# Patient Record
Sex: FEMALE | Race: WHITE | NOT HISPANIC OR LATINO | Employment: OTHER | URBAN - NONMETROPOLITAN AREA
[De-identification: names, ages, dates, MRNs, and addresses within clinical notes are randomized per-mention and may not be internally consistent; named-entity substitution may affect disease eponyms.]

---

## 2017-01-23 ENCOUNTER — HOSPITAL ENCOUNTER (OUTPATIENT)
Dept: GENERAL RADIOLOGY | Facility: HOSPITAL | Age: 72
Discharge: HOME | End: 2017-01-23
Attending: INTERNAL MEDICINE | Admitting: INTERNAL MEDICINE

## 2017-01-23 ENCOUNTER — TRANSCRIBE ORDERS (OUTPATIENT)
Dept: ADMINISTRATIVE | Facility: HOSPITAL | Age: 72
End: 2017-01-23

## 2017-01-23 DIAGNOSIS — M25.562 LEFT KNEE PAIN, UNSPECIFIED CHRONICITY: ICD-10-CM

## 2017-01-23 DIAGNOSIS — M25.562 LEFT KNEE PAIN, UNSPECIFIED CHRONICITY: Primary | ICD-10-CM

## 2017-01-23 PROCEDURE — 73564 X-RAY EXAM KNEE 4 OR MORE: CPT

## 2017-01-23 PROCEDURE — 73564 X-RAY EXAM KNEE 4 OR MORE: CPT | Performed by: RADIOLOGY

## 2017-02-07 DIAGNOSIS — M25.562 LEFT KNEE PAIN, UNSPECIFIED CHRONICITY: Primary | ICD-10-CM

## 2017-02-13 ENCOUNTER — APPOINTMENT (OUTPATIENT)
Dept: GENERAL RADIOLOGY | Facility: HOSPITAL | Age: 72
End: 2017-02-13
Attending: ORTHOPAEDIC SURGERY

## 2018-11-07 ENCOUNTER — OFFICE VISIT (OUTPATIENT)
Dept: ORTHOPEDIC SURGERY | Facility: CLINIC | Age: 73
End: 2018-11-07

## 2018-11-07 VITALS
HEART RATE: 66 BPM | DIASTOLIC BLOOD PRESSURE: 54 MMHG | HEIGHT: 60 IN | WEIGHT: 100 LBS | SYSTOLIC BLOOD PRESSURE: 121 MMHG | BODY MASS INDEX: 19.63 KG/M2

## 2018-11-07 DIAGNOSIS — M79.642 HAND PAIN, LEFT: ICD-10-CM

## 2018-11-07 DIAGNOSIS — M65.332 TRIGGER MIDDLE FINGER OF LEFT HAND: Primary | ICD-10-CM

## 2018-11-07 PROCEDURE — 99406 BEHAV CHNG SMOKING 3-10 MIN: CPT | Performed by: ORTHOPAEDIC SURGERY

## 2018-11-07 PROCEDURE — 99204 OFFICE O/P NEW MOD 45 MIN: CPT | Performed by: ORTHOPAEDIC SURGERY

## 2018-11-07 RX ORDER — TRAMADOL HYDROCHLORIDE 50 MG/1
50 TABLET ORAL EVERY 6 HOURS PRN
Status: ON HOLD | COMMUNITY
End: 2018-12-18 | Stop reason: SDUPTHER

## 2018-11-07 RX ORDER — LISINOPRIL 10 MG/1
10 TABLET ORAL DAILY
Status: ON HOLD | COMMUNITY
Start: 2016-09-12 | End: 2019-04-20

## 2018-11-07 RX ORDER — PYRIDOXINE HCL (VITAMIN B6) 50 MG
100 TABLET ORAL DAILY
COMMUNITY
End: 2018-11-21

## 2018-11-07 RX ORDER — ERGOCALCIFEROL 1.25 MG/1
50000 CAPSULE ORAL WEEKLY
Status: ON HOLD | COMMUNITY
End: 2019-04-20

## 2018-11-07 RX ORDER — DILTIAZEM HYDROCHLORIDE 240 MG/1
240 CAPSULE, COATED, EXTENDED RELEASE ORAL DAILY
COMMUNITY
Start: 2016-12-19 | End: 2018-12-18 | Stop reason: HOSPADM

## 2018-11-07 RX ORDER — ATENOLOL 25 MG/1
25 TABLET ORAL DAILY
COMMUNITY
Start: 2013-11-19 | End: 2019-03-28

## 2018-11-07 NOTE — PROGRESS NOTES
History & Physical      Patient: Anita Roche  YOB: 1945  Date of Encounter: 11/07/2018        Chief Complaint:   Chief Complaint   Patient presents with   • Left Hand - Pain, Follow-up       HPI:   Anita Roche, 73 y.o. female, referred by Diann Ferreira APRN presents today with pain in her palm along her fourth finger and complaints of locking sensation which is very painful.  She is right-hand dominant.  She denies weakness or numbness in her hand.  She has no other major complaints in her hand.  She has had trigger finger release right hand in the past.    Active Problem List:  Patient Active Problem List   Diagnosis   • Chronic fatigue syndrome   • Knee pain   • Closed wedge compression fracture of thoracic vertebra (CMS/HCC)   • Atherosclerosis of coronary artery   • Persistent cough   • Degeneration of intervertebral disc of lumbar region   • Stricture of esophagus   • Hand pain, left       Past Medical History:  Past Medical History:   Diagnosis Date   • Hypertension        Past Surgical History:  Past Surgical History:   Procedure Laterality Date   • BACK SURGERY     • CATARACT EXTRACTION, BILATERAL     • CHOLECYSTECTOMY     • TONSILLECTOMY AND ADENOIDECTOMY     • TRIGGER FINGER RELEASE Right     Third and Fourth Fingers Dr. Ashley       Family History:  Family History   Problem Relation Age of Onset   • Hypertension Mother    • Hypertension Father    • Cancer Sister    • Hypertension Sister        Social History:  Social History     Social History   • Marital status:      Spouse name: N/A   • Number of children: N/A   • Years of education: N/A     Occupational History   •       Works for [employer]     Social History Main Topics   • Smoking status: Current Every Day Smoker     Packs/day: 0.25     Years: 30.00   • Smokeless tobacco: Never Used   • Alcohol use No   • Drug use: No   • Sexual activity: Defer     Other Topics Concern   • Not on file     Social History Narrative    • No narrative on file     Patient's Body mass index is 19.53 kg/m². BMI is below normal parameters. Recommendations include: no follow-up required.    I advised Anita of the risks of continuing to use tobacco, and I provided her with tobacco cessation educational materials in the After Visit Summary.     During this visit, I spent 3 minutes counseling the patient regarding tobacco cessation.    Medications:  Current Outpatient Prescriptions   Medication Sig Dispense Refill   • aspirin 81 MG tablet Take 2 tablets by mouth.     • atenolol (TENORMIN) 25 MG tablet Take  by mouth.     • cyanocobalamin (CYANOCOBALAMIN) 100 MCG tablet tablet Take 100 mcg by mouth Daily.     • diltiaZEM CD (CARDIZEM CD) 240 MG 24 hr capsule Take  by mouth.     • lisinopril (PRINIVIL,ZESTRIL) 10 MG tablet Take  by mouth.     • traMADol (ULTRAM) 50 MG tablet Take 50 mg by mouth Every 6 (Six) Hours As Needed for Moderate Pain .     • vitamin D (ERGOCALCIFEROL) 69877 units capsule capsule Take 50,000 Units by mouth 1 (One) Time Per Week.       No current facility-administered medications for this visit.        Allergies:  Allergies   Allergen Reactions   • Atorvastatin Unknown (See Comments)   • Meperidine Unknown (See Comments)   • Sulfa Antibiotics Nausea Only   • Versed [Midazolam] Other (See Comments)     Patient states she can not wake up       Review of Systems:   Review of Systems   Constitutional: Negative.    HENT: Negative.    Eyes: Negative.    Respiratory: Positive for cough and shortness of breath.    Cardiovascular: Negative.    Gastrointestinal: Negative.    Endocrine: Negative.    Genitourinary: Negative.    Musculoskeletal: Positive for arthralgias.   Skin: Negative.    Allergic/Immunologic: Negative.    Neurological: Negative.    Hematological: Negative.    Psychiatric/Behavioral: Negative.        Physical Exam:   Physical Exam   Constitutional: She is oriented to person, place, and time. She appears well-developed. No  "distress.   HENT:   Head: Normocephalic and atraumatic.   Right Ear: External ear normal.   Left Ear: External ear normal.   Eyes: Conjunctivae and EOM are normal. Right eye exhibits no discharge. Left eye exhibits no discharge.   Neck: Normal range of motion. Neck supple.   Cardiovascular: Normal rate, regular rhythm and normal heart sounds.    No murmur heard.  Pulmonary/Chest: Effort normal and breath sounds normal. No respiratory distress.   Abdominal: Soft. She exhibits no distension.   Musculoskeletal:   Left hand evaluation reveals palpable nodule within the palm in line with the third finger.  She does demonstrate triggering with significant swelling and pain with attempted motion.  Neurovascular is grossly intact.   Neurological: She is alert and oriented to person, place, and time.   Skin: Skin is warm and dry. She is not diaphoretic.   Psychiatric: She has a normal mood and affect. Her behavior is normal. Judgment and thought content normal.     GENERAL: 73 y.o. female, alert and oriented X 3 in no acute distress.   Visit Vitals  /54   Pulse 66   Ht 152.4 cm (60\")   Wt 45.4 kg (100 lb)   BMI 19.53 kg/m²         Assessment & Plan:   73 y.o. female with obvious triggering left third finger.  We discussed her options, risks and benefits and I doubt she would have long-term success with steroid injection and she is more interested in obtaining permanent relief and wishes to proceed with surgical option. We have therefore scheduled her for trigger finger release left third distal to be done under local anesthetic at [facility].       ICD-10-CM ICD-9-CM   1. Trigger middle finger of left hand M65.332 727.03   2. Hand pain, left M79.642 729.5               Cc:   Diann Ferreira APRN          Scribed for Garret De Anda MD by Hue De Anda RN.10:39 AM 11/07/2018    "

## 2018-11-08 PROBLEM — M25.569 KNEE PAIN: Status: ACTIVE | Noted: 2018-11-08

## 2018-11-08 PROBLEM — G93.32 CHRONIC FATIGUE SYNDROME: Status: ACTIVE | Noted: 2018-11-08

## 2018-11-08 PROBLEM — I25.10 ATHEROSCLEROSIS OF CORONARY ARTERY: Status: ACTIVE | Noted: 2018-11-08

## 2018-11-08 PROBLEM — K22.2 STRICTURE OF ESOPHAGUS: Status: ACTIVE | Noted: 2018-11-08

## 2018-11-08 PROBLEM — R05.3 PERSISTENT COUGH: Status: ACTIVE | Noted: 2018-11-08

## 2018-11-08 PROBLEM — M79.642 HAND PAIN, LEFT: Status: ACTIVE | Noted: 2018-11-08

## 2018-11-08 PROBLEM — S22.000A CLOSED WEDGE COMPRESSION FRACTURE OF THORACIC VERTEBRA (HCC): Status: ACTIVE | Noted: 2018-11-08

## 2018-11-19 ENCOUNTER — TELEPHONE (OUTPATIENT)
Dept: ORTHOPEDIC SURGERY | Facility: CLINIC | Age: 73
End: 2018-11-19

## 2018-11-19 PROBLEM — M65.332 TRIGGER MIDDLE FINGER OF LEFT HAND: Status: ACTIVE | Noted: 2018-11-19

## 2018-11-19 NOTE — TELEPHONE ENCOUNTER
Patient has been notified concerning PAT date/time 11-21-18@11:30. Patient verbalized understanding.

## 2018-11-21 ENCOUNTER — APPOINTMENT (OUTPATIENT)
Dept: PREADMISSION TESTING | Facility: HOSPITAL | Age: 73
End: 2018-11-21

## 2018-11-21 VITALS — WEIGHT: 98.8 LBS | BODY MASS INDEX: 19.3 KG/M2

## 2018-11-21 LAB
ANION GAP SERPL CALCULATED.3IONS-SCNC: 4.4 MMOL/L (ref 3.6–11.2)
BASOPHILS # BLD AUTO: 0.03 10*3/MM3 (ref 0–0.3)
BASOPHILS NFR BLD AUTO: 0.3 % (ref 0–2)
BUN BLD-MCNC: 13 MG/DL (ref 7–21)
BUN/CREAT SERPL: 16.5 (ref 7–25)
CALCIUM SPEC-SCNC: 9.7 MG/DL (ref 7.7–10)
CHLORIDE SERPL-SCNC: 110 MMOL/L (ref 99–112)
CO2 SERPL-SCNC: 26.6 MMOL/L (ref 24.3–31.9)
CREAT BLD-MCNC: 0.79 MG/DL (ref 0.43–1.29)
DEPRECATED RDW RBC AUTO: 50 FL (ref 37–54)
EOSINOPHIL # BLD AUTO: 0.19 10*3/MM3 (ref 0–0.7)
EOSINOPHIL NFR BLD AUTO: 1.9 % (ref 0–7)
ERYTHROCYTE [DISTWIDTH] IN BLOOD BY AUTOMATED COUNT: 13.5 % (ref 11.5–14.5)
GFR SERPL CREATININE-BSD FRML MDRD: 72 ML/MIN/1.73
GLUCOSE BLD-MCNC: 70 MG/DL (ref 70–110)
HCT VFR BLD AUTO: 46.3 % (ref 37–47)
HGB BLD-MCNC: 14.9 G/DL (ref 12–16)
IMM GRANULOCYTES # BLD: 0.02 10*3/MM3 (ref 0–0.03)
IMM GRANULOCYTES NFR BLD: 0.2 % (ref 0–0.5)
LYMPHOCYTES # BLD AUTO: 1.94 10*3/MM3 (ref 1–3)
LYMPHOCYTES NFR BLD AUTO: 19.2 % (ref 16–46)
MCH RBC QN AUTO: 32.7 PG (ref 27–33)
MCHC RBC AUTO-ENTMCNC: 32.2 G/DL (ref 33–37)
MCV RBC AUTO: 101.5 FL (ref 80–94)
MONOCYTES # BLD AUTO: 0.83 10*3/MM3 (ref 0.1–0.9)
MONOCYTES NFR BLD AUTO: 8.2 % (ref 0–12)
NEUTROPHILS # BLD AUTO: 7.08 10*3/MM3 (ref 1.4–6.5)
NEUTROPHILS NFR BLD AUTO: 70.2 % (ref 40–75)
OSMOLALITY SERPL CALC.SUM OF ELEC: 279.8 MOSM/KG (ref 273–305)
PLATELET # BLD AUTO: 199 10*3/MM3 (ref 130–400)
PMV BLD AUTO: 13 FL (ref 6–10)
POTASSIUM BLD-SCNC: 4 MMOL/L (ref 3.5–5.3)
RBC # BLD AUTO: 4.56 10*6/MM3 (ref 4.2–5.4)
SODIUM BLD-SCNC: 141 MMOL/L (ref 135–153)
WBC NRBC COR # BLD: 10.09 10*3/MM3 (ref 4.5–12.5)

## 2018-11-21 PROCEDURE — 93005 ELECTROCARDIOGRAM TRACING: CPT

## 2018-11-21 PROCEDURE — 85025 COMPLETE CBC W/AUTO DIFF WBC: CPT | Performed by: ANESTHESIOLOGY

## 2018-11-21 PROCEDURE — 36415 COLL VENOUS BLD VENIPUNCTURE: CPT

## 2018-11-21 PROCEDURE — 80048 BASIC METABOLIC PNL TOTAL CA: CPT | Performed by: ANESTHESIOLOGY

## 2018-11-21 RX ORDER — UBIDECARENONE 75 MG
50 CAPSULE ORAL DAILY
Status: ON HOLD | COMMUNITY
End: 2018-11-27

## 2018-11-21 RX ORDER — CYANOCOBALAMIN 1000 UG/ML
1000 INJECTION, SOLUTION INTRAMUSCULAR; SUBCUTANEOUS
COMMUNITY
End: 2019-03-28

## 2018-11-21 NOTE — DISCHARGE INSTRUCTIONS
TAKE the following medications the morning of surgery:  All heart or blood pressure medications    HOLD all diabetic medications the morning of surgery as ordered by physician.    Please discontinue all blood thinners and anticoagulants (except aspirin) prior to surgery as per your surgeon and cardiologist instructions.  Aspirin may be continued up to the day prior to surgery.     CHLORHEXIDINE CLOTHS GIVEN WITH INSTRUCTIONS AND FORM TO RETURN TO HOSPITAL    General Instructions:  · Do not eat or drink after midnight: includes water, mints, or gum. You may brush your teeth.  Dental appliances that are removable must be taken out day of surgery.  · Do not smoke, chew tobacco, or drink alcohol.  · Bring medications in original bottles, any inhalers and if applicable your C-PAP/BI-PAP machine.  · Bring any papers given to you in the doctor's office.  · Wear clean comfortable clothes and socks.  · Do not wear contact lenses or make-up. Bring a case for your glasses if applicable.  · Bring crutches or walker if applicable.  · Leave all other valuables and jewelry at home.    If you were given a blood bank ID arm band remember to bring it with you the day of surgery.    Preventing a Surgical Site Infection:  Shower the night before surgery (unless instructed other wise) using a fresh bar of anti-bacterial soap (such as Dial) and clean washcloth. Dry with a clean towel and dress in clean clothing.  For 2 to 3 days before surgery, avoid shaving with a razor near where you will have surgery because the razor can irritate skin and make it easier to develop an infection. Ask your surgeon if you will be receiving antibiotics prior to surgery.  Make sure you, your family, and all healthcare providers clear their hands with soap and water or an alcohol-based hand  before caring for you or your wound.  If at all possible, quit smoking as many days before surgery as you can.    Day of surgery:  Upon arrival, a Pre-op nurse  and Anesthesiologist will review your health history, obtain vital signs, and answer questions you may have. The only belongings needed at this time will be your home medications and if applicable your C-PAP/BI-PAP machine. If you are staying overnight your family can leave the rest of your belongings in the car and bring them to your room later. A Pre-op nurse will start an IV and you may receive medication in preparation for surgery, including something to help you relax. Your family will be able to see you in the Pre-op area. While you are in surgery your family should notify the waiting room  if they leave the waiting room area and provide a contact phone number.    Please be aware that surgery does come with discomfort. We want to make every effort to control your discomfort so please discuss any uncontrolled symptoms with your nurse. Your doctor will most likely have prescribed pain medications.    If you are going home after surgery you will receive individualized written care instructions before being discharged. A responsible adult must drive you to and from the hospital on the day of surgery and stay with you for 24 hours.    If you are staying overnight following surgery, you will be transported to your hospital room following the recovery period.  [facility] has all private rooms.    If you have any questions please call Pre-Admission Testing at [phone].  Deductibles and co-payments are collected on the day of service. Please be prepared to pay the required co-pay, deductible or deposit on the day of service as defined by your plan.

## 2018-11-26 ENCOUNTER — TELEPHONE (OUTPATIENT)
Dept: ORTHOPEDIC SURGERY | Facility: CLINIC | Age: 73
End: 2018-11-26

## 2018-11-27 ENCOUNTER — PREP FOR SURGERY (OUTPATIENT)
Dept: OTHER | Facility: HOSPITAL | Age: 73
End: 2018-11-27

## 2018-11-27 ENCOUNTER — HOSPITAL ENCOUNTER (OUTPATIENT)
Facility: HOSPITAL | Age: 73
Setting detail: HOSPITAL OUTPATIENT SURGERY
Discharge: HOME | End: 2018-11-27
Attending: ORTHOPAEDIC SURGERY | Admitting: ORTHOPAEDIC SURGERY

## 2018-11-27 VITALS
BODY MASS INDEX: 19.44 KG/M2 | TEMPERATURE: 97.6 F | SYSTOLIC BLOOD PRESSURE: 164 MMHG | OXYGEN SATURATION: 97 % | DIASTOLIC BLOOD PRESSURE: 70 MMHG | WEIGHT: 99 LBS | HEIGHT: 60 IN | HEART RATE: 72 BPM | RESPIRATION RATE: 18 BRPM

## 2018-11-27 DIAGNOSIS — M79.642 HAND PAIN, LEFT: ICD-10-CM

## 2018-11-27 DIAGNOSIS — M65.332 TRIGGER MIDDLE FINGER OF LEFT HAND: ICD-10-CM

## 2018-11-27 PROCEDURE — 26055 INCISE FINGER TENDON SHEATH: CPT | Performed by: ORTHOPAEDIC SURGERY

## 2018-11-27 RX ORDER — LIDOCAINE HYDROCHLORIDE 10 MG/ML
INJECTION, SOLUTION EPIDURAL; INFILTRATION; INTRACAUDAL; PERINEURAL AS NEEDED
Status: DISCONTINUED | OUTPATIENT
Start: 2018-11-27 | End: 2018-11-27 | Stop reason: HOSPADM

## 2018-11-27 RX ORDER — HYDROCODONE BITARTRATE AND ACETAMINOPHEN 7.5; 325 MG/1; MG/1
1 TABLET ORAL EVERY 6 HOURS PRN
Qty: 8 TABLET | Refills: 0 | Status: SHIPPED | OUTPATIENT
Start: 2018-11-27 | End: 2018-12-18 | Stop reason: HOSPADM

## 2018-11-27 RX ORDER — MAGNESIUM HYDROXIDE 1200 MG/15ML
LIQUID ORAL AS NEEDED
Status: DISCONTINUED | OUTPATIENT
Start: 2018-11-27 | End: 2018-11-27 | Stop reason: HOSPADM

## 2018-11-27 RX ORDER — BUPIVACAINE HYDROCHLORIDE 5 MG/ML
INJECTION, SOLUTION PERINEURAL AS NEEDED
Status: DISCONTINUED | OUTPATIENT
Start: 2018-11-27 | End: 2018-11-27 | Stop reason: HOSPADM

## 2018-11-27 NOTE — H&P (VIEW-ONLY)
History & Physical        Patient: Anita Roche  YOB: 1945  Date of Encounter: 11/07/2018           Chief Complaint:       Chief Complaint   Patient presents with   • Left Hand - Pain, Follow-up         HPI:   Anita Roche, 73 y.o. female, referred by Diann Ferreira APRN presents today with pain in her palm along her fourth finger and complaints of locking sensation which is very painful.  She is right-hand dominant.  She denies weakness or numbness in her hand.  She has no other major complaints in her hand.  She has had trigger finger release right hand in the past.     Active Problem List:      Patient Active Problem List   Diagnosis   • Chronic fatigue syndrome   • Knee pain   • Closed wedge compression fracture of thoracic vertebra (CMS/HCC)   • Atherosclerosis of coronary artery   • Persistent cough   • Degeneration of intervertebral disc of lumbar region   • Stricture of esophagus   • Hand pain, left         Past Medical History:  Medical History        Past Medical History:   Diagnosis Date   • Hypertension              Past Surgical History:  Surgical History         Past Surgical History:   Procedure Laterality Date   • BACK SURGERY       • CATARACT EXTRACTION, BILATERAL       • CHOLECYSTECTOMY       • TONSILLECTOMY AND ADENOIDECTOMY       • TRIGGER FINGER RELEASE Right       Third and Fourth Fingers Dr. Ashley            Family History:        Family History   Problem Relation Age of Onset   • Hypertension Mother     • Hypertension Father     • Cancer Sister     • Hypertension Sister           Social History:  Social History               Social History   • Marital status:        Spouse name: N/A   • Number of children: N/A   • Years of education: N/A            Occupational History   •          Works for [employer]            Social History Main Topics   • Smoking status: Current Every Day Smoker       Packs/day: 0.25       Years: 30.00   • Smokeless tobacco: Never Used    • Alcohol use No   • Drug use: No   • Sexual activity: Defer           Other Topics Concern   • Not on file          Social History Narrative   • No narrative on file         Patient's Body mass index is 19.53 kg/m². BMI is below normal parameters. Recommendations include: no follow-up required.     I advised Anita of the risks of continuing to use tobacco, and I provided her with tobacco cessation educational materials in the After Visit Summary.      During this visit, I spent 3 minutes counseling the patient regarding tobacco cessation.     Medications:  Current Medications          Current Outpatient Prescriptions   Medication Sig Dispense Refill   • aspirin 81 MG tablet Take 2 tablets by mouth.       • atenolol (TENORMIN) 25 MG tablet Take  by mouth.       • cyanocobalamin (CYANOCOBALAMIN) 100 MCG tablet tablet Take 100 mcg by mouth Daily.       • diltiaZEM CD (CARDIZEM CD) 240 MG 24 hr capsule Take  by mouth.       • lisinopril (PRINIVIL,ZESTRIL) 10 MG tablet Take  by mouth.       • traMADol (ULTRAM) 50 MG tablet Take 50 mg by mouth Every 6 (Six) Hours As Needed for Moderate Pain .       • vitamin D (ERGOCALCIFEROL) 98909 units capsule capsule Take 50,000 Units by mouth 1 (One) Time Per Week.          No current facility-administered medications for this visit.             Allergies:        Allergies   Allergen Reactions   • Atorvastatin Unknown (See Comments)   • Meperidine Unknown (See Comments)   • Sulfa Antibiotics Nausea Only   • Versed [Midazolam] Other (See Comments)       Patient states she can not wake up         Review of Systems:   Review of Systems   Constitutional: Negative.    HENT: Negative.    Eyes: Negative.    Respiratory: Positive for cough and shortness of breath.    Cardiovascular: Negative.    Gastrointestinal: Negative.    Endocrine: Negative.    Genitourinary: Negative.    Musculoskeletal: Positive for arthralgias.   Skin: Negative.    Allergic/Immunologic: Negative.    Neurological:  "Negative.    Hematological: Negative.    Psychiatric/Behavioral: Negative.          Physical Exam:   Physical Exam   Constitutional: She is oriented to person, place, and time. She appears well-developed. No distress.   HENT:   Head: Normocephalic and atraumatic.   Right Ear: External ear normal.   Left Ear: External ear normal.   Eyes: Conjunctivae and EOM are normal. Right eye exhibits no discharge. Left eye exhibits no discharge.   Neck: Normal range of motion. Neck supple.   Cardiovascular: Normal rate, regular rhythm and normal heart sounds.    No murmur heard.  Pulmonary/Chest: Effort normal and breath sounds normal. No respiratory distress.   Abdominal: Soft. She exhibits no distension.   Musculoskeletal:   Left hand evaluation reveals palpable nodule within the palm in line with the third finger.  She does demonstrate triggering with significant swelling and pain with attempted motion.  Neurovascular is grossly intact.   Neurological: She is alert and oriented to person, place, and time.   Skin: Skin is warm and dry. She is not diaphoretic.   Psychiatric: She has a normal mood and affect. Her behavior is normal. Judgment and thought content normal.      GENERAL: 73 y.o. female, alert and oriented X 3 in no acute distress.   Visit Vitals  /54   Pulse 66   Ht 152.4 cm (60\")   Wt 45.4 kg (100 lb)   BMI 19.53 kg/m²            Assessment & Plan:   73 y.o. female with obvious triggering left third finger.  We discussed her options, risks and benefits and I doubt she would have long-term success with steroid injection and she is more interested in obtaining permanent relief and wishes to proceed with surgical option. We have therefore scheduled her for trigger finger release left third distal to be done under local anesthetic at [facility].          ICD-10-CM ICD-9-CM   1. Trigger middle finger of left hand M65.332 727.03   2. Hand pain, left M79.642 729.5                     Cc:   Diann Ferreira " SAMREEN Guerra              Scribed for Garret De Anda MD by Hue De Anda RN.10:39 AM 11/07/2018

## 2018-11-27 NOTE — H&P
History & Physical        Patient: Anita Roche  YOB: 1945  Date of Encounter: 11/07/2018           Chief Complaint:       Chief Complaint   Patient presents with   • Left Hand - Pain, Follow-up         HPI:   Anita Roche, 73 y.o. female, referred by Diann Ferreira APRN presents today with pain in her palm along her fourth finger and complaints of locking sensation which is very painful.  She is right-hand dominant.  She denies weakness or numbness in her hand.  She has no other major complaints in her hand.  She has had trigger finger release right hand in the past.     Active Problem List:      Patient Active Problem List   Diagnosis   • Chronic fatigue syndrome   • Knee pain   • Closed wedge compression fracture of thoracic vertebra (CMS/HCC)   • Atherosclerosis of coronary artery   • Persistent cough   • Degeneration of intervertebral disc of lumbar region   • Stricture of esophagus   • Hand pain, left         Past Medical History:  Medical History        Past Medical History:   Diagnosis Date   • Hypertension              Past Surgical History:  Surgical History         Past Surgical History:   Procedure Laterality Date   • BACK SURGERY       • CATARACT EXTRACTION, BILATERAL       • CHOLECYSTECTOMY       • TONSILLECTOMY AND ADENOIDECTOMY       • TRIGGER FINGER RELEASE Right       Third and Fourth Fingers Dr. Ashley            Family History:        Family History   Problem Relation Age of Onset   • Hypertension Mother     • Hypertension Father     • Cancer Sister     • Hypertension Sister           Social History:  Social History               Social History   • Marital status:        Spouse name: N/A   • Number of children: N/A   • Years of education: N/A            Occupational History   •          Works for [employer]            Social History Main Topics   • Smoking status: Current Every Day Smoker       Packs/day: 0.25       Years: 30.00   • Smokeless tobacco: Never Used    • Alcohol use No   • Drug use: No   • Sexual activity: Defer           Other Topics Concern   • Not on file          Social History Narrative   • No narrative on file         Patient's Body mass index is 19.53 kg/m². BMI is below normal parameters. Recommendations include: no follow-up required.     I advised Anita of the risks of continuing to use tobacco, and I provided her with tobacco cessation educational materials in the After Visit Summary.      During this visit, I spent 3 minutes counseling the patient regarding tobacco cessation.     Medications:  Current Medications          Current Outpatient Prescriptions   Medication Sig Dispense Refill   • aspirin 81 MG tablet Take 2 tablets by mouth.       • atenolol (TENORMIN) 25 MG tablet Take  by mouth.       • cyanocobalamin (CYANOCOBALAMIN) 100 MCG tablet tablet Take 100 mcg by mouth Daily.       • diltiaZEM CD (CARDIZEM CD) 240 MG 24 hr capsule Take  by mouth.       • lisinopril (PRINIVIL,ZESTRIL) 10 MG tablet Take  by mouth.       • traMADol (ULTRAM) 50 MG tablet Take 50 mg by mouth Every 6 (Six) Hours As Needed for Moderate Pain .       • vitamin D (ERGOCALCIFEROL) 72584 units capsule capsule Take 50,000 Units by mouth 1 (One) Time Per Week.          No current facility-administered medications for this visit.             Allergies:        Allergies   Allergen Reactions   • Atorvastatin Unknown (See Comments)   • Meperidine Unknown (See Comments)   • Sulfa Antibiotics Nausea Only   • Versed [Midazolam] Other (See Comments)       Patient states she can not wake up         Review of Systems:   Review of Systems   Constitutional: Negative.    HENT: Negative.    Eyes: Negative.    Respiratory: Positive for cough and shortness of breath.    Cardiovascular: Negative.    Gastrointestinal: Negative.    Endocrine: Negative.    Genitourinary: Negative.    Musculoskeletal: Positive for arthralgias.   Skin: Negative.    Allergic/Immunologic: Negative.    Neurological:  "Negative.    Hematological: Negative.    Psychiatric/Behavioral: Negative.          Physical Exam:   Physical Exam   Constitutional: She is oriented to person, place, and time. She appears well-developed. No distress.   HENT:   Head: Normocephalic and atraumatic.   Right Ear: External ear normal.   Left Ear: External ear normal.   Eyes: Conjunctivae and EOM are normal. Right eye exhibits no discharge. Left eye exhibits no discharge.   Neck: Normal range of motion. Neck supple.   Cardiovascular: Normal rate, regular rhythm and normal heart sounds.    No murmur heard.  Pulmonary/Chest: Effort normal and breath sounds normal. No respiratory distress.   Abdominal: Soft. She exhibits no distension.   Musculoskeletal:   Left hand evaluation reveals palpable nodule within the palm in line with the third finger.  She does demonstrate triggering with significant swelling and pain with attempted motion.  Neurovascular is grossly intact.   Neurological: She is alert and oriented to person, place, and time.   Skin: Skin is warm and dry. She is not diaphoretic.   Psychiatric: She has a normal mood and affect. Her behavior is normal. Judgment and thought content normal.      GENERAL: 73 y.o. female, alert and oriented X 3 in no acute distress.   Visit Vitals  /54   Pulse 66   Ht 152.4 cm (60\")   Wt 45.4 kg (100 lb)   BMI 19.53 kg/m²            Assessment & Plan:   73 y.o. female with obvious triggering left third finger.  We discussed her options, risks and benefits and I doubt she would have long-term success with steroid injection and she is more interested in obtaining permanent relief and wishes to proceed with surgical option. We have therefore scheduled her for trigger finger release left third distal to be done under local anesthetic at [facility].          ICD-10-CM ICD-9-CM   1. Trigger middle finger of left hand M65.332 727.03   2. Hand pain, left M79.642 729.5                     Cc:   Diann Ferreira " SAMREEN Guerra              Scribed for Garret De Anda MD by Hue De Anda RN.10:39 AM 11/07/2018

## 2018-11-27 NOTE — OP NOTE
FINGER TRIGGER RELEASE  Procedure Note    Anita Roche  11/27/2018    Pre-op Diagnosis:   Trigger Finger left third    Post-op Diagnosis:     Post-Op Diagnosis Codes:         Trigger finger left third    Procedure(s):  TRIGGER FINGER  RELEASE LEFT THIRD    Surgeon(s):  Garret De Anda MD    Anesthesia: Local    Operative technique: With patient in the operating theatre under left hand and arm were sterilely prepped and draped in the usual manner with tourniquet applied.  Palmar aspect left hand in line with the third finger at the level of the distal thenar crease was infiltrated with 3 cc of 0.5 Marcaine.  With the extremity exsanguinated and the tourniquet inflated transverse incision was made 1.5 cm in length.  With skin divided sharply underlying flexor tendon was exposed and at the level of the A1 pulley the sheath was divided including the A1 pulley.  The third finger was taken through full excursion demonstrates no further triggering.  Tourniquet was deflated hemostasis was obtained with electrocautery.  The wound closed with 3-0 nylon vertical mattress suture sterile bulky dressing was applied.  She was taken to recovery room in stable condition.    Staff:   Circulator: Rosa Todd RN; Rachid De Luna RN  Scrub Person: Shashank Noriega  Monitor/Sedation Nurse: Tim Griffiths RN  Assistant: Ravinder Gentile    Estimated Blood Loss: None    Specimens:   none             * No orders in the log *    Implants/Grafts: none      Drains:  None      Complications: none    Tourniquet time: 8  min    Garret De Anda MD     Date: 11/27/2018  Time: 12:28 PM    Cc: Diann Ferreira APRN

## 2018-12-08 ENCOUNTER — APPOINTMENT (OUTPATIENT)
Dept: MRI IMAGING | Facility: HOSPITAL | Age: 73
End: 2018-12-08

## 2018-12-08 ENCOUNTER — APPOINTMENT (OUTPATIENT)
Dept: GENERAL RADIOLOGY | Facility: HOSPITAL | Age: 73
End: 2018-12-08

## 2018-12-08 ENCOUNTER — APPOINTMENT (OUTPATIENT)
Dept: CT IMAGING | Facility: HOSPITAL | Age: 73
End: 2018-12-08

## 2018-12-08 ENCOUNTER — HOSPITAL ENCOUNTER (EMERGENCY)
Facility: HOSPITAL | Age: 73
Discharge: ANOTHER HOSPITAL | End: 2018-12-08
Attending: EMERGENCY MEDICINE | Admitting: EMERGENCY MEDICINE

## 2018-12-08 ENCOUNTER — HOSPITAL ENCOUNTER (INPATIENT)
Facility: HOSPITAL | Age: 73
LOS: 9 days | Discharge: NURSING, REHAB OR LONG-TERM CARE | End: 2018-12-18
Attending: FAMILY MEDICINE | Admitting: INTERNAL MEDICINE

## 2018-12-08 VITALS
BODY MASS INDEX: 19.43 KG/M2 | SYSTOLIC BLOOD PRESSURE: 147 MMHG | HEART RATE: 80 BPM | OXYGEN SATURATION: 95 % | RESPIRATION RATE: 16 BRPM | DIASTOLIC BLOOD PRESSURE: 75 MMHG | TEMPERATURE: 98.8 F | WEIGHT: 98.99 LBS | HEIGHT: 60 IN

## 2018-12-08 DIAGNOSIS — I63.231 CEREBROVASCULAR ACCIDENT (CVA) DUE TO OCCLUSION OF RIGHT CAROTID ARTERY (HCC): ICD-10-CM

## 2018-12-08 DIAGNOSIS — R13.12 OROPHARYNGEAL DYSPHAGIA: Primary | ICD-10-CM

## 2018-12-08 DIAGNOSIS — R41.841 COGNITIVE COMMUNICATION DEFICIT: ICD-10-CM

## 2018-12-08 DIAGNOSIS — Z74.09 IMPAIRED MOBILITY AND ADLS: ICD-10-CM

## 2018-12-08 DIAGNOSIS — Z74.09 IMPAIRED FUNCTIONAL MOBILITY, BALANCE, GAIT, AND ENDURANCE: ICD-10-CM

## 2018-12-08 DIAGNOSIS — I63.9 CEREBROVASCULAR ACCIDENT (CVA), UNSPECIFIED MECHANISM (HCC): Primary | ICD-10-CM

## 2018-12-08 DIAGNOSIS — Z78.9 IMPAIRED MOBILITY AND ADLS: ICD-10-CM

## 2018-12-08 PROBLEM — E87.6 HYPOKALEMIA: Status: ACTIVE | Noted: 2018-12-08

## 2018-12-08 PROBLEM — Z72.0 TOBACCO USE: Chronic | Status: ACTIVE | Noted: 2018-12-08

## 2018-12-08 PROBLEM — R53.1 LEFT-SIDED WEAKNESS: Status: ACTIVE | Noted: 2018-12-08

## 2018-12-08 LAB
ALBUMIN SERPL-MCNC: 4.4 G/DL (ref 3.4–4.8)
ALBUMIN/GLOB SERPL: 1.8 G/DL (ref 1.5–2.5)
ALP SERPL-CCNC: 79 U/L (ref 35–104)
ALT SERPL W P-5'-P-CCNC: 21 U/L (ref 10–36)
ANION GAP SERPL CALCULATED.3IONS-SCNC: 6.3 MMOL/L (ref 3.6–11.2)
AST SERPL-CCNC: 23 U/L (ref 10–30)
BASOPHILS # BLD AUTO: 0.02 10*3/MM3 (ref 0–0.3)
BASOPHILS NFR BLD AUTO: 0.2 % (ref 0–2)
BILIRUB SERPL-MCNC: 0.6 MG/DL (ref 0.2–1.8)
BUN BLD-MCNC: 17 MG/DL (ref 7–21)
BUN/CREAT SERPL: 21 (ref 7–25)
CALCIUM SPEC-SCNC: 9.6 MG/DL (ref 7.7–10)
CHLORIDE SERPL-SCNC: 110 MMOL/L (ref 99–112)
CO2 SERPL-SCNC: 24.7 MMOL/L (ref 24.3–31.9)
CREAT BLD-MCNC: 0.81 MG/DL (ref 0.43–1.29)
DEPRECATED RDW RBC AUTO: 49.2 FL (ref 37–54)
EOSINOPHIL # BLD AUTO: 0.03 10*3/MM3 (ref 0–0.7)
EOSINOPHIL NFR BLD AUTO: 0.3 % (ref 0–7)
ERYTHROCYTE [DISTWIDTH] IN BLOOD BY AUTOMATED COUNT: 13.3 % (ref 11.5–14.5)
GFR SERPL CREATININE-BSD FRML MDRD: 70 ML/MIN/1.73
GLOBULIN UR ELPH-MCNC: 2.5 GM/DL
GLUCOSE BLD-MCNC: 77 MG/DL (ref 70–110)
GLUCOSE BLDC GLUCOMTR-MCNC: 79 MG/DL (ref 70–130)
GLUCOSE BLDC GLUCOMTR-MCNC: 86 MG/DL (ref 70–130)
HCT VFR BLD AUTO: 46.1 % (ref 37–47)
HGB BLD-MCNC: 14.7 G/DL (ref 12–16)
HOLD SPECIMEN: NORMAL
HOLD SPECIMEN: NORMAL
IMM GRANULOCYTES # BLD: 0.04 10*3/MM3 (ref 0–0.03)
IMM GRANULOCYTES NFR BLD: 0.4 % (ref 0–0.5)
INR PPP: 1.07 (ref 0.9–1.1)
LYMPHOCYTES # BLD AUTO: 1.3 10*3/MM3 (ref 1–3)
LYMPHOCYTES NFR BLD AUTO: 11.6 % (ref 16–46)
MCH RBC QN AUTO: 32.2 PG (ref 27–33)
MCHC RBC AUTO-ENTMCNC: 31.9 G/DL (ref 33–37)
MCV RBC AUTO: 101.1 FL (ref 80–94)
MONOCYTES # BLD AUTO: 1.07 10*3/MM3 (ref 0.1–0.9)
MONOCYTES NFR BLD AUTO: 9.5 % (ref 0–12)
NEUTROPHILS # BLD AUTO: 8.79 10*3/MM3 (ref 1.4–6.5)
NEUTROPHILS NFR BLD AUTO: 78 % (ref 40–75)
OSMOLALITY SERPL CALC.SUM OF ELEC: 281.6 MOSM/KG (ref 273–305)
PLATELET # BLD AUTO: 178 10*3/MM3 (ref 130–400)
PMV BLD AUTO: 12.1 FL (ref 6–10)
POTASSIUM BLD-SCNC: 3.4 MMOL/L (ref 3.5–5.3)
PROT SERPL-MCNC: 6.9 G/DL (ref 6–8)
PROTHROMBIN TIME: 14.2 SECONDS (ref 11–15.4)
RBC # BLD AUTO: 4.56 10*6/MM3 (ref 4.2–5.4)
SODIUM BLD-SCNC: 141 MMOL/L (ref 135–153)
WBC NRBC COR # BLD: 11.25 10*3/MM3 (ref 4.5–12.5)
WHOLE BLOOD HOLD SPECIMEN: NORMAL
WHOLE BLOOD HOLD SPECIMEN: NORMAL

## 2018-12-08 PROCEDURE — 70544 MR ANGIOGRAPHY HEAD W/O DYE: CPT

## 2018-12-08 PROCEDURE — 96374 THER/PROPH/DIAG INJ IV PUSH: CPT

## 2018-12-08 PROCEDURE — 85025 COMPLETE CBC W/AUTO DIFF WBC: CPT | Performed by: EMERGENCY MEDICINE

## 2018-12-08 PROCEDURE — 25010000002 ONDANSETRON PER 1 MG: Performed by: FAMILY MEDICINE

## 2018-12-08 PROCEDURE — 93010 ELECTROCARDIOGRAM REPORT: CPT | Performed by: INTERNAL MEDICINE

## 2018-12-08 PROCEDURE — 82962 GLUCOSE BLOOD TEST: CPT

## 2018-12-08 PROCEDURE — G0378 HOSPITAL OBSERVATION PER HR: HCPCS

## 2018-12-08 PROCEDURE — 70547 MR ANGIOGRAPHY NECK W/O DYE: CPT

## 2018-12-08 PROCEDURE — 99285 EMERGENCY DEPT VISIT HI MDM: CPT

## 2018-12-08 PROCEDURE — 85610 PROTHROMBIN TIME: CPT | Performed by: EMERGENCY MEDICINE

## 2018-12-08 PROCEDURE — 80053 COMPREHEN METABOLIC PANEL: CPT | Performed by: EMERGENCY MEDICINE

## 2018-12-08 PROCEDURE — 71045 X-RAY EXAM CHEST 1 VIEW: CPT

## 2018-12-08 PROCEDURE — 25010000002 KETOROLAC TROMETHAMINE PER 15 MG: Performed by: EMERGENCY MEDICINE

## 2018-12-08 PROCEDURE — 70450 CT HEAD/BRAIN W/O DYE: CPT

## 2018-12-08 PROCEDURE — 96375 TX/PRO/DX INJ NEW DRUG ADDON: CPT

## 2018-12-08 PROCEDURE — 99219 PR INITIAL OBSERVATION CARE/DAY 50 MINUTES: CPT | Performed by: FAMILY MEDICINE

## 2018-12-08 PROCEDURE — 70450 CT HEAD/BRAIN W/O DYE: CPT | Performed by: RADIOLOGY

## 2018-12-08 PROCEDURE — 70551 MRI BRAIN STEM W/O DYE: CPT

## 2018-12-08 PROCEDURE — 25010000002 ONDANSETRON PER 1 MG: Performed by: EMERGENCY MEDICINE

## 2018-12-08 PROCEDURE — 71045 X-RAY EXAM CHEST 1 VIEW: CPT | Performed by: RADIOLOGY

## 2018-12-08 PROCEDURE — 93005 ELECTROCARDIOGRAM TRACING: CPT | Performed by: EMERGENCY MEDICINE

## 2018-12-08 RX ORDER — SODIUM CHLORIDE 0.9 % (FLUSH) 0.9 %
3 SYRINGE (ML) INJECTION EVERY 12 HOURS SCHEDULED
Status: DISCONTINUED | OUTPATIENT
Start: 2018-12-08 | End: 2018-12-18 | Stop reason: HOSPADM

## 2018-12-08 RX ORDER — ACETAMINOPHEN 325 MG/1
650 TABLET ORAL EVERY 6 HOURS PRN
Status: DISCONTINUED | OUTPATIENT
Start: 2018-12-08 | End: 2018-12-18 | Stop reason: HOSPADM

## 2018-12-08 RX ORDER — HYDROCODONE BITARTRATE AND ACETAMINOPHEN 7.5; 325 MG/1; MG/1
1 TABLET ORAL EVERY 6 HOURS PRN
Status: DISCONTINUED | OUTPATIENT
Start: 2018-12-08 | End: 2018-12-11

## 2018-12-08 RX ORDER — LISINOPRIL 10 MG/1
10 TABLET ORAL DAILY
Status: DISCONTINUED | OUTPATIENT
Start: 2018-12-09 | End: 2018-12-18 | Stop reason: HOSPADM

## 2018-12-08 RX ORDER — SODIUM CHLORIDE 0.9 % (FLUSH) 0.9 %
10 SYRINGE (ML) INJECTION AS NEEDED
Status: DISCONTINUED | OUTPATIENT
Start: 2018-12-08 | End: 2018-12-08 | Stop reason: HOSPADM

## 2018-12-08 RX ORDER — ACETAMINOPHEN 650 MG/1
650 SUPPOSITORY RECTAL EVERY 6 HOURS PRN
Status: DISCONTINUED | OUTPATIENT
Start: 2018-12-08 | End: 2018-12-18 | Stop reason: HOSPADM

## 2018-12-08 RX ORDER — POTASSIUM CHLORIDE 750 MG/1
40 CAPSULE, EXTENDED RELEASE ORAL AS NEEDED
Status: DISCONTINUED | OUTPATIENT
Start: 2018-12-08 | End: 2018-12-16

## 2018-12-08 RX ORDER — POTASSIUM CHLORIDE 1.5 G/1.77G
40 POWDER, FOR SOLUTION ORAL AS NEEDED
Status: DISCONTINUED | OUTPATIENT
Start: 2018-12-08 | End: 2018-12-16

## 2018-12-08 RX ORDER — POTASSIUM CHLORIDE 7.45 MG/ML
10 INJECTION INTRAVENOUS
Status: DISCONTINUED | OUTPATIENT
Start: 2018-12-08 | End: 2018-12-16

## 2018-12-08 RX ORDER — KETOROLAC TROMETHAMINE 30 MG/ML
15 INJECTION, SOLUTION INTRAMUSCULAR; INTRAVENOUS ONCE
Status: COMPLETED | OUTPATIENT
Start: 2018-12-08 | End: 2018-12-08

## 2018-12-08 RX ORDER — SODIUM CHLORIDE 9 MG/ML
75 INJECTION, SOLUTION INTRAVENOUS CONTINUOUS
Status: ACTIVE | OUTPATIENT
Start: 2018-12-08 | End: 2018-12-09

## 2018-12-08 RX ORDER — ASPIRIN 81 MG/1
162 TABLET, CHEWABLE ORAL DAILY
Status: DISCONTINUED | OUTPATIENT
Start: 2018-12-09 | End: 2018-12-09

## 2018-12-08 RX ORDER — ONDANSETRON 4 MG/1
4 TABLET, FILM COATED ORAL EVERY 6 HOURS PRN
Status: DISCONTINUED | OUTPATIENT
Start: 2018-12-08 | End: 2018-12-18 | Stop reason: HOSPADM

## 2018-12-08 RX ORDER — LORAZEPAM 2 MG/ML
0.5 INJECTION INTRAMUSCULAR EVERY 4 HOURS PRN
Status: CANCELLED | OUTPATIENT
Start: 2018-12-08 | End: 2018-12-18

## 2018-12-08 RX ORDER — SODIUM CHLORIDE 0.9 % (FLUSH) 0.9 %
3-10 SYRINGE (ML) INJECTION AS NEEDED
Status: DISCONTINUED | OUTPATIENT
Start: 2018-12-08 | End: 2018-12-18 | Stop reason: HOSPADM

## 2018-12-08 RX ORDER — DILTIAZEM HYDROCHLORIDE 240 MG/1
240 CAPSULE, COATED, EXTENDED RELEASE ORAL DAILY
Status: DISCONTINUED | OUTPATIENT
Start: 2018-12-09 | End: 2018-12-15

## 2018-12-08 RX ORDER — ONDANSETRON 2 MG/ML
4 INJECTION INTRAMUSCULAR; INTRAVENOUS EVERY 6 HOURS PRN
Status: DISCONTINUED | OUTPATIENT
Start: 2018-12-08 | End: 2018-12-18 | Stop reason: HOSPADM

## 2018-12-08 RX ORDER — ONDANSETRON 2 MG/ML
4 INJECTION INTRAMUSCULAR; INTRAVENOUS ONCE
Status: COMPLETED | OUTPATIENT
Start: 2018-12-08 | End: 2018-12-08

## 2018-12-08 RX ORDER — ATENOLOL 25 MG/1
25 TABLET ORAL DAILY
Status: DISCONTINUED | OUTPATIENT
Start: 2018-12-09 | End: 2018-12-18 | Stop reason: HOSPADM

## 2018-12-08 RX ORDER — LORAZEPAM 2 MG/ML
1 INJECTION INTRAMUSCULAR ONCE
Status: COMPLETED | OUTPATIENT
Start: 2018-12-08 | End: 2018-12-09

## 2018-12-08 RX ADMIN — SODIUM CHLORIDE, PRESERVATIVE FREE 3 ML: 5 INJECTION INTRAVENOUS at 20:33

## 2018-12-08 RX ADMIN — KETOROLAC TROMETHAMINE 15 MG: 30 INJECTION, SOLUTION INTRAMUSCULAR; INTRAVENOUS at 16:35

## 2018-12-08 RX ADMIN — ONDANSETRON 4 MG: 2 INJECTION, SOLUTION INTRAMUSCULAR; INTRAVENOUS at 20:33

## 2018-12-08 RX ADMIN — ONDANSETRON 4 MG: 2 INJECTION, SOLUTION INTRAMUSCULAR; INTRAVENOUS at 16:32

## 2018-12-08 RX ADMIN — SODIUM CHLORIDE 75 ML/HR: 9 INJECTION, SOLUTION INTRAVENOUS at 22:06

## 2018-12-08 RX ADMIN — SODIUM CHLORIDE, PRESERVATIVE FREE 3 ML: 5 INJECTION INTRAVENOUS at 20:40

## 2018-12-08 NOTE — ED NOTES
Called [facility] Stroke Team for Dr. Toledo. Dr. Salamanca returned phone call.     Roxy Benitez  12/08/18 [id number]

## 2018-12-08 NOTE — ED PROVIDER NOTES
Subjective   Patient states she started having headache 3 days ago family states that she started getting confused yesterday and started having weakness and facial droop yesterday        History provided by:  Patient and relative   used: No    Stroke   Presenting symptoms: confusion, headaches, visual change and weakness    Onset quality:  Gradual  Last known well:  3 days ago  Duration:  24 hours  Timing:  Constant  Progression:  Unchanged  Similar to previous episodes: no    Associated symptoms: no chest pain, no difficulty swallowing and no fever        Review of Systems   Constitutional: Negative for fever.   HENT: Negative for trouble swallowing.    Respiratory: Negative.    Cardiovascular: Negative.  Negative for chest pain.   Gastrointestinal: Negative.  Negative for abdominal pain.   Endocrine: Negative.    Genitourinary: Negative.  Negative for dysuria.   Skin: Negative.    Neurological: Positive for facial asymmetry, weakness and headaches.   Psychiatric/Behavioral: Positive for confusion.   All other systems reviewed and are negative.      Past Medical History:   Diagnosis Date   • Acid reflux    • Arthritis    • Atherosclerosis of coronary artery    • Chronic fatigue    • Coronary artery disease    • Degenerative lumbar disc    • Elevated cholesterol    • Esophageal stricture    • Hypertension    • PONV (postoperative nausea and vomiting)        Allergies   Allergen Reactions   • Meperidine Unknown (See Comments)     Does not take     • Sulfa Antibiotics Nausea Only   • Versed [Midazolam] Other (See Comments)     Patient states she can not wake up   • Atorvastatin Unknown (See Comments)     States not allergic wont take         Past Surgical History:   Procedure Laterality Date   • ABDOMINAL SURGERY     • BACK SURGERY     • CARDIAC CATHETERIZATION     • CATARACT EXTRACTION, BILATERAL     • CHOLECYSTECTOMY     • COLONOSCOPY     • ENDOSCOPY     • EYE SURGERY     • TONSILLECTOMY AND  ADENOIDECTOMY     • TRIGGER FINGER RELEASE Right     Third and Fourth Fingers Dr. Ashley   • TUBAL ABDOMINAL LIGATION         Family History   Problem Relation Age of Onset   • Hypertension Mother    • Hypertension Father    • Cancer Sister    • Hypertension Sister        Social History     Socioeconomic History   • Marital status:      Spouse name: Not on file   • Number of children: Not on file   • Years of education: Not on file   • Highest education level: Not on file   Occupational History   • Occupation:      Comment: Works for [employer]   Tobacco Use   • Smoking status: Current Every Day Smoker     Packs/day: 0.25     Years: 30.00     Pack years: 7.50   • Smokeless tobacco: Never Used   Substance and Sexual Activity   • Alcohol use: No   • Drug use: No   • Sexual activity: Defer           Objective   Physical Exam   Constitutional: She appears well-developed and well-nourished. No distress.   HENT:   Head: Normocephalic and atraumatic.   Right Ear: External ear normal.   Left Ear: External ear normal.   Nose: Nose normal.   Eyes: Conjunctivae and EOM are normal. Pupils are equal, round, and reactive to light.   Neck: Normal range of motion. Neck supple. No JVD present. No tracheal deviation present.   Cardiovascular: Normal rate, regular rhythm and normal heart sounds.   No murmur heard.  Pulmonary/Chest: Effort normal and breath sounds normal. No respiratory distress. She has no wheezes.   Abdominal: Soft. Bowel sounds are normal. There is no tenderness.   Musculoskeletal: Normal range of motion. She exhibits no edema or deformity.   Neurological: She is alert. She has normal strength. She is disoriented. A cranial nerve deficit is present.   Patient with left facial droop.  Forehead is spared.  Patient has left hemianopsia.  Patient ignores L arm, has some movement.   Skin: Skin is warm and dry. No rash noted. She is not diaphoretic. No erythema. No pallor.   Psychiatric: She has a normal  mood and affect. Her behavior is normal. Thought content normal.   Nursing note and vitals reviewed.      Procedures           ED Course  ED Course as of Dec 08 1615   Sat Dec 08, 2018   1611 Normal sinus rhythm rate of 76 smiled LV H otherwise no acute changes ECG 12 Lead [DD]      ED Course User Index  [DD] Christiano Toledo MD      's milagros with Dr. Salamanca neurology at [facility] who said that they could take patient and Dr. Bhatia the hospitalist accepted patient in transfer            MDM      Final diagnoses:   Cerebrovascular accident (CVA), unspecified mechanism (CMS/HCC)            Christiano Toledo MD  12/08/18 161

## 2018-12-08 NOTE — ED NOTES
Pt was accepted to [facility].     Called [facility] for transfer, [facility] approved and will be here as soon as they refuel.     Roxy Benitez  12/08/18 0138

## 2018-12-08 NOTE — NURSING NOTE
ACC REVIEW REPORT: [facility]        PATIENT NAME: Anita Roche    PATIENT ID: [id number]    BED: S340    BED TYPE: Tele    BED GIVEN TO: Deborah GODINEZ BED GIVEN: 1554    YOB: 1945    AGE: 74 yo    GENDER: Female    PREVIOUS ADMIT TO [facility]:     PREVIOUS ADMISSION DATE:     PATIENT CLASS:     TODAY'S DATE: 12/8/2018    TRANSFER DATE: 12/8/2018    ETA:    TRANSFERRING FACILITY: [facility]    TRANSFERRING FACILITY PHONE # : [phone]    TRANSFERRING MD: Dr. Toledo    DATE/TIME REQUEST RECEIVED: 12/8/2018 at 1547    [facility] RN: Ashlee Oliveira RN     REPORT FROM: Deborah Castañeda RN    TIME REPORT TAKEN: 1606    DIAGNOSIS: CVA    REASON FOR TRANSFER TO [facility]: Higher Level of Care    TRANSPORTATION: Ambulance    CLINICAL REASON FOR TRANSFER TO [facility]: Patient states that she was at work yesterday when she noticed having trouble using the keyboard. The family also noted a left facial droop. The patient did not seek treatment until today.       CLINICAL INFORMATION    HEIGHT:     WEIGHT: 44.9 kg    ALLERGIES: Sulfa, Demerol, Versed, Lipitor    RAINEY:     INFECTIOUS DISEASE: None    ISOLATION:     1ST VITAL SIGNS:   TIME:   TEMP:   PULSE:   B/P:   RESP:     LAST VITAL SIGNS:  TIME:   TEMP: 99.9  PULSE:76  B/P: 149/71  RESP: 18    LAB INFORMATION: Unremarkable    CULTURE INFORMATION:     MEDS/IV FLUIDS: See MAR sent with patient. Has a #22 LAC-SL      CARDIAC SYSTEM:    CHEST PAIN:     RATE:     SCALE:     RHYTHM: SR    Is patient taking or has patient been given any drugs that could increase bleeding? None  (Plavix, Brilinta, Effient, Eliquis, Xarelto, Warfarin, Integrilin, Angiomax)    DRUG:      DOSE/FREQUENCY:     CARDIAC ENZYMES:    DATE:   TIME:   CK:   CKMB:   RUBY:   TROP:     DATE:   TIME:   CK:   CKMB:   RUBY:   TROP:       HEART CATH:     HEART CATH DATE:     HEART CATH RESULTS:     LAD:   RCA:   CX:   LMAIN:   EF:     SWAN:     SITE:   SIZE:    DATE INSERTED:     ARTLINE:     SITE:   SIZE:   DATE INSERTED:      SHEATH:    SITE:   SIZE:   DATE INSERTED:         VASOSEAL:    SITE:   DATE INSERTED:     EXTERNAL PACEMAKER:     RATE:   EXT PACER ON:     MODE:    DATE INSERTED:   OUTPUT SETTING:   SENSITIVITY SETTING:   SENSITIVITY TYPE:     IABP:    SITE:   AUG PRESSURE:   DATE INSERTED:     CARDIAC NOTES:       RESPIRATORY SYSTEM:    LUNG SOUNDS:    CLEAR: Yes  CRACKLES:   WHEEZES:   RHONCHI:   DIMINISHED:     ABG DATE:         ABG TIME:     ABG RESULTS:    PH:   PO2:   PCO2:   HCO3:   O2 SAT:       ETT:     ETT SIZE:     ORAL:     NASAL:     SECURED AT MEASUREMENT (CM):     ON VENTILATOR:    TV:   FI02:   RATE:   PEEP:     OXYGEN: RA    O2 SAT: 96%    ADMINISTRATION ROUTE:     IMAGING FINDINGS:     PNEUMO LOCATION:     PNEUMO SIZE:     PNEUMO CHEST TUBE SEAL TYPE:     RADIOLOGY RESULTS:     RESPIRATORY STATUS: No acute distress      CNS/MUSCULOSKELETAL    ALERT AND ORIENTED:    PERSON: Yes  PLACE: Yes  TIME: Yes    INJURY:  WHERE:     SANDRA COMA SCALE:    E:   M:   V:     STROKE SCALE: NIH-6    SIZE OF HEMORRHAGE:     SYMPTOMS: (CHOOSE APPROPRIATE)    ASPHASIA: No  ATAXIA: Yes  DYSARTHRIA: No  DYSPHASIA: Ariel  HEADACHE: No  PARALYSIS: No  SEIZURE: No  SYNCOPE: No  VERTIGO: NO  VISION CHANGE:  NO      EXTREMITY WEAKNESS:    LEFT ARM: Yes  RIGHT ARM: WNL  LEFT LEG: Yes  RIGHT LEG: WNL    CAT SCAN RESULTS: CT of head showed small meningoma and old left sided infarct    MRI RESULTS:     CNS/MUSCULOSKELETAL NOTES: Patient is able to answer question appropriately. Has left facial droop and left sided weakness. Seems to somewhat ignore the left side although she is able to move her extremities.       GI//GY      ABDOMINAL PAIN:     VOMITING:     DIARRHEA:     NAUSEA:     BOWEL SOUNDS:     OCCULT STOOL:     VAGINAL BLEEDING:     TESTICULAR PAIN:     HEMATURIA:     NG TUBE:    SIZE:   DATE INSERTED:       ULTRASOUND:     ULTRASOUND RESULTS:       ACUTE ABDOMEN:     ACUTE ABDOMEN RESULTS:       CT SCAN:     CT SCAN RESULTS:        GI//GY NOTES:     PAST MEDICAL HISTORY: CAD, GERD, HTN, Hypercholesterolemia, choley, tonsillectomy, back surgery, trigger finger release    OTHER SYMPTOM NOTES:     ADDITIONAL NOTES: Patient has a bandaide on her left second digit covering sutures from recent trigger finger release procedure          Ashlee Oliveira RN  12/8/2018  4:16 PM

## 2018-12-09 ENCOUNTER — APPOINTMENT (OUTPATIENT)
Dept: CARDIOLOGY | Facility: HOSPITAL | Age: 73
End: 2018-12-09
Attending: FAMILY MEDICINE

## 2018-12-09 ENCOUNTER — APPOINTMENT (OUTPATIENT)
Dept: GENERAL RADIOLOGY | Facility: HOSPITAL | Age: 73
End: 2018-12-09

## 2018-12-09 PROBLEM — J69.0 ASPIRATION PNEUMONIA (HCC): Status: ACTIVE | Noted: 2018-12-09

## 2018-12-09 PROBLEM — I25.10 ATHEROSCLEROSIS OF CORONARY ARTERY: Chronic | Status: ACTIVE | Noted: 2018-11-08

## 2018-12-09 PROBLEM — J18.9 SEPSIS DUE TO PNEUMONIA (HCC): Status: ACTIVE | Noted: 2018-12-09

## 2018-12-09 PROBLEM — I63.9 CVA (CEREBRAL VASCULAR ACCIDENT) (HCC): Status: ACTIVE | Noted: 2018-12-08

## 2018-12-09 PROBLEM — A41.9 SEPSIS DUE TO PNEUMONIA (HCC): Status: ACTIVE | Noted: 2018-12-09

## 2018-12-09 PROBLEM — R63.6 UNDERWEIGHT: Status: ACTIVE | Noted: 2018-12-09

## 2018-12-09 LAB
ANION GAP SERPL CALCULATED.3IONS-SCNC: 15 MMOL/L (ref 3–11)
ARTICHOKE IGE QN: 116 MG/DL (ref 0–130)
BACTERIA UR QL AUTO: ABNORMAL /HPF
BASOPHILS # BLD AUTO: 0.01 10*3/MM3 (ref 0–0.2)
BASOPHILS NFR BLD AUTO: 0.1 % (ref 0–1)
BH CV ECHO MEAS - AO MAX PG: 12 MMHG
BH CV ECHO MEAS - AO MEAN PG: 5 MMHG
BH CV ECHO MEAS - AO V2 MAX: 172 CM/SEC
BH CV ECHO MEAS - AO V2 VTI: 29.6 CM
BH CV ECHO MEAS - AVA PLANIMETRY TRACED: 1.5 CM2
BH CV ECHO MEAS - IVSD: 1.3 CM
BH CV ECHO MEAS - LAT PEAK E' VEL: 10 CM/SEC
BH CV ECHO MEAS - LV MAX PG: 4 MMHG
BH CV ECHO MEAS - LV MEAN PG: 2 MMHG
BH CV ECHO MEAS - LV V1 MAX: 102 CM/SEC
BH CV ECHO MEAS - LV V1 VTI: 17 CM
BH CV ECHO MEAS - LVIDD: 2.8 CM
BH CV ECHO MEAS - LVOT AREA: 2.5 CM2
BH CV ECHO MEAS - LVOT DIAM: 1.8 CM
BH CV ECHO MEAS - LVPWD: 1.3 CM
BH CV ECHO MEAS - MED PEAK E' VEL: 4.83 CM/SEC
BH CV ECHO MEAS - MV A MAX VEL: 103 CM/SEC
BH CV ECHO MEAS - MV DEC TIME: 254 MSEC
BH CV ECHO MEAS - MV E MAX VEL: 58.2 CM/SEC
BH CV ECHO MEAS - MV E/A: 0.6
BH CV ECHO MEAS - MV MAX PG: 6 MMHG
BH CV ECHO MEAS - MV MEAN PG: 3 MMHG
BH CV ECHO MEAS - MV V2 VTI: 22 CM
BH CV ECHO MEAS - TAPSE (>1.6): 1.8 CM2
BH CV ECHO MEASUREMENTS AVERAGE E/E' RATIO: 7.85
BH CV XLRA - RV BASE: 1.5 CM
BH CV XLRA - RV LENGTH: 3.7 CM
BH CV XLRA - RV MID: 0.8 CM
BH CV XLRA - TDI S': 21.4 CM/SEC
BILIRUB UR QL STRIP: NEGATIVE
BUN BLD-MCNC: 32 MG/DL (ref 9–23)
BUN/CREAT SERPL: 36.8 (ref 7–25)
CALCIUM SPEC-SCNC: 9.1 MG/DL (ref 8.7–10.4)
CHLORIDE SERPL-SCNC: 108 MMOL/L (ref 99–109)
CHOLEST SERPL-MCNC: 160 MG/DL (ref 0–200)
CLARITY UR: CLEAR
CO2 SERPL-SCNC: 19 MMOL/L (ref 20–31)
COLOR UR: YELLOW
CREAT BLD-MCNC: 0.87 MG/DL (ref 0.6–1.3)
DEPRECATED RDW RBC AUTO: 48.3 FL (ref 37–54)
EOSINOPHIL # BLD AUTO: 0.01 10*3/MM3 (ref 0–0.3)
EOSINOPHIL NFR BLD AUTO: 0.1 % (ref 0–3)
ERYTHROCYTE [DISTWIDTH] IN BLOOD BY AUTOMATED COUNT: 13 % (ref 11.3–14.5)
GFR SERPL CREATININE-BSD FRML MDRD: 64 ML/MIN/1.73
GLUCOSE BLD-MCNC: 53 MG/DL (ref 70–100)
GLUCOSE BLDC GLUCOMTR-MCNC: 163 MG/DL (ref 70–130)
GLUCOSE BLDC GLUCOMTR-MCNC: 165 MG/DL (ref 70–130)
GLUCOSE BLDC GLUCOMTR-MCNC: 166 MG/DL (ref 70–130)
GLUCOSE BLDC GLUCOMTR-MCNC: 63 MG/DL (ref 70–130)
GLUCOSE UR STRIP-MCNC: NEGATIVE MG/DL
HBA1C MFR BLD: 5.1 % (ref 4.8–5.6)
HCT VFR BLD AUTO: 44.1 % (ref 34.5–44)
HDLC SERPL-MCNC: 32 MG/DL (ref 40–60)
HGB BLD-MCNC: 14.3 G/DL (ref 11.5–15.5)
HGB UR QL STRIP.AUTO: ABNORMAL
HYALINE CASTS UR QL AUTO: ABNORMAL /LPF
IMM GRANULOCYTES # BLD: 0.03 10*3/MM3 (ref 0–0.03)
IMM GRANULOCYTES NFR BLD: 0.2 % (ref 0–0.6)
KETONES UR QL STRIP: ABNORMAL
LEFT ATRIUM VOLUME INDEX: 13.3 ML/M2
LEUKOCYTE ESTERASE UR QL STRIP.AUTO: ABNORMAL
LV EF 2D ECHO EST: 70 %
LYMPHOCYTES # BLD AUTO: 0.75 10*3/MM3 (ref 0.6–4.8)
LYMPHOCYTES NFR BLD AUTO: 4.9 % (ref 24–44)
MAXIMAL PREDICTED HEART RATE: 148 BPM
MCH RBC QN AUTO: 32.7 PG (ref 27–31)
MCHC RBC AUTO-ENTMCNC: 32.4 G/DL (ref 32–36)
MCV RBC AUTO: 100.9 FL (ref 80–99)
MONOCYTES # BLD AUTO: 0.44 10*3/MM3 (ref 0–1)
MONOCYTES NFR BLD AUTO: 2.9 % (ref 0–12)
NEUTROPHILS # BLD AUTO: 14.16 10*3/MM3 (ref 1.5–8.3)
NEUTROPHILS NFR BLD AUTO: 91.8 % (ref 41–71)
NITRITE UR QL STRIP: NEGATIVE
PH UR STRIP.AUTO: <=5 [PH] (ref 5–8)
PLATELET # BLD AUTO: 176 10*3/MM3 (ref 150–450)
PMV BLD AUTO: 11.9 FL (ref 6–12)
POTASSIUM BLD-SCNC: 3.7 MMOL/L (ref 3.5–5.5)
PROCALCITONIN SERPL-MCNC: 0.05 NG/ML
PROT UR QL STRIP: NEGATIVE
RBC # BLD AUTO: 4.37 10*6/MM3 (ref 3.89–5.14)
RBC # UR: ABNORMAL /HPF
REF LAB TEST METHOD: ABNORMAL
SODIUM BLD-SCNC: 142 MMOL/L (ref 132–146)
SP GR UR STRIP: 1.02 (ref 1–1.03)
SQUAMOUS #/AREA URNS HPF: ABNORMAL /HPF
STRESS TARGET HR: 126 BPM
TRIGL SERPL-MCNC: 178 MG/DL (ref 0–150)
UROBILINOGEN UR QL STRIP: ABNORMAL
WBC NRBC COR # BLD: 15.4 10*3/MM3 (ref 3.5–10.8)
WBC UR QL AUTO: ABNORMAL /HPF

## 2018-12-09 PROCEDURE — 25010000002 ONDANSETRON PER 1 MG: Performed by: FAMILY MEDICINE

## 2018-12-09 PROCEDURE — 97167 OT EVAL HIGH COMPLEX 60 MIN: CPT | Performed by: OCCUPATIONAL THERAPIST

## 2018-12-09 PROCEDURE — 92610 EVALUATE SWALLOWING FUNCTION: CPT

## 2018-12-09 PROCEDURE — 84145 PROCALCITONIN (PCT): CPT | Performed by: FAMILY MEDICINE

## 2018-12-09 PROCEDURE — 94799 UNLISTED PULMONARY SVC/PX: CPT

## 2018-12-09 PROCEDURE — 93306 TTE W/DOPPLER COMPLETE: CPT

## 2018-12-09 PROCEDURE — 80061 LIPID PANEL: CPT | Performed by: FAMILY MEDICINE

## 2018-12-09 PROCEDURE — 83036 HEMOGLOBIN GLYCOSYLATED A1C: CPT | Performed by: FAMILY MEDICINE

## 2018-12-09 PROCEDURE — 82962 GLUCOSE BLOOD TEST: CPT

## 2018-12-09 PROCEDURE — 93306 TTE W/DOPPLER COMPLETE: CPT | Performed by: INTERNAL MEDICINE

## 2018-12-09 PROCEDURE — 25010000002 LORAZEPAM PER 2 MG: Performed by: FAMILY MEDICINE

## 2018-12-09 PROCEDURE — 80048 BASIC METABOLIC PNL TOTAL CA: CPT | Performed by: FAMILY MEDICINE

## 2018-12-09 PROCEDURE — 25010000002 PIPERACILLIN SOD-TAZOBACTAM PER 1 G: Performed by: FAMILY MEDICINE

## 2018-12-09 PROCEDURE — 87040 BLOOD CULTURE FOR BACTERIA: CPT | Performed by: FAMILY MEDICINE

## 2018-12-09 PROCEDURE — 85025 COMPLETE CBC W/AUTO DIFF WBC: CPT | Performed by: FAMILY MEDICINE

## 2018-12-09 PROCEDURE — 99223 1ST HOSP IP/OBS HIGH 75: CPT | Performed by: PSYCHIATRY & NEUROLOGY

## 2018-12-09 PROCEDURE — 25010000002 METOCLOPRAMIDE PER 10 MG: Performed by: FAMILY MEDICINE

## 2018-12-09 PROCEDURE — 71045 X-RAY EXAM CHEST 1 VIEW: CPT

## 2018-12-09 PROCEDURE — 94640 AIRWAY INHALATION TREATMENT: CPT

## 2018-12-09 PROCEDURE — 81001 URINALYSIS AUTO W/SCOPE: CPT | Performed by: FAMILY MEDICINE

## 2018-12-09 RX ORDER — METOCLOPRAMIDE HYDROCHLORIDE 5 MG/ML
5 INJECTION INTRAMUSCULAR; INTRAVENOUS EVERY 6 HOURS
Status: DISCONTINUED | OUTPATIENT
Start: 2018-12-09 | End: 2018-12-16

## 2018-12-09 RX ORDER — DEXTROSE MONOHYDRATE 50 MG/ML
75 INJECTION, SOLUTION INTRAVENOUS CONTINUOUS
Status: DISCONTINUED | OUTPATIENT
Start: 2018-12-09 | End: 2018-12-09

## 2018-12-09 RX ORDER — METOPROLOL TARTRATE 5 MG/5ML
5 INJECTION INTRAVENOUS EVERY 6 HOURS PRN
Status: DISCONTINUED | OUTPATIENT
Start: 2018-12-09 | End: 2018-12-18 | Stop reason: HOSPADM

## 2018-12-09 RX ORDER — DEXTROSE MONOHYDRATE 25 G/50ML
25 INJECTION, SOLUTION INTRAVENOUS ONCE
Status: COMPLETED | OUTPATIENT
Start: 2018-12-09 | End: 2018-12-09

## 2018-12-09 RX ORDER — IPRATROPIUM BROMIDE AND ALBUTEROL SULFATE 2.5; .5 MG/3ML; MG/3ML
3 SOLUTION RESPIRATORY (INHALATION)
Status: DISCONTINUED | OUTPATIENT
Start: 2018-12-09 | End: 2018-12-13

## 2018-12-09 RX ORDER — ASPIRIN 300 MG/1
300 SUPPOSITORY RECTAL DAILY
Status: DISCONTINUED | OUTPATIENT
Start: 2018-12-09 | End: 2018-12-18 | Stop reason: HOSPADM

## 2018-12-09 RX ORDER — DEXTROSE, SODIUM CHLORIDE, AND POTASSIUM CHLORIDE 5; .45; .15 G/100ML; G/100ML; G/100ML
100 INJECTION INTRAVENOUS CONTINUOUS
Status: DISCONTINUED | OUTPATIENT
Start: 2018-12-09 | End: 2018-12-11

## 2018-12-09 RX ORDER — CLOPIDOGREL BISULFATE 75 MG/1
75 TABLET ORAL DAILY
Status: DISCONTINUED | OUTPATIENT
Start: 2018-12-09 | End: 2018-12-18 | Stop reason: HOSPADM

## 2018-12-09 RX ADMIN — ONDANSETRON 4 MG: 2 INJECTION, SOLUTION INTRAMUSCULAR; INTRAVENOUS at 13:12

## 2018-12-09 RX ADMIN — METOCLOPRAMIDE 5 MG: 5 INJECTION, SOLUTION INTRAMUSCULAR; INTRAVENOUS at 18:14

## 2018-12-09 RX ADMIN — ACETAMINOPHEN 650 MG: 650 SUPPOSITORY RECTAL at 02:38

## 2018-12-09 RX ADMIN — LORAZEPAM 1 MG: 2 INJECTION INTRAMUSCULAR; INTRAVENOUS at 00:19

## 2018-12-09 RX ADMIN — SODIUM CHLORIDE, PRESERVATIVE FREE 3 ML: 5 INJECTION INTRAVENOUS at 21:59

## 2018-12-09 RX ADMIN — DEXTROSE MONOHYDRATE 75 ML/HR: 50 INJECTION, SOLUTION INTRAVENOUS at 08:09

## 2018-12-09 RX ADMIN — TAZOBACTAM SODIUM AND PIPERACILLIN SODIUM 3.38 G: 375; 3 INJECTION, SOLUTION INTRAVENOUS at 21:58

## 2018-12-09 RX ADMIN — IPRATROPIUM BROMIDE AND ALBUTEROL SULFATE 3 ML: 2.5; .5 SOLUTION RESPIRATORY (INHALATION) at 19:49

## 2018-12-09 RX ADMIN — SODIUM CHLORIDE, PRESERVATIVE FREE 3 ML: 5 INJECTION INTRAVENOUS at 08:13

## 2018-12-09 RX ADMIN — DEXTROSE MONOHYDRATE 25 ML: 25 INJECTION, SOLUTION INTRAVENOUS at 08:09

## 2018-12-09 RX ADMIN — POTASSIUM CHLORIDE, DEXTROSE MONOHYDRATE AND SODIUM CHLORIDE 100 ML/HR: 150; 5; 450 INJECTION, SOLUTION INTRAVENOUS at 12:21

## 2018-12-09 RX ADMIN — TAZOBACTAM SODIUM AND PIPERACILLIN SODIUM 3.38 G: 375; 3 INJECTION, SOLUTION INTRAVENOUS at 16:06

## 2018-12-09 RX ADMIN — ASPIRIN 300 MG: 300 SUPPOSITORY RECTAL at 16:42

## 2018-12-09 RX ADMIN — IPRATROPIUM BROMIDE AND ALBUTEROL SULFATE 3 ML: 2.5; .5 SOLUTION RESPIRATORY (INHALATION) at 17:00

## 2018-12-09 RX ADMIN — ACETAMINOPHEN 650 MG: 650 SUPPOSITORY RECTAL at 13:05

## 2018-12-09 NOTE — CONSULTS
Inpatient Neurology Consult Stroke  Consult performed by: Willian Salamanca MD  Consult ordered by: Yesica Bhatia MD  Reason for consult: stroke sx       Patient Care Team:  Diann Ferreira APRN as PCP - General (Family Medicine)  Devan Interiano MD as PCP - Claims Attributed    Chief complaint:       History of Present Illness    73 y.o. female referred by Dr Yesica Bhatia for left sided weakness.  Pt developed HA on Wednesday 12/5/18 assoc with N/v.  12/7/18 family noticed left sided facial droop and confusion.  Pt agreed to evaluation at [facility] on 12/8/18.  Pt found to have unsteady gait, left hemianopsia, left sided weakness and neglect in Ed.     HCT, my review of films, normal   MRI Brain, my review of films, multiple shower emboli right hemisphere  MRA Head and neck - occlusion R ICA     Transferred to [facility] for higher level of care.       Review of Systems   Constitutional: Positive for fatigue. Negative for activity change and unexpected weight change.   HENT: Negative for tinnitus and trouble swallowing.    Eyes: Negative for photophobia and visual disturbance.   Respiratory: Negative for apnea, cough and choking.    Cardiovascular: Negative for leg swelling.   Gastrointestinal: Positive for diarrhea and nausea. Negative for vomiting.   Endocrine: Negative for cold intolerance and heat intolerance.   Genitourinary: Negative for difficulty urinating, frequency, menstrual problem and urgency.   Musculoskeletal: Negative for back pain, gait problem, myalgias and neck pain.   Skin: Negative for color change and rash.   Allergic/Immunologic: Negative for immunocompromised state.   Neurological: Positive for weakness, numbness and headaches. Negative for dizziness, tremors, seizures, syncope, facial asymmetry, speech difficulty and light-headedness.   Hematological: Negative for adenopathy. Does not bruise/bleed easily.   Psychiatric/Behavioral: Negative for behavioral problems, confusion, decreased  concentration, hallucinations and sleep disturbance.         Past Medical History:   Diagnosis Date   • Acid reflux    • Arthritis    • Atherosclerosis of coronary artery    • Chronic fatigue    • Coronary artery disease    • Degenerative lumbar disc    • Elevated cholesterol    • Esophageal stricture    • Hypertension    • PONV (postoperative nausea and vomiting)    ,   Past Surgical History:   Procedure Laterality Date   • ABDOMINAL SURGERY     • BACK SURGERY     • CARDIAC CATHETERIZATION     • CATARACT EXTRACTION, BILATERAL     • CHOLECYSTECTOMY     • COLONOSCOPY     • ENDOSCOPY     • EYE SURGERY     • TONSILLECTOMY AND ADENOIDECTOMY     • TRIGGER FINGER RELEASE Right     Third and Fourth Fingers Dr. Ashley   • TUBAL ABDOMINAL LIGATION     ,   Family History   Problem Relation Age of Onset   • Hypertension Mother    • Hypertension Father    • Cancer Sister    • Hypertension Sister    ,   Social History     Tobacco Use   • Smoking status: Current Every Day Smoker     Packs/day: 0.25     Years: 30.00     Pack years: 7.50   • Smokeless tobacco: Never Used   Substance Use Topics   • Alcohol use: No   • Drug use: No   ,   Medications Prior to Admission   Medication Sig Dispense Refill Last Dose   • aspirin 81 MG tablet Take 2 tablets by mouth Daily.   11/7/2018 at Unknown time   • atenolol (TENORMIN) 25 MG tablet Take 25 mg by mouth Daily.   11/26/2018 at 2245   • cyanocobalamin 1000 MCG/ML injection Inject 1,000 mcg into the appropriate muscle as directed by prescriber Every 28 (Twenty-Eight) Days.   Past Month at Unknown time   • diltiaZEM CD (CARDIZEM CD) 240 MG 24 hr capsule Take 240 mg by mouth Daily.   11/26/2018 at Unknown time   • HYDROcodone-acetaminophen (NORCO) 7.5-325 MG per tablet Take 1 tablet by mouth Every 6 (Six) Hours As Needed for Moderate Pain  (Pain). 8 tablet 0 More than a month at Unknown time   • lisinopril (PRINIVIL,ZESTRIL) 10 MG tablet Take 10 mg by mouth Daily.   11/26/2018 at Unknown  time   • traMADol (ULTRAM) 50 MG tablet Take 50 mg by mouth Every 6 (Six) Hours As Needed for Moderate Pain .   11/27/2018 at Unknown time   • vitamin D (ERGOCALCIFEROL) 27221 units capsule capsule Take 50,000 Units by mouth 1 (One) Time Per Week.   Past Week at Unknown time   , Scheduled Meds:    aspirin 162 mg Oral Daily   atenolol 25 mg Oral Daily   clopidogrel 75 mg Oral Daily   diltiaZEM  mg Oral Daily   lisinopril 10 mg Oral Daily   sodium chloride 3 mL Intravenous Q12H   sodium chloride 3 mL Intravenous Q12H   , Continuous Infusions:    dextrose 75 mL/hr Last Rate: 75 mL/hr (12/09/18 1037)   , PRN Meds:  •  acetaminophen  •  acetaminophen  •  HYDROcodone-acetaminophen  •  ondansetron **OR** ondansetron  •  potassium chloride **OR** potassium chloride **OR** potassium chloride  •  sodium chloride  •  sodium chloride and Allergies:  Meperidine; Sulfa antibiotics; Versed [midazolam]; and Atorvastatin     Physical Exam   Constitutional: Vital signs are normal. She appears well-developed and well-nourished.   HENT:   Head: Normocephalic and atraumatic.   Eyes: Lids are normal. Pupils are equal, round, and reactive to light.   Fundoscopic exam:       The right eye shows no exudate, no hemorrhage and no papilledema. The right eye shows venous pulsations.        The left eye shows no exudate, no hemorrhage and no papilledema. The left eye shows venous pulsations.   Neck: Normal range of motion and phonation normal. Normal carotid pulses present. Carotid bruit is not present. No thyroid mass and no thyromegaly present.   Cardiovascular: Normal rate, regular rhythm and normal heart sounds.   Pulmonary/Chest: Effort normal.   Skin: Skin is warm and dry.   Psychiatric: She has a normal mood and affect. Her speech is slurred.   Nursing note and vitals reviewed.      Neurologic Exam     Mental Status   Oriented to person.   Oriented to place.   Follows 3 step commands.   Attention: decreased. Concentration:  decreased.   Speech: slurred   Level of consciousness: drowsy  Knowledge: poor.   Able to name object. Able to read. Able to repeat. Able to write. Normal comprehension.     Cranial Nerves     CN II   Visual fields full to confrontation.   Visual acuity: normal  Right visual field deficit: none  Left visual field deficit: none     CN III, IV, VI   Pupils are equal, round, and reactive to light.  Right pupil: Shape: regular. Reactivity: brisk. Consensual response: intact.   Left pupil: Shape: regular. Reactivity: brisk. Consensual response: intact.   Nystagmus: none     CN V   Right facial sensation deficit: none  Left facial sensation deficit: complete  Right corneal reflex: normal  Left corneal reflex: normal    CN VII   Right facial weakness: none  Left facial weakness: central    CN VIII   CN VIII normal.   Hearing: intact    CN IX, X   Right gag reflex: abnormal  Left gag reflex: abnormal    CN XI   Right sternocleidomastoid strength: normal  Left sternocleidomastoid strength: normal    CN XII   Tongue: not atrophic  Fasciculations: absent  Tongue deviation: none  Right gaze preference  Doll past midline     Motor Exam   Muscle bulk: normal  Overall muscle tone: normal  Right arm tone: normal  Left arm tone: normal  Right leg tone: normal  Left leg tone: normal    Strength   Strength 5/5 except as noted.   Left neck flexion: 2/5  Left neck extension: 2/5  Left deltoid: 2/5  Left biceps: 2/5  Left triceps: 2/5  Left wrist flexion: 2/5  Left wrist extension: 2/5  Left iliopsoas: 2/5  Left quadriceps: 2/5  Left hamstrin/5  Left glutei: 2/5  Left anterior tibial: 2/5  Left posterior tibial: 2/5  Left peroneal: 2/5  Left gastroc: 2/5    Sensory Exam   Right arm light touch: normal  Right leg light touch: normal  Right arm vibration: normal  Right arm proprioception: normal  Right leg proprioception: normal  Right arm pinprick: normal  Right leg pinprick: decreased from knee  Stereognosis: abnormal left  Left  hemineglect      Gait, Coordination, and Reflexes     Tremor   Resting tremor: absent  Intention tremor: absent    Reflexes   Reflexes 2+ except as noted. Moves right side purposely  Impaired on the left              Results Review:    I reviewed the patient's new clinical results.  I reviewed the patient's new imaging results and agree with the interpretation.  I reviewed the patient's other test results and agree with the interpretation    A1c 5.1, chol 160, BMP, CMP - NCS    Echo pending    coags - NCS    Assessment/Plan       Left-sided weakness    Atherosclerosis of coronary artery    Degeneration of intervertebral disc of lumbar region    Hypokalemia    Tobacco use    1.  R hemisphere infarct secondary to R ICA occlusion - ASA/Plavix.  Large infarct prognosis gaurded        I discussed the patients findings and my recommendations with patient and family    Willian Salamanca MD  12/09/18  10:57 AM

## 2018-12-09 NOTE — PLAN OF CARE
Problem: Patient Care Overview  Goal: Plan of Care Review  Outcome: Ongoing (interventions implemented as appropriate)   12/08/18 1938 12/09/18 0412   Coping/Psychosocial   Plan of Care Reviewed With patient;family --    Plan of Care Review   Progress no change --    OTHER   Outcome Summary --  pt vss, pt NIH is a 14 pt has left sided weakness and a facial droop, pt confused at times and complains of a headache and stomach ache pt got ativan prn due to pt feeling uneasy and agitated and couldt atay still long enough for the mri, pt finally was able to complete mri, results were called to the doctor, no other complaints at this time will continue to monitor

## 2018-12-09 NOTE — PLAN OF CARE
Problem: Patient Care Overview  Goal: Plan of Care Review   12/09/18 7134   Coping/Psychosocial   Plan of Care Reviewed With patient   OTHER   Outcome Summary Patient flaccid o n left side of body. NPO. Complained of nausea throughout shift. Daughter at bedside for majority of day.

## 2018-12-09 NOTE — THERAPY EVALUATION
Acute Care - Occupational Therapy Initial Evaluation  [facility]     Patient Name: Anita Roche  : 1945  MRN: [id number]  Today's Date: 2018  Onset of Illness/Injury or Date of Surgery: 18  Date of Referral to OT: 18  Referring Physician: MD Sriram    Admit Date: 2018       ICD-10-CM ICD-9-CM   1. Dysphagia, unspecified type R13.10 787.20   2. Impaired mobility and ADLs Z74.09 799.89     Patient Active Problem List   Diagnosis   • Chronic fatigue syndrome   • Knee pain   • Closed wedge compression fracture of thoracic vertebra (CMS/HCC)   • Atherosclerosis of coronary artery   • Persistent cough   • Degeneration of intervertebral disc of lumbar region   • Stricture of esophagus   • Hand pain, left   • Trigger middle finger of left hand   • Hypokalemia   • CVA (cerebral vascular accident) (CMS/HCC)   • Tobacco use   • Underweight   • Sepsis due to pneumonia (CMS/HCC)   • Aspiration pneumonia (CMS/HCC)     Past Medical History:   Diagnosis Date   • Acid reflux    • Arthritis    • Atherosclerosis of coronary artery    • Chronic fatigue    • Coronary artery disease    • Degenerative lumbar disc    • Elevated cholesterol    • Esophageal stricture    • Hypertension    • PONV (postoperative nausea and vomiting)      Past Surgical History:   Procedure Laterality Date   • ABDOMINAL SURGERY     • BACK SURGERY     • CARDIAC CATHETERIZATION     • CATARACT EXTRACTION, BILATERAL     • CHOLECYSTECTOMY     • COLONOSCOPY     • ENDOSCOPY     • EYE SURGERY     • TONSILLECTOMY AND ADENOIDECTOMY     • TRIGGER FINGER RELEASE Right     Third and Fourth Fingers Dr. Ashley   • TUBAL ABDOMINAL LIGATION            OT ASSESSMENT FLOWSHEET (last 72 hours)      Occupational Therapy Evaluation     Row Name 18 1532                   OT Evaluation Time/Intention    Subjective Information  complains of;weakness;fatigue;pain  -ST        Document Type  evaluation  -ST        Patient Effort  good  -ST            General Information    Patient Profile Reviewed?  yes  -ST        Onset of Illness/Injury or Date of Surgery  12/08/18  -ST        Referring Physician  MD Sriram  -ST        Patient Observations  cooperative;agree to therapy;lethargic  -ST        Patient/Family Observations  no family present  -ST        General Observations of Patient  Pt sitting UIB upon arrival; 02 NC; tele  -ST        Prior Level of Function  independent:;all household mobility;community mobility;transfer;bed mobility;feeding;grooming;dressing;bathing;home management;cooking;cleaning;driving  -ST        Equipment Currently Used at Home  raised toilet  -ST        Pertinent History of Current Functional Problem  Pt presents as a transfer from [facility] with H/A starting 12/5 associated with nausea and diarrhea. Pt then w/onset of L sided facial droop, confusion and unsteady gait. CT: old L sided CVA; small meningioma, however no acute findings   -ST        Existing Precautions/Restrictions  fall;other (see comments) severe neglect, L side flaccid, NPO  -ST        Limitations/Impairments  sensory;visual;other (see comments) neglect   -ST        Risks Reviewed  patient:;LOB;nausea/vomiting;dizziness;increased discomfort;change in vital signs  -ST        Benefits Reviewed  patient:;improve function;increase independence;increase strength;increase balance;decrease pain;increase knowledge  -ST        Barriers to Rehab  medically complex  -ST           Relationship/Environment    Concerns About Impact on Relationships  lives with son; son works; pt works in an office  -ST           Resource/Environmental Concerns    Current Living Arrangements  home/apartment/condo  -ST           Cognitive Assessment/Interventions    Additional Documentation  Cognitive Assessment/Intervention (Group)  -ST           Cognitive Assessment/Intervention- PT/OT    Affect/Mental Status (Cognitive)  WNL  -ST        Orientation Status (Cognition)  oriented x 4  -ST        Follows  Commands (Cognition)  WNL  -ST        Cognitive Function (Cognitive)  WNL  -ST        Personal Safety Interventions  fall prevention program maintained;gait belt;nonskid shoes/slippers when out of bed  -ST        Cognitive Assessment/Intervention Comment  moderate dysarthria, significant facial droop on L side impacted words/sounds  -ST           Safety Issues, Functional Mobility    Impairments Affecting Function (Mobility)  balance;coordination;grasp;motor control;motor planning;muscle tone abnormal;postural/trunk control;range of motion (ROM);sensation/sensory awareness;strength;visual/perceptual  -ST           Bed Mobility Assessment/Treatment    Bed Mobility Assessment/Treatment  rolling right;rolling left;supine-sit-supine  -ST        Rolling Left [place] (Bed Mobility)  minimum assist (75% patient effort);2 person assist  -ST        Rolling Right [place] (Bed Mobility)  maximum assist (25% patient effort);2 person assist  -ST        Supine-Sit-Supine [place] (Bed Mobility)  maximum assist (25% patient effort);2 person assist  -ST        Bed Mobility, Safety Issues  decreased use of arms for pushing/pulling;decreased use of legs for bridging/pushing  -ST        Assistive Device (Bed Mobility)  bed rails;draw sheet;head of bed elevated  -ST        Comment (Bed Mobility)  RN asked that therapy completed in-bed eval (EOB OK), no transfers d/t HR  -ST           Functional Mobility    Functional Mobility- Comment  medical status  -ST           Transfer Assessment/Treatment    Comment (Transfers)  med status   -ST           ADL Assessment/Intervention    BADL Assessment/Intervention  lower body dressing;toileting;feeding  -ST           Lower Body Dressing Assessment/Training    Lower Body Dressing [place] Level  doff;don;dependent (less than 25% patient effort)  -ST        Lower Body Dressing Position  supine  -ST           Self-Feeding Assessment/Training    Comment (Feeding)  currently NPO  -ST            Toileting Assessment/Training    [place] Level (Toileting)  change pad/brief;perform perineal hygiene;dependent (less than 25% patient effort)  -ST        Comment (Toileting)  rolled onto bed pan however pt not successful; noted soiled linens under pt-completed hygiene and changed linens   -ST           BADL Safety/Performance    Impairments, BADL Safety/Performance  balance;endurance/activity tolerance;grasp/prehension;coordination;motor control;motor planning;muscle tone abnormality;range of motion;sensory awareness;strength;trunk/postural control;visual/perceptual  -ST           General ROM    GENERAL ROM COMMENTS  RUE WFL; LUE PROM WFL; AROM no active movements  -ST           MMT (Manual Muscle Testing)    General MMT Comments  RUE 4+/5; LUE 0/5-no trace movement   -ST           Motor Assessment/Interventions    Additional Documentation  Balance (Group);Fine Motor Testing & Training (Group);Gross Motor Coordination (Group);Muscle Tone Assessment (Row);Therapeutic Exercise (Group)  -ST           Gross Motor Coordination    Gross Motor Impairments  coordination;finger to nose;balance;rapid alternating;postural control;motor control;sensation;strength;upper  -ST        Gross Motor Skill, Impairments Detail  finger to nose only intact R side   -ST           Balance    Balance  static sitting balance  -ST           Static Sitting Balance    Level of [place] (Unsupported Sitting, Static Balance)  maximal assist, 25 to 49% patient effort  -ST        Sitting Position (Unsupported Sitting, Static Balance)  sitting on edge of bed  -ST        Time Able to Maintain Position (Unsupported Sitting, Static Balance)  2 to 3 minutes  -ST           Fine Motor Testing & Training    Comment, Fine Motor Coordination  opposition, slow R side but intact; L side flaccid   -ST           Sensory Assessment/Intervention    Sensory General Assessment  pain sensation deficits identified;sharp/dull sensation deficits  identified;light touch sensation deficits identified;proprioception deficits identified  -ST           Light Touch Sensation Assessment    Left Upper Extremity: Light Touch Sensation Assessment  absent sensation  -ST           Pain Sensation Assessment    Left Upper Extremity: Pain Sensation Assessment  absent sensation  -ST           Sharp/Dull Sensation Assessment    Left Upper Extremity: Sharp/Dull Sensation Assessment  absent sensation  -ST           Proprioception Assessment    Left Upper Extremity: Proprioception Assessment  absent sensation  -ST           Positioning and Restraints    Pre-Treatment Position  in bed  -ST        Post Treatment Position  bed  -ST        In Bed  notified nsg;supine;call light within reach;encouraged to call for assist;exit alarm on  -ST           Pain Assessment    Additional Documentation  Pain Scale: Numbers Pre/Post-Treatment (Group)  -ST           Pain Scale: Numbers Pre/Post-Treatment    Pain Scale: Numbers, Pretreatment  0/10 - no pain  -ST        Pain Scale: Numbers, Post-Treatment  0/10 - no pain  -ST           Clinical Impression (OT)    Date of Referral to OT  12/09/18  -ST        OT Diagnosis  impaired ADLs  -ST        Prognosis (OT Eval)  fair  -ST        Patient/Family Goals Statement (OT Eval)  return home  -ST        Criteria for Skilled Therapeutic Interventions Met (OT Eval)  yes  -ST        Rehab Potential (OT Eval)  fair, will monitor progress closely  -ST        Therapy Frequency (OT Eval)  daily  -ST        Care Plan Review (OT)  evaluation/treatment results reviewed;care plan/treatment goals reviewed;risks/benefits reviewed;current/potential barriers reviewed;patient/other agree to care plan  -ST        Anticipated Equipment Needs at Discharge (OT)  -- TBD  -ST        Anticipated Discharge Disposition (OT)  inpatient rehabilitation facility;skilled nursing facility  -ST           Vital Signs    Pre Systolic BP Rehab  150  -ST        Pre Treatment Diastolic  BP  82  -ST        Intratreatment Heart Rate (beats/min)  108  -ST           Planned OT Interventions    Planned Therapy Interventions (OT Eval)  activity tolerance training;adaptive equipment training;functional balance retraining;neuromuscular control/coordination retraining;occupation/activity based interventions;passive ROM/stretching;patient/caregiver education/training;ROM/therapeutic exercise;strengthening exercise;transfer/mobility retraining  -ST           OT Goals    Bed Mobility Goal Selection (OT)  bed mobility, OT goal 1  -ST        Transfer Goal Selection (OT)  transfer, OT goal 1  -ST        Dressing Goal Selection (OT)  dressing, OT goal 1  -ST        Balance Goal Selection (OT)  balance, OT goal 1  -ST        Isolated Movement Goal Selection (OT)  isolated movement, OT goal 1  -ST        Additional Documentation  Balance Goal Selection (OT) (Row);Isolated Movement Goal Selection (OT) (Row)  -ST           Bed Mobility Goal 1 (OT)    Activity/Assistive Device (Bed Mobility Goal 1, OT)  sit to supine/supine to sit  -ST        [place] Level/Cues Needed (Bed Mobility Goal 1, OT)  moderate assist (50-74% patient effort)  -ST        Time Frame (Bed Mobility Goal 1, OT)  long term goal (LTG);5 days  -ST        Progress/Outcomes (Bed Mobility Goal 1, OT)  goal ongoing  -ST           Transfer Goal 1 (OT)    Activity/Assistive Device (Transfer Goal 1, OT)  bed-to-chair/chair-to-bed;toilet  -ST        [place] Level/Cues Needed (Transfer Goal 1, OT)  minimum assist (75% or more patient effort)  -ST        Time Frame (Transfer Goal 1, OT)  long term goal (LTG);5 days  -ST        Barriers (Transfers Goal 1, OT)  any appropriate AD as needed   -ST        Progress/Outcome (Transfer Goal 1, OT)  goal ongoing  -ST           Dressing Goal 1 (OT)    Activity/Assistive Device (Dressing Goal 1, OT)  upper body dressing  -ST        [place]/Cues Needed (Dressing Goal 1, OT)  minimum assist (75% or more  patient effort)  -ST        Time Frame (Dressing Goal 1, OT)  long term goal (LTG);5 days  -ST        Barriers (Dressing Goal 1, OT)  fran technique  -ST        Progress/Outcome (Dressing Goal 1, OT)  goal ongoing  -ST           Balance Goal 1 (OT)    Activity/Assistive Device (Balance Goal 1, OT)  standing, static  -ST        [place] Level/Cues Needed (Balance Goal 1, OT)  minimum assist (75% or more patient effort)  -ST        Time Frame (Balance Goal 1, OT)  long term goal (LTG);5 days  -ST        Barriers (Balance Goal 1, OT)  X3+ mins with appropriate support  -ST        Progress/Outcomes (Balance Goal 1, OT)  goal ongoing  -ST           Isolated Movement Goal 1 (OT)    Body Area (Isolated Movement Goal 1, OT)  left fingers;left wrist;left forearm;left elbow;left shoulder;left scapula  -ST        Level (Isolated Movement Goal 1, OT)  facilitate movement  -ST        Time Frame (Isolated Movement Goal 1, OT)  long term goal (LTG);5 days  -ST        Progress/Outcomes (Isolated Movement Goal 1, OT)  goal ongoing  -ST           Living Environment    Home Accessibility  wheelchair accessible;other (see comments);tub/shower is not walk in has ramp  -ST          User Key  (r) = Recorded By, (t) = Taken By, (c) = Cosigned By    Initials Name Effective Dates    Rebeka Garcia, OTR 06/10/18 -          Occupational Therapy Education     Title: PT OT SLP Therapies (In Progress)     Topic: Occupational Therapy (Done)     Point: ADL training (Done)     Description: Instruct learner(s) on proper safety adaptation and remediation techniques during self care or transfers.   Instruct in proper use of assistive devices.    Learning Progress Summary           Patient Acceptance, E,TB,D, VU,DU by ST at 12/9/2018  3:32 PM    Comment:  role of OT, benefits of activity, bed mobility, POC, rolling, deficits, neglect                   Point: Home exercise program (Done)     Description: Instruct learner(s) on appropriate  technique for monitoring, assisting and/or progressing therapeutic exercises/activities.    Learning Progress Summary           Patient Acceptance, E,TB,D, VU,DU by  at 12/9/2018  3:32 PM    Comment:  role of OT, benefits of activity, bed mobility, POC, rolling, deficits, neglect                   Point: Precautions (Done)     Description: Instruct learner(s) on prescribed precautions during self-care and functional transfers.    Learning Progress Summary           Patient Acceptance, E,TB,D, VU,DU by  at 12/9/2018  3:32 PM    Comment:  role of OT, benefits of activity, bed mobility, POC, rolling, deficits, neglect                   Point: Body mechanics (Done)     Description: Instruct learner(s) on proper positioning and spine alignment during self-care, functional mobility activities and/or exercises.    Learning Progress Summary           Patient Acceptance, E,TB,D, VU,DU by  at 12/9/2018  3:32 PM    Comment:  role of OT, benefits of activity, bed mobility, POC, rolling, deficits, neglect                               User Key     Initials Effective Dates Name Provider Type Discipline     06/10/18 -  Rebeka Ospina OTR Occupational Therapist OT                  OT Recommendation and Plan  Outcome Summary/Treatment Plan (OT)  Anticipated Equipment Needs at Discharge (OT): (TBD)  Anticipated Discharge Disposition (OT): inpatient rehabilitation facility, skilled nursing facility  Planned Therapy Interventions (OT Eval): activity tolerance training, adaptive equipment training, functional balance retraining, neuromuscular control/coordination retraining, occupation/activity based interventions, passive ROM/stretching, patient/caregiver education/training, ROM/therapeutic exercise, strengthening exercise, transfer/mobility retraining  Therapy Frequency (OT Eval): daily  Plan of Care Review  Plan of Care Reviewed With: patient  Plan of Care Reviewed With: patient  Outcome Summary: Pt presents with  significant facial droop, flaccid L side, and neglect. Pt unable to maintain midline with pupils or turn head past midline. Pt max-dependent X2 for EOB sitting balance. Pt with need extensive rehab at d/c.     Outcome Measures     Row Name 12/09/18 1532             How much help from another is currently needed...    Putting on and taking off regular lower body clothing?  1  -ST      Bathing (including washing, rinsing, and drying)  1  -ST      Toileting (which includes using toilet bed pan or urinal)  1  -ST      Putting on and taking off regular upper body clothing  1  -ST      Taking care of personal grooming (such as brushing teeth)  1  -ST      Eating meals  1  -ST      Score  6  -ST         Modified Martin Scale    Modified Eagle Scale  5 - Severe disability.  Bedridden, incontinent, and requiring constant nursing care and attention.  -ST         Functional Assessment    Outcome Measure Options  AM-PAC 6 Clicks Daily Activity (OT);Modified Eagle  -ST        User Key  (r) = Recorded By, (t) = Taken By, (c) = Cosigned By    Initials Name Provider Type    ST Rebeka Ospina OTR Occupational Therapist          Time Calculation:   Time Calculation- OT     Row Name 12/09/18 1702             Time Calculation- OT    OT Start Time  1532  -ST      OT Received On  12/09/18  -ST      OT Goal Re-Cert Due Date  12/19/18  -ST        User Key  (r) = Recorded By, (t) = Taken By, (c) = Cosigned By    Initials Name Provider Type    ST Rebeka Ospina OTR Occupational Therapist        Therapy Suggested Charges     Code   Minutes Charges    None           Therapy Charges for Today     Code Description Service Date Service Provider Modifiers Qty    [id number]  OT EVAL HIGH COMPLEXITY 4 12/9/2018 Rebeka Ospina OTR GO 1               VERNON Weber  12/9/2018

## 2018-12-09 NOTE — H&P
"    [facility] Medicine Services  HISTORY AND PHYSICAL    Patient Name: Anita Roche  : 1945  MRN: [id number]  Primary Care Physician: Diann Ferreira APRN  Date of admission: 2018      Subjective   Subjective     Chief Complaint:  Possible stroke, left sided weakness    HPI:  Anita Roche is a 73 y.o. female with PMH significant for HTN, tobacco abuse, CAD, esophageal stricture, trigger finger s/p release 2018 comes to us in transfer from the ER at [place].  The patient reports headaches starting Wednesday,  associated with nausea, no vomiting.  Diarrhea started Thursday and has since resolved.  Yesterday,  - the patient's family noted she had left sided facial droop as well as some confusion.  They attempted to bring her to the local ER that day, but the patient refused until today.  The patient additionally reports an unsteady gait, leaning both to the left and right at times.  She denies any falls, but \"caught herself\" today in the kitchen before falling.  At the local ER, CT head was performed which showed an old left sided stroke (by report) but no acute insults.  However, left facial droop and left sided drift was noted and the patient was sent to [facility] for higher level of neuro care.    Review of Systems   Constitutional: Positive for activity change and appetite change. Negative for fever.   HENT: Positive for ear pain.         Right ear pain, congestion   Eyes: Negative.    Respiratory: Negative.    Cardiovascular: Negative.    Gastrointestinal: Positive for diarrhea and nausea.   Endocrine: Negative.    Genitourinary: Negative.    Musculoskeletal: Positive for back pain.   Skin: Negative.    Allergic/Immunologic: Negative.    Neurological: Positive for facial asymmetry, weakness and headaches.   Hematological: Negative.    Psychiatric/Behavioral: Negative.           Otherwise 10-system ROS reviewed and is negative except as mentioned in the " HPI.    Personal History     Past Medical History:   Diagnosis Date   • Acid reflux    • Arthritis    • Atherosclerosis of coronary artery    • Chronic fatigue    • Coronary artery disease    • Degenerative lumbar disc    • Elevated cholesterol    • Esophageal stricture    • Hypertension    • PONV (postoperative nausea and vomiting)        Past Surgical History:   Procedure Laterality Date   • ABDOMINAL SURGERY     • BACK SURGERY     • CARDIAC CATHETERIZATION     • CATARACT EXTRACTION, BILATERAL     • CHOLECYSTECTOMY     • COLONOSCOPY     • ENDOSCOPY     • EYE SURGERY     • TONSILLECTOMY AND ADENOIDECTOMY     • TRIGGER FINGER RELEASE Right     Third and Fourth Fingers Dr. Ashley   • TUBAL ABDOMINAL LIGATION         Family History: family history includes Cancer in her sister; Hypertension in her father, mother, and sister. Otherwise pertinent FHx was reviewed and unremarkable.     Social History:  reports that she has been smoking.  She has a 7.50 pack-year smoking history. she has never used smokeless tobacco. She reports that she does not drink alcohol or use drugs.  Social History     Social History Narrative   • Not on file       Medications:  Available home medication information reviewed.    Allergies   Allergen Reactions   • Meperidine Unknown (See Comments)     Does not take     • Sulfa Antibiotics Nausea Only   • Versed [Midazolam] Other (See Comments)     Patient states she can not wake up   • Atorvastatin Unknown (See Comments)     States not allergic wont take         Objective   Objective     Vital Signs:   Temp:  [98.8 °F (37.1 °C)-99.9 °F (37.7 °C)] 98.8 °F (37.1 °C)  Heart Rate:  [69-82] 69  Resp:  [16-20] 20  BP: (139-158)/(69-80) 139/69   Total (NIH Stroke Scale): 14    Physical Exam   Constitutional: No acute distress, awake, alert  Eyes: PERRLA, sclerae anicteric, no conjunctival injection  HENT: NCAT, mucous membranes moist, left sided facial droop  Neck: Supple, no thyromegaly, no  lymphadenopathy, trachea midline, no audible bruits  Respiratory: Clear to auscultation bilaterally, nonlabored respirations   Cardiovascular: RRR, no murmurs, rubs, or gallops, palpable pedal pulses bilaterally  Gastrointestinal: Positive bowel sounds, soft, nontender, nondistended  Musculoskeletal: No bilateral ankle edema, no clubbing or cyanosis to extremities  Psychiatric: Very flat affect, cooperative  Neurologic: Oriented x 3, strength decreased LUE and LLL with some uncoordination, Left facial droop, left visual field cut, speech mildly slurred  Skin: No rashes      Results Reviewed:  I have personally reviewed current lab, radiology, and data and agree.    Results from last 7 days   Lab Units  12/08/18   1514   WBC 10*3/mm3  11.25   HEMOGLOBIN g/dL  14.7   HEMATOCRIT %  46.1   PLATELETS 10*3/mm3  178   INR   1.07     Results from last 7 days   Lab Units  12/08/18   1514   SODIUM mmol/L  141   POTASSIUM mmol/L  3.4*   CHLORIDE mmol/L  110   CO2 mmol/L  24.7   BUN mg/dL  17   CREATININE mg/dL  0.81   GLUCOSE mg/dL  77   CALCIUM mg/dL  9.6   ALT (SGPT) U/L  21   AST (SGOT) U/L  23     Estimated Creatinine Clearance: 44.5 mL/min (by C-G formula based on SCr of 0.81 mg/dL).  Brief Urine Lab Results     None        No results found for: BNP  Imaging Results (last 24 hours)     ** No results found for the last 24 hours. **             Assessment/Plan   Assessment / Plan     Active Hospital Problems    Diagnosis   • **Left-sided weakness   • Hypokalemia   • Tobacco use   • Atherosclerosis of coronary artery     Description: Echo (4/2012): LVEF >65%     • Degeneration of intervertebral disc of lumbar region         Assessment & Plan:  Ms. Roche is a 73 year old woman who presents to us with left sided facial droop and weakness since yesterday.  Initial CT head at [facility] showed old left sided infarct and small meningioma but nothing acute.    MRI brain, MRA head and neck without contrast  ECHO tomorrow  Continue ASA  "(takes 162 mg daily at home already)  Neuro consult  Refuses statin due to \"allergy\" in the past  Did not pass bedside dysphagia, so NPO and SLP consult in AM  PT/OT/CM.  Will likely need rehab.    Encourage smoking cessation  Refused nicotine patch    Headache and nausea: Acetaminophen and zofran PRN.    Hypokalemia:  Mild.    Recheck in AM and replete per protocol.    DVT prophylaxis: Mechanical    CODE STATUS:    Code Status and Medical Interventions:   Ordered at: 12/08/18 2008     Level Of Support Discussed With:    Patient     Code Status:    CPR     Medical Interventions (Level of Support Prior to Arrest):    Full     OBSERVATION status, however if further evaluation or treatment plans warrant, status may change.  Based upon current information, I predict patient's care encounter to be less than or equal to 2 midnights.      Electronically signed by Yesica Bhatia MD, 12/08/18, 8:08 PM.        "

## 2018-12-09 NOTE — PLAN OF CARE
Problem: Patient Care Overview  Goal: Plan of Care Review   12/09/18 1616   Coping/Psychosocial   Plan of Care Reviewed With patient   Plan of Care Review   Progress (Eval)     SLP evaluation completed. Will continue to address dysphagia. NPO safest at this time, SLC eval pending when pt is more consistently alert. Please see note for further details and recommendations.

## 2018-12-09 NOTE — THERAPY EVALUATION
Acute Care - Speech Language Pathology   Swallow Initial Evaluation [facility]   Clinical Swallow Evaluation       Patient Name: Anita Roche  : 1945  MRN: [id number]  Today's Date: 2018  Onset of Illness/Injury or Date of Surgery: 18     Referring Physician: MD Sriram      Admit Date: 2018    Visit Dx:     ICD-10-CM ICD-9-CM   1. Dysphagia, unspecified type R13.10 787.20   2. Impaired mobility and ADLs Z74.09 799.89     Patient Active Problem List   Diagnosis   • Chronic fatigue syndrome   • Knee pain   • Closed wedge compression fracture of thoracic vertebra (CMS/HCC)   • Atherosclerosis of coronary artery   • Persistent cough   • Degeneration of intervertebral disc of lumbar region   • Stricture of esophagus   • Hand pain, left   • Trigger middle finger of left hand   • Hypokalemia   • CVA (cerebral vascular accident) (CMS/HCC)   • Tobacco use   • Underweight   • Sepsis due to pneumonia (CMS/HCC)   • Aspiration pneumonia (CMS/HCC)     Past Medical History:   Diagnosis Date   • Acid reflux    • Arthritis    • Atherosclerosis of coronary artery    • Chronic fatigue    • Coronary artery disease    • Degenerative lumbar disc    • Elevated cholesterol    • Esophageal stricture    • Hypertension    • PONV (postoperative nausea and vomiting)      Past Surgical History:   Procedure Laterality Date   • ABDOMINAL SURGERY     • BACK SURGERY     • CARDIAC CATHETERIZATION     • CATARACT EXTRACTION, BILATERAL     • CHOLECYSTECTOMY     • COLONOSCOPY     • ENDOSCOPY     • EYE SURGERY     • TONSILLECTOMY AND ADENOIDECTOMY     • TRIGGER FINGER RELEASE Right     Third and Fourth Fingers Dr. Ashley   • TUBAL ABDOMINAL LIGATION          SWALLOW EVALUATION (last 72 hours)      SLP Adult Swallow Evaluation     Row Name 18 1600                   Rehab Evaluation    Document Type  evaluation  -SG        Subjective Information  no complaints;complains of  -SG        Patient Effort  good  -SG         Symptoms Noted During/After Treatment  none  -SG           General Information    Patient Profile Reviewed  yes  -SG        Pertinent History Of Current Problem  adm w/ CVA  -SG        Current Method of Nutrition  NPO  -SG        Precautions/Limitations, Vision  WFL  -SG        Precautions/Limitations, Hearing  WFL  -SG        Prior Level of Function-Communication  other (see comments) unknown baseline  -SG        Prior Level of Function-Swallowing  other (see comments) unknown baseline  -SG        Plans/Goals Discussed with  patient;agreed upon  -SG        Barriers to Rehab  none identified  -SG        Patient's Goals for Discharge  return home  -SG           Oral Motor and Function    Dentition Assessment  other (see comments) CNA, pt unable to open mouth on command  -SG        Secretion Management  dried secretions in oral cavity;other (see comments) drooling noted bilaterally  -SG        Volitional Swallow  delayed;weak  -SG        Volitional Cough  WFL  -SG           Oral Musculature and Cranial Nerve Assessment    Oral Motor General Assessment  oral labial or buccal impairment  -SG        Oral Labial or Buccal Impairment, Detail, Cranial Nerve VII (Facial):  CN7: Motor Impairment;left labial droop;reduced strength bilaterally;reduced ROM  -SG        Oral Motor, Comment  Unable to fully assess 2' pt's dec'd cog status, unable to follow OM commands  -SG           General Eating/Swallowing Observations    Respiratory Support Currently in Use  nasal cannula  -SG        Eating/Swallowing Skills  fed by SLP;unsafe self-feeding skills observed;unaware of safety concerns  -SG        Positioning During Eating  upright 90 degree;upright in bed;needs frequent re-positioning;other (see comments) frequent repositioning by SLP  -SG        Utensils Used  spoon;cup;straw  -SG        Consistencies Trialed  thin liquids;pureed  -SG           Respiratory    Respiratory Status  wheezing, stridor;during swallowing/eating  -SG         Respiratory Pattern  decrease control/incoordination of breathing swallow  -SG           Clinical Swallow Eval    Oral Prep Phase  impaired  -SG        Oral Transit  impaired  -SG        Oral Residue  impaired  -SG        Pharyngeal Phase  suspected pharyngeal impairment  -SG        Clinical Swallow Evaluation Summary  CSE completed this date. Pt not alert in am, then getting in-room testing, pt seen for evaluation later in pm. Pt confused, lethargic, incoherent mumbling during evaluation. Inconsistent verbal responses, inconsistent alertness. Frequent repositioning required by SLP during assessment. Drooling noted bilaterally, but facial droop evident on left. Moderate dysarthria. Pt tested w/ thins and puree via tsp. Immediate gagging cough w/ small tsp of thins. Ant loss w/ all, pt unable to clear lips of puree, minimal amount taken from tsp. Wheezing cough noted after trials. No family present. Pt unable to express understanding w/ provided education. Rec: NPO is safest at this time, needs BS re-eval tomorrow, aggressive oral care, and SLC eval when pt more consistently responsive and alert.   -SG           Oral Prep Concerns    Oral Prep Concerns  other (see comments) solids not tested  -SG           Oral Transit Concerns    Oral Transit Concerns  delayed initiation of bolus transit  -SG        Delayed Intiation of Bolus Transit  thin  -SG           Oral Residue Concerns    Oral Residue Concerns  diffuse residue throughout oral cavity  -SG        Diffuse Residue Throughout Oral Cavity  other (see comments) puree  -SG           Pharyngeal Phase Concerns    Pharyngeal Phase Concerns  cough;wet vocal quality  -SG        Wet Vocal Quality  all consistencies  -SG        Cough  thin  -SG           Clinical Impression    SLP Swallowing Diagnosis  oral dysfunction;pharyngeal dysfunction  -SG        Functional Impact  risk of aspiration/pneumonia;risk of malnutrition;risk of dehydration  -SG        Rehab  Potential/Prognosis, Swallowing  good, to achieve stated therapy goals  -        Swallow Criteria for Skilled Therapeutic Interventions Met  demonstrates skilled criteria  -SG           Recommendations    Therapy Frequency (Swallow)  5 days per week  -SG        Predicted Duration Therapy Intervention (Days)  until discharge  -        SLP Diet Recommendation  NPO  -SG        Recommended Diagnostics  reassess via clinical swallow evaluation  -        SLP Rec. for Method of Medication Administration  meds via alternate route  -          User Key  (r) = Recorded By, (t) = Taken By, (c) = Cosigned By    Initials Name Effective Dates    Tatyana Sahu MS CCC-SLP 06/22/15 -           EDUCATION  The patient has been educated in the following areas:   Dysphagia (Swallowing Impairment) Oral Care/Hydration Modified Diet Instruction.    SLP Recommendation and Plan  SLP Swallowing Diagnosis: oral dysfunction, pharyngeal dysfunction  SLP Diet Recommendation: NPO           Recommended Diagnostics: reassess via clinical swallow evaluation  Swallow Criteria for Skilled Therapeutic Interventions Met: demonstrates skilled criteria     Rehab Potential/Prognosis, Swallowing: good, to achieve stated therapy goals  Therapy Frequency (Swallow): 5 days per week  Predicted Duration Therapy Intervention (Days): until discharge       Plan of Care Reviewed With: patient  Plan of Care Review  Plan of Care Reviewed With: patient  Progress: (Eval)           Time Calculation:   Time Calculation- SLP     Row Name 12/09/18 1621             Time Calculation- SLP    SLP Start Time  1600  -      SLP Received On  12/09/18  -        User Key  (r) = Recorded By, (t) = Taken By, (c) = Cosigned By    Initials Name Provider Type    AISHA CurtistalijohanaJoyce Tatyana MS Michela CCC-SLP Speech and Language Pathologist          Therapy Charges for Today     Code Description Service Date Service Provider Modifiers Qty    [id number] [facility]  ORAL PHARYNG SWALLOW 3 12/9/2018 Tatyana Rodarte, MS CCC-SLP GN 1               Tatyana Rodarte MS CCC-SLP  12/9/2018

## 2018-12-09 NOTE — PROGRESS NOTES
[facility] Medicine Services  PROGRESS NOTE    Patient Name: Anita Roche  : 1945  MRN: [id number]    Date of Admission: 2018  Length of Stay: 0  Primary Care Physician: Diann Ferreira APRN    Subjective   Subjective     CC:  CVA    HPI:  Fever 102 noted ~1130 this am.  Tachypneic and ill appearing earlier today now improving. Somnolent but localized, very sleepy.     ROS:  Otherwise ROS is negative except as mentioned in the HPI.    Objective   Objective     Vital Signs:   Temp:  [96.7 °F (35.9 °C)-102 °F (38.9 °C)] 102 °F (38.9 °C)  Heart Rate:  [] 110  Resp:  [16-20] 20  BP: (128-158)/(56-83) 150/82  Total (NIH Stroke Scale): 16     Physical Exam:  Constitutional: Somnelent, thin body habitus  Respiratory: Coarse bilaterally, poor effort but faint rales R>L base, nonlabored respirations while asleep currently on 3L  Cardiovascular: RRR, no murmur  Musculoskeletal: No peripheral edema, normal muscle tone for age  Neurologic: Somnolent with awake briefly and localize to me as I stand on right side of bed.  Per prior exams has left hemiparesis and neglect.   Skin: No rashes, no jaundice, no petechiae, no mottling      Results Reviewed:  I have personally reviewed current lab, radiology, and data and agree.    Results from last 7 days   Lab Units  18   1259  18   1514   WBC 10*3/mm3  15.40*  11.25   HEMOGLOBIN g/dL  14.3  14.7   HEMATOCRIT %  44.1*  46.1   PLATELETS 10*3/mm3  176  178   INR    --   1.07     Results from last 7 days   Lab Units  18   0637  18   1514   SODIUM mmol/L  142  141   POTASSIUM mmol/L  3.7  3.4*   CHLORIDE mmol/L  108  110   CO2 mmol/L  19.0*  24.7   BUN mg/dL  32*  17   CREATININE mg/dL  0.87  0.81   GLUCOSE mg/dL  53*  77   CALCIUM mg/dL  9.1  9.6   ALT (SGPT) U/L   --   21   AST (SGOT) U/L   --   23     Estimated Creatinine Clearance: 37.3 mL/min (by C-G formula based on SCr of 0.87 mg/dL).  No results found for:  BNP    Microbiology Results Abnormal     None          Imaging Results (last 24 hours)     Procedure Component Value Units Date/Time    XR Chest 1 View [[id number]] Collected:  12/09/18 1232     Updated:  12/09/18 1257    Narrative:       EXAMINATION: XR CHEST 1 VW - 12/9/2018     INDICATION: Productive cough, fever.     TECHNIQUE: Single-view frontal chest.     COMPARISONS: 6/27/2013     FINDINGS:  There is opacity in the right lung base. No pleural effusion  or pneumothorax. There is atherosclerosis of the aortic knob. Normal  cardiomediastinal silhouette.       Impression:       Right basal opacity could be compatible with aspiration as  questioned clinically.     DICTATED:   12/9/2018  EDITED/ls :   12/9/2018      This report was finalized on 12/9/2018 12:55 PM by Chandra Ceballos.       MRI Angiogram Head Without Contrast [[id number]] Collected:  12/08/18 2122     Updated:  12/09/18 0109    Addenda:        THIS REPORT CONTAINS FINDINGS THAT MAY BE CRITICAL TO PATIENT CARE. The   findings were verbally communicated via telephone conference with Koki Munroe NP at 1:07 AM EST on 12/9/2018. The findings were acknowledged and   understood.    THIS DOCUMENT HAS BEEN ELECTRONICALLY SIGNED BY BURTON GIVENS MD  Signed:  12/09/18 0109 by Burton Givens MD    Narrative:       EXAM:    MR Angiogram Head Without Contrast, Arteries     EXAM DATE/TIME:    12/8/2018 9:22 PM     CLINICAL HISTORY:    73 years old, female; Signs and symptoms; Cognitive deficit and headache and   speech disturbance and weakness; Altered mental status; Slurred speech; Patient   HX: Unsteady gait, weakness, almost fell today; H/a and nausea since wednesday,   left sided facial droop and confusion yesterday; HX CVA best images possible -   patient AMS, meds were given to patient, sponges/pads were used to stabilize   head; Additional info: Stroke     TECHNIQUE:    MR angiogram head without contrast. Exam focused on the arteries.     COMPARISON:     No relevant prior studies available.     FINDINGS:    Right internal carotid artery:  There is occlusion of the right internal   carotid artery, with partial reconstitution of the cavernous and supraclinoid   right internal carotid artery. There is approximately 50% stenosis of the   clinoid segment of the left internal carotid artery.    Right anterior cerebral artery:  There is mild decreased signal intensity   within the A1 segment of the right anterior cerebral artery, suggestive of   decreased blood flow.    Right middle cerebral artery:  There is significant asymmetric decreased   signal intensity of M1 and M2 segments of the the right middle cerebral artery,   consistent with decreased blood flow.    Right posterior cerebral artery: No occlusion or significant stenosis. No   aneurysm.    Right vertebral artery:  There is suboptimal evaluation of the right vertebral   artery due to motion artifact.      Left internal carotid artery:  There is suboptimal evaluation of the petrous   left internal carotid artery due to motion artifact.    Left anterior cerebral artery: No occlusion or significant stenosis. No   aneurysm.    Left middle cerebral artery: No occlusion or significant stenosis. No   aneurysm.    Left posterior cerebral artery: No occlusion or significant stenosis. No   aneurysm.    Left vertebral artery:  There is suboptimal evaluation of the left vertebral   artery due to motion artifact.    Basilar artery: No occlusion or significant stenosis. No aneurysm.    Other findings:  Motion artifact significantly limits this study.       Impression:       1. There is occlusion of the right internal carotid artery, with partial   reconstitution of the cavernous and supraclinoid right internal carotid artery.   2. There is significant asymmetric decreased signal intensity of M1 and M2   segments of the the right middle cerebral artery, consistent with decreased   blood flow.   3. There is mild decreased  signal intensity within the A1 segment of the right   anterior cerebral artery, suggestive of decreased blood flow.   4. Motion artifact limits this study. CTA suggested.    THIS DOCUMENT HAS BEEN ELECTRONICALLY SIGNED BY BURTON GIVENS MD    MRI Brain Without Contrast [[id number]] Collected:  12/08/18 2313     Updated:  12/09/18 0109    Addenda:        THIS REPORT CONTAINS FINDINGS THAT MAY BE CRITICAL TO PATIENT CARE. The   findings were verbally communicated via telephone conference with Koki Munroe NP at 1:07 AM EST on 12/9/2018. The findings were acknowledged and   understood.    THIS DOCUMENT HAS BEEN ELECTRONICALLY SIGNED BY BURTON GIVENS MD  Signed:  12/09/18 0109 by Burton Givens MD    Narrative:       EXAM:    MR Head Without Contrast     EXAM DATE/TIME:    12/8/2018 11:13 PM     CLINICAL HISTORY:    73 years old, female; Pain and signs and symptoms; Altered mental   status/memory loss and speech disturbance and weakness, extremity and weakness,   facial; Left; Confusion or disorientation; Slurred speech; Headache; Headache   not specified; Patient HX: Unsteady gait, weakness, almost fell today; H/a and   nausea since wednesday, left sided facial droop and confusion yesterday; HX CVA   best images possible - patient AMS, meds were given to patient, sponges/pads   were used to stabilize head; Additional info: Stroke     TECHNIQUE:    MR of the head without contrast.     COMPARISON:    MRI ANGIOGRAM HEAD WO CONTRAST 12/8/2018 9:15 PM     FINDINGS:    Brain:  Multiple foci of restricted diffusion are identified within the right   cerebral hemisphere, consistent with acute infarcts. This involves the frontal,   parietal, and occipital lobes. These infarcts are predominantly within the   white matter consistent with lacunar infarcts, with several small cortical   based infarcts. Multiple tiny T2 hyperintense chronic lacunar infarcts are   identified within the bilateral thalami. Tiny T2 hyperintense  dilated   perivascular spaces and/or chronic lacunar infarcts are visualized within the   bilateral basal ganglia. In the left parafalcine region superiorly, there is an   extra-axial mass measuring 1.2 x 0.7 x 1.3 cm. The likely etiology is a   meningioma.    Midline shift:  No midline shift.    Ventricles:  There is mild prominence of the ventricles and sulci, compatible   with atrophy.    Bones/joints: Unremarkable.    Soft tissues: Normal.    Sinuses:  Minimal mucosal thickening of a few ethmoid air cells. There is no   mucosal thickening of the right frontal sinus.    Mastoid air cells: No mastoid effusion.    Orbits:  Bilateral lens implants.    Internal carotid arteries:  There is loss of the normal flow void within the   right internal carotid artery, consistent with decreased flow or occlusion.       Impression:       1. Multiple foci of restricted diffusion are identified within the right   cerebral hemisphere, consistent with acute infarcts. These infarcts are   predominantly within the white matter consistent with lacunar infarcts, with   several small cortical based infarcts.   2. There is loss of the normal flow void within the right internal carotid   artery, consistent with decreased flow or occlusion. Refer to the MRA head   study from the same day for further discussion.   3. Mild atrophy.   4. In the left parafalcine region superiorly, there is an extra-axial mass   measuring 1.2 x 0.7 x 1.3 cm. The likely etiology is a meningioma. Follow-up   postcontrast sequences are recommended.  5. Additional findings described above.    THIS DOCUMENT HAS BEEN ELECTRONICALLY SIGNED BY HELDER CARDOZA MD    MRI Angiogram Neck Without Contrast [[id number]] Collected:  12/08/18 2313     Updated:  12/09/18 0109    Addenda:        THIS REPORT CONTAINS FINDINGS THAT MAY BE CRITICAL TO PATIENT CARE. The   findings were verbally communicated via telephone conference with Koki Munroe NP at 1:07 AM EST on 12/9/2018.  The findings were acknowledged and   understood.    THIS DOCUMENT HAS BEEN ELECTRONICALLY SIGNED BY BURTON GIVENS MD  Signed:  12/09/18 0109 by Burton Givens MD    Narrative:       EXAM:    MR Angiography Neck Without Intravenous Contrast    EXAM DATE/TIME:    12/8/2018 11:13 PM    CLINICAL HISTORY:    The patient age is 73 years old and is female; Signs and symptoms; Cognitive   deficit and headache and speech disturbance and weakness; Altered mental   status; Slurred speech; Patient HX: Unsteady gait, weakness, almost fell today;   H/a and nausea since wednesday, left sided facial droop and confusion   yesterday; HX CVA best images possible - patient AMS, meds were given to   patient, sponges/pads were used to stabilize head; Additional info: Stroke   Unsteady gait, weakness, almost fell today; H/a and nausea since wednesday,   left sided facial droop and confusion yesterday; HX CVA best images possible -   patient AMS, meds were given to patient, sponges/pads were used to stabilize   head    TECHNIQUE:    Magnetic resonance angiography images of the neck without intravenous   contrast.    COMPARISON:    MRI ANGIOGRAM HEAD WO CONTRAST 12/8/2018 9:15 PM    FINDINGS:    Right common carotid artery:  There is tapered narrowing of the proximal   right common carotid artery, suggestive of dissection. There is occlusion of   the remaining right common carotid artery.    Right internal carotid artery:  Occlusion of the right internal and external   carotid arteries.    Right external carotid artery:  See above.    Right vertebral artery:  A flow-void is identified within the proximal right   vertebral artery. This can be contributed by artifact, although flow limiting   pathology is considered. Artifact significantly limits evaluation of the distal   V3 and proximal V4 segments of the right vertebral artery.      Left common carotid artery:  A bovine aortic arch is visualized, with common   origin of the  brachiocephalic artery and left common carotid artery. See below.    Left internal carotid artery:  There is approximate 50% stenosis of the   proximal left internal carotid artery. Mild stenosis is also visualized of the   distal left common carotid artery.    Left external carotid artery:  Mild stenosis of the proximal left external   carotid artery.  No occlusion.    Left vertebral artery:  Moderate to severe stenosis is visualized of the   proximal left vertebral artery. This may be contributed by artifact.      Other vasculature:  There is significant decreased signal intensity involving   the proximal left subclavian artery, concerning for severe stenosis. There is   moderate stenosis of the proximal right subclavian artery.  Flow is visualized   within a vessel within the soft tissues of the right posterior neck. This is   suggestive of the ascending cervical artery.     CAROTID STENOSIS REFERENCE USING NASCET CRITERIA:    % ICA stenosis = (1 - narrowest ICA diameter/diameter of distal cervical ICA)   x 100.    Mild - <50% stenosis.    Moderate - 50-69% stenosis.    Severe - 70-94% stenosis.    Near occlusion - 95-99% stenosis.    Occluded - 100% stenosis.      Impression:       1.  There is tapered narrowing of the proximal right common carotid artery,   suggestive of dissection. There is occlusion of the remaining right common   carotid artery.  2.  Occlusion of the right internal and external carotid arteries.  3.  There is approximate 50% stenosis of the proximal left internal carotid   artery. Mild stenosis is also visualized of the distal left common carotid   artery.  4.  Mild stenosis of the proximal left external carotid artery.  5.  There is significant decreased signal intensity involving the proximal left   subclavian artery, concerning for severe stenosis. There is moderate stenosis   of the proximal right subclavian artery.  6.  A flow-void is identified within the proximal right vertebral  artery. This   can be contributed by artifact, although flow limiting pathology is considered.   Artifact significantly limits evaluation of the distal V3 and proximal V4   segments of the right vertebral artery.  7.  Moderate to severe stenosis is visualized of the proximal left vertebral   artery. This may be contributed by artifact.  8.  These findings can be further evaluated with CTA.  9.  Additional findings described above.    THIS DOCUMENT HAS BEEN ELECTRONICALLY SIGNED BY HELDER CARDOZA MD             I have reviewed the medications.    Assessment/Plan   Assessment / Plan     Active Hospital Problems    Diagnosis Date Noted   • **CVA (cerebral vascular accident) (CMS/[facility]) [I63.9] 12/08/2018   • Underweight [R63.6] 12/09/2018   • Sepsis due to pneumonia (CMS/[facility]) [J18.9, A41.9] 12/09/2018   • Aspiration pneumonia (CMS/[facility]) [J69.0] 12/09/2018   • Hypokalemia [E87.6] 12/08/2018   • Tobacco use [Z72.0] 12/08/2018   • Atherosclerosis of coronary artery [I25.10] 11/08/2018     Description: Echo (4/2012): LVEF >65%     • Degeneration of intervertebral disc of lumbar region [M51.36] 03/31/2008          Brief Hospital Course to date:  Anita Roche is a 73 y.o. female with PMH significant for HTN, tobacco abuse, CAD, esophageal stricture, trigger finger s/p release 11/27/2018 transferred from the ER at [place] for 3 days of HA, confusion, balance disturbances.  At the local ER, CT head was performed which showed an old left sided stroke but no acute insults.  However, left facial droop and left sided drift was noted and the patient was sent to [facility] for higher level of neuro care.  MRI upon arrival revealed large right hemispheric CVA.       R hemisphere infarct secondary to R ICA occlusion   - ASA/Plavix (currently NPO)  - Neuro has evaluated  - large infarct with guarded prognosis (somnelence, left HP, neglect, dysphagia)  - strict NPO, placed on maintenance D5 1/2ND with K+ for now until seen by ST[facility] [place] IV BP meds  for now and rectal ASA.  - ST/PT/OT/CM    Sepsis (not POA)  Aspiration pna (not POA)  - BCx x2  - Zosyn  - NPO awaiting ST official eval but suspect will need alternate nutrition        DVT Prophylaxis:  Favor mechanical for now due to large size of evolving stroke    CODE STATUS:   Code Status and Medical Interventions:   Ordered at: 12/08/18 2008     Level Of Support Discussed With:    Patient     Code Status:    CPR     Medical Interventions (Level of Support Prior to Arrest):    Full       Disposition: I expect the patient to be discharged TBD.      Electronically signed by Kandi Whiting MD, 12/09/18, 2:51 PM.    Critical Care time spent in direct patient care:    40 minutes (excluding procedure time, if applicable) including high complexity decision making to assess and treat vital organ system failure in this individual who has impairment of one or more vital organ systems such that there is a high probability of imminent or life threatening deterioration in the patient’s condition and failure to initiate the above interventions on an urgent basis would likely result in sudden, clinically significant or life threatening deterioration in the patient's condition.  3:21 PM

## 2018-12-09 NOTE — PLAN OF CARE
Problem: Patient Care Overview  Goal: Plan of Care Review  Outcome: Ongoing (interventions implemented as appropriate)    Goal: Individualization and Mutuality  Outcome: Ongoing (interventions implemented as appropriate)    Goal: Discharge Needs Assessment  Outcome: Ongoing (interventions implemented as appropriate)   12/08/18 1934 12/08/18 1937   Disability   Equipment Currently Used at Home --  none   Discharge Needs Assessment   Patient/Family Anticipates Transition to home with family --    Patient/Family Anticipated Services at Transition  --    Transportation Anticipated family or friend will provide;car, drives self --      Goal: Interprofessional Rounds/Family Conf   12/08/18 1938   Interdisciplinary Rounds/Family Conf   Participants ;family;pharmacy;patient;social work/services       Problem: Skin Injury Risk (Adult)  Goal: Identify Related Risk Factors and Signs and Symptoms  Outcome: Ongoing (interventions implemented as appropriate)   12/08/18 1938   Skin Injury Risk (Adult)   Related Risk Factors (Skin Injury Risk) advanced age;medication     Goal: Skin Health and Integrity  Outcome: Ongoing (interventions implemented as appropriate)   12/08/18 1938   Skin Injury Risk (Adult)   Skin Health and Integrity making progress toward outcome

## 2018-12-10 ENCOUNTER — APPOINTMENT (OUTPATIENT)
Dept: GENERAL RADIOLOGY | Facility: HOSPITAL | Age: 73
End: 2018-12-10

## 2018-12-10 PROBLEM — J96.01 ACUTE RESPIRATORY FAILURE WITH HYPOXIA (HCC): Status: ACTIVE | Noted: 2018-12-10

## 2018-12-10 LAB
GLUCOSE BLDC GLUCOMTR-MCNC: 131 MG/DL (ref 70–130)
GLUCOSE BLDC GLUCOMTR-MCNC: 149 MG/DL (ref 70–130)
GLUCOSE BLDC GLUCOMTR-MCNC: 151 MG/DL (ref 70–130)

## 2018-12-10 PROCEDURE — 25010000002 METOCLOPRAMIDE PER 10 MG: Performed by: FAMILY MEDICINE

## 2018-12-10 PROCEDURE — 25010000002 PIPERACILLIN SOD-TAZOBACTAM PER 1 G: Performed by: FAMILY MEDICINE

## 2018-12-10 PROCEDURE — 97162 PT EVAL MOD COMPLEX 30 MIN: CPT

## 2018-12-10 PROCEDURE — 94799 UNLISTED PULMONARY SVC/PX: CPT

## 2018-12-10 PROCEDURE — 99232 SBSQ HOSP IP/OBS MODERATE 35: CPT | Performed by: PSYCHIATRY & NEUROLOGY

## 2018-12-10 PROCEDURE — 25010000002 MORPHINE SULFATE (PF) 2 MG/ML SOLUTION: Performed by: FAMILY MEDICINE

## 2018-12-10 PROCEDURE — 92611 MOTION FLUOROSCOPY/SWALLOW: CPT

## 2018-12-10 PROCEDURE — 97112 NEUROMUSCULAR REEDUCATION: CPT

## 2018-12-10 PROCEDURE — 82962 GLUCOSE BLOOD TEST: CPT

## 2018-12-10 PROCEDURE — 25010000002 HEPARIN (PORCINE) PER 1000 UNITS: Performed by: FAMILY MEDICINE

## 2018-12-10 PROCEDURE — 74230 X-RAY XM SWLNG FUNCJ C+: CPT

## 2018-12-10 PROCEDURE — 94760 N-INVAS EAR/PLS OXIMETRY 1: CPT

## 2018-12-10 PROCEDURE — 94640 AIRWAY INHALATION TREATMENT: CPT

## 2018-12-10 PROCEDURE — 92610 EVALUATE SWALLOWING FUNCTION: CPT

## 2018-12-10 RX ORDER — HEPARIN SODIUM 5000 [USP'U]/ML
5000 INJECTION, SOLUTION INTRAVENOUS; SUBCUTANEOUS EVERY 8 HOURS SCHEDULED
Status: DISCONTINUED | OUTPATIENT
Start: 2018-12-10 | End: 2018-12-18 | Stop reason: HOSPADM

## 2018-12-10 RX ORDER — MORPHINE SULFATE 2 MG/ML
1 INJECTION, SOLUTION INTRAMUSCULAR; INTRAVENOUS EVERY 4 HOURS PRN
Status: DISCONTINUED | OUTPATIENT
Start: 2018-12-10 | End: 2018-12-11

## 2018-12-10 RX ADMIN — IPRATROPIUM BROMIDE AND ALBUTEROL SULFATE 3 ML: 2.5; .5 SOLUTION RESPIRATORY (INHALATION) at 20:08

## 2018-12-10 RX ADMIN — HYDROCODONE BITARTRATE AND ACETAMINOPHEN 1 TABLET: 7.5; 325 TABLET ORAL at 22:18

## 2018-12-10 RX ADMIN — TAZOBACTAM SODIUM AND PIPERACILLIN SODIUM 3.38 G: 375; 3 INJECTION, SOLUTION INTRAVENOUS at 06:05

## 2018-12-10 RX ADMIN — MORPHINE SULFATE 1 MG: 2 INJECTION, SOLUTION INTRAMUSCULAR; INTRAVENOUS at 10:05

## 2018-12-10 RX ADMIN — ATENOLOL 25 MG: 25 TABLET ORAL at 10:07

## 2018-12-10 RX ADMIN — TAZOBACTAM SODIUM AND PIPERACILLIN SODIUM 3.38 G: 375; 3 INJECTION, SOLUTION INTRAVENOUS at 22:21

## 2018-12-10 RX ADMIN — METOCLOPRAMIDE 5 MG: 5 INJECTION, SOLUTION INTRAMUSCULAR; INTRAVENOUS at 14:55

## 2018-12-10 RX ADMIN — IPRATROPIUM BROMIDE AND ALBUTEROL SULFATE 3 ML: 2.5; .5 SOLUTION RESPIRATORY (INHALATION) at 16:53

## 2018-12-10 RX ADMIN — HEPARIN SODIUM 5000 UNITS: 5000 INJECTION INTRAVENOUS; SUBCUTANEOUS at 22:19

## 2018-12-10 RX ADMIN — HEPARIN SODIUM 5000 UNITS: 5000 INJECTION INTRAVENOUS; SUBCUTANEOUS at 14:54

## 2018-12-10 RX ADMIN — METOCLOPRAMIDE 5 MG: 5 INJECTION, SOLUTION INTRAMUSCULAR; INTRAVENOUS at 10:04

## 2018-12-10 RX ADMIN — SODIUM CHLORIDE, PRESERVATIVE FREE 3 ML: 5 INJECTION INTRAVENOUS at 22:19

## 2018-12-10 RX ADMIN — MORPHINE SULFATE 1 MG: 2 INJECTION, SOLUTION INTRAMUSCULAR; INTRAVENOUS at 17:56

## 2018-12-10 RX ADMIN — POTASSIUM CHLORIDE, DEXTROSE MONOHYDRATE AND SODIUM CHLORIDE 100 ML/HR: 150; 5; 450 INJECTION, SOLUTION INTRAVENOUS at 22:18

## 2018-12-10 RX ADMIN — POTASSIUM CHLORIDE, DEXTROSE MONOHYDRATE AND SODIUM CHLORIDE 100 ML/HR: 150; 5; 450 INJECTION, SOLUTION INTRAVENOUS at 01:52

## 2018-12-10 RX ADMIN — IPRATROPIUM BROMIDE AND ALBUTEROL SULFATE 3 ML: 2.5; .5 SOLUTION RESPIRATORY (INHALATION) at 08:38

## 2018-12-10 RX ADMIN — LISINOPRIL 10 MG: 10 TABLET ORAL at 10:04

## 2018-12-10 RX ADMIN — TAZOBACTAM SODIUM AND PIPERACILLIN SODIUM 3.38 G: 375; 3 INJECTION, SOLUTION INTRAVENOUS at 14:55

## 2018-12-10 RX ADMIN — ASPIRIN 300 MG: 300 SUPPOSITORY RECTAL at 10:05

## 2018-12-10 RX ADMIN — METOCLOPRAMIDE 5 MG: 5 INJECTION, SOLUTION INTRAMUSCULAR; INTRAVENOUS at 22:19

## 2018-12-10 RX ADMIN — METOCLOPRAMIDE 5 MG: 5 INJECTION, SOLUTION INTRAMUSCULAR; INTRAVENOUS at 01:36

## 2018-12-10 RX ADMIN — CLOPIDOGREL BISULFATE 75 MG: 75 TABLET ORAL at 10:04

## 2018-12-10 RX ADMIN — BARIUM SULFATE 100 ML: 0.81 POWDER, FOR SUSPENSION ORAL at 11:30

## 2018-12-10 RX ADMIN — BARIUM SULFATE 20 ML: 400 PASTE ORAL at 11:30

## 2018-12-10 RX ADMIN — IPRATROPIUM BROMIDE AND ALBUTEROL SULFATE 3 ML: 2.5; .5 SOLUTION RESPIRATORY (INHALATION) at 13:20

## 2018-12-10 RX ADMIN — SODIUM CHLORIDE, PRESERVATIVE FREE 3 ML: 5 INJECTION INTRAVENOUS at 22:22

## 2018-12-10 NOTE — CONSULTS
Patient does not meet diabetes education order criteria (5.1) therefore patient was not seen for diabetes education at this time.  Please re consult as needed.

## 2018-12-10 NOTE — MBS/VFSS/FEES
Acute Care - Speech Language Pathology   Swallow Initial Evaluation  [place]   Modified Barium Swallow Study (MBS)     Patient Name: Anita Roche  : 1945  MRN: [id number]  Today's Date: 12/10/2018  Onset of Illness/Injury or Date of Surgery: 18     Referring Physician: MD SULEIMAN      Admit Date: 2018    Visit Dx:     ICD-10-CM ICD-9-CM   1. Dysphagia, unspecified type R13.10 787.20   2. Impaired mobility and ADLs Z74.09 799.89   3. Impaired functional mobility, balance, gait, and endurance Z74.09 V49.89     Patient Active Problem List   Diagnosis   • Chronic fatigue syndrome   • Knee pain   • Closed wedge compression fracture of thoracic vertebra (CMS/HCC)   • Atherosclerosis of coronary artery   • Persistent cough   • Degeneration of intervertebral disc of lumbar region   • Stricture of esophagus   • Hand pain, left   • Trigger middle finger of left hand   • Hypokalemia   • CVA (cerebral vascular accident) (CMS/HCC)   • Tobacco use   • Underweight   • Sepsis due to pneumonia (CMS/HCC)   • Aspiration pneumonia (CMS/HCC)   • Acute respiratory failure with hypoxia (CMS/HCC)     Past Medical History:   Diagnosis Date   • Acid reflux    • Arthritis    • Atherosclerosis of coronary artery    • Chronic fatigue    • Coronary artery disease    • Degenerative lumbar disc    • Elevated cholesterol    • Esophageal stricture    • Hypertension    • PONV (postoperative nausea and vomiting)      Past Surgical History:   Procedure Laterality Date   • ABDOMINAL SURGERY     • BACK SURGERY     • CARDIAC CATHETERIZATION     • CATARACT EXTRACTION, BILATERAL     • CHOLECYSTECTOMY     • COLONOSCOPY     • ENDOSCOPY     • EYE SURGERY     • TONSILLECTOMY AND ADENOIDECTOMY     • TRIGGER FINGER RELEASE Right     Third and Fourth Fingers Dr. Ashley   • TUBAL ABDOMINAL LIGATION          SWALLOW EVALUATION (last 72 hours)      SLP Adult Swallow Evaluation     Row Name 12/10/18 1120 12/10/18 0930 18 1600              Rehab Evaluation    Document Type  evaluation  -MP  re-evaluation  -MP  evaluation  -SG      Subjective Information  no complaints  -MP  complains of;pain at IV site; RN aware  -MP  no complaints;complains of  -SG      Patient Observations  lethargic  -MP  alert;cooperative  -MP  --      Patient/Family Observations  --  Daughter present  -MP  --      Patient Effort  adequate  -MP  good  -MP  good  -SG      Symptoms Noted During/After Treatment  --  --  none  -SG         General Information    Patient Profile Reviewed  yes  -MP  yes  -MP  yes  -SG      Pertinent History Of Current Problem  See previous eval.  -MP  Pt admitted 12/8 w/ CVA. 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 PMH: HTN, tobacco abuse, CAD, esophageal stricture. CXR 12/8: no evidence of active or acute cardiopulmonary disease.  -MP  adm w/ CVA  -SG      Current Method of Nutrition  NPO  -MP  NPO  -MP  NPO  -SG      Precautions/Limitations, Vision  WFL  -MP  WFL  -MP  WFL  -SG      Precautions/Limitations, Hearing  WFL  -MP  WFL  -MP  WFL  -SG      Prior Level of Function-Communication  other (see comments) baseline unknown  -MP  other (see comments) baseline unknown  -MP  other (see comments) unknown baseline  -SG      Prior Level of Function-Swallowing  other (see comments) baseline unknown  -MP  other (see comments) baseline unknown  -MP  other (see comments) unknown baseline  -SG      Plans/Goals Discussed with  patient;agreed upon  -MP  patient;family;agreed upon  -MP  patient;agreed upon  -SG      Barriers to Rehab  none identified  -MP  none identified  -MP  none identified  -SG      Patient's Goals for Discharge  patient did not state  -MP  patient did not state  -MP  return home  -SG      Family Goals for Discharge  --  family did not state  -MP  --         Pain Assessment    Additional Documentation  Pain Scale: FACES Pre/Post-Treatment (Group)  -MP  Pain Scale: FACES Pre/Post-Treatment (Group)  -MP  --         Pain Scale: FACES  Pre/Post-Treatment    Pain: FACES Scale, Pretreatment  2-->hurts little bit  -MP  2-->hurts little bit  -MP  --      Pain: FACES Scale, Post-Treatment  2-->hurts little bit  -MP  2-->hurts little bit  -MP  --         Oral Motor and Function    Dentition Assessment  --  natural, present and adequate  -MP  other (see comments) CNA, pt unable to open mouth on command  -SG      Secretion Management  --  dried secretions in oral cavity;other (see comments) continues to drool bilaterally  -MP  dried secretions in oral cavity;other (see comments) drooling noted bilaterally  -SG      Volitional Swallow  --  --  delayed;weak  -SG      Volitional Cough  --  --  WFL  -SG         Oral Musculature and Cranial Nerve Assessment    Oral Motor General Assessment  --  oral labial or buccal impairment  -MP  oral labial or buccal impairment  -SG      Oral Labial or Buccal Impairment, Detail, Cranial Nerve VII (Facial):  --  CN7: Motor Impairment;left labial droop;reduced strength bilaterally;reduced ROM  -MP  CN7: Motor Impairment;left labial droop;reduced strength bilaterally;reduced ROM  -SG      Oral Motor, Comment  --  --  Unable to fully assess 2' pt's dec'd cog status, unable to follow OM commands  -SG         General Eating/Swallowing Observations    Respiratory Support Currently in Use  --  nasal cannula  -MP  nasal cannula  -SG      Eating/Swallowing Skills  --  fed by SLP  -MP  fed by SLP;unsafe self-feeding skills observed;unaware of safety concerns  -SG      Positioning During Eating  --  upright 90 degree;upright in bed  -MP  upright 90 degree;upright in bed;needs frequent re-positioning;other (see comments) frequent repositioning by SLP  -SG      Utensils Used  --  spoon  -MP  spoon;cup;straw  -SG      Consistencies Trialed  --  thin liquids;nectar/syrup-thick liquids;pureed  -MP  thin liquids;pureed  -SG         Respiratory    Respiratory Status  --  --  wheezing, stridor;during swallowing/eating  -SG      Respiratory  Pattern  --  --  decrease control/incoordination of breathing swallow  -SG         Clinical Swallow Eval    Oral Prep Phase  --  impaired  -MP  impaired  -SG      Oral Transit  --  impaired  -MP  impaired  -SG      Oral Residue  --  WFL  -MP  impaired  -SG      Pharyngeal Phase  --  suspected pharyngeal impairment  -MP  suspected pharyngeal impairment  -SG      Clinical Swallow Evaluation Summary  --  Clinical swallow re-evaluation completed. Pt given trials of thin, nectar-thick, and puree via tsp. Anterior loss of approximately 1/2 of thin liquid bolus, immediate cough and wet vocal quality noted. No overt s/sxs w/ NTL or puree via tsp. Rec NPO, meds crushed in puree, check to ensure cleared oral cavity. SLP will proceed w/ MBS today to determine safest PO diet.  -MP  CSE completed this date. Pt not alert in am, then getting in-room testing, pt seen for evaluation later in pm. Pt confused, lethargic, incoherent mumbling during evaluation. Inconsistent verbal responses, inconsistent alertness. Frequent repositioning required by SLP during assessment. Drooling noted bilaterally, but facial droop evident on left. Moderate dysarthria. Pt tested w/ thins and puree via tsp. Immediate gagging cough w/ small tsp of thins. Ant loss w/ all, pt unable to clear lips of puree, minimal amount taken from tsp. Wheezing cough noted after trials. No family present. Pt unable to express understanding w/ provided education. Rec: NPO is safest at this time, needs BS re-eval tomorrow, aggressive oral care, and SLC eval when pt more consistently responsive and alert.   -SG         Oral Prep Concerns    Oral Prep Concerns  --  other (see comments) DNT solid  -MP  other (see comments) solids not tested  -SG         Oral Transit Concerns    Oral Transit Concerns  --  increased oral transit time  -MP  delayed initiation of bolus transit  -SG      Delayed Intiation of Bolus Transit  --  --  thin  -SG      Increased Oral Transit Time  --   pudding  -MP  --         Oral Residue Concerns    Oral Residue Concerns  --  --  diffuse residue throughout oral cavity  -SG      Diffuse Residue Throughout Oral Cavity  --  --  other (see comments) puree  -SG         Pharyngeal Phase Concerns    Pharyngeal Phase Concerns  --  cough;wet vocal quality  -MP  cough;wet vocal quality  -SG      Wet Vocal Quality  --  thin  -MP  all consistencies  -SG      Cough  --  --  thin  -SG         MBS/VFSS    Utensils Used  spoon;cup  -MP  --  --      Consistencies Trialed  thin liquids;pudding thick  -MP  --  --         MBS/VFSS Interpretation    Oral Prep Phase  impaired oral phase of swallowing  -MP  --  --      Oral Transit Phase  impaired  -MP  --  --      Oral Residue  impaired  -MP  --  --      VFSS Summary  SLP MBS evaluation completed. Pt w/ significantly decreased alertness level compared to bedside evaluation completed in AM. Discussed w/ RN. Per RN, pt given morphine prior to MBS. Given decreased alertness level, limited PO trials were completed during MBS. Pt w/ mild anterior loss w/ thin via tsp. Pt unable to accept thin via cup or straw. Increased A-P transit time w/ pudding. Mild-mod oral reside w/ pudding that partially cleared w/ consecutive swallows. DNT solid. Delay of initiation of the pharyngeal swallow to the level of the pyriform sinuses, though did not impact function (w/ small bolus size). No penetration/aspiration w/ thin liquid via tsp size bolus or pudding. Mild vallecular residue w/ thin and pudding 2' reduced base of tongue retraction. Recommend: dysphagia level II diet w/ thin liquid via tsp only. No straws. Meds crushed in puree. Will need assist w/ all feeding. ONLY feed when alert/awake. If lethargic, hold tray. Follow standard aspiration precautions. SLP will f/u for dysphagia tx and advancement as able.  -MP  --  --         Oral Preparatory Phase    Oral Preparatory Phase  anterior loss  -MP  --  --      Anterior Loss  thin liquids  -MP  --  --          Oral Transit Phase    Impaired Oral Transit Phase  increased A-P transit time  -MP  --  --      Increased A-P Transit Time  pudding/puree  -MP  --  --         Oral Residue    Impaired Oral Residue  diffuse residue throughout oral cavity  -MP  --  --      Diffuse Residue throughout Oral Cavity  pudding/puree  -MP  --  --      Response to Oral Residue  other (see comments) partially cleared w/ consecutive swallows  -MP  --  --         Initiation of Pharyngeal Swallow    Initiation of Pharyngeal Swallow  bolus in pyriform sinuses  -MP  --  --      Pharyngeal Phase  functional pharyngeal phase of swallowing;other (see comments) for limited PO given (tsp size bolus of thin and pudding)  -MP  --  --      Pharyngeal Residue  pudding/puree;valleculae;secondary to reduced base of tongue retraction  -MP  --  --      Response to Residue  other (see comments) reduced but not cleared w/ consecutive swallows  -MP  --  --         Clinical Impression    SLP Swallowing Diagnosis  oral dysfunction;mild;pharyngeal dysfunction  -MP  oral dysfunction;suspected pharyngeal dysfunction  -MP  oral dysfunction;pharyngeal dysfunction  -SG      Functional Impact  risk of malnutrition;risk of dehydration  -MP  risk of aspiration/pneumonia;risk of malnutrition;risk of dehydration  -MP  risk of aspiration/pneumonia;risk of malnutrition;risk of dehydration  -SG      Rehab Potential/Prognosis, Swallowing  good, to achieve stated therapy goals  -MP  good, to achieve stated therapy goals  -MP  good, to achieve stated therapy goals  -SG      Swallow Criteria for Skilled Therapeutic Interventions Met  demonstrates skilled criteria  -MP  demonstrates skilled criteria  -MP  demonstrates skilled criteria  -SG         Recommendations    Therapy Frequency (Swallow)  5 days per week  -MP  5 days per week  -MP  5 days per week  -SG      Predicted Duration Therapy Intervention (Days)  until discharge  -MP  until discharge  -MP  until discharge  -SG       SLP Diet Recommendation  puree;thin liquids;other (see comments) via tsp only  -MP  NPO  -MP  NPO  -SG      Recommended Diagnostics  --  VFSS (MBS)  -MP  reassess via clinical swallow evaluation  -SG      Recommended Precautions and Strategies  no straw;small bites of food and sips of liquid;liquid via spoon only;other (see comments) ONLY feed when alert/awake  -MP  --  --      SLP Rec. for Method of Medication Administration  meds crushed;with pudding or applesauce;as tolerated  -MP  meds crushed;with pudding or applesauce;as tolerated via alternate route if any s/sxs  -MP  meds via alternate route  -SG      Monitor for Signs of Aspiration  yes;notify SLP if any concerns  -MP  yes;notify SLP if any concerns  -MP  --      Anticipated Dischage Disposition  unknown;anticipate therapy at next level of care  -MP  unknown;anticipate therapy at next level of care  -MP  --        User Key  (r) = Recorded By, (t) = Taken By, (c) = Cosigned By    Initials Name Effective Dates     Tatyana Rodarte MS CCC-SLP 06/22/15 -     MP Steven Christina MS, CFY-SLP 08/15/18 -           EDUCATION  The patient has been educated in the following areas:   Dysphagia (Swallowing Impairment) Modified Diet Instruction.    SLP Recommendation and Plan  SLP Swallowing Diagnosis: oral dysfunction, mild, pharyngeal dysfunction  SLP Diet Recommendation: puree, thin liquids, other (see comments)(via tsp only)  Recommended Precautions and Strategies: no straw, small bites of food and sips of liquid, liquid via spoon only, other (see comments)(ONLY feed when alert/awake)     Monitor for Signs of Aspiration: yes, notify SLP if any concerns  Recommended Diagnostics: VFSS (MBS)  Swallow Criteria for Skilled Therapeutic Interventions Met: demonstrates skilled criteria  Anticipated Dischage Disposition: unknown, anticipate therapy at next level of care  Rehab Potential/Prognosis, Swallowing: good, to achieve stated therapy goals  Therapy  Frequency (Swallow): 5 days per week  Predicted Duration Therapy Intervention (Days): until discharge       Plan of Care Reviewed With: patient  Plan of Care Review  Plan of Care Reviewed With: patient    SLP GOALS     Row Name 12/10/18 1120             Oral Nutrition/Hydration Goal 1 (SLP)    Oral Nutrition/Hydration Goal 1, SLP  LTG: Pt will return to regular diet, thin liquids w/ no overt s/sxs aspiration w/ 100% acc and no cues  -MP      Time Frame (Oral Nutrition/Hydration Goal 1, SLP)  by discharge  -MP         Oral Nutrition/Hydration Goal 2 (SLP)    Oral Nutrition/Hydration Goal 2, SLP  Pt will tolerate therapeutic trials of thin liquid via tsp and puree w/ no overt s/sxs aspiration w/ 100% acc and no cues  -MP      Time Frame (Oral Nutrition/Hydration Goal 2, SLP)  short term goal (STG);by discharge  -MP         Oral Nutrition/Hydration Goal (SLP)    Oral Nutrition/Hydration Goal, SLP  Pt will tolerate trials of regular solid w/ no overt s/sxs aspiration or discomfort w/ 100% acc and no cues  -MP      Time Frame (Oral Nutrition/Hydration Goal, SLP)  short term goal (STG);by discharge  -MP         Lingual Strengthening Goal 1 (SLP)    Activity (Lingual Strengthening Goal 1, SLP)  increase lingual tone/sensation/control/coordination/movement  -MP      Increase Lingual Tone/Sensation/Control/Coordination/Movement  swallow trials;lingual resistance exercises  -MP      [place]/Accuracy (Lingual Strengthening Goal 1, SLP)  with minimal cues (75-90% accuracy)  -MP      Time Frame (Lingual Strengthening Goal 1, SLP)  short term goal (STG);by discharge  -MP         Pharyngeal Strengthening Exercise Goal 1 (SLP)    Activity (Pharyngeal Strengthening Goal 1, SLP)  increase timing;increase squeeze/positive pressure generation;increase tongue base retraction  -MP      Increase Timing  prepping - 3 second prep or suck swallow or 3-step swallow  -MP      Increase Squeeze/Positive Pressure Generation  hard effortful  swallow  -MP      Increase Tongue Base Retraction  juan david  -MP      [place]/Accuracy (Pharyngeal Strengthening Goal 1, SLP)  with minimal cues (75-90% accuracy)  -MP      Time Frame (Pharyngeal Strengthening Goal 1, SLP)  short term goal (STG);by discharge  -MP         Swallow Compensatory Strategies Goal 1 (SLP)    Activity (Swallow Compensatory Strategies/Techniques Goal 1, SLP)  compensatory strategies;liquids by teaspoon only  -MP      [place]/Accuracy (Swallow Compensatory Strategies/Techniques Goal 1, SLP)  with minimal cues (75-90% accuracy)  -MP      Time Frame (Swallow Compensatory Strategies/Techniques Goal 1, SLP)  short term goal (STG);by discharge  -MP        User Key  (r) = Recorded By, (t) = Taken By, (c) = Cosigned By    Initials Name Provider Type    Steven Nieto MS, CFY-SLP Speech and Language Pathologist             Time Calculation:   Time Calculation- SLP     Row Name 12/10/18 1229 12/10/18 1002          Time Calculation- SLP    SLP Start Time  1120  -MP  0930  -MP     SLP Received On  12/10/18  -MP  12/10/18  -MP       User Key  (r) = Recorded By, (t) = Taken By, (c) = Cosigned By    Initials Name Provider Type    Steven Nieto MS, CFY-SLP Speech and Language Pathologist          Therapy Charges for Today     Code Description Service Date Service Provider Modifiers Qty    63776799767 HC ST EVAL ORAL PHARYNG SWALLOW 3 12/10/2018 Steven Christina MS CFY-SLP GN 1    65146767640 HC ST MOTION FLUORO EVAL SWALLOW 5 12/10/2018 Steven Christina MS, CFY-SLP GN 1               Steven Christina MS, CFY-INOCENCIA  12/10/2018

## 2018-12-10 NOTE — PROGRESS NOTES
Continued Stay Note  [facility]     Patient Name: Anita Roche  MRN: [id number]  Today's Date: 12/10/2018    Admit Date: 12/8/2018    Discharge Plan     Row Name 12/10/18 5390       Plan    Plan  TBD.   Possible Rehab    Patient/Family in Agreement with Plan  yes    Plan Comments  CM met w/ pt at bedside.    Pt's grandson, Bipin Rojas, whom lives with the pt. is at bedside.   Presently, his Mother and pt's dtr. is not here.    I discussed with pt and grandson possibility of rehab and there are places in [facility].   He said they live closer to [place] and wondered if there was Rehab there as well as it would be easier on family; however, he wants the very best rehab for his grandmother and [place] is Ok too.    When his Mother returns this evening, they will discuss it and CM will f/u in a.m.      Bipin stated he lives with his pt.(his grandmother) and he is a student and works and his other brother whom has Cerebral palsy lives there as well.   His grandmother was independent prior to this and he said she would most likely need rehab.            Discharge Codes    No documentation.             Anita Moreno RN

## 2018-12-10 NOTE — PLAN OF CARE
Problem: Fall Risk (Adult)  Goal: Absence of Fall  Outcome: Ongoing (interventions implemented as appropriate)   12/10/18 0072   Fall Risk (Adult)   Absence of Fall making progress toward outcome

## 2018-12-10 NOTE — THERAPY RE-EVALUATION
Acute Care - Speech Language Pathology   Swallow Re-Evaluation [facility]   Clinical Swallow Re-Evaluation     Patient Name: Anita Roche  : 1945  MRN: [id number]  Today's Date: 12/10/2018  Onset of Illness/Injury or Date of Surgery: 18     Referring Physician: MD Sriram      Admit Date: 2018    Visit Dx:     ICD-10-CM ICD-9-CM   1. Dysphagia, unspecified type R13.10 787.20   2. Impaired mobility and ADLs Z74.09 799.89     Patient Active Problem List   Diagnosis   • Chronic fatigue syndrome   • Knee pain   • Closed wedge compression fracture of thoracic vertebra (CMS/HCC)   • Atherosclerosis of coronary artery   • Persistent cough   • Degeneration of intervertebral disc of lumbar region   • Stricture of esophagus   • Hand pain, left   • Trigger middle finger of left hand   • Hypokalemia   • CVA (cerebral vascular accident) (CMS/HCC)   • Tobacco use   • Underweight   • Sepsis due to pneumonia (CMS/HCC)   • Aspiration pneumonia (CMS/HCC)     Past Medical History:   Diagnosis Date   • Acid reflux    • Arthritis    • Atherosclerosis of coronary artery    • Chronic fatigue    • Coronary artery disease    • Degenerative lumbar disc    • Elevated cholesterol    • Esophageal stricture    • Hypertension    • PONV (postoperative nausea and vomiting)      Past Surgical History:   Procedure Laterality Date   • ABDOMINAL SURGERY     • BACK SURGERY     • CARDIAC CATHETERIZATION     • CATARACT EXTRACTION, BILATERAL     • CHOLECYSTECTOMY     • COLONOSCOPY     • ENDOSCOPY     • EYE SURGERY     • TONSILLECTOMY AND ADENOIDECTOMY     • TRIGGER FINGER RELEASE Right     Third and Fourth Fingers Dr. Ashley   • TUBAL ABDOMINAL LIGATION          SWALLOW EVALUATION (last 72 hours)      SLP Adult Swallow Evaluation     Row Name 12/10/18 0930 18 1600                Rehab Evaluation    Document Type  re-evaluation  -MP  evaluation  -SG       Subjective Information  complains of;pain at IV site; RN aware  -MP  no  complaints;complains of  -SG       Patient Observations  alert;cooperative  -MP  --       Patient/Family Observations  Daughter present  -MP  --       Patient Effort  good  -MP  good  -SG       Symptoms Noted During/After Treatment  --  none  -SG          General Information    Patient Profile Reviewed  yes  -MP  yes  -SG       Pertinent History Of Current Problem  Pt admitted 12/8 w/ CVA. 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 PMH: HTN, tobacco abuse, CAD, esophageal stricture. CXR 12/8: no evidence of active or acute cardiopulmonary disease.  -MP  adm w/ CVA  -SG       Current Method of Nutrition  NPO  -MP  NPO  -SG       Precautions/Limitations, Vision  WFL  -MP  WFL  -SG       Precautions/Limitations, Hearing  WFL  -MP  WFL  -SG       Prior Level of Function-Communication  other (see comments) baseline unknown  -MP  other (see comments) unknown baseline  -SG       Prior Level of Function-Swallowing  other (see comments) baseline unknown  -MP  other (see comments) unknown baseline  -SG       Plans/Goals Discussed with  patient;family;agreed upon  -MP  patient;agreed upon  -SG       Barriers to Rehab  none identified  -MP  none identified  -SG       Patient's Goals for Discharge  patient did not state  -MP  return home  -SG       Family Goals for Discharge  family did not state  -MP  --          Pain Assessment    Additional Documentation  Pain Scale: FACES Pre/Post-Treatment (Group)  -MP  --          Pain Scale: FACES Pre/Post-Treatment    Pain: FACES Scale, Pretreatment  2-->hurts little bit  -MP  --       Pain: FACES Scale, Post-Treatment  2-->hurts little bit  -MP  --          Oral Motor and Function    Dentition Assessment  natural, present and adequate  -MP  other (see comments) CNA, pt unable to open mouth on command  -SG       Secretion Management  dried secretions in oral cavity;other (see comments) continues to drool bilaterally  -MP  dried secretions in oral cavity;other (see comments) drooling noted  bilaterally  -SG       Volitional Swallow  --  delayed;weak  -SG       Volitional Cough  --  WFL  -SG          Oral Musculature and Cranial Nerve Assessment    Oral Motor General Assessment  oral labial or buccal impairment  -MP  oral labial or buccal impairment  -SG       Oral Labial or Buccal Impairment, Detail, Cranial Nerve VII (Facial):  CN7: Motor Impairment;left labial droop;reduced strength bilaterally;reduced ROM  -MP  CN7: Motor Impairment;left labial droop;reduced strength bilaterally;reduced ROM  -SG       Oral Motor, Comment  --  Unable to fully assess 2' pt's dec'd cog status, unable to follow OM commands  -SG          General Eating/Swallowing Observations    Respiratory Support Currently in Use  nasal cannula  -MP  nasal cannula  -SG       Eating/Swallowing Skills  fed by SLP  -MP  fed by SLP;unsafe self-feeding skills observed;unaware of safety concerns  -SG       Positioning During Eating  upright 90 degree;upright in bed  -MP  upright 90 degree;upright in bed;needs frequent re-positioning;other (see comments) frequent repositioning by SLP  -SG       Utensils Used  spoon  -MP  spoon;cup;straw  -SG       Consistencies Trialed  thin liquids;nectar/syrup-thick liquids;pureed  -MP  thin liquids;pureed  -SG          Respiratory    Respiratory Status  --  wheezing, stridor;during swallowing/eating  -SG       Respiratory Pattern  --  decrease control/incoordination of breathing swallow  -SG          Clinical Swallow Eval    Oral Prep Phase  impaired  -MP  impaired  -SG       Oral Transit  impaired  -MP  impaired  -SG       Oral Residue  WFL  -MP  impaired  -SG       Pharyngeal Phase  suspected pharyngeal impairment  -MP  suspected pharyngeal impairment  -SG       Clinical Swallow Evaluation Summary  Clinical swallow re-evaluation completed. Pt given trials of thin, nectar-thick, and puree via tsp. Anterior loss of approximately 1/2 of thin liquid bolus, immediate cough and wet vocal quality noted. No  overt s/sxs w/ NTL or puree via tsp. Rec NPO, meds crushed in puree, check to ensure cleared oral cavity. SLP will proceed w/ MBS today to determine safest PO diet.  -MP  CSE completed this date. Pt not alert in am, then getting in-room testing, pt seen for evaluation later in pm. Pt confused, lethargic, incoherent mumbling during evaluation. Inconsistent verbal responses, inconsistent alertness. Frequent repositioning required by SLP during assessment. Drooling noted bilaterally, but facial droop evident on left. Moderate dysarthria. Pt tested w/ thins and puree via tsp. Immediate gagging cough w/ small tsp of thins. Ant loss w/ all, pt unable to clear lips of puree, minimal amount taken from tsp. Wheezing cough noted after trials. No family present. Pt unable to express understanding w/ provided education. Rec: NPO is safest at this time, needs BS re-eval tomorrow, aggressive oral care, and SLC eval when pt more consistently responsive and alert.   -SG          Oral Prep Concerns    Oral Prep Concerns  other (see comments) DNT solid  -MP  other (see comments) solids not tested  -SG          Oral Transit Concerns    Oral Transit Concerns  increased oral transit time  -MP  delayed initiation of bolus transit  -SG       Delayed Intiation of Bolus Transit  --  thin  -SG       Increased Oral Transit Time  pudding  -MP  --          Oral Residue Concerns    Oral Residue Concerns  --  diffuse residue throughout oral cavity  -SG       Diffuse Residue Throughout Oral Cavity  --  other (see comments) puree  -SG          Pharyngeal Phase Concerns    Pharyngeal Phase Concerns  cough;wet vocal quality  -MP  cough;wet vocal quality  -SG       Wet Vocal Quality  thin  -MP  all consistencies  -SG       Cough  --  thin  -SG          Clinical Impression    SLP Swallowing Diagnosis  oral dysfunction;suspected pharyngeal dysfunction  -MP  oral dysfunction;pharyngeal dysfunction  -SG       Functional Impact  risk of  aspiration/pneumonia;risk of malnutrition;risk of dehydration  -MP  risk of aspiration/pneumonia;risk of malnutrition;risk of dehydration  -SG       Rehab Potential/Prognosis, Swallowing  good, to achieve stated therapy goals  -MP  good, to achieve stated therapy goals  -SG       Swallow Criteria for Skilled Therapeutic Interventions Met  demonstrates skilled criteria  -MP  demonstrates skilled criteria  -SG          Recommendations    Therapy Frequency (Swallow)  5 days per week  -MP  5 days per week  -SG       Predicted Duration Therapy Intervention (Days)  until discharge  -MP  until discharge  -SG       SLP Diet Recommendation  NPO  -MP  NPO  -SG       Recommended Diagnostics  VFSS (MBS)  -MP  reassess via clinical swallow evaluation  -SG       SLP Rec. for Method of Medication Administration  meds crushed;with pudding or applesauce;as tolerated via alternate route if any s/sxs  -MP  meds via alternate route  -SG       Monitor for Signs of Aspiration  yes;notify SLP if any concerns  -MP  --       Anticipated Dischage Disposition  unknown;anticipate therapy at next level of care  -  --         User Key  (r) = Recorded By, (t) = Taken By, (c) = Cosigned By    Initials Name Effective Dates     Suri-Tatyana Rodas MS CCC-SLP 06/22/15 -     Steven Nieto MS, CFY-SLP 08/15/18 -           EDUCATION  The patient has been educated in the following areas:   Dysphagia (Swallowing Impairment) NPO rationale.    SLP Recommendation and Plan  SLP Swallowing Diagnosis: oral dysfunction, suspected pharyngeal dysfunction  SLP Diet Recommendation: NPO        Monitor for Signs of Aspiration: yes, notify SLP if any concerns  Recommended Diagnostics: VFSS (MBS)  Swallow Criteria for Skilled Therapeutic Interventions Met: demonstrates skilled criteria  Anticipated Dischage Disposition: unknown, anticipate therapy at next level of care  Rehab Potential/Prognosis, Swallowing: good, to achieve stated therapy  goals  Therapy Frequency (Swallow): 5 days per week  Predicted Duration Therapy Intervention (Days): until discharge       Plan of Care Reviewed With: patient, family  Plan of Care Review  Plan of Care Reviewed With: patient, family           Time Calculation:   Time Calculation- SLP     Row Name 12/10/18 1002             Time Calculation- SLP    SLP Start Time  0930  -MP      SLP Received On  12/10/18  -MP        User Key  (r) = Recorded By, (t) = Taken By, (c) = Cosigned By    Initials Name Provider Type    MP Steven Christina MS, CFY-SLP Speech and Language Pathologist          Therapy Charges for Today     Code Description Service Date Service Provider Modifiers Qty    [id number] HC ST EVAL ORAL PHARYNG SWALLOW 3 12/10/2018 Steven Christina MS, CFY-SLP GN 1               Steven Christina MS, JESSI-SLP  12/10/2018

## 2018-12-10 NOTE — PLAN OF CARE
Problem: Patient Care Overview  Goal: Plan of Care Review  Outcome: Ongoing (interventions implemented as appropriate)   12/10/18 1001   Coping/Psychosocial   Plan of Care Reviewed With patient;family   SLP re-evaluation completed. Will continue to address suspected pharyngeal dysphagia via MBSS today. Please see note for further details and recommendations.

## 2018-12-10 NOTE — PROGRESS NOTES
"Clinical Nutrition   Reason For Visit: BMI    Patient Name: Anita Roche  YOB: 1945  MRN: [id number]  Date of Encounter: 12/10/18 1:42 PM  Admission date: 12/8/2018        Nutrition Assessment     Admission Problem List:  • **Left-sided weakness   • Hypokalemia   • Tobacco use   • Atherosclerosis of coronary artery     Applicable Nutrition-Related Information:  Uses supplements at home as primary nutrition      Applicable medical tests/procedures since admission:  12/10: MBS      PMH: She  has a past medical history of Acid reflux, Arthritis, Atherosclerosis of coronary artery, Chronic fatigue, Coronary artery disease, Degenerative lumbar disc, Elevated cholesterol, Esophageal stricture, Hypertension, and PONV (postoperative nausea and vomiting).   PSxH: She  has a past surgical history that includes Cataract extraction, bilateral; Cholecystectomy; Tonsillectomy and adenoidectomy; Back surgery; Trigger finger release (Right); Abdominal surgery; Cardiac catheterization; Colonoscopy; Esophagogastroduodenoscopy; Eye surgery; Tubal ligation; and TRIGGER FINGER  RELEASE LEFT THIRD (Left, 11/27/2018).        Reported/Observed/Food/Nutrition Related History     Pt asleep, per pt's family she does not eat very much solid food at home.  She drinks 4-5 Ensure type ([employer] brand) supplemental drinks every day (chocolate only). She will also eat soups and more liquid type foods. Pt had MBS today and per SLP pt ok for puree, thin with no straws. Obtained food pref's from family.      Anthropometrics   Height: 62\"  Weight: 89 lbs  BMI: 16.27  BMI classification: Underweight:<18.5kg/m2   UBW: 95 lbs per family  IBW: 110 lbs      Weight change: Family not sure about pt's weight loss, state she fluctuates between 90-95lbs usually.         Labs reviewed   Labs reviewed: Yes  Results from last 7 days   Lab Units  12/09/18   0637  12/08/18   1514   SODIUM mmol/L  142  141   POTASSIUM mmol/L  3.7  3.4*   CHLORIDE mmol/L  " 108  110   CO2 mmol/L  19.0*  24.7   BUN mg/dL  32*  17   CREATININE mg/dL  0.87  0.81   GLUCOSE mg/dL  53*  77   CALCIUM mg/dL  9.1  9.6   CHOLESTEROL mg/dL  160   --    TRIGLYCERIDES mg/dL  178*   --      Results from last 7 days   Lab Units  12/09/18   1259  12/09/18   0637  12/08/18   1514   WBC 10*3/mm3  15.40*   --   11.25   ALBUMIN g/dL   --    --   4.40   CHOLESTEROL mg/dL   --   160   --    TRIGLYCERIDES mg/dL   --   178*   --      Results from last 7 days   Lab Units  12/10/18   1219  12/10/18   0553  12/10/18   0038  12/09/18   1756  12/09/18   1137  12/09/18   0832   GLUCOSE mg/dL  131*  151*  149*  165*  166*  163*     Lab Results   Lab Value Date/Time    HGBA1C 5.10 12/09/2018 0637     Medications reviewed   Medications reviewed: Yes       Current Nutrition Prescription   PO: Diet Dysphagia; II - Pureed; Thin; No Straws; Cardiac      Evaluation of Received Nutrient/Fluid Intake:  Insufficient data       Nutrition Diagnosis     Problem Underweight   Etiology Clinical condition   Signs/Symptoms BMI 16.27       Problem Swallowing difficulty   Etiology Left sided weakness 2/2 CVA   Signs/Symptoms MBS results       Intervention   Intervention: Follow treatment progress, Care plan reviewed, Advise alternate selection, Interview for preferences, Encourage intake, Supplement provided   1) Boost plus 4/day      Goal:   General: Nutrition support treatment  PO: Establish PO, Tolerate PO, Modify texture/consistency        Monitoring/Evaluation:       Monitoring/Evaluation: Per protocol, I&O, PO intake, Supplement intake, Symptoms, Swallow function  Will Continue to follow per protocol  Yudelka Jarvis RD  Time Spent: 40 min

## 2018-12-10 NOTE — PLAN OF CARE
Problem: Patient Care Overview  Goal: Plan of Care Review  Outcome: Ongoing (interventions implemented as appropriate)   12/10/18 1229   Coping/Psychosocial   Plan of Care Reviewed With patient   SLP MBS evaluation completed. Will continue to address dysphagia. Please see note for further details and recommendations.

## 2018-12-10 NOTE — THERAPY EVALUATION
Acute Care - Physical Therapy Initial Evaluation  [facility]     Patient Name: Anita Roche  : 1945  MRN: [id number]  Today's Date: 12/10/2018   Onset of Illness/Injury or Date of Surgery: 18  Date of Referral to PT: 18  Referring Physician: MD SULEIMAN      Admit Date: 2018    Visit Dx:     ICD-10-CM ICD-9-CM   1. Dysphagia, unspecified type R13.10 787.20   2. Impaired mobility and ADLs Z74.09 799.89   3. Impaired functional mobility, balance, gait, and endurance Z74.09 V49.89     Patient Active Problem List   Diagnosis   • Chronic fatigue syndrome   • Knee pain   • Closed wedge compression fracture of thoracic vertebra (CMS/HCC)   • Atherosclerosis of coronary artery   • Persistent cough   • Degeneration of intervertebral disc of lumbar region   • Stricture of esophagus   • Hand pain, left   • Trigger middle finger of left hand   • Hypokalemia   • CVA (cerebral vascular accident) (CMS/HCC)   • Tobacco use   • Underweight   • Sepsis due to pneumonia (CMS/HCC)   • Aspiration pneumonia (CMS/HCC)     Past Medical History:   Diagnosis Date   • Acid reflux    • Arthritis    • Atherosclerosis of coronary artery    • Chronic fatigue    • Coronary artery disease    • Degenerative lumbar disc    • Elevated cholesterol    • Esophageal stricture    • Hypertension    • PONV (postoperative nausea and vomiting)      Past Surgical History:   Procedure Laterality Date   • ABDOMINAL SURGERY     • BACK SURGERY     • CARDIAC CATHETERIZATION     • CATARACT EXTRACTION, BILATERAL     • CHOLECYSTECTOMY     • COLONOSCOPY     • ENDOSCOPY     • EYE SURGERY     • TONSILLECTOMY AND ADENOIDECTOMY     • TRIGGER FINGER RELEASE Right     Third and Fourth Fingers Dr. Ashley   • TUBAL ABDOMINAL LIGATION          PT ASSESSMENT (last 12 hours)      Physical Therapy Evaluation     Row Name 12/10/18 0940          PT Evaluation Time/Intention    Subjective Information  complains of;pain PAIN AT IV SITE, R WRIST.   -CD      Document Type  evaluation COMPLETED CHART REVIEW [id number].   -CD     Mode of Treatment  physical therapy  -CD     Patient Effort  good  -CD     Row Name 12/10/18 0958          General Information    Patient Profile Reviewed?  yes  -CD     Onset of Illness/Injury or Date of Surgery  12/08/18  -CD     Referring Physician  MD SULEIMAN  -CD     Patient Observations  alert;cooperative  -CD     Patient/Family Observations  DAUGHTER PRESENT.   -CD     General Observations of Patient  PT IN FOWLERS UPON ARRIVA ON O2 AND WITH IV AT R WRIST. NSG IN TO GIVE MEDS DURING P.T. EVAL.   -CD     Prior Level of Function  independent:;all household mobility;community mobility;ADL's;driving;work WORKS FT IN AN OFFICE.   -CD     Equipment Currently Used at Home  raised toilet  -CD     Pertinent History of Current Functional Problem  Pt presents as a transfer from [facility] with H/A starting 12/5 associated with nausea and diarrhea. Pt then w/onset of L sided facial droop, confusion and unsteady gait. CT: old L sided CVA; small meningioma, however no acute findings. 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   -CD     Existing Precautions/Restrictions  fall SEVERE L NEGLECT, L SIDE FLACCID.   -CD     Limitations/Impairments  sensory;visual NEGLECT.   -CD     Risks Reviewed  patient:;LOB;nausea/vomiting;dizziness;increased discomfort;change in vital signs;lines disloged  -CD     Benefits Reviewed  improve function;increase independence;increase strength;increase balance;increase knowledge;patient and family:  -CD     Barriers to Rehab  medically complex  -CD     Row Name 12/10/18 0972          Relationship/Environment    Lives With  child(jannet), adult SON AND GRANDSON. DTR PLANS TO STAY WITH PT AT D/C.   -CD     Primary Roles/Responsibilities  wage earner, full time;caregiver for other(s)  -CD     Concerns About Impact on Relationships  SON WORKS   -CD     Row Name 12/10/18 0924          Resource/Environmental Concerns     Current Living Arrangements  home/apartment/condo  -CD     Row Name 12/10/18 0940          Cognitive Assessment/Intervention- PT/OT    Affect/Mental Status (Cognitive)  WNL  -CD     Orientation Status (Cognition)  oriented x 4  -CD     Follows Commands (Cognition)  follows one step commands;repetition of directions required;verbal cues/prompting required;75-90% accuracy  -CD     Personal Safety Interventions  fall prevention program maintained;gait belt;muscle strengthening facilitated;nonskid shoes/slippers when out of bed;supervised activity  -CD     Cognitive Assessment/Intervention Comment  SLURRED SPEECH.   -CD     Row Name 12/10/18 0940          Safety Issues, Functional Mobility    Safety Issues Affecting Function (Mobility)  insight into deficits/self awareness;safety precaution awareness;safety precautions follow-through/compliance  -CD     Impairments Affecting Function (Mobility)  balance;coordination;muscle tone abnormal;pain;postural/trunk control;strength;visual/perceptual  -CD     Row Name 12/10/18 0940          Bed Mobility Assessment/Treatment    Supine-Sit-Supine Dunn (Bed Mobility)  maximum assist (25% patient effort);2 person assist  -CD     Bed Mobility, Safety Issues  decreased use of arms for pushing/pulling;decreased use of legs for bridging/pushing  -CD     Assistive Device (Bed Mobility)  bed rails;draw sheet;head of bed elevated  -CD     Row Name 12/10/18 0940          Transfer Assessment/Treatment    Transfer Assessment/Treatment  sit-stand transfer;bed-chair transfer  -CD     Comment (Transfers)  CUES FOR HAND PLACEMENT. PT RELUCTANT TO USE R UE DUE C/O PAIN FROM IV SITE.   -CD     Bed-Chair Dunn (Transfers)  maximum assist (25% patient effort);2 person assist;verbal cues  -CD     Assistive Device (Bed-Chair Transfers)  -- VIA B UE SUPPORT AND GAIT BELT.  -CD     Sit-Stand Dunn (Transfers)  maximum assist (25% patient effort);2 person assist;verbal cues  -CD      Row Name 12/10/18 0940          Sit-Stand Transfer    Assistive Device (Sit-Stand Transfers)  -- VIA B UE SUPPORT AND GAIT BELT.   -CD     Row Name 12/10/18 0940          Gait/Stairs Assessment/Training    Comment (Gait/Stairs)  DEFERRED TO POOR SITTING BALANCE, WEAKNESS.   -CD     Row Name 12/10/18 0940          General ROM    GENERAL ROM COMMENTS  B LE WFL'S , L PROM, R AROM.   -CD     Row Name 12/10/18 0940          MMT (Manual Muscle Testing)    General MMT Comments  R LE GROSSLY 4/5, L LE 0/5.   -CD     Row Name 12/10/18 0940          Motor Assessment/Intervention    Additional Documentation  Balance (Group);Balance Interventions (Group);Gross Motor Coordination (Group);Muscle Tone Assessment (Row);Therapeutic Exercise (Group);Therapeutic Exercise Interventions (Group)  -     Row Name 12/10/18 0940          Therapeutic Exercise    45012 -  PT Neuromuscular Reeducation Minutes  10  -CD     Row Name 12/10/18 0940          Balance    Balance  static sitting balance;static standing balance  -     Row Name 12/10/18 09          Static Sitting Balance    Level of [place] (Unsupported Sitting, Static Balance)  maximal assist, 25 to 49% patient effort X2  -CD     Sitting Position (Unsupported Sitting, Static Balance)  sitting on edge of bed  -CD     Time Able to Maintain Position (Unsupported Sitting, Static Balance)  more than 5 minutes  -CD     Comment (Unsupported Sitting, Static Balance)  PT PUSHES TO THE L WITH R UE. HOLD HEAD FLEXED AND ROTATED R. WORKED ON MIDLINE ORIENTATION WITH FORWARD GAZE SITTING EOB.   -     Row Name 12/10/18 0940          Static Standing Balance    Level of [place] (Supported Standing, Static Balance)  maximal assist, 25 to 49% patient effort X2  -CD     Time Able to Maintain Position (Supported Standing, Static Balance)  15 to 30 seconds  -CD     Assistive Device Utilized (Supported Standing, Static Balance)  -- VIA B UE SUPPORT AND GAIT BELT.   -CD     Comment  (Supported Standing, Static Balance)  STOOD BRIEFLY FOR NSG TO GIVE SUPPOSITORY.   -CD     Row Name 12/10/18 0940          Sensory Assessment/Intervention    Sensory General Assessment  light touch sensation deficits identified;proprioception deficits identified  -CD     Row Name 12/10/18 0940          Vision Assessment/Intervention    Visual Impairment/Limitations  peripheral vision impaired left  -CD     Visual Motor Impairment  visual tracking, left  -CD     Visual Processing Deficit  fran-inattention/neglect, left  -CD     Vision Assessment Comment  TO BE ASSESSED FURTHER.   -CD     Row Name 12/10/18 0940          Pain Assessment    Additional Documentation  Pain Scale: FACES Pre/Post-Treatment (Group)  -CD     Row Name 12/10/18 0940          Pain Scale: Numbers Pre/Post-Treatment    Pain Location - Side  Right  -CD     Pain Location  wrist FROM IV AT DORSUM OF WRIST.   -CD     Pain Intervention(s)  Repositioned NSG NOTIFIED- TO CONSIDER CHANGING IV SITE TO HELP MOBILITY.  -CD     Row Name 12/10/18 0940          Pain Scale: FACES Pre/Post-Treatment    Pain: FACES Scale, Pretreatment  4-->hurts little more  -CD     Pain: FACES Scale, Post-Treatment  4-->hurts little more  -CD     Row Name 12/10/18 0940          Plan of Care Review    Plan of Care Reviewed With  patient;daughter  -CD     Row Name 12/10/18 0940          Physical Therapy Clinical Impression    Date of Referral to PT  12/08/18  -CD     PT Diagnosis (PT Clinical Impression)  IMPAIRED FUNCTIONAL MOBILITY.   -CD     Patient/Family Goals Statement (PT Clinical Impression)  DID NOT STATE.   -CD     Criteria for Skilled Interventions Met (PT Clinical Impression)  yes  -CD     Rehab Potential (PT Clinical Summary)  good, to achieve stated therapy goals  -CD     Care Plan Review (PT)  evaluation/treatment results reviewed;care plan/treatment goals reviewed;risks/benefits reviewed;current/potential barriers reviewed;patient/other agree to care plan  -CD      Care Plan Review, Other Participant (PT Clinical Impression)  daughter  -CD     Row Name 12/10/18 0940          Vital Signs    Pre Systolic BP Rehab  148  -CD     Pre Treatment Diastolic BP  78  -CD     Post Systolic BP Rehab  165  -CD     Post Treatment Diastolic BP  74  -CD     Pretreatment Heart Rate (beats/min)  109  -CD     Posttreatment Heart Rate (beats/min)  111  -CD     Pre SpO2 (%)  96  -CD     O2 Delivery Pre Treatment  supplemental O2  -CD     Post SpO2 (%)  96  -CD     O2 Delivery Post Treatment  supplemental O2  -CD     Pre Patient Position  Supine  -CD     Intra Patient Position  Sitting  -CD     Post Patient Position  Sitting AFTER TRANSFER BED TO CHAIR.   -CD     Row Name 12/10/18 0940          Physical Therapy Goals    Bed Mobility Goal Selection (PT)  bed mobility, PT goal 1  -CD     Transfer Goal Selection (PT)  transfer, PT goal 1  -CD     Balance Goal Selection (PT)  balance, PT goal 1;balance, PT goal 2  -CD     Additional Documentation  Balance Goal Selection (PT) (Row)  -CD     Row Name 12/10/18 0940          Bed Mobility Goal 1 (PT)    Activity/Assistive Device (Bed Mobility Goal 1, PT)  supine to sit  -CD     [place] Level/Cues Needed (Bed Mobility Goal 1, PT)  minimum assist (75% or more patient effort) X2  -CD     Time Frame (Bed Mobility Goal 1, PT)  long term goal (LTG);2 weeks  -CD     Row Name 12/10/18 0940          Transfer Goal 1 (PT)    Activity/Assistive Device (Transfer Goal 1, PT)  sit-to-stand/stand-to-sit;walker, fran  -CD     [place] Level/Cues Needed (Transfer Goal 1, PT)  moderate assist (50-74% patient effort) X2  -CD     Time Frame (Transfer Goal 1, PT)  long term goal (LTG);2 weeks  -CD     Row Name 12/10/18 0940          Balance Goal 1 (PT)    Activity/Assistive Device (Balance Goal 1, PT)  sitting, static  -CD     [place] Level/Cues Needed (Balance Goal 1, PT)  minimum assist (75% or more patient effort)  -CD     Time Frame (Balance Goal 1, PT)  long  term goal (LTG);2 weeks  -CD     Row Name 12/10/18 0940          Balance Goal 2 (PT)    Activity/Assistive Device (Balance Goal 2, PT)  standing, static;walker, fran  -CD     [place] Level (Balance Goal 2, PT)  moderate assist (50-74% patient effort)  -CD     Time Frame (Balance Goal 2, PT)  long term goal (LTG);2 weeks  -CD     Row Name 12/10/18 0940          Patient Education Goal (PT)    Activity (Patient Education Goal, PT)  HEP, POSITIONING FOR PROTECTION/PRESSURE RELIEF, ATTENDING TO L SIDE.   -CD     [place]/Cues/Accuracy (Memory Goal 2, PT)  verbalizes understanding;independent;demonstrates adequately FAMILY.  -CD     Time Frame (Patient Education Goal, PT)  long term goal (LTG);2 weeks  -CD     Row Name 12/10/18 0940          Positioning and Restraints    Pre-Treatment Position  in bed  -CD     Post Treatment Position  chair  -CD     In Chair  reclined;call light within reach;encouraged to call for assist;exit alarm on;with nsg;RUE elevated;LUE elevated;legs elevated;on mechanical lift sling NOTIFIED NSG OF CURRENT MOBILITY STATUS AND REC'S FOR TF'S .  -CD     Row Name 12/10/18 0940          Living Environment    Home Accessibility  wheelchair accessible HAS A RAMP.   -CD       User Key  (r) = Recorded By, (t) = Taken By, (c) = Cosigned By    Initials Name Provider Type    CD Jamia Bocanegra PT Physical Therapist        Physical Therapy Education     Title: PT OT SLP Therapies (In Progress)     Topic: Physical Therapy (Done)     Point: Mobility training (Done)     Learning Progress Summary           Patient Acceptance, E, VU,NR by CD at 12/10/2018 10:49 AM    Comment:  BENEFITS OF OOB ACTIVITY, POSTURAL AWARENESS, ATTENDING TO L SIDE, TRANSFER TRAINING                   Point: Home exercise program (Done)     Learning Progress Summary           Patient Acceptance, E, VU,NR by CD at 12/10/2018 10:49 AM    Comment:  BENEFITS OF OOB ACTIVITY, POSTURAL AWARENESS, ATTENDING TO L SIDE, TRANSFER TRAINING                    Point: Body mechanics (Done)     Learning Progress Summary           Patient Acceptance, E, VU,NR by CD at 12/10/2018 10:49 AM    Comment:  BENEFITS OF OOB ACTIVITY, POSTURAL AWARENESS, ATTENDING TO L SIDE, TRANSFER TRAINING                   Point: Precautions (Done)     Learning Progress Summary           Patient Acceptance, E, VU,NR by CD at 12/10/2018 10:49 AM    Comment:  BENEFITS OF OOB ACTIVITY, POSTURAL AWARENESS, ATTENDING TO L SIDE, TRANSFER TRAINING                               User Key     Initials Effective Dates Name Provider Type Discipline    CD 06/19/15 -  Jamia Bocanegra PT Physical Therapist PT              PT Recommendation and Plan  Anticipated Discharge Disposition (PT): inpatient rehabilitation facility  Planned Therapy Interventions (PT Eval): balance training, home exercise program, bed mobility training, patient/family education, postural re-education, transfer training, strengthening, neuromuscular re-education  Therapy Frequency (PT Clinical Impression): daily  Outcome Summary/Treatment Plan (PT)  Anticipated Discharge Disposition (PT): inpatient rehabilitation facility  Plan of Care Reviewed With: patient  Outcome Summary: PT PRESENTS WITH EVOLVING SYMPTOM S/P CVA TO INCLUDE, L SIDED WEAKNESS, IMPAIRED SENSATION ON L, L NEGLECT, R GAZE PREFERENCE, IMPAIRED BALANCE AND DECLINE IN FUNCTIONAL MOBILITY. PT REQUIRES MAX ASSIST X2 FOR BED MOBILITY AND STAND PIVOT TRANSFER BED TO CHAIR. RECOMMEND IRF AT D/C.   Outcome Measures     Row Name 12/10/18 0940 12/09/18 1532          How much help from another person do you currently need...    Turning from your back to your side while in flat bed without using bedrails?  2  -CD  --     Moving from lying on back to sitting on the side of a flat bed without bedrails?  2  -CD  --     Moving to and from a bed to a chair (including a wheelchair)?  2  -CD  --     Standing up from a chair using your arms (e.g., wheelchair, bedside chair)?   2  -CD  --     Climbing 3-5 steps with a railing?  1  -CD  --     To walk in hospital room?  1  -CD  --     AM-PAC 6 Clicks Score  10  -CD  --        How much help from another is currently needed...    Putting on and taking off regular lower body clothing?  --  1  -ST     Bathing (including washing, rinsing, and drying)  --  1  -ST     Toileting (which includes using toilet bed pan or urinal)  --  1  -ST     Putting on and taking off regular upper body clothing  --  1  -ST     Taking care of personal grooming (such as brushing teeth)  --  1  -ST     Eating meals  --  1  -ST     Score  --  6  -ST        Modified [place] Scale    Modified [place] Scale  4 - Moderately severe disability.  Unable to walk without assistance, and unable to attend to own bodily needs without assistance.  -CD  5 - Severe disability.  Bedridden, incontinent, and requiring constant nursing care and attention.  -ST        Functional Assessment    Outcome Measure Options  AM-PAC 6 Clicks Basic Mobility (PT);Modified Martin  -CD  AM-PAC 6 Clicks Daily Activity (OT);Modified [place]  -ST       User Key  (r) = Recorded By, (t) = Taken By, (c) = Cosigned By    Initials Name Provider Type    CD Jamia Bocanegra, PT Physical Therapist    Rebeka Garcia, OTR Occupational Therapist         Time Calculation:   PT Charges     Row Name 12/10/18 0940             Time Calculation    Start Time  0940  -CD         Time Calculation- PT    Total Timed Code Minutes- PT  10 minute(s)  -CD         Timed Charges    24961 -  PT Neuromuscular Reeducation Minutes  10  -CD        User Key  (r) = Recorded By, (t) = Taken By, (c) = Cosigned By    Initials Name Provider Type    Jamia Harrell, PT Physical Therapist        Therapy Suggested Charges     Code   Minutes Charges    18718 (CPT®) Hc Pt Neuromusc Re Education Ea 15 Min 10 1    36259 (CPT®) Hc Pt Ther Proc Ea 15 Min      24649 (CPT®) Hc Gait Training Ea 15 Min      75570 (CPT®) Hc Pt Therapeutic Act Ea 15 Min       68255 (CPT®) Hc Pt Manual Therapy Ea 15 Min      55010 (CPT®) Hc Pt Iontophoresis Ea 15 Min      55952 (CPT®) Hc Pt Elec Stim Ea-Per 15 Min      11057 (CPT®) Hc Pt Ultrasound Ea 15 Min      94832 (CPT®) Hc Pt Self Care/Mgmt/Train Ea 15 Min      94757 (CPT®) Hc Pt Prosthetic (S) Train Initial Encounter, Each 15 Min      89363 (CPT®) Hc Pt Orthotic(S)/Prosthetic(S) Encounter, Each 15 Min      52432 (CPT®) Hc Orthotic(S) Mgmt/Train Initial Encounter, Each 15min      Total  10 1        Therapy Charges for Today     Code Description Service Date Service Provider Modifiers Qty    49086620979 HC PT NEUROMUSC RE EDUCATION EA 15 MIN 12/10/2018 Jamia Bocanegra, PT GP 1    18010228370 HC PT EVAL MOD COMPLEXITY 4 12/10/2018 Jamia Bocanegra, PT GP 1    23693012576 HC PT THER SUPP EA 15 MIN 12/10/2018 Jamia Bocanegra, PT GP 2          PT G-Codes  Outcome Measure Options: AM-PAC 6 Clicks Basic Mobility (PT), Modified Martin  AM-PAC 6 Clicks Score: 10  Score: 6  Modified [place] Scale: 4 - Moderately severe disability.  Unable to walk without assistance, and unable to attend to own bodily needs without assistance.      Jamia Bocanegra, PT  12/10/2018

## 2018-12-10 NOTE — PROGRESS NOTES
[facility] Medicine Services  PROGRESS NOTE    Patient Name: Anita Roche  : 1945  MRN: [id number]    Date of Admission: 2018  Length of Stay: 1  Primary Care Physician: Diann Ferreira APRN    Subjective   Subjective     CC:  CVA    HPI:  Last fever 102 ~24 hrs ago.  Will wake more reliably today.  Per nursing, pt coughed some with pills in applesauce, passed MBS and cleared for modified diet but will need to watch closely.  Flaccid left side and left neglect persists.     ROS:  Otherwise ROS is negative except as mentioned in the HPI.    Objective   Objective     Vital Signs:   Temp:  [97.5 °F (36.4 °C)-99.7 °F (37.6 °C)] 98.6 °F (37 °C)  Heart Rate:  [86-96] 91  Resp:  [15-16] 16  BP: (118-164)/(67-88) 154/68  Total (NIH Stroke Scale): 16     Physical Exam:  Constitutional: alert and interactive, thin body habitus  Respiratory: Coarse bilaterally,today no rales apprciated, nonlabored respirations while on 3L  Cardiovascular: RRR, no murmur  Musculoskeletal: No peripheral edema, normal muscle tone for age  Neurologic: Flaccid left paresis and facial droop with associated mechanically slurred speech  Skin: No rashes, no jaundice, no petechiae, no mottling      Results Reviewed:  I have personally reviewed current lab, radiology, and data and agree.    Results from last 7 days   Lab Units  18   1259  18   1514   WBC 10*3/mm3  15.40*  11.25   HEMOGLOBIN g/dL  14.3  14.7   HEMATOCRIT %  44.1*  46.1   PLATELETS 10*3/mm3  176  178   INR    --   1.07     Results from last 7 days   Lab Units  18   0637  18   1514   SODIUM mmol/L  142  141   POTASSIUM mmol/L  3.7  3.4*   CHLORIDE mmol/L  108  110   CO2 mmol/L  19.0*  24.7   BUN mg/dL  32*  17   CREATININE mg/dL  0.87  0.81   GLUCOSE mg/dL  53*  77   CALCIUM mg/dL  9.1  9.6   ALT (SGPT) U/L   --   21   AST (SGOT) U/L   --   23     Estimated Creatinine Clearance: 37.3 mL/min (by C-G formula based on SCr of  0.87 mg/dL).  No results found for: BNP    Microbiology Results Abnormal     Procedure Component Value - Date/Time    Blood Culture - Blood, Arm, Right [[id number]] Collected:  12/09/18 1259    Lab Status:  Preliminary result Specimen:  Blood from Arm, Right Updated:  12/10/18 0131     Blood Culture No growth at less than 24 hours    Blood Culture - Blood, Hand, Right [[id number]] Collected:  12/09/18 1306    Lab Status:  Preliminary result Specimen:  Blood from Hand, Right Updated:  12/10/18 0131     Blood Culture No growth at less than 24 hours          Imaging Results (last 24 hours)     Procedure Component Value Units Date/Time    FL Video Swallow With Speech [[id number]] Updated:  12/10/18 1131    XR Chest 1 View [[id number]] Collected:  12/09/18 1232     Updated:  12/09/18 1257    Narrative:       EXAMINATION: XR CHEST 1 VW - 12/9/2018     INDICATION: Productive cough, fever.     TECHNIQUE: Single-view frontal chest.     COMPARISONS: 6/27/2013     FINDINGS:  There is opacity in the right lung base. No pleural effusion  or pneumothorax. There is atherosclerosis of the aortic knob. Normal  cardiomediastinal silhouette.       Impression:       Right basal opacity could be compatible with aspiration as  questioned clinically.     DICTATED:   12/9/2018  EDITED/ls :   12/9/2018      This report was finalized on 12/9/2018 12:55 PM by Chandra Ceballos.           Results for orders placed during the hospital encounter of 12/08/18   Adult Transthoracic Echo Complete W/ Cont if Necessary Per Protocol (With Agitated Saline)    Narrative · Left ventricular systolic function is hyperdynamic (EF > 70).  · Mid LV cavitary gradient of 40 mmHg..  · Trace mitral valve regurgitation is present.  · Trace tricuspid valve regurgitation is present          I have reviewed the medications.    Assessment/Plan   Assessment / Plan     Active Hospital Problems    Diagnosis Date Noted   • **CVA (cerebral vascular accident) (CMS/[facility]) [I63.9]  12/08/2018   • Acute respiratory failure with hypoxia (CMS/[facility]) [J96.01] 12/10/2018   • Underweight [R63.6] 12/09/2018   • Sepsis due to pneumonia (CMS/[facility]) [J18.9, A41.9] 12/09/2018   • Aspiration pneumonia (CMS/[facility]) [J69.0] 12/09/2018   • Hypokalemia [E87.6] 12/08/2018   • Tobacco use [Z72.0] 12/08/2018   • Atherosclerosis of coronary artery [I25.10] 11/08/2018     Description: Echo (4/2012): LVEF >65%     • Degeneration of intervertebral disc of lumbar region [M51.36] 03/31/2008          Brief Hospital Course to date:  Anita Roche is a 73 y.o. female with PMH significant for HTN, tobacco abuse, CAD, esophageal stricture, trigger finger s/p release 11/27/2018 transferred from the ER at [facility] for 3 days of HA, confusion, balance disturbances.  At the local ER, CT head was performed which showed an old left sided stroke but no acute insults.  However, left facial droop and left sided drift was noted and the patient was sent to [facility] for higher level of neuro care.  MRI upon arrival revealed large right hemispheric CVA.       R hemisphere infarct secondary to R ICA occlusion with residual left hemiparalysis, dysphagia  - ASA/Plavix (currently NPO)  - Neuro has evaluated  - large infarct with guarded prognosis (somnelence, left HP, neglect, dysphagia)  - on modified diet  - ST/PT/OT/CM -->  [facility] referral in progress    Sepsis (not POA)  Aspiration pna (not POA)  Acute hypoxic failure  - BCx x2  - Zosyn  - NPO [place] [facility] [place] suspect will need alternate nutrition  - am CBC        DVT Prophylaxis:  Given stable neuro exam as of 12/10 added SQ hep prophy since bedbound from severity of CVA    CODE STATUS:   Code Status and Medical Interventions:   Ordered at: 12/08/18 2008     Level Of Support Discussed With:    Patient     Code Status:    CPR     Medical Interventions (Level of Support Prior to Arrest):    Full       Disposition: I expect the patient to be discharged to [facility] likely mid-week or so once ensure she  safely takes enough PO to sustain her nutrition and has no further aspiration episodes.     Electronically signed by Kandi Whiting MD, 12/10/18, 11:37 AM.

## 2018-12-10 NOTE — PROGRESS NOTES
Clinical Nutrition   Reason For Visit: {reason:69336}    Patient Name: Anita Roche  YOB: 1945  MRN: [id number]  Date of Encounter: 12/10/18 1:42 PM  Admission date: 12/8/2018      Comments:       Nutrition Assessment     Admission Problem List:        Applicable Nutrition-Related Information:        Applicable medical tests/procedures since admission:        PMH: She  has a past medical history of Acid reflux, Arthritis, Atherosclerosis of coronary artery, Chronic fatigue, Coronary artery disease, Degenerative lumbar disc, Elevated cholesterol, Esophageal stricture, Hypertension, and PONV (postoperative nausea and vomiting).   PSxH: She  has a past surgical history that includes Cataract extraction, bilateral; Cholecystectomy; Tonsillectomy and adenoidectomy; Back surgery; Trigger finger release (Right); Abdominal surgery; Cardiac catheterization; Colonoscopy; Esophagogastroduodenoscopy; Eye surgery; Tubal ligation; and TRIGGER FINGER  RELEASE LEFT THIRD (Left, 11/27/2018).        Reported/Observed/Food/Nutrition Related History           Anthropometrics   Height:   Weight:   BMI:   BMI classification: {BMI classification:53692}   UBW:   IBW:       Weight change:         Labs reviewed   Labs reviewed: Yes  Results from last 7 days   Lab Units  12/09/18   0637  12/08/18   1514   SODIUM mmol/L  142  141   POTASSIUM mmol/L  3.7  3.4*   CHLORIDE mmol/L  108  110   CO2 mmol/L  19.0*  24.7   BUN mg/dL  32*  17   CREATININE mg/dL  0.87  0.81   GLUCOSE mg/dL  53*  77   CALCIUM mg/dL  9.1  9.6   CHOLESTEROL mg/dL  160   --    TRIGLYCERIDES mg/dL  178*   --      Results from last 7 days   Lab Units  12/09/18   1259  12/09/18   0637  12/08/18   1514   WBC 10*3/mm3  15.40*   --   11.25   ALBUMIN g/dL   --    --   4.40   CHOLESTEROL mg/dL   --   160   --    TRIGLYCERIDES mg/dL   --   178*   --      Results from last 7 days   Lab Units  12/10/18   1219  12/10/18   0553  12/10/18   0038  12/09/18   1756  12/09/18    1137  12/09/18   0832   GLUCOSE mg/dL  131*  151*  149*  165*  166*  163*     Lab Results   Lab Value Date/Time    HGBA1C 5.10 12/09/2018 0637     Medications reviewed   Medications reviewed: Yes       Current Nutrition Prescription   PO: Diet Dysphagia; II - Pureed; Thin; No Straws; Cardiac      Evaluation of Received Nutrient/Fluid Intake:  Insufficient data       Nutrition Diagnosis     Problem Underweight   Etiology Clinical condition   Signs/Symptoms BMI 16.27       Problem Swallowing difficulty   Etiology Left sided weakness 2/2 CVA   Signs/Symptoms MBS results       Intervention   Intervention: Follow treatment progress, Care plan reviewed, Advise alternate selection, Interview for preferences, Encourage intake, Supplement provided   1) Boost plus 4/day      Goal:   General: Nutrition support treatment  PO: Establish PO, Tolerate PO, Modify texture/consistency        Monitoring/Evaluation:       Monitoring/Evaluation: Per protocol, I&O, PO intake, Supplement intake, Symptoms, Swallow function  Will Continue to follow per protocol  Yudelka Jarvis RD  Time Spent: 40 min

## 2018-12-10 NOTE — PROGRESS NOTES
Daily Progress Note  Neurology     LOS: 1 day     Subjective     Chief Complaint: left hemiparesis    Interval History:  No acute events overnight    ROS: negative for fever    Objective     Vital signs in last 24 hours:  Temp:  [97.5 °F (36.4 °C)-99.7 °F (37.6 °C)] 98.6 °F (37 °C)  Heart Rate:  [86-96] 91  Resp:  [15-16] 16  BP: (118-164)/(67-88) 154/68      Physical Exam:   General: Lying in bed with eyes open. In NAD.     Respiratory: Respirations unlabored   CV: RRR       Neurologic Exam:   Mental status: Awake, alert, Follows commands.    CN: II-XII intact                    Results from last 7 days   Lab Units  12/09/18   1259   WBC 10*3/mm3  15.40*   HEMOGLOBIN g/dL  14.3   HEMATOCRIT %  44.1*   PLATELETS 10*3/mm3  176           Results Review:    Labs reviewed. A1c 5.1,   MRI brain report reviewed  MRA head and neck reports reviewed  EKG report reviewed  TTE report reviewed    Assessment/Plan       CVA (cerebral vascular accident) (CMS/HCC)    Atherosclerosis of coronary artery    Degeneration of intervertebral disc of lumbar region    Hypokalemia    Tobacco use    Underweight    Sepsis due to pneumonia (CMS/HCC)    Aspiration pneumonia (CMS/HCC)    Acute respiratory failure with hypoxia (CMS/HCC)        1.  Acute ischemic stroke = scattered infarcts seen in the right hemisphere in the periventricular region on MRI.  Mechanism likely secondary to right ICA occlusion.  She is currently on aspirin and Plavix.  She refuses to take statin medication. PT recommending inpatient rehab.    2.  Hypertension = continue meds    3.  Hyperlipidemia = . Patient declining statin    No further input. Will follow as needed      Violeta Rosario MD  12/10/18  12:34 PM

## 2018-12-10 NOTE — PROGRESS NOTES
Discharge Planning Assessment  [facility]     Patient Name: Anita Roche  MRN: [id number]  Today's Date: 12/10/2018    Admit Date: 12/8/2018    Discharge Needs Assessment     Row Name 12/10/18 0912       Living Environment    Lives With  child(jannet), adult    Name(s) of Who Lives With Patient  Both her adult son and daughter live with her    Current Living Arrangements  home/apartment/condo    Primary Care Provided by  self    Provides Primary Care For  no one    Family Caregiver if Needed  child(jannet), adult    Quality of Family Relationships  involved    Able to Return to Prior Arrangements  yes       Transition Planning    Patient/Family Anticipates Transition to  home with family    Patient/Family Anticipated Services at Transition  none    Transportation Anticipated  family or friend will provide       Discharge Needs Assessment    Readmission Within the Last 30 Days  no previous admission in last 30 days    Concerns to be Addressed  discharge planning    Equipment Currently Used at Home  none    Anticipated Changes Related to Illness  none    Equipment Needed After Discharge  none        Discharge Plan     Row Name 12/10/18 0913       Plan    Plan  home    Patient/Family in Agreement with Plan  yes    Plan Comments  Pt lives in [place] and her adult children live with her. Pt is still empployed full time and was independent with all ADLs prior to admit. She denies use of any DME/HH prior to admit. Pt is followed by her PCP and her medications are covered by insurance. At this time pt reports her plan for discharge is to return home. CM will await for PT/OT eval to be complete and will then discuss with pt possible need for rehab if appropriate.         Destination      No service coordination in this encounter.      Durable Medical Equipment      No service coordination in this encounter.      Dialysis/Infusion      No service coordination in this encounter.      Home Medical Care      No service coordination  in this encounter.      Community Resources      No service coordination in this encounter.          Demographic Summary     Row Name 12/10/18 0912       General Information    Admission Type  inpatient    Referral Source  physician    Reason for Consult  discharge planning    General Information Comments  PCP Diann Ferreira        Contact Information    Permission Granted to Share Info With  ;family/designee    Contact Information Comments  Teresa Roche  [phone]        Functional Status     Row Name 12/10/18 0912       Functional Status, IADL    Medications  independent    Meal Preparation  independent    Housekeeping  independent    Laundry  independent    Shopping  independent       Mental Status    General Appearance WDL  WDL       Mental Status Summary    Recent Changes in Mental Status/Cognitive Functioning  unable to assess       Employment/    Employment Status  employed full time        Psychosocial    No documentation.       Abuse/Neglect    No documentation.       Legal    No documentation.       Substance Abuse    No documentation.       Patient Forms    No documentation.           Alaina Stevens RN

## 2018-12-10 NOTE — PLAN OF CARE
Problem: Patient Care Overview  Goal: Plan of Care Review  Outcome: Ongoing (interventions implemented as appropriate)   12/10/18 9173   Coping/Psychosocial   Plan of Care Reviewed With patient   OTHER   Outcome Summary PT PRESENTS WITH EVOLVING SYMPTOM S/P CVA TO INCLUDE, L SIDED WEAKNESS, IMPAIRED SENSATION ON L, L NEGLECT, R GAZE PREFERENCE, SLURRED SPEECH, IMPAIRED BALANCE AND DECLINE IN FUNCTIONAL MOBILITY. PT REQUIRES MAX ASSIST X2 FOR BED MOBILITY AND STAND PIVOT TRANSFER BED TO CHAIR. RECOMMEND IRF AT D/C.

## 2018-12-10 NOTE — PROGRESS NOTES
Continued Stay Note  [facility]     Patient Name: Anita Roche  MRN: [id number]  Today's Date: 12/10/2018    Admit Date: 12/8/2018    Discharge Plan     Row Name 12/10/18 1128       Plan    Plan  TBD.    Plan Comments  In rounds this a.m., Dr. Whiting shared pt. would benefit from Rehab.    CM spoke to Celia Reveles, and she will review pt.    CM will discuss with pt. and family.   CM to f/u.       Final Discharge Disposition Code  62 - inpatient rehab facility    Row Name 12/10/18 0913       Plan    Plan  home    Patient/Family in Agreement with Plan  yes    Plan Comments  Pt lives in [place] and her adult children live with her. Pt is still empployed full time and was independent with all ADLs prior to admit. She denies use of any DME/HH prior to admit. Pt is followed by her PCP and her medications are covered by insurance. At this time pt reports her plan for discharge is to return home. CM will await for PT/OT eval to be complete and will then discuss with pt possible need for rehab if appropriate.         Discharge Codes    No documentation.             Anita Moreno RN

## 2018-12-11 ENCOUNTER — APPOINTMENT (OUTPATIENT)
Dept: GENERAL RADIOLOGY | Facility: HOSPITAL | Age: 73
End: 2018-12-11

## 2018-12-11 LAB
ANION GAP SERPL CALCULATED.3IONS-SCNC: 5 MMOL/L (ref 3–11)
BUN BLD-MCNC: 10 MG/DL (ref 9–23)
BUN/CREAT SERPL: 14.9 (ref 7–25)
CALCIUM SPEC-SCNC: 9.2 MG/DL (ref 8.7–10.4)
CHLORIDE SERPL-SCNC: 111 MMOL/L (ref 99–109)
CO2 SERPL-SCNC: 24 MMOL/L (ref 20–31)
CREAT BLD-MCNC: 0.67 MG/DL (ref 0.6–1.3)
DEPRECATED RDW RBC AUTO: 50.8 FL (ref 37–54)
ERYTHROCYTE [DISTWIDTH] IN BLOOD BY AUTOMATED COUNT: 13.5 % (ref 11.3–14.5)
GFR SERPL CREATININE-BSD FRML MDRD: 87 ML/MIN/1.73
GLUCOSE BLD-MCNC: 96 MG/DL (ref 70–100)
GLUCOSE BLDC GLUCOMTR-MCNC: 69 MG/DL (ref 70–130)
HCT VFR BLD AUTO: 40.4 % (ref 34.5–44)
HGB BLD-MCNC: 13 G/DL (ref 11.5–15.5)
MCH RBC QN AUTO: 32.9 PG (ref 27–31)
MCHC RBC AUTO-ENTMCNC: 32.2 G/DL (ref 32–36)
MCV RBC AUTO: 102.3 FL (ref 80–99)
PLATELET # BLD AUTO: 175 10*3/MM3 (ref 150–450)
PMV BLD AUTO: 12.4 FL (ref 6–12)
POTASSIUM BLD-SCNC: 3.5 MMOL/L (ref 3.5–5.5)
RBC # BLD AUTO: 3.95 10*6/MM3 (ref 3.89–5.14)
SODIUM BLD-SCNC: 140 MMOL/L (ref 132–146)
WBC NRBC COR # BLD: 11.38 10*3/MM3 (ref 3.5–10.8)

## 2018-12-11 PROCEDURE — 85027 COMPLETE CBC AUTOMATED: CPT | Performed by: FAMILY MEDICINE

## 2018-12-11 PROCEDURE — 94799 UNLISTED PULMONARY SVC/PX: CPT

## 2018-12-11 PROCEDURE — 97110 THERAPEUTIC EXERCISES: CPT

## 2018-12-11 PROCEDURE — 94760 N-INVAS EAR/PLS OXIMETRY 1: CPT

## 2018-12-11 PROCEDURE — 92523 SPEECH SOUND LANG COMPREHEN: CPT

## 2018-12-11 PROCEDURE — 80048 BASIC METABOLIC PNL TOTAL CA: CPT | Performed by: FAMILY MEDICINE

## 2018-12-11 PROCEDURE — 97530 THERAPEUTIC ACTIVITIES: CPT

## 2018-12-11 PROCEDURE — 25010000002 HEPARIN (PORCINE) PER 1000 UNITS: Performed by: FAMILY MEDICINE

## 2018-12-11 PROCEDURE — 25010000002 PIPERACILLIN SOD-TAZOBACTAM PER 1 G: Performed by: FAMILY MEDICINE

## 2018-12-11 PROCEDURE — 25010000002 METOCLOPRAMIDE PER 10 MG: Performed by: FAMILY MEDICINE

## 2018-12-11 PROCEDURE — 92610 EVALUATE SWALLOWING FUNCTION: CPT

## 2018-12-11 PROCEDURE — 71045 X-RAY EXAM CHEST 1 VIEW: CPT

## 2018-12-11 PROCEDURE — 94640 AIRWAY INHALATION TREATMENT: CPT

## 2018-12-11 RX ORDER — SENNA AND DOCUSATE SODIUM 50; 8.6 MG/1; MG/1
2 TABLET, FILM COATED ORAL 2 TIMES DAILY
Status: DISCONTINUED | OUTPATIENT
Start: 2018-12-11 | End: 2018-12-16

## 2018-12-11 RX ADMIN — LISINOPRIL 10 MG: 10 TABLET ORAL at 08:58

## 2018-12-11 RX ADMIN — DILTIAZEM HYDROCHLORIDE 240 MG: 240 CAPSULE, COATED, EXTENDED RELEASE ORAL at 08:58

## 2018-12-11 RX ADMIN — SODIUM CHLORIDE, PRESERVATIVE FREE 3 ML: 5 INJECTION INTRAVENOUS at 08:58

## 2018-12-11 RX ADMIN — METOCLOPRAMIDE 5 MG: 5 INJECTION, SOLUTION INTRAMUSCULAR; INTRAVENOUS at 15:56

## 2018-12-11 RX ADMIN — TAZOBACTAM SODIUM AND PIPERACILLIN SODIUM 3.38 G: 375; 3 INJECTION, SOLUTION INTRAVENOUS at 15:57

## 2018-12-11 RX ADMIN — HEPARIN SODIUM 5000 UNITS: 5000 INJECTION INTRAVENOUS; SUBCUTANEOUS at 21:37

## 2018-12-11 RX ADMIN — DOXYCYCLINE 100 MG: 100 INJECTION, POWDER, LYOPHILIZED, FOR SOLUTION INTRAVENOUS at 21:27

## 2018-12-11 RX ADMIN — IPRATROPIUM BROMIDE AND ALBUTEROL SULFATE 3 ML: 2.5; .5 SOLUTION RESPIRATORY (INHALATION) at 19:32

## 2018-12-11 RX ADMIN — HEPARIN SODIUM 5000 UNITS: 5000 INJECTION INTRAVENOUS; SUBCUTANEOUS at 15:56

## 2018-12-11 RX ADMIN — ATENOLOL 25 MG: 25 TABLET ORAL at 08:58

## 2018-12-11 RX ADMIN — DOXYCYCLINE 100 MG: 100 INJECTION, POWDER, LYOPHILIZED, FOR SOLUTION INTRAVENOUS at 11:39

## 2018-12-11 RX ADMIN — TAZOBACTAM SODIUM AND PIPERACILLIN SODIUM 3.38 G: 375; 3 INJECTION, SOLUTION INTRAVENOUS at 06:13

## 2018-12-11 RX ADMIN — CLOPIDOGREL BISULFATE 75 MG: 75 TABLET ORAL at 08:58

## 2018-12-11 RX ADMIN — TAZOBACTAM SODIUM AND PIPERACILLIN SODIUM 3.38 G: 375; 3 INJECTION, SOLUTION INTRAVENOUS at 21:27

## 2018-12-11 RX ADMIN — METOCLOPRAMIDE 5 MG: 5 INJECTION, SOLUTION INTRAMUSCULAR; INTRAVENOUS at 21:37

## 2018-12-11 RX ADMIN — METOCLOPRAMIDE 5 MG: 5 INJECTION, SOLUTION INTRAMUSCULAR; INTRAVENOUS at 06:12

## 2018-12-11 RX ADMIN — ACETAMINOPHEN 650 MG: 650 SUPPOSITORY RECTAL at 21:38

## 2018-12-11 RX ADMIN — ASPIRIN 300 MG: 300 SUPPOSITORY RECTAL at 11:39

## 2018-12-11 RX ADMIN — METOCLOPRAMIDE 5 MG: 5 INJECTION, SOLUTION INTRAMUSCULAR; INTRAVENOUS at 08:58

## 2018-12-11 RX ADMIN — SODIUM CHLORIDE, PRESERVATIVE FREE 3 ML: 5 INJECTION INTRAVENOUS at 21:27

## 2018-12-11 RX ADMIN — IPRATROPIUM BROMIDE AND ALBUTEROL SULFATE 3 ML: 2.5; .5 SOLUTION RESPIRATORY (INHALATION) at 07:58

## 2018-12-11 RX ADMIN — HEPARIN SODIUM 5000 UNITS: 5000 INJECTION INTRAVENOUS; SUBCUTANEOUS at 06:30

## 2018-12-11 RX ADMIN — IPRATROPIUM BROMIDE AND ALBUTEROL SULFATE 3 ML: 2.5; .5 SOLUTION RESPIRATORY (INHALATION) at 13:29

## 2018-12-11 NOTE — PLAN OF CARE
Problem: Patient Care Overview  Goal: Plan of Care Review  Outcome: Ongoing (interventions implemented as appropriate)   12/11/18 5484   Coping/Psychosocial   Plan of Care Reviewed With patient;daughter   Plan of Care Review   Progress no change   OTHER   Outcome Summary Pt A&O, swallowing worsening to the point of NPO recommendation from SLP after evaluation today. Max assist with non weight bearing on left side. Family considering keofeed at this point as pt not eating/drinking. Will continue to monitor.

## 2018-12-11 NOTE — PLAN OF CARE
Problem: Patient Care Overview  Goal: Plan of Care Review  Outcome: Ongoing (interventions implemented as appropriate)   12/11/18 1019   Coping/Psychosocial   Plan of Care Reviewed With patient   OTHER   Outcome Summary Pt alert and motivated to get OOB and attempt ADL and balance tasks. Pt continues to need mod assist for sittiing balance, max x 2 for bed to chair txfr. Needs IRF at discharge.

## 2018-12-11 NOTE — THERAPY RE-EVALUATION
Acute Care - Speech Language Pathology Initial Evaluation  [facility]   Cognitive-Communication Evaluation  & Clinical Swallow Re-evaluation     Patient Name: Anita Roche  : 1945  MRN: [id number]  Today's Date: 2018  Onset of Illness/Injury or Date of Surgery: 18     Referring Physician: MD SULEIMAN      Admit Date: 2018     Visit Dx:    ICD-10-CM ICD-9-CM   1. Dysphagia, unspecified type R13.10 787.20   2. Impaired mobility and ADLs Z74.09 799.89   3. Impaired functional mobility, balance, gait, and endurance Z74.09 V49.89   4. Cerebrovascular accident (CVA) due to occlusion of right carotid artery (CMS/HCC) I63.231 433.11   5. Cognitive communication deficit R41.841 799.52     Patient Active Problem List   Diagnosis   • Chronic fatigue syndrome   • Knee pain   • Closed wedge compression fracture of thoracic vertebra (CMS/HCC)   • Atherosclerosis of coronary artery   • Persistent cough   • Degeneration of intervertebral disc of lumbar region   • Stricture of esophagus   • Hand pain, left   • Trigger middle finger of left hand   • Hypokalemia   • CVA (cerebral vascular accident) (CMS/HCC)   • Tobacco use   • Underweight   • Sepsis due to pneumonia (CMS/HCC)   • Aspiration pneumonia (CMS/HCC)   • Acute respiratory failure with hypoxia (CMS/HCC)     Past Medical History:   Diagnosis Date   • Acid reflux    • Arthritis    • Atherosclerosis of coronary artery    • Chronic fatigue    • Coronary artery disease    • Degenerative lumbar disc    • Elevated cholesterol    • Esophageal stricture    • Hypertension    • PONV (postoperative nausea and vomiting)      Past Surgical History:   Procedure Laterality Date   • ABDOMINAL SURGERY     • BACK SURGERY     • CARDIAC CATHETERIZATION     • CATARACT EXTRACTION, BILATERAL     • CHOLECYSTECTOMY     • COLONOSCOPY     • ENDOSCOPY     • EYE SURGERY     • TONSILLECTOMY AND ADENOIDECTOMY     • TRIGGER FINGER RELEASE Right     Third and Fourth Fingers   "Gabi   • TUBAL ABDOMINAL LIGATION          SLP EVALUATION (last 72 hours)      SLP SLC Evaluation     Row Name 12/11/18 8555                   Communication Assessment/Intervention    Document Type  evaluation  -AC        Subjective Information  complains of;pain pain/discomfort in chair   -AC        Patient/Family Observations  Pt had difficulty remaining alert. Cooperative w/ cues. Pt's dtr @ bedside & reported pt \"not herself today.\"  -AC        Patient Effort  fair  -AC           General Information    Patient Profile Reviewed  yes  -AC        Pertinent History Of Current Problem  Adm w/ multiple CVAs, aspiration pneumonia. Hx spinal fx, esophageal stricture s/p multiple dilations (most recent ~1yr ago per dtr). Pt's dtr reported pt eats very little at home--primarily drinks [employer] chocolate Boost.  -AC        Prior Level of Function-Communication  other (see comments) u/a to determine @ this time  -AC        Plans/Goals Discussed with  patient and family;agreed upon  -AC        Barriers to Rehab  medically complex  -AC        Patient's Goals for Discharge  patient did not state  -AC        Family Goals for Discharge  family did not state  -AC           Pain Assessment    Additional Documentation  Pain Scale: FACES Pre/Post-Treatment (Group)  -AC           Pain Scale: FACES Pre/Post-Treatment    Pain: FACES Scale, Pretreatment  2-->hurts little bit  -AC        Pain: FACES Scale, Post-Treatment  2-->hurts little bit  -AC        Pre/Post Treatment Pain Comment  back/head/right hand - repositioned pt, per her request, and notified RN re: headache.  -AC           Comprehension Assessment/Intervention    Comprehension Assessment/Intervention  Auditory Comprehension  -AC           Auditory Comprehension Assessment/Intervention    Answers Questions (Communication)  mild impairment;simple;yes/no;personal  -AC        Able to Follow Commands (Communication)  mild impairment;2-step  -AC        Auditory " Comprehension Communication, Comment  Further assessment needed when pt more consistently alert.  -AC           Expression Assessment/Intervention    Expression Assessment/Intervention  verbal expression  -AC           Verbal Expression Assessment/Intervention    Repetition  WFL;sounds  -AC        Conversational Discourse/Fluency  WFL;other (see comments) for min verbalizations  -AC        Verbal Expression, Comment  Minimal verbalizations during eval. Further assessment needed when pt more consistently alert.  -AC           Oral Motor Structure and Function    Dentition Assessment  upper dentures/partial in place;lower dentures/partial in place;poor oral hygiene  -AC        Mucosal Quality  sticky  -AC           Oral Musculature and Cranial Nerve Assessment    Oral Motor General Assessment  oral labial or buccal impairment;lingual impairment  -AC        Oral Labial or Buccal Impairment, Detail, Cranial Nerve VII (Facial):  CN7: Motor Impairment;reduced ROM;reduced strength bilaterally;left labial droop;other (see comments) greatest on L  -AC        Lingual Impairment, Detail. Cranial Nerves IX, XII (Glossopharyngeal and Hypoglossal)  CN12: Motor Impairment;reduced lingual ROM;reduced strength;bilaterally  -AC           Motor Speech Assessment/Intervention    Motor Speech Function  moderate impairment;unfamiliar listener  -AC        Characteristics Consistent with Dysarthria  slow rate;decreased intensity;decreased breath support  -AC        Conversational Speech (Communication)  moderate impairment;simple  -AC        Motor Speech, Comment  Minimal verbalizations during eval, but dysarthria was evident. Pt's dtr also reported noting changes in speech. Further assessment needed when pt more consistently alert.  -AC           Cognitive Assessment Intervention- SLP    Attention (Cognitive)  severe impairment;sustained;other (see comments) poor eye contact, attention to task  -AC        Cognition, Comment  U/a to fully  assess cognitive-communication skills given difficulty sustaining alertness & attention.  -AC           SLP Clinical Impressions    SLP Diagnosis  Pt had difficulty remaining alert during eval. Suspected moderate dysarthria c/b slow rate of speech, imprecise articulation, & reduced breath support. Pt also noted to have significant difficulty making eye contact and maintaining attention to task. Would benefit from cont'd dx tx.  -AC        Rehab Potential/Prognosis  fair  -        SLC Criteria for Skilled Therapy Interventions Met  yes  -        Functional Impact  functional impact in social situations;functional impact in ADLs;difficulty communicating wants, needs;difficulty communicating in an emergency  -           Recommendations    Therapy Frequency (SLP SLC)  5 days per week  -        Predicted Duration Therapy Intervention (Days)  until discharge  -        Anticipated Dischage Disposition  anticipate therapy at next level of care  -          User Key  (r) = Recorded By, (t) = Taken By, (c) = Cosigned By    Initials Name Effective Dates     Estrella Gaytan, MS CCC-SLP 07/27/17 -            EDUCATION  The patient has been educated in the following areas:     Cognitive Impairment Communication Impairment.    SLP Recommendation and Plan    SLP Diagnosis: Pt had difficulty remaining alert during eval. Suspected moderate dysarthria c/b slow rate of speech, imprecise articulation, & reduced breath support. Pt also noted to have significant difficulty making eye contact and maintaining attention to task. Would benefit from cont'd dx tx.    Rehab Potential/Prognosis: fair    [facility] Criteria for Skilled Therapy Interventions Met: yes    Anticipated Dischage Disposition: anticipate therapy at next level of care       Predicted Duration Therapy Intervention (Days): until discharge      Plan of Care Review  Plan of Care Reviewed With: patient, daughter  Plan of Care Reviewed With: patient,  daughter          Estrella Gaytan, MS CCC-SLP  2018 and Acute Care - Speech Language Pathology   Swallow Re-Evaluation [facility]     Patient Name: Anita Roche  : 1945  MRN: [id number]  Today's Date: 2018  Onset of Illness/Injury or Date of Surgery: 18     Referring Physician: MD SULEIMAN      Admit Date: 2018    Visit Dx:     ICD-10-CM ICD-9-CM   1. Dysphagia, unspecified type R13.10 787.20   2. Impaired mobility and ADLs Z74.09 799.89   3. Impaired functional mobility, balance, gait, and endurance Z74.09 V49.89   4. Cerebrovascular accident (CVA) due to occlusion of right carotid artery (CMS/HCC) I63.231 433.11   5. Cognitive communication deficit R41.841 799.52        SWALLOW EVALUATION (last 72 hours)      SLP Adult Swallow Evaluation     Row Name 18 1200 12/10/18 1120 12/10/18 0930 18 1600          Rehab Evaluation    Document Type  re-evaluation  -AC  evaluation  -MP  re-evaluation  -MP  evaluation  -SG     Subjective Information  --  no complaints  -MP  complains of;pain at IV site; RN aware  -MP  no complaints;complains of  -SG     Patient Observations  --  lethargic  -MP  alert;cooperative  -MP  --     Patient/Family Observations  --  --  Daughter present  -MP  --     Patient Effort  fair  -AC  adequate  -MP  good  -MP  good  -SG     Symptoms Noted During/After Treatment  --  --  --  none  -SG        General Information    Patient Profile Reviewed  yes  -AC  yes  -MP  yes  -MP  yes  -SG     Pertinent History Of Current Problem  --  See previous eval.  -MP  Pt admitted  w/ CVA. MRI Head : occlusion of the R ICA. PMH: HTN, tobacco abuse, CAD, esophageal stricture. CXR : no evidence of active or acute cardiopulmonary disease.  -MP  adm w/ CVA  -SG     Current Method of Nutrition  pureed;thin liquids thin via tsp only  -AC  NPO  -MP  NPO  -MP  NPO  -SG     Precautions/Limitations, Vision  --  WFL  -MP  WFL  -MP  WFL  -SG     Precautions/Limitations, Hearing   --  WFL  -MP  WFL  -MP  WFL  -SG     Prior Level of Function-Communication  --  other (see comments) baseline unknown  -MP  other (see comments) baseline unknown  -MP  other (see comments) unknown baseline  -SG     Prior Level of Function-Swallowing  --  other (see comments) baseline unknown  -MP  other (see comments) baseline unknown  -MP  other (see comments) unknown baseline  -SG     Plans/Goals Discussed with  patient and family;agreed upon  -AC  patient;agreed upon  -MP  patient;family;agreed upon  -MP  patient;agreed upon  -SG     Barriers to Rehab  medically complex  -AC  none identified  -MP  none identified  -MP  none identified  -SG     Patient's Goals for Discharge  patient did not state  -AC  patient did not state  -MP  patient did not state  -MP  return home  -SG     Family Goals for Discharge  patient able to eat/drink without coughing/choking  -AC  --  family did not state  -MP  --        Pain Assessment    Additional Documentation  --  Pain Scale: FACES Pre/Post-Treatment (Group)  -MP  Pain Scale: FACES Pre/Post-Treatment (Group)  -MP  --        Pain Scale: FACES Pre/Post-Treatment    Pain: FACES Scale, Pretreatment  2-->hurts little bit  -AC  2-->hurts little bit  -MP  2-->hurts little bit  -MP  --     Pain: FACES Scale, Post-Treatment  2-->hurts little bit  -AC  2-->hurts little bit  -MP  2-->hurts little bit  -MP  --     Pre/Post Treatment Pain Comment  back/head/right hand - repositioned pt, per her request, and notified RN re: headache.  -AC  --  --  --        Oral Motor and Function    Dentition Assessment  --  --  natural, present and adequate  -MP  other (see comments) CNA, pt unable to open mouth on command  -SG     Secretion Management  --  --  dried secretions in oral cavity;other (see comments) continues to drool bilaterally  -MP  dried secretions in oral cavity;other (see comments) drooling noted bilaterally  -SG     Volitional Swallow  --  --  --  delayed;weak  -SG     Volitional Cough   --  --  --  WFL  -SG        Oral Musculature and Cranial Nerve Assessment    Oral Motor General Assessment  --  --  oral labial or buccal impairment  -MP  oral labial or buccal impairment  -SG     Oral Labial or Buccal Impairment, Detail, Cranial Nerve VII (Facial):  --  --  CN7: Motor Impairment;left labial droop;reduced strength bilaterally;reduced ROM  -MP  CN7: Motor Impairment;left labial droop;reduced strength bilaterally;reduced ROM  -SG     Oral Motor, Comment  --  --  --  Unable to fully assess 2' pt's dec'd cog status, unable to follow OM commands  -SG        General Eating/Swallowing Observations    Respiratory Support Currently in Use  --  --  nasal cannula  -MP  nasal cannula  -SG     Eating/Swallowing Skills  fed by SLP;self-fed  -AC  --  fed by SLP  -MP  fed by SLP;unsafe self-feeding skills observed;unaware of safety concerns  -SG     Positioning During Eating  upright 90 degree;upright in chair  -AC  --  upright 90 degree;upright in bed  -MP  upright 90 degree;upright in bed;needs frequent re-positioning;other (see comments) frequent repositioning by SLP  -SG     Utensils Used  spoon;cup  -AC  --  spoon  -MP  spoon;cup;straw  -SG     Consistencies Trialed  thin liquids;nectar/syrup-thick liquids;pureed  -AC  --  thin liquids;nectar/syrup-thick liquids;pureed  -MP  thin liquids;pureed  -SG        Respiratory    Respiratory Status  wheezing, stridor;during swallowing/eating  -AC  --  --  wheezing, stridor;during swallowing/eating  -SG     Respiratory Pattern  --  --  --  decrease control/incoordination of breathing swallow  -SG        Clinical Swallow Eval    Oral Prep Phase  impaired  -AC  --  impaired  -MP  impaired  -SG     Oral Transit  impaired  -AC  --  impaired  -MP  impaired  -SG     Oral Residue  impaired  -AC  --  WFL  -MP  impaired  -SG     Pharyngeal Phase  suspected pharyngeal impairment  -AC  --  suspected pharyngeal impairment  -MP  suspected pharyngeal impairment  -SG     Clinical  Swallow Evaluation Summary  --  --  Clinical swallow re-evaluation completed. Pt given trials of thin, nectar-thick, and puree via tsp. Anterior loss of approximately 1/2 of thin liquid bolus, immediate cough and wet vocal quality noted. No overt s/sxs w/ NTL or puree via tsp. Rec NPO, meds crushed in puree, check to ensure cleared oral cavity. SLP will proceed w/ MBS today to determine safest PO diet.  -MP  CSE completed this date. Pt not alert in am, then getting in-room testing, pt seen for evaluation later in pm. Pt confused, lethargic, incoherent mumbling during evaluation. Inconsistent verbal responses, inconsistent alertness. Frequent repositioning required by SLP during assessment. Drooling noted bilaterally, but facial droop evident on left. Moderate dysarthria. Pt tested w/ thins and puree via tsp. Immediate gagging cough w/ small tsp of thins. Ant loss w/ all, pt unable to clear lips of puree, minimal amount taken from tsp. Wheezing cough noted after trials. No family present. Pt unable to express understanding w/ provided education. Rec: NPO is safest at this time, needs BS re-eval tomorrow, aggressive oral care, and SLC eval when pt more consistently responsive and alert.   -SG        Oral Prep Concerns    Oral Prep Concerns  oral holding;anterior loss  -AC  --  other (see comments) DNT solid  -MP  other (see comments) solids not tested  -SG     Oral Holding  all consistencies;other (see comments) likely affected by cognitive status/lethargy  -AC  --  --  --     Anterior Loss  all consistencies;other (see comments) 2' reduced labial seal  -AC  --  --  --        Oral Transit Concerns    Oral Transit Concerns  delayed initiation of bolus transit;increased oral transit time  -AC  --  increased oral transit time  -MP  delayed initiation of bolus transit  -SG     Delayed Intiation of Bolus Transit  all consistencies  -AC  --  --  thin  -SG     Increased Oral Transit Time  all consistencies  -AC  --  pudding   -MP  --     Oral Transit Concerns, Comment  both 2' reduced lingual strength/coordination and likely affected by cognitive status/lethargy  -AC  --  --  --        Oral Residue Concerns    Oral Residue Concerns  diffuse residue throughout oral cavity  -AC  --  --  diffuse residue throughout oral cavity  -SG     Diffuse Residue Throughout Oral Cavity  pudding;other (see comments) removed w/ oral toothettes & oral suctioning  -AC  --  --  other (see comments) puree  -SG     Oral Residue Concerns, Comment  2' reduced lingual strength/ROM  -AC  --  --  --        Pharyngeal Phase Concerns    Pharyngeal Phase Concerns  wet vocal quality;cough  -AC  --  cough;wet vocal quality  -MP  cough;wet vocal quality  -SG     Wet Vocal Quality  all consistencies;other (see comments) intermittent wet-sounding wheezing   -AC  --  thin  -MP  all consistencies  -SG     Cough  thin;nectar;pudding;other (see comments) immediate cough-thin via tsp, delayed-nectar via tsp/puree  -AC  --  --  thin  -SG     Pharyngeal Phase Concerns, Comment  APRN/RN called to see if pt could be re-evaluated due to pt developing fever overnight & coughing noted w/ PO today. Pt had difficulty holding up her head and maintaining alertness/attention during re-eval. Pt demonstrated overt clinical s/sxs aspiration today -- appears to be a change c/t yesterday afternoon. Pt's dtr concerned that Morphine pt was given last night is still affecting her today. Safest rec is NPO w/ meds via alternate route.  -AC  --  --  --        MBS/VFSS    Utensils Used  --  spoon;cup  -MP  --  --     Consistencies Trialed  --  thin liquids;pudding thick  -MP  --  --        MBS/VFSS Interpretation    Oral Prep Phase  --  impaired oral phase of swallowing  -MP  --  --     Oral Transit Phase  --  impaired  -MP  --  --     Oral Residue  --  impaired  -MP  --  --     VFSS Summary  --  SLP MBS evaluation completed. Pt w/ significantly decreased alertness level compared to bedside evaluation  completed in AM. Discussed w/ RN. Per RN, pt given morphine prior to MBS. Given decreased alertness level, limited PO trials were completed during MBS. Pt w/ mild anterior loss w/ thin via tsp. Pt unable to accept thin via cup or straw. Increased A-P transit time w/ pudding. Mild-mod oral reside w/ pudding that partially cleared w/ consecutive swallows. DNT solid. Delay of initiation of the pharyngeal swallow to the level of the pyriform sinuses, though did not impact function (w/ small bolus size). No penetration/aspiration w/ thin liquid via tsp size bolus or pudding. Mild vallecular residue w/ thin and pudding 2' reduced base of tongue retraction. Recommend: dysphagia level II diet w/ thin liquid via tsp only. No straws. Meds crushed in puree. Will need assist w/ all feeding. ONLY feed when alert/awake. If lethargic, hold tray. Follow standard aspiration precautions. SLP will f/u for dysphagia tx and advancement as able.  -MP  --  --        Oral Preparatory Phase    Oral Preparatory Phase  --  anterior loss  -MP  --  --     Anterior Loss  --  thin liquids  -MP  --  --        Oral Transit Phase    Impaired Oral Transit Phase  --  increased A-P transit time  -MP  --  --     Increased A-P Transit Time  --  pudding/puree  -MP  --  --        Oral Residue    Impaired Oral Residue  --  diffuse residue throughout oral cavity  -MP  --  --     Diffuse Residue throughout Oral Cavity  --  pudding/puree  -MP  --  --     Response to Oral Residue  --  other (see comments) partially cleared w/ consecutive swallows  -MP  --  --        Initiation of Pharyngeal Swallow    Initiation of Pharyngeal Swallow  --  bolus in pyriform sinuses  -MP  --  --     Pharyngeal Phase  --  functional pharyngeal phase of swallowing;other (see comments) for limited PO given (tsp size bolus of thin and pudding)  -MP  --  --     Pharyngeal Residue  --  pudding/puree;valleculae;secondary to reduced base of tongue retraction  -MP  --  --     Response to  Residue  --  other (see comments) reduced but not cleared w/ consecutive swallows  -MP  --  --        Clinical Impression    SLP Swallowing Diagnosis  mod-severe;oral dysfunction;suspected pharyngeal dysfunction  -AC  oral dysfunction;mild;pharyngeal dysfunction  -MP  oral dysfunction;suspected pharyngeal dysfunction  -MP  oral dysfunction;pharyngeal dysfunction  -SG     Functional Impact  risk of aspiration/pneumonia;risk of malnutrition;risk of dehydration  -AC  risk of malnutrition;risk of dehydration  -MP  risk of aspiration/pneumonia;risk of malnutrition;risk of dehydration  -MP  risk of aspiration/pneumonia;risk of malnutrition;risk of dehydration  -SG     Rehab Potential/Prognosis, Swallowing  adequate, monitor progress closely  -AC  good, to achieve stated therapy goals  -MP  good, to achieve stated therapy goals  -MP  good, to achieve stated therapy goals  -SG     Swallow Criteria for Skilled Therapeutic Interventions Met  demonstrates skilled criteria  -AC  demonstrates skilled criteria  -MP  demonstrates skilled criteria  -MP  demonstrates skilled criteria  -SG        Recommendations    Therapy Frequency (Swallow)  --  5 days per week  -MP  5 days per week  -MP  5 days per week  -SG     Predicted Duration Therapy Intervention (Days)  until discharge  -AC  until discharge  -MP  until discharge  -MP  until discharge  -SG     SLP Diet Recommendation  NPO  -AC  puree;thin liquids;other (see comments) via tsp only  -MP  NPO  -MP  NPO  -SG     Recommended Diagnostics  reassess via clinical swallow evaluation;other (see comments) when pt more consistently alert  -  --  VFSS (MBS)  -MP  reassess via clinical swallow evaluation  -SG     Recommended Precautions and Strategies  --  no straw;small bites of food and sips of liquid;liquid via spoon only;other (see comments) ONLY feed when alert/awake  -MP  --  --     SLP Rec. for Method of Medication Administration  meds via alternate route  -AC  meds crushed;with  pudding or applesauce;as tolerated  -MP  meds crushed;with pudding or applesauce;as tolerated via alternate route if any s/sxs  -MP  meds via alternate route  -SG     Monitor for Signs of Aspiration  --  yes;notify SLP if any concerns  -MP  yes;notify SLP if any concerns  -MP  --     Anticipated Dischage Disposition  anticipate therapy at next level of care  -AC  unknown;anticipate therapy at next level of care  -MP  unknown;anticipate therapy at next level of care  -MP  --       User Key  (r) = Recorded By, (t) = Taken By, (c) = Cosigned By    Initials Name Effective Dates    SG Groppjohana-Adrianna Tatyana Abernathy, MS CCC-SLP 06/22/15 -     AC Estrella Gaytan, MS CCC-SLP 07/27/17 -     MP Steven Christina MS, CFY-SLP 08/15/18 -           EDUCATION  The patient has been educated in the following areas:   Dysphagia (Swallowing Impairment) Oral Care/Hydration NPO rationale.    SLP Recommendation and Plan  SLP Swallowing Diagnosis: mod-severe, oral dysfunction, suspected pharyngeal dysfunction  SLP Diet Recommendation: NPO      Recommended Diagnostics: reassess via clinical swallow evaluation, other (see comments)(when pt more consistently alert)  Swallow Criteria for Skilled Therapeutic Interventions Met: demonstrates skilled criteria  Anticipated Dischage Disposition: anticipate therapy at next level of care  Rehab Potential/Prognosis, Swallowing: adequate, monitor progress closely     Predicted Duration Therapy Intervention (Days): until discharge       Plan of Care Reviewed With: patient, daughter  Plan of Care Review  Plan of Care Reviewed With: patient, daughter    SLP GOALS     Row Name 12/11/18 1150 12/10/18 1120          Oral Nutrition/Hydration Goal 1 (SLP)    Oral Nutrition/Hydration Goal 1, SLP  --  LTG: Pt will return to regular diet, thin liquids w/ no overt s/sxs aspiration w/ 100% acc and no cues  -MP     Time Frame (Oral Nutrition/Hydration Goal 1, SLP)  --  by discharge  -MP        Oral Nutrition/Hydration  Goal 2 (SLP)    Oral Nutrition/Hydration Goal 2, SLP  --  Pt will tolerate therapeutic trials of thin liquid via tsp and puree w/ no overt s/sxs aspiration w/ 100% acc and no cues  -MP     Time Frame (Oral Nutrition/Hydration Goal 2, SLP)  --  short term goal (STG);by discharge  -MP        Oral Nutrition/Hydration Goal (SLP)    Oral Nutrition/Hydration Goal, SLP  --  Pt will tolerate trials of regular solid w/ no overt s/sxs aspiration or discomfort w/ 100% acc and no cues  -MP     Time Frame (Oral Nutrition/Hydration Goal, SLP)  --  short term goal (STG);by discharge  -MP        Lingual Strengthening Goal 1 (SLP)    Activity (Lingual Strengthening Goal 1, SLP)  --  increase lingual tone/sensation/control/coordination/movement  -MP     Increase Lingual Tone/Sensation/Control/Coordination/Movement  --  swallow trials;lingual resistance exercises  -MP     [place]/Accuracy (Lingual Strengthening Goal 1, SLP)  --  with minimal cues (75-90% accuracy)  -MP     Time Frame (Lingual Strengthening Goal 1, SLP)  --  short term goal (STG);by discharge  -MP        Pharyngeal Strengthening Exercise Goal 1 (SLP)    Activity (Pharyngeal Strengthening Goal 1, SLP)  --  increase timing;increase squeeze/positive pressure generation;increase tongue base retraction  -MP     Increase Timing  --  prepping - 3 second prep or suck swallow or 3-step swallow  -MP     Increase Squeeze/Positive Pressure Generation  --  hard effortful swallow  -MP     Increase Tongue Base Retraction  --  juan david  -MP     [place]/Accuracy (Pharyngeal Strengthening Goal 1, SLP)  --  with minimal cues (75-90% accuracy)  -MP     Time Frame (Pharyngeal Strengthening Goal 1, SLP)  --  short term goal (STG);by discharge  -MP        Swallow Compensatory Strategies Goal 1 (SLP)    Activity (Swallow Compensatory Strategies/Techniques Goal 1, SLP)  --  compensatory strategies;liquids by teaspoon only  -MP     [place]/Accuracy (Swallow Compensatory  Strategies/Techniques Goal 1, SLP)  --  with minimal cues (75-90% accuracy)  -MP     Time Frame (Swallow Compensatory Strategies/Techniques Goal 1, SLP)  --  short term goal (STG);by discharge  -MP        Respiratory Support Goal 1 (SLP)    Improve Respiratory Support Goal 1 (SLP)  sustaining a vowel sound on exhalation;80%;with minimal cues (75-90%)  -AC  --     Time Frame (Respiratory Support Goal 1, SLP)  short term goal (STG);by discharge  -AC  --        Articulation Goal 1 (SLP)    Improve Articulation Goal 1 (SLP)  by over-articulating at word level;80%;with minimal cues (75-90%)  -AC  --     Time Frame (Articulation Goal 1, SLP)  short term goal (STG);by discharge  -AC  --        Attention Goal 1 (SLP)    Improve Attention by Goal 1 (SLP)  looking at speaker;attending to task;80%;independently (over 90% accuracy)  -AC  --     Time Frame (Attention Goal 1, SLP)  short term goal (STG);by discharge  -AC  --        Additional Goal 1 (SLP)    Additional Goal 1, SLP  LTG: Pt will improve cognitive-communication skills in order to participate in care in hospital setting w/ 100% acc w/ min cues.  -AC  --     Time Frame (Additional Goal 1, SLP)  by discharge  -AC  --       User Key  (r) = Recorded By, (t) = Taken By, (c) = Cosigned By    Initials Name Provider Type    Estrella Rueda MS CCC-SLP Speech and Language Pathologist    Steven Nieto MS, CFY-SLP Speech and Language Pathologist        Time Calculation:   Time Calculation- SLP     Row Name 12/11/18 1418             Time Calculation- SLP    SLP Start Time  1150  -      SLP Received On  12/11/18  -        User Key  (r) = Recorded By, (t) = Taken By, (c) = Cosigned By    Initials Name Provider Type    Estrella Rueda MS CCC-SLP Speech and Language Pathologist          Therapy Charges for Today     Code Description Service Date Service Provider Modifiers Qty    [id number]  ST EVAL ORAL PHARYNG SWALLOW 3 12/11/2018 Estrella Gaytan MS CCC-SLP GN 1     [id number] [facility] SPEECH AND PROD W LANG  3 12/11/2018 Estrella Gaytan, MS CCC-SLP GN 1               Estrella Gaytan MS CCC-SLP  12/11/2018

## 2018-12-11 NOTE — PROGRESS NOTES
Continued Stay Note  [facility]     Patient Name: Anita Roche  MRN: [id number]  Today's Date: 12/11/2018    Admit Date: 12/8/2018    Discharge Plan     Row Name 12/11/18 1156       Plan    Plan  TBD.    Possible Rehab at [facility] or [facility]    Plan Comments  CM provided Acute rehab list to pt and pt's grandson, Bipin, to review with his Mom(Pt's dtr.), and pt.      He did say they live in [place] and this maybe easier on family to visit pt., referral made to [facility], adm. Coord., Marcelle, and per Marcelle at this time they do have open beds.    Celia Mason, also following pt.     CM to f/u with family for their final decision.    Per rds, Dr Whiting, says pt. current with fever, looking at end of week.         Final Discharge Disposition Code  62 - inpatient rehab facility        Discharge Codes    No documentation.             Anita Moreno RN

## 2018-12-11 NOTE — THERAPY TREATMENT NOTE
Acute Care - Occupational Therapy Treatment Note  [facility]     Patient Name: Anita Roche  : 1945  MRN: [id number]  Today's Date: 2018  Onset of Illness/Injury or Date of Surgery: 18  Date of Referral to OT: 18  Referring Physician: MD SULEIMAN    Admit Date: 2018       ICD-10-CM ICD-9-CM   1. Dysphagia, unspecified type R13.10 787.20   2. Impaired mobility and ADLs Z74.09 799.89   3. Impaired functional mobility, balance, gait, and endurance Z74.09 V49.89     Patient Active Problem List   Diagnosis   • Chronic fatigue syndrome   • Knee pain   • Closed wedge compression fracture of thoracic vertebra (CMS/HCC)   • Atherosclerosis of coronary artery   • Persistent cough   • Degeneration of intervertebral disc of lumbar region   • Stricture of esophagus   • Hand pain, left   • Trigger middle finger of left hand   • Hypokalemia   • CVA (cerebral vascular accident) (CMS/HCC)   • Tobacco use   • Underweight   • Sepsis due to pneumonia (CMS/HCC)   • Aspiration pneumonia (CMS/HCC)   • Acute respiratory failure with hypoxia (CMS/HCC)     Past Medical History:   Diagnosis Date   • Acid reflux    • Arthritis    • Atherosclerosis of coronary artery    • Chronic fatigue    • Coronary artery disease    • Degenerative lumbar disc    • Elevated cholesterol    • Esophageal stricture    • Hypertension    • PONV (postoperative nausea and vomiting)      Past Surgical History:   Procedure Laterality Date   • ABDOMINAL SURGERY     • BACK SURGERY     • CARDIAC CATHETERIZATION     • CATARACT EXTRACTION, BILATERAL     • CHOLECYSTECTOMY     • COLONOSCOPY     • ENDOSCOPY     • EYE SURGERY     • TONSILLECTOMY AND ADENOIDECTOMY     • TRIGGER FINGER RELEASE Right     Third and Fourth Fingers Dr. Ashley   • TUBAL ABDOMINAL LIGATION         Therapy Treatment    Rehabilitation Treatment Summary     Row Name 18 1000             Treatment Time/Intention    Discipline  occupational therapist  -AN      Document  Type  therapy note (daily note)  -AN      Subjective Information  complains of;pain  -AN      Mode of Treatment  occupational therapy  -AN      Patient/Family Observations  daughter came in during session; pt supine in bed with [place], bed alarm  -AN      Care Plan Review  care plan/treatment goals reviewed;risks/benefits reviewed;current/potential barriers reviewed  -AN      Patient Effort  good  -AN      Comment  pt unable to track past midline this date  -AN      Treatment Considerations/Comments  L side neglect, visual deficits R eye, swallow restrictions, L side HP  -AN      Recorded by [AN] Juana Carson OT 12/11/18 1018      Row Name 12/11/18 1000             Vital Signs    Pre Systolic BP Rehab  139  -AN      Pre Treatment Diastolic BP  64  -AN      Post Systolic BP Rehab  129  -AN      Post Treatment Diastolic BP  76  -AN      Pretreatment Heart Rate (beats/min)  91  -AN      Intratreatment Heart Rate (beats/min)  106  -AN      Posttreatment Heart Rate (beats/min)  100  -AN      Recorded by [ANNA] Juana Carson OT 12/11/18 1018      Row Name 12/11/18 1000             Cognitive Assessment/Intervention- PT/OT    Affect/Mental Status (Cognitive)  WFL  -AN      Orientation Status (Cognition)  oriented to;oriented x 4  -AN      Follows Commands (Cognition)  follows one step commands;75-90% accuracy;increased processing time needed;repetition of directions required;verbal cues/prompting required  -AN      Personal Safety Interventions  fall prevention program maintained  -AN      Recorded by [ANNA] Juana Carson OT 12/11/18 1018      Row Name 12/11/18 1000             Bed-Chair Transfer    Bed-Chair [place] (Transfers)  maximum assist (25% patient effort);2 person assist;verbal cues  -AN      Recorded by [ANNA] Juana Carson OT 12/11/18 1018      Row Name 12/11/18 1000             ADL Assessment/Intervention    BADL Assessment/Intervention  feeding  -AN      Recorded by [ANNA] Juana Carson OT 12/11/18  1018      Row Name 12/11/18 1000             Lower Body Dressing Assessment/Training    Lower Body Dressing [place] Level  don;socks;maximum assist (25% patient effort)  -AN      Recorded by [AN] Juana Carson OT 12/11/18 1018      Row Name 12/11/18 1000             Self-Feeding Assessment/Training    [place] Level (Feeding)  prepare tray/open items;moderate assist (50% patient effort);feeding skills;supervision;set up;verbal cues  -AN      Self-Feeding Assess/Train, Spillage Amount  moderate  -AN      Position (Self-Feeding)  supported sitting  -AN      Comment (Feeding)  needs vc's to take smaller bites and swallow between  -AN      Recorded by [AN] Juana Carson OT 12/11/18 1018      Row Name 12/11/18 1000             Motor Skills Assessment/Interventions    Additional Documentation  Therapeutic Exercise (Group)  -AN      Recorded by [AN] Juana Carson OT 12/11/18 1018      Row Name 12/11/18 1000             Therapeutic Exercise    66805 - OT Therapeutic Activity Minutes  30  -AN      Recorded by [AN] Juana Carson OT 12/11/18 1018      Row Name 12/11/18 1000             Therapeutic Exercise    Upper Extremity Range of Motion (Therapeutic Exercise)  shoulder abduction/adduction, left;shoulder horizontal abduction/adduction, left;shoulder internal/external rotation, left;elbow flexion/extension, left;forearm supination/pronation, left;wrist flexion/extension, left  -AN      Exercise Type (Therapeutic Exercise)  PROM (passive range of motion)  -AN      Recorded by [AN] Juana Carson OT 12/11/18 1018      Row Name 12/11/18 1000             Gross Motor Coordination    Gross Motor Skill, Impairments Detail  some difficulty this date with eye hand coordination on R finding food  on R  -AN      Recorded by [AN] Juana Carson OT 12/11/18 1018      Row Name 12/11/18 1000             Static Sitting Balance    Level of [place] (Unsupported Sitting, Static Balance)  maximal assist, 25 to 49%  patient effort  -AN      Sitting Position (Unsupported Sitting, Static Balance)  sitting on edge of bed  -AN      Time Able to Maintain Position (Unsupported Sitting, Static Balance)  3 to 4 minutes  -AN      Comment (Unsupported Sitting, Static Balance)  progress to mod intermittently with assist with neck stretch and rotation to look forward and flex trunk  -AN      Recorded by [AN] Juana Carson OT 12/11/18 1018      Row Name 12/11/18 1000             Positioning and Restraints    Pre-Treatment Position  in bed  -AN      Post Treatment Position  chair  -AN      In Chair  notified nsg;reclined;call light within reach;encouraged to call for assist;exit alarm on;with family/caregiver  -AN      Recorded by [AN] Juana Carson OT 12/11/18 1018      Row Name 12/11/18 1000             Pain Scale: Numbers Pre/Post-Treatment    Pain Location  head  -AN      Pain Intervention(s)  Repositioned notified Rn  -AN      Recorded by [AN] Juana Carson OT 12/11/18 1018      Row Name 12/11/18 1000             Pain Scale: FACES Pre/Post-Treatment    Pain: FACES Scale, Pretreatment  4-->hurts little more  -AN      Pain: FACES Scale, Post-Treatment  4-->hurts little more  -AN      Recorded by [AN] Juana Carson, OT 12/11/18 1018      Row Name 12/11/18 1000             Vision Assessment/Intervention    Vision Assessment Comment  Placed tissues at midline this date with instructions to pt and daughter to keep at midline today for 1-2 hours to address visual compensation strategies  -AN      Recorded by [AN] Juana Carson OT 12/11/18 1018      Row Name 12/11/18 1000             Outcome Summary/Treatment Plan (PT)    Daily Summary of Progress (PT)  progress toward functional goals as expected  -AN      Anticipated Discharge Disposition (PT)  inpatient rehabilitation facility  -AN      Recorded by [AN] Juana Carson OT 12/11/18 1018        User Key  (r) = Recorded By, (t) = Taken By, (c) = Cosigned By    Initials Name Effective  Dates Discipline    AN AlliJuana dasilva, OT 06/22/15 -  OT             Occupational Therapy Education     Title: PT OT SLP Therapies (In Progress)     Topic: Occupational Therapy (Done)     Point: ADL training (Done)     Description: Instruct learner(s) on proper safety adaptation and remediation techniques during self care or transfers.   Instruct in proper use of assistive devices.    Learning Progress Summary           Patient Acceptance, E,D, VU,NR by  at 12/11/2018 10:18 AM    Comment:  ADL retraining wtih feeding and education on body mechanics for balance.    Acceptance, E,TB,D, VU,DU by ST at 12/9/2018  3:32 PM    Comment:  role of OT, benefits of activity, bed mobility, POC, rolling, deficits, neglect                   Point: Home exercise program (Done)     Description: Instruct learner(s) on appropriate technique for monitoring, assisting and/or progressing therapeutic exercises/activities.    Learning Progress Summary           Patient Acceptance, E,TB,D, VU,DU by ST at 12/9/2018  3:32 PM    Comment:  role of OT, benefits of activity, bed mobility, POC, rolling, deficits, neglect                   Point: Precautions (Done)     Description: Instruct learner(s) on prescribed precautions during self-care and functional transfers.    Learning Progress Summary           Patient Acceptance, E,TB,D, VU,DU by ST at 12/9/2018  3:32 PM    Comment:  role of OT, benefits of activity, bed mobility, POC, rolling, deficits, neglect                   Point: Body mechanics (Done)     Description: Instruct learner(s) on proper positioning and spine alignment during self-care, functional mobility activities and/or exercises.    Learning Progress Summary           Patient Acceptance, E,D, VU,NR by AN at 12/11/2018 10:18 AM    Comment:  ADL retraining wtih feeding and education on body mechanics for balance.    Acceptance, E,TB,D, VU,DU by ST at 12/9/2018  3:32 PM    Comment:  role of OT, benefits of activity, bed mobility,  POC, rolling, deficits, neglect                               User Key     Initials Effective Dates Name Provider Type Discipline    ST 06/10/18 -  Rebeka Ospina, OTR Occupational Therapist OT    AN 06/22/15 -  Juana Carson, OT Occupational Therapist OT                OT Recommendation and Plan     Plan of Care Review  Plan of Care Reviewed With: patient  Plan of Care Reviewed With: patient  Outcome Summary: Pt alert and motivated to get OOB and attempt ADL and balance tasks. Pt continues to need mod assist for sittiing balance, max x 2 for bed to chair txfr. Needs IRF at discharge.   Outcome Measures     Row Name 12/11/18 0931 12/10/18 0940 12/09/18 1532       How much help from another person do you currently need...    Turning from your back to your side while in flat bed without using bedrails?  --  2  -CD  --    Moving from lying on back to sitting on the side of a flat bed without bedrails?  --  2  -CD  --    Moving to and from a bed to a chair (including a wheelchair)?  --  2  -CD  --    Standing up from a chair using your arms (e.g., wheelchair, bedside chair)?  --  2  -CD  --    Climbing 3-5 steps with a railing?  --  1  -CD  --    To walk in hospital room?  --  1  -CD  --    AM-PAC 6 Clicks Score  --  10  -CD  --       How much help from another is currently needed...    Putting on and taking off regular lower body clothing?  1  -AN  --  1  -ST    Bathing (including washing, rinsing, and drying)  1  -AN  --  1  -ST    Toileting (which includes using toilet bed pan or urinal)  1  -AN  --  1  -ST    Putting on and taking off regular upper body clothing  1  -AN  --  1  -ST    Taking care of personal grooming (such as brushing teeth)  1  -AN  --  1  -ST    Eating meals  2  -AN  --  1  -ST    Score  7  -AN  --  6  -ST       Modified [place] Scale    Modified [place] Scale  4 - Moderately severe disability.  Unable to walk without assistance, and unable to attend to own bodily needs without assistance.  -AN   4 - Moderately severe disability.  Unable to walk without assistance, and unable to attend to own bodily needs without assistance.  -CD  5 - Severe disability.  Bedridden, incontinent, and requiring constant nursing care and attention.  -ST       Functional Assessment    Outcome Measure Options  --  AM-PAC 6 Clicks Basic Mobility (PT);Modified [place]  -CD  AM-PAC 6 Clicks Daily Activity (OT);Modified [place]  -ST      User Key  (r) = Recorded By, (t) = Taken By, (c) = Cosigned By    Initials Name Provider Type    Jamia Harrell, PT Physical Therapist    Rebeka Garcia, OTR Occupational Therapist    uJana Quintanilla, OT Occupational Therapist           Time Calculation:   Time Calculation- OT     Row Name 12/11/18 1020 12/11/18 1000          Time Calculation- OT    OT Start Time  0931  -AN  --     Total Timed Code Minutes- OT  39 minute(s)  -AN  --     OT Received On  12/11/18  -AN  --     OT Goal Re-Cert Due Date  12/19/18  -AN  --        Timed Charges    48114 - OT Therapeutic Activity Minutes  --  30  -AN       User Key  (r) = Recorded By, (t) = Taken By, (c) = Cosigned By    Initials Name Provider Type    Juana Quintanilla, OT Occupational Therapist           Therapy Suggested Charges     Code   Minutes Charges    52151 (CPT®) Hc Ot Neuromusc Re Education Ea 15 Min      62167 (CPT®) Hc Ot Ther Proc Ea 15 Min      78907 (CPT®) Hc Ot Therapeutic Act Ea 15 Min 30 2    99093 (CPT®) Hc Ot Manual Therapy Ea 15 Min      18634 (CPT®) Hc Ot Iontophoresis Ea 15 Min      39376 (CPT®) Hc Ot Elec Stim Ea-Per 15 Min      28716 (CPT®) Hc Ot Ultrasound Ea 15 Min      26357 (CPT®) Hc Ot Self Care/Mgmt/Train Ea 15 Min      Total  30 2        Therapy Charges for Today     Code Description Service Date Service Provider Modifiers Qty    90740309269 HC OT THERAPEUTIC ACT EA 15 MIN 12/11/2018 Juana Carson OT GO 2    00576818632 HC OT THER PROC EA 15 MIN 12/11/2018 Juana Carson OT GO 1    38631975413 HC OT THER SUPP EA 15  MIN 12/11/2018 Juana Carson, OT GO 2               Juana CALL. Alli, NIK  12/11/2018

## 2018-12-11 NOTE — PLAN OF CARE
Problem: Patient Care Overview  Goal: Plan of Care Review  Outcome: Ongoing (interventions implemented as appropriate)   12/11/18 1864   Coping/Psychosocial   Plan of Care Reviewed With patient;daughter   SLP re-evaluation completed. 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 Please see note for further details and recommendations.

## 2018-12-12 ENCOUNTER — APPOINTMENT (OUTPATIENT)
Dept: GENERAL RADIOLOGY | Facility: HOSPITAL | Age: 73
End: 2018-12-12
Attending: HOSPITALIST

## 2018-12-12 PROCEDURE — 25010000002 METOCLOPRAMIDE PER 10 MG: Performed by: FAMILY MEDICINE

## 2018-12-12 PROCEDURE — 92611 MOTION FLUOROSCOPY/SWALLOW: CPT

## 2018-12-12 PROCEDURE — 94799 UNLISTED PULMONARY SVC/PX: CPT

## 2018-12-12 PROCEDURE — 97110 THERAPEUTIC EXERCISES: CPT

## 2018-12-12 PROCEDURE — 92610 EVALUATE SWALLOWING FUNCTION: CPT

## 2018-12-12 PROCEDURE — 25010000002 HEPARIN (PORCINE) PER 1000 UNITS: Performed by: FAMILY MEDICINE

## 2018-12-12 PROCEDURE — 74230 X-RAY XM SWLNG FUNCJ C+: CPT

## 2018-12-12 PROCEDURE — 94640 AIRWAY INHALATION TREATMENT: CPT

## 2018-12-12 PROCEDURE — 94760 N-INVAS EAR/PLS OXIMETRY 1: CPT

## 2018-12-12 PROCEDURE — 25010000002 PIPERACILLIN SOD-TAZOBACTAM PER 1 G: Performed by: FAMILY MEDICINE

## 2018-12-12 RX ADMIN — ACETAMINOPHEN 650 MG: 650 SUPPOSITORY RECTAL at 21:57

## 2018-12-12 RX ADMIN — TAZOBACTAM SODIUM AND PIPERACILLIN SODIUM 3.38 G: 375; 3 INJECTION, SOLUTION INTRAVENOUS at 05:26

## 2018-12-12 RX ADMIN — ATENOLOL 25 MG: 25 TABLET ORAL at 12:15

## 2018-12-12 RX ADMIN — IPRATROPIUM BROMIDE AND ALBUTEROL SULFATE 3 ML: 2.5; .5 SOLUTION RESPIRATORY (INHALATION) at 06:57

## 2018-12-12 RX ADMIN — TAZOBACTAM SODIUM AND PIPERACILLIN SODIUM 3.38 G: 375; 3 INJECTION, SOLUTION INTRAVENOUS at 14:04

## 2018-12-12 RX ADMIN — BARIUM SULFATE 10 ML: 400 SUSPENSION ORAL at 14:38

## 2018-12-12 RX ADMIN — HEPARIN SODIUM 5000 UNITS: 5000 INJECTION INTRAVENOUS; SUBCUTANEOUS at 21:55

## 2018-12-12 RX ADMIN — IPRATROPIUM BROMIDE AND ALBUTEROL SULFATE 3 ML: 2.5; .5 SOLUTION RESPIRATORY (INHALATION) at 16:06

## 2018-12-12 RX ADMIN — METOCLOPRAMIDE 5 MG: 5 INJECTION, SOLUTION INTRAMUSCULAR; INTRAVENOUS at 21:56

## 2018-12-12 RX ADMIN — HEPARIN SODIUM 5000 UNITS: 5000 INJECTION INTRAVENOUS; SUBCUTANEOUS at 05:27

## 2018-12-12 RX ADMIN — IPRATROPIUM BROMIDE AND ALBUTEROL SULFATE 3 ML: 2.5; .5 SOLUTION RESPIRATORY (INHALATION) at 19:54

## 2018-12-12 RX ADMIN — METOCLOPRAMIDE 5 MG: 5 INJECTION, SOLUTION INTRAMUSCULAR; INTRAVENOUS at 08:05

## 2018-12-12 RX ADMIN — CLOPIDOGREL BISULFATE 75 MG: 75 TABLET ORAL at 12:15

## 2018-12-12 RX ADMIN — SODIUM CHLORIDE, PRESERVATIVE FREE 3 ML: 5 INJECTION INTRAVENOUS at 09:19

## 2018-12-12 RX ADMIN — BARIUM SULFATE 55 ML: 0.81 POWDER, FOR SUSPENSION ORAL at 14:39

## 2018-12-12 RX ADMIN — DOXYCYCLINE 100 MG: 100 INJECTION, POWDER, LYOPHILIZED, FOR SOLUTION INTRAVENOUS at 21:55

## 2018-12-12 RX ADMIN — BARIUM SULFATE 50 ML: 400 SUSPENSION ORAL at 14:39

## 2018-12-12 RX ADMIN — METOCLOPRAMIDE 5 MG: 5 INJECTION, SOLUTION INTRAMUSCULAR; INTRAVENOUS at 01:40

## 2018-12-12 RX ADMIN — SODIUM CHLORIDE, PRESERVATIVE FREE 3 ML: 5 INJECTION INTRAVENOUS at 21:55

## 2018-12-12 RX ADMIN — HEPARIN SODIUM 5000 UNITS: 5000 INJECTION INTRAVENOUS; SUBCUTANEOUS at 14:04

## 2018-12-12 RX ADMIN — BARIUM SULFATE 20 ML: 400 PASTE ORAL at 14:39

## 2018-12-12 RX ADMIN — METOCLOPRAMIDE 5 MG: 5 INJECTION, SOLUTION INTRAMUSCULAR; INTRAVENOUS at 14:04

## 2018-12-12 RX ADMIN — DILTIAZEM HYDROCHLORIDE 240 MG: 240 CAPSULE, COATED, EXTENDED RELEASE ORAL at 12:14

## 2018-12-12 RX ADMIN — DOXYCYCLINE 100 MG: 100 INJECTION, POWDER, LYOPHILIZED, FOR SOLUTION INTRAVENOUS at 08:05

## 2018-12-12 RX ADMIN — ASPIRIN 300 MG: 300 SUPPOSITORY RECTAL at 08:05

## 2018-12-12 RX ADMIN — LISINOPRIL 10 MG: 10 TABLET ORAL at 12:15

## 2018-12-12 RX ADMIN — SODIUM CHLORIDE, PRESERVATIVE FREE 3 ML: 5 INJECTION INTRAVENOUS at 08:05

## 2018-12-12 RX ADMIN — IPRATROPIUM BROMIDE AND ALBUTEROL SULFATE 3 ML: 2.5; .5 SOLUTION RESPIRATORY (INHALATION) at 12:26

## 2018-12-12 RX ADMIN — ACETAMINOPHEN 650 MG: 325 TABLET ORAL at 12:14

## 2018-12-12 RX ADMIN — TAZOBACTAM SODIUM AND PIPERACILLIN SODIUM 3.38 G: 375; 3 INJECTION, SOLUTION INTRAVENOUS at 21:55

## 2018-12-12 NOTE — PROGRESS NOTES
[facility] Medicine Services  PROGRESS NOTE    Patient Name: Anita Roche  : 1945  MRN: [id number]    Date of Admission: 2018  Length of Stay: 3  Primary Care Physician: Diann Ferreira APRN    Subjective   Subjective     CC:  Hospital follow up for CVA    HPI:    Patient resting in chair this morning.  No family at bedside.  Per nursing staff, she has had multiple loose stools this morning.  Patient answers all questions appropriately this morning.    ROS:  ROS limited due to mentation     Objective   Objective     Vital Signs:   Temp:  [97.8 °F (36.6 °C)-100.7 °F (38.2 °C)] 97.8 °F (36.6 °C)  Heart Rate:  [80-96] 80  Resp:  [12-18] 18  BP: (132-186)/(72-88) 146/72  Total (NIH Stroke Scale): 17     Physical Exam:  Gen-no acute distress, sitting up in chair, no family at bedside.   CV-RRR, S1 S2 normal, no m/r/g  Resp-faint crackles in bases, normal WOB, on supplemental O2  Abd-soft, NT, ND, +BS  Ext-no edema, PPP  Neuro-left side is flaccid, left facial droop, slurred speech noted  Skin-no rashes noted to exposed skin   Psych-appropriate mood, occasional confusion, alert and pleasant.     Results Reviewed:  I have personally reviewed current lab, radiology, and data and agree.    Results from last 7 days   Lab Units  18   0714  18   1259  18   1514   WBC 10*3/mm3  11.38*  15.40*  11.25   HEMOGLOBIN g/dL  13.0  14.3  14.7   HEMATOCRIT %  40.4  44.1*  46.1   PLATELETS 10*3/mm3  175  176  178   INR    --    --   1.07     Results from last 7 days   Lab Units  18   0714  18   0637  18   1514   SODIUM mmol/L  140  142  141   POTASSIUM mmol/L  3.5  3.7  3.4*   CHLORIDE mmol/L  111*  108  110   CO2 mmol/L  24.0  19.0*  24.7   BUN mg/dL  10  32*  17   CREATININE mg/dL  0.67  0.87  0.81   GLUCOSE mg/dL  96  53*  77   CALCIUM mg/dL  9.2  9.1  9.6   ALT (SGPT) U/L   --    --   21   AST (SGOT) U/L   --    --   23     Estimated Creatinine Clearance:  40.5 mL/min (by C-G formula based on SCr of 0.67 mg/dL).  No results found for: BNP    Microbiology Results Abnormal     Procedure Component Value - Date/Time    Blood Culture - Blood, Arm, Right [[id number]] Collected:  12/09/18 1259    Lab Status:  Preliminary result Specimen:  Blood from Arm, Right Updated:  12/11/18 1330     Blood Culture No growth at 2 days    Blood Culture - Blood, Hand, Right [[id number]] Collected:  12/09/18 1306    Lab Status:  Preliminary result Specimen:  Blood from Hand, Right Updated:  12/11/18 1330     Blood Culture No growth at 2 days          Imaging Results (last 24 hours)     Procedure Component Value Units Date/Time    XR Chest 1 View [[id number]] Collected:  12/11/18 1410     Updated:  12/11/18 1415    Narrative:       EXAMINATION: XR CHEST 1 VW- 12/11/2018     INDICATION: fever, hypoxia, aspiration PNA; R13.10-Dysphagia,  unspecified; Z74.09-Other reduced mobility     TECHNIQUE:  Single view frontal chest.     COMPARISONS: 12/09/2018     FINDINGS:  Atherosclerosis noted of the aortic knob. There is improved  right lower lobe airspace disease. No pleural effusion or pneumothorax.  The cardiomediastinal silhouette is unchanged.       Impression:       Improved right lower lobe airspace disease.     D:  12/11/2018  E:  12/11/2018     This report was finalized on 12/11/2018 2:13 PM by Chandra Ceballos.           Results for orders placed during the hospital encounter of 12/08/18   Adult Transthoracic Echo Complete W/ Cont if Necessary Per Protocol (With Agitated Saline)    Narrative · Left ventricular systolic function is hyperdynamic (EF > 70).  · Mid LV cavitary gradient of 40 mmHg..  · Trace mitral valve regurgitation is present.  · Trace tricuspid valve regurgitation is present          I have reviewed the medications.    Assessment/Plan   Assessment / Plan     Active Hospital Problems    Diagnosis Date Noted   • **CVA (cerebral vascular accident) (CMS/[facility]) [I63.9] 12/08/2018   • Acute  respiratory failure with hypoxia (CMS/[facility]) [J96.01] 12/10/2018   • Underweight [R63.6] 12/09/2018   • Sepsis due to pneumonia (CMS/[facility]) [J18.9, A41.9] 12/09/2018   • Aspiration pneumonia (CMS/[facility]) [J69.0] 12/09/2018   • Hypokalemia [E87.6] 12/08/2018   • Tobacco use [Z72.0] 12/08/2018   • Atherosclerosis of coronary artery [I25.10] 11/08/2018     Description: Echo (4/2012): LVEF >65%     • Degeneration of intervertebral disc of lumbar region [M51.36] 03/31/2008       Brief Hospital Course to date:  Anita Roche is a 73 y.o. female with PMH significant for HTN, tobacco abuse, CAD, esophageal stricture, trigger finger s/p release 11/27/2018 transferred from the ER at [place] for 3 days of HA, confusion, balance disturbances.  At the local ER, CT head was performed which showed an old left sided stroke but no acute insults.  However, left facial droop and left sided drift was noted and the patient was sent to [facility] for higher level of neuro care.  MRI upon arrival revealed large right hemispheric CVA.     R hemisphere infarct secondary to R ICA occlusion with residual left hemiparalysis, dysphagia  - ASA/Plavix  - Neuro has evaluated  - large infarct with guarded prognosis (somnelence, left HP, neglect, dysphagia)  - currently NPO.  Was unable to work with speech yesterday due to somnolence.  Scheduled for a MBS this afternoon.    - ST/PT/OT/CM -->  [facility] referral in progress    Sepsis (not POA)  Aspiration pna (not POA)  Acute hypoxic failure  - BCx x2 negative thus far  - Zosyn, doxy  - leukocytosis and fever improved.      Diarrhea  - multiple (~5) episodes this morning.   - will send stool for c diff.      DVT Prophylaxis:  Given stable neuro exam as of 12/10 added SQ hep prophy since bedbound from severity of CVA    CODE STATUS:   Code Status and Medical Interventions:   Ordered at: 12/08/18 2008     Level Of Support Discussed With:    Patient     Code Status:    CPR     Medical Interventions (Level of Support Prior  to Arrest):    Full     Disposition: I expect the patient to be discharged TBD    Electronically signed by SAMREEN Bradley, 12/12/18, 1:18 PM.

## 2018-12-12 NOTE — PLAN OF CARE
Problem: Fall Risk (Adult)  Goal: Absence of Fall  Outcome: Ongoing (interventions implemented as appropriate)  Client is unaware of safe boundaries, and left side neglect, Tolerated antibiotic therapy well

## 2018-12-12 NOTE — PLAN OF CARE
Problem: Patient Care Overview  Goal: Plan of Care Review  Outcome: Ongoing (interventions implemented as appropriate)   12/12/18 4195   Coping/Psychosocial   Plan of Care Reviewed With patient   SLP re-evaluation completed. Will address moderate oral dysphagia and moderate-severe pharyngeal dysphagia in tx. Please see note for further details and recommendations.

## 2018-12-12 NOTE — PLAN OF CARE
Problem: Patient Care Overview  Goal: Plan of Care Review  Outcome: Ongoing (interventions implemented as appropriate)   12/12/18 8413   Coping/Psychosocial   Plan of Care Reviewed With patient   Plan of Care Review   Progress no change   OTHER   Outcome Summary pt more alert today, able to take meds crushed in applesauce but failed barium swallow test and swallow evals. waiting on family to discuss possible keofeed as pt is unwilling to make decision. no complaints of pain but does complain of headache. left sided facial droop still predominant. reviewed options and plan of care with pt who verbalized understanding but needs further teaching. will continue to monitor.

## 2018-12-12 NOTE — THERAPY TREATMENT NOTE
Acute Care - Occupational Therapy Treatment Note  [facility]     Patient Name: Anita Roche  : 1945  MRN: [id number]  Today's Date: 2018  Onset of Illness/Injury or Date of Surgery: 18  Date of Referral to OT: 18  Referring Physician: MD SULEIMAN    Admit Date: 2018       ICD-10-CM ICD-9-CM   1. Oropharyngeal dysphagia R13.12 787.22   2. Impaired mobility and ADLs Z74.09 799.89   3. Impaired functional mobility, balance, gait, and endurance Z74.09 V49.89   4. Cerebrovascular accident (CVA) due to occlusion of right carotid artery (CMS/HCC) I63.231 433.11   5. Cognitive communication deficit R41.841 799.52     Patient Active Problem List   Diagnosis   • Chronic fatigue syndrome   • Knee pain   • Closed wedge compression fracture of thoracic vertebra (CMS/HCC)   • Atherosclerosis of coronary artery   • Persistent cough   • Degeneration of intervertebral disc of lumbar region   • Stricture of esophagus   • Hand pain, left   • Trigger middle finger of left hand   • Hypokalemia   • CVA (cerebral vascular accident) (CMS/HCC)   • Tobacco use   • Underweight   • Sepsis due to pneumonia (CMS/HCC)   • Aspiration pneumonia (CMS/HCC)   • Acute respiratory failure with hypoxia (CMS/HCC)     Past Medical History:   Diagnosis Date   • Acid reflux    • Arthritis    • Atherosclerosis of coronary artery    • Chronic fatigue    • Coronary artery disease    • Degenerative lumbar disc    • Elevated cholesterol    • Esophageal stricture    • Hypertension    • PONV (postoperative nausea and vomiting)      Past Surgical History:   Procedure Laterality Date   • ABDOMINAL SURGERY     • BACK SURGERY     • CARDIAC CATHETERIZATION     • CATARACT EXTRACTION, BILATERAL     • CHOLECYSTECTOMY     • COLONOSCOPY     • ENDOSCOPY     • EYE SURGERY     • TONSILLECTOMY AND ADENOIDECTOMY     • TRIGGER FINGER RELEASE Right     Third and Fourth Fingers Dr. Ashley   • TUBAL ABDOMINAL LIGATION         Therapy  Treatment    Rehabilitation Treatment Summary     Row Name 12/12/18 1609 12/12/18 0923          Treatment Time/Intention    Discipline  occupational therapist  -AN  physical therapist  -CD     Document Type  therapy note (daily note)  -AN  therapy note (daily note)  -CD     Subjective Information  complains of;pain;fatigue  -AN  complains of;pain HA. NSG TO GIVE PAIN MED AFTER SWALLOW ASSESSMENT BY SLP  -CD     Mode of Treatment  occupational therapy  -AN  physical therapy  -CD     Patient/Family Observations  pt supine in bed, L UE elevated, [place] within reach, bed alarm  -AN  PT SUPINE UPON ARRIVAL ON RA. PT AGREEABLE TO THERAPY AND UIC. REQUESTED TO USE THE BATHROOM.   -CD     Care Plan Review  care plan/treatment goals reviewed;risks/benefits reviewed  -AN  care plan/treatment goals reviewed;patient/other agree to care plan  -CD     Therapy Frequency (PT Clinical Impression)  --  daily  -CD     Patient Effort  adequate  -AN  --     Comment  Pt reports fatigued, sleepy and HA  -AN  --     Existing Precautions/Restrictions  fall;NPO;other (see comments)  (Significant)  pt now Spore precautions  -AN  fall L HP. L NEGLECT, NPO, VISUAL DEFICITS.   -CD     Treatment Considerations/Comments  L side neglect, L HP  -AN  --     Recorded by [AN] Juana Carson, OT 12/12/18 1627 [CD] Jamia Bocanegra, PT 12/12/18 1027     Row Name 12/12/18 1609 12/12/18 0923          Vital Signs    Pre Systolic BP Rehab  172  -AN  -- VSS. NSG CLEARED FOR TREATMENT.   -CD     Pre Treatment Diastolic BP  74  -AN  --     Pre SpO2 (%)  95  -AN  --     O2 Delivery Pre Treatment  room air  -AN  --     Post SpO2 (%)  95  -AN  --     O2 Delivery Post Treatment  room air  -AN  --     Recorded by [AN] Juana Carson, OT 12/12/18 1630 [CD] Jamia Bocanegra, PT 12/12/18 1027     Row Name 12/12/18 1609 12/12/18 0923          Cognitive Assessment/Intervention- PT/OT    Affect/Mental Status (Cognitive)  WFL  -AN  --     Orientation Status (Cognition)   oriented to;person;place;time  -AN  oriented to;person;place;time  -CD     Follows Commands (Cognition)  follows one step commands;75-90% accuracy;repetition of directions required;verbal cues/prompting required  -AN  follows one step commands;75-90% accuracy;verbal cues/prompting required;repetition of directions required;increased processing time needed  -CD     Personal Safety Interventions  --  fall prevention program maintained;gait belt;muscle strengthening facilitated;nonskid shoes/slippers when out of bed;supervised activity  -CD     Recorded by [AN] Juana Carson, OT 12/12/18 1630 [CD] Jamia Bocanegra, PT 12/12/18 1027     Row Name 12/12/18 2955             Safety Issues, Functional Mobility    Safety Issues Affecting Function (Mobility)  insight into deficits/self awareness;safety precaution awareness;safety precautions follow-through/compliance  -CD      Impairments Affecting Function (Mobility)  balance;coordination;endurance/activity tolerance;postural/trunk control;strength;visual/perceptual  -CD      Recorded by [CD] Jamia Bocanegra, PT 12/12/18 1027      Row Name 12/12/18 1609 12/12/18 0908          Bed Mobility Assessment/Treatment    Bed Mobility Assessment/Treatment  --  rolling right;sidelying-sit  -CD     Rolling Right [place] (Bed Mobility)  --  moderate assist (50% patient effort)  -CD     Sidelying-Sit [place] (Bed Mobility)  --  maximum assist (25% patient effort);2 person assist;verbal cues  -CD     Bed Mobility, Safety Issues  --  decreased use of arms for pushing/pulling;decreased use of legs for bridging/pushing;impaired trunk control for bed mobility  -CD     Assistive Device (Bed Mobility)  --  bed rails;head of bed elevated;draw sheet  -CD     Comment (Bed Mobility)  deferred, pt reports just  back to bed, wants to rest  -AN  --     Recorded by [AN] Juana Carson, OT 12/12/18 1630 [CD] Jamia Bocanegra, PT 12/12/18 1027     Row Name 12/12/18 1609 12/12/18 1841           Transfer Assessment/Treatment    Transfer Assessment/Treatment  --  sit-stand transfer;stand-sit transfer;toilet transfer;bed-chair transfer  -CD     Comment (Transfers)  deferred  -AN  ALL TRANSFERS WITH B UE SUPPORT AND GAIT BELT.   -CD     Recorded by [AN] Juana Carson, OT 12/12/18 1630 [CD] Jamia Bocaengra, PT 12/12/18 1027     Row Name 12/12/18 0923             Bed-Chair Transfer    Bed-Chair [place] (Transfers)  maximum assist (25% patient effort);verbal cues BED TO BSC AND BSC TO RECLINER.   -CD      Recorded by [CD] Jamia Bocanegra, PT 12/12/18 1027      Row Name 12/12/18 0923             Sit-Stand Transfer    Sit-Stand [place] (Transfers)  maximum assist (25% patient effort)  -CD      Recorded by [CD] Jamia Bocanegra, PT 12/12/18 1027      Row Name 12/12/18 0923             Stand-Sit Transfer    Stand-Sit [place] (Transfers)  maximum assist (25% patient effort)  -CD      Recorded by [CD] Jamia Bocanegra, PT 12/12/18 1027      Row Name 12/12/18 0923             Toilet Transfer    Type (Toilet Transfer)  sit-stand;stand-sit  -CD      [place] Level (Toilet Transfer)  maximum assist (25% patient effort)  -CD      Assistive Device (Toilet Transfer)  commode, bedside without drop arms  -CD      Recorded by [CD] Jamia Bocanegra, PT 12/12/18 1027      Row Name 12/12/18 0923             Motor Skills Assessment/Interventions    Additional Documentation  Balance (Group);Balance Interventions (Group);Therapeutic Exercise (Group);Therapeutic Exercise Interventions (Group)  -CD      Recorded by [CD] Jamia Bocanegra, PT 12/12/18 1027      Row Name 12/12/18 1609 12/12/18 0923          Therapeutic Exercise    13012 - PT Therapeutic Exercise Minutes  --  10  -CD     06745 -  PT Neuromuscular Reeducation Minutes  --  20  -CD     84563 - OT Therapeutic Exercise Minutes  9  -AN  --     Recorded by [AN] Juana Carson, OT 12/12/18 1630 [CD] Jamia Bocanegra, PT 12/12/18 1027     Row Name 12/12/18 1609 12/12/18 0923           Therapeutic Exercise    Upper Extremity Range of Motion (Therapeutic Exercise)  shoulder flexion/extension, bilateral;shoulder horizontal abduction/adduction, bilateral;shoulder internal/external rotation, bilateral;elbow flexion/extension, bilateral;forearm supination/pronation, bilateral;wrist flexion/extension, bilateral  -AN  shoulder flexion/extension, left;elbow flexion/extension, left;wrist flexion/extension, left  -CD     Hand (Therapeutic Exercise)  finger flexion/extension, left various reps , no reciporcal AROM  -AN  --     Lower Extremity (Therapeutic Exercise)  --  LAQ (long arc quad), bilateral;marching while seated HIP ABD/ADD, DF,   -CD     Exercise Type (Therapeutic Exercise)  AROM (active range of motion);PROM (passive range of motion)  -AN  AROM (active range of motion);PROM (passive range of motion) PROM L LE, AROM R LE.   -CD     Position (Therapeutic Exercise)  supine  -AN  seated  -CD     Sets/Reps (Therapeutic Exercise)  2 set of 10  -AN  1 SET OF 10 REPS.   -CD     Comment (Therapeutic Exercise)  --  CUES TO ATTEND TO L SIDE DURING PROM EXERCISES. WORKED ON CERVICAL ROTATION TO THE L AND TRACKING L, PT HAS R GAZE PREFERENCE/ L NEGLECT.   -CD     Recorded by [AN] Juana Carson OT 12/12/18 1630 [CD] Jamia Bocanegra, PT 12/12/18 1027     Row Name 12/12/18 0923             Balance    Balance  static sitting balance;static standing balance  -CD      Recorded by [CD] Jamia Bocanegra, PT 12/12/18 1027      Row Name 12/12/18 0923             Static Sitting Balance    Level of [place] (Unsupported Sitting, Static Balance)  maximal assist, 25 to 49% patient effort PROGRESSED TO INTERMITTENT MIN ASSIST.   -CD      Sitting Position (Unsupported Sitting, Static Balance)  sitting on edge of bed  -CD      Time Able to Maintain Position (Unsupported Sitting, Static Balance)  more than 5 minutes  -CD      Comment (Unsupported Sitting, Static Balance)  WORKED ON MIDLINE ORIENTATION, ATTENDING TO  L SIDE OF ROOM, IDENTIFYING NUMBER OF FINGERS, TRACKING. .PERFORMED AT EOB AND ON BSC.   -CD      Recorded by [CD] Jamia Bocanegra, PT 12/12/18 1027      Row Name 12/12/18 0923             Static Standing Balance    Level of [place] (Supported Standing, Static Balance)  maximal assist, 25 to 49% patient effort  -CD      Time Able to Maintain Position (Supported Standing, Static Balance)  45 to 60 seconds  -CD      Assistive Device Utilized (Supported Standing, Static Balance)  -- VIA B UE SUPPORT AND GAIT BELT.   -CD      Comment (Supported Standing, Static Balance)  STOOD FOR HYGIENE AFTER BM. CARE TO BLOCK L KNEE. PT LEANS LEFT AND L KNEE ONEAL.   -CD      Recorded by [CD] Jamia Bocanegra, PT 12/12/18 1027      Row Name 12/12/18 1609 12/12/18 0923          Positioning and Restraints    Pre-Treatment Position  in bed  -AN  in bed  -CD     Post Treatment Position  bed  -AN  chair  -CD     In Bed  supine;call light within reach;encouraged to call for assist;exit alarm on;legs elevated;LUE elevated  -AN  --     In Chair  --  reclined;call light within reach;encouraged to call for assist;notified nsg;exit alarm on;with other staff;on mechanical lift sling;legs elevated;RUE elevated;LUE elevated SLP ARRIVED TO TEST SWALLOW.   -CD     Recorded by [AN] Juana Carson, OT 12/12/18 1630 [CD] Jamia Bocanegra, PT 12/12/18 1027     Row Name 12/12/18 1609 12/12/18 09          Pain Scale: Numbers Pre/Post-Treatment    Pain Scale: Numbers, Pretreatment  6/10  -AN  9/10  -CD     Pain Scale: Numbers, Post-Treatment  6/10  -AN  9/10  -CD     Pain Location  head  -AN  head  -CD     Pain Intervention(s)  Repositioned  -AN  Repositioned NSG AWARE AND TO GIVE MEDS ASAP WHEN SLP DONE.   -CD     Recorded by [AN] Juana Carson, OT 12/12/18 1630 [CD] Jamia Bocanegra, PT 12/12/18 1027     Row Name 12/12/18 1609 12/12/18 0923          Sensory Assessment/Intervention    Sensory General Assessment  --  light touch sensation deficits  identified L LE.   -CD     Additional Documentation  Vision Assessment/Intervention (Group)  -AN  --     Recorded by [AN] Juana Carson, OT 12/12/18 1630 [CD] Jamia Bocanegra, PT 12/12/18 1027     Row Name 12/12/18 1609             Vision Assessment/Intervention    Vision Assessment Comment  OT to L during session, pt attend and turn to L with cues x 3  -AN      Recorded by [AN] Juana Carson, OT 12/12/18 1630      Row Name 12/12/18 1609             Light Touch Sensation Assessment    Additional Details: Light Touch Sensation Assessment  Had pt practice soft touch with R UE to L, encouraged 3x day  -AN      Recorded by [AN] Juana Carson, OT 12/12/18 1630      Row Name 12/12/18 1609             Outcome Summary/Treatment Plan (OT)    Daily Summary of Progress (OT)  progress toward functional goals is gradual  -AN      Anticipated Discharge Disposition (OT)  inpatient rehabilitation facility  -AN      Recorded by [AN] Juana Carson, OT 12/12/18 1630      Row Name 12/12/18 0923             Outcome Summary/Treatment Plan (PT)    Daily Summary of Progress (PT)  progress toward functional goals as expected  -CD      Anticipated Discharge Disposition (PT)  inpatient rehabilitation facility  -CD      Recorded by [CD] Jamia Bocanegra, PT 12/12/18 1027        User Key  (r) = Recorded By, (t) = Taken By, (c) = Cosigned By    Initials Name Effective Dates Discipline    CD Jamia Bocanegra, PT 06/19/15 -  PT    AN Juana Carson, OT 06/22/15 -  OT           Rehab Goal Summary     Row Name 12/12/18 1500             Oral Nutrition/Hydration Goal 1 (SLP)    Oral Nutrition/Hydration Goal 1, SLP  LTG: Pt will demonstrate improved swallowing skills to the level where repeat instrumental swallow study is indicated.  -AC      Time Frame (Oral Nutrition/Hydration Goal 1, SLP)  by discharge  -AC      Progress/Outcomes (Oral Nutrition/Hydration Goal 1, SLP)  goal revised this date  -AC         Oral Nutrition/Hydration Goal 2 (SLP)    Oral  Nutrition/Hydration Goal 2, SLP  Pt will tolerate therapeutic trials of H2O w/o s/sxs aspiration w/ 70% acc w/o cues.  -AC      Time Frame (Oral Nutrition/Hydration Goal 2, SLP)  short term goal (STG);by discharge  -AC      Progress/Outcomes (Oral Nutrition/Hydration Goal 2, SLP)  goal revised this date  -AC         Oral Nutrition/Hydration Goal (SLP)    Oral Nutrition/Hydration Goal, SLP  Pt will tolerate trials of regular solid w/ no overt s/sxs aspiration or discomfort w/ 100% acc and no cues  -AC      Time Frame (Oral Nutrition/Hydration Goal, SLP)  short term goal (STG);by discharge  -AC      Progress/Outcomes (Oral Nutrition/Hydration Goal, SLP)  goal no longer appropriate  -AC         Lingual Strengthening Goal 1 (SLP)    Increase Lingual Tone/Sensation/Control/Coordination/Movement  lingual resistance exercises  -AC      [place]/Accuracy (Lingual Strengthening Goal 1, SLP)  with minimal cues (75-90% accuracy)  -AC      Time Frame (Lingual Strengthening Goal 1, SLP)  short term goal (STG);by discharge  -AC      Progress/Outcomes (Lingual Strengthening Goal 1, SLP)  goal revised this date  -AC         Pharyngeal Strengthening Exercise Goal 1 (SLP)    Activity (Pharyngeal Strengthening Goal 1, SLP)  increase timing;increase superior movement of the hyolaryngeal complex;increase anterior movement of the hyolaryngeal complex;increase closure at entrance to airway/closure of airway at glottis;increase squeeze/positive pressure generation;increase tongue base retraction  -AC      Increase Timing  prepping - 3 second prep or suck swallow or 3-step swallow  -AC      Increase Superior Movement of the Hyolaryngeal Complex  effortful pitch glide (falsetto + pharyngeal squeeze)  -AC      Increase Anterior Movement of the Hyolaryngeal Complex  chin tuck against resistance (CTAR)  -AC      Increase Closure at Entrance to Airway/Closure of Airway at Glottis  super-supraglottic swallow  -AC      Increase  Squeeze/Positive Pressure Generation  hard effortful swallow  -AC      Increase Tongue Base Retraction  juan david  -AC      [place]/Accuracy (Pharyngeal Strengthening Goal 1, SLP)  with minimal cues (75-90% accuracy)  -AC      Time Frame (Pharyngeal Strengthening Goal 1, SLP)  short term goal (STG);by discharge  -AC      Progress/Outcomes (Pharyngeal Strengthening Goal 1, SLP)  goal revised this date  -AC         Swallow Compensatory Strategies Goal 1 (SLP)    Activity (Swallow Compensatory Strategies/Techniques Goal 1, SLP)  compensatory strategies;liquids by teaspoon only  -AC      [place]/Accuracy (Swallow Compensatory Strategies/Techniques Goal 1, SLP)  with minimal cues (75-90% accuracy)  -AC      Time Frame (Swallow Compensatory Strategies/Techniques Goal 1, SLP)  short term goal (STG);by discharge  -AC      Progress/Outcomes (Swallow Compensatory Strategies/Techniques Goal 1, SLP)  goal no longer appropriate  -AC        User Key  (r) = Recorded By, (t) = Taken By, (c) = Cosigned By    Initials Name Provider Type Discipline    AC Estrella Gaytan MS CCC-SLP Speech and Language Pathologist SLP        Occupational Therapy Education     Title: PT OT SLP Therapies (In Progress)     Topic: Occupational Therapy (Done)     Point: ADL training (Done)     Description: Instruct learner(s) on proper safety adaptation and remediation techniques during self care or transfers.   Instruct in proper use of assistive devices.    Learning Progress Summary           Patient Acceptance, E,ISAAC HAYES,NR by AN at 12/11/2018 10:18 AM    Comment:  ADL retraining wtih feeding and education on body mechanics for balance.    Acceptance, MARISA,ABIGAIL BERNARD VU,REBECA by  at 12/9/2018  3:32 PM    Comment:  role of OT, benefits of activity, bed mobility, POC, rolling, deficits, neglect                   Point: Home exercise program (Done)     Description: Instruct learner(s) on appropriate technique for monitoring, assisting and/or progressing  therapeutic exercises/activities.    Learning Progress Summary           Patient Acceptance, E,D, VU,DU,NR by AN at 12/12/2018  4:31 PM    Comment:  Educated on UE ex, sensation training; visual ex    Acceptance, E,TB,D, VU,DU by  at 12/9/2018  3:32 PM    Comment:  role of OT, benefits of activity, bed mobility, POC, rolling, deficits, neglect                   Point: Precautions (Done)     Description: Instruct learner(s) on prescribed precautions during self-care and functional transfers.    Learning Progress Summary           Patient Acceptance, E,TB,D, VU,DU by  at 12/9/2018  3:32 PM    Comment:  role of OT, benefits of activity, bed mobility, POC, rolling, deficits, neglect                   Point: Body mechanics (Done)     Description: Instruct learner(s) on proper positioning and spine alignment during self-care, functional mobility activities and/or exercises.    Learning Progress Summary           Patient Acceptance, E,D, VU,NR by ANNA at 12/11/2018 10:18 AM    Comment:  ADL retraining wtih feeding and education on body mechanics for balance.    Acceptance, E,TB,D, VU,DU by  at 12/9/2018  3:32 PM    Comment:  role of OT, benefits of activity, bed mobility, POC, rolling, deficits, neglect                               User Key     Initials Effective Dates Name Provider Type Discipline     06/10/18 -  Rebeka Ospina OTR Occupational Therapist OT     06/22/15 -  Juana Carson, OT Occupational Therapist OT                OT Recommendation and Plan  Outcome Summary/Treatment Plan (OT)  Daily Summary of Progress (OT): progress toward functional goals is gradual  Anticipated Discharge Disposition (OT): inpatient rehabilitation facility  Daily Summary of Progress (OT): progress toward functional goals is gradual  Plan of Care Review  Plan of Care Reviewed With: patient  Plan of Care Reviewed With: patient  Outcome Summary: Pt continues to c/o headache, fatigue. Improvment with pt tracking L, turning  head to L this date. Continue OT POC, will need IRF at discharge.  Outcome Measures     Row Name 12/12/18 1609 12/12/18 0923 12/11/18 0931       How much help from another person do you currently need...    Turning from your back to your side while in flat bed without using bedrails?  --  2  -CD  --    Moving from lying on back to sitting on the side of a flat bed without bedrails?  --  2  -CD  --    Moving to and from a bed to a chair (including a wheelchair)?  --  2  -CD  --    Standing up from a chair using your arms (e.g., wheelchair, bedside chair)?  --  2  -CD  --    Climbing 3-5 steps with a railing?  --  1  -CD  --    To walk in hospital room?  --  1  -CD  --    AM-PAC 6 Clicks Score  --  10  -CD  --       How much help from another is currently needed...    Putting on and taking off regular lower body clothing?  1  -AN  --  1  -AN    Bathing (including washing, rinsing, and drying)  1  -AN  --  1  -AN    Toileting (which includes using toilet bed pan or urinal)  1  -AN  --  1  -AN    Putting on and taking off regular upper body clothing  1  -AN  --  1  -AN    Taking care of personal grooming (such as brushing teeth)  2  -AN  --  1  -AN    Eating meals  1  -AN  --  2  -AN    Score  7  -AN  --  7  -AN       Modified Hockley Scale    Modified Hockley Scale  --  4 - Moderately severe disability.  Unable to walk without assistance, and unable to attend to own bodily needs without assistance.  -CD  4 - Moderately severe disability.  Unable to walk without assistance, and unable to attend to own bodily needs without assistance.  -AN       Functional Assessment    Outcome Measure Options  --  AM-PAC 6 Clicks Basic Mobility (PT);Modified [place]  -CD  --    Row Name 12/10/18 0964             How much help from another person do you currently need...    Turning from your back to your side while in flat bed without using bedrails?  2  -CD      Moving from lying on back to sitting on the side of a flat bed without  bedrails?  2  -CD      Moving to and from a bed to a chair (including a wheelchair)?  2  -CD      Standing up from a chair using your arms (e.g., wheelchair, bedside chair)?  2  -CD      Climbing 3-5 steps with a railing?  1  -CD      To walk in hospital room?  1  -CD      AM-PAC 6 Clicks Score  10  -CD         Modified [place] Scale    Modified [place] Scale  4 - Moderately severe disability.  Unable to walk without assistance, and unable to attend to own bodily needs without assistance.  -CD         Functional Assessment    Outcome Measure Options  AM-PAC 6 Clicks Basic Mobility (PT);Modified [place]  -CD        User Key  (r) = Recorded By, (t) = Taken By, (c) = Cosigned By    Initials Name Provider Type    Jamia Harrell, PT Physical Therapist    Juana Quintanilla, OT Occupational Therapist           Time Calculation:   Time Calculation- OT     Row Name 12/12/18 1632 12/12/18 1609          Time Calculation- OT    OT Start Time  1609  -AN  --     Total Timed Code Minutes- OT  9 minute(s)  -AN  --     OT Received On  12/12/18  -AN  --     OT Goal Re-Cert Due Date  12/19/18  -AN  --        Timed Charges    06386 - OT Therapeutic Exercise Minutes  --  9  -AN       User Key  (r) = Recorded By, (t) = Taken By, (c) = Cosigned By    Initials Name Provider Type    Juana Quintanilla OT Occupational Therapist           Therapy Suggested Charges     Code   Minutes Charges    73631 (CPT®) Hc Ot Neuromusc Re Education Ea 15 Min      83739 (CPT®) Hc Ot Ther Proc Ea 15 Min 9 1    16844 (CPT®) Hc Ot Therapeutic Act Ea 15 Min      91082 (CPT®) Hc Ot Manual Therapy Ea 15 Min      08979 (CPT®) Hc Ot Iontophoresis Ea 15 Min      73492 (CPT®) Hc Ot Elec Stim Ea-Per 15 Min      90329 (CPT®) Hc Ot Ultrasound Ea 15 Min      76699 (CPT®) Hc Ot Self Care/Mgmt/Train Ea 15 Min      Total  9 1        Therapy Charges for Today     Code Description Service Date Service Provider Modifiers Qty    68482024955 HC OT THERAPEUTIC ACT EA 15 MIN  12/11/2018 Juana Carson, OT GO 2    32529566517 HC OT THER PROC EA 15 MIN 12/11/2018 Juana Carson, OT GO 1    86857508144 HC OT THER SUPP EA 15 MIN 12/11/2018 Juana Carson, OT GO 2    25301301169 HC OT THER PROC EA 15 MIN 12/12/2018 Juana Carson, OT GO 1               Juana CALL. Alli OT  12/12/2018

## 2018-12-12 NOTE — PROGRESS NOTES
"Clinical Nutrition   Reason For Visit: Follow-up protocol, ? 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 Plan for MBS.      PMH: She  has a past medical history of Acid reflux, Arthritis, Atherosclerosis of coronary artery, Chronic fatigue, Coronary artery disease, Degenerative lumbar disc, Elevated cholesterol, Esophageal stricture, Hypertension, and PONV (postoperative nausea and vomiting).   PSxH: She  has a past surgical history that includes Cataract extraction, bilateral; Cholecystectomy; Tonsillectomy and adenoidectomy; Back surgery; Trigger finger release (Right); Abdominal surgery; Cardiac catheterization; Colonoscopy; Esophagogastroduodenoscopy; Eye surgery; Tubal ligation; and TRIGGER FINGER  RELEASE LEFT THIRD (Left, 11/27/2018).        Reported/Observed/Food/Nutrition Related History     RN reports that SLP planning to do MBS later this afternoon. RN states that if pt fails MBS today, then family leaning towards having a keofeed placed and start EN. Pt reports that she is feeling a little hungry. Pt states that if she does not pass MBS swallow eval then she is OK with having keofeed placed for tube feeding.       Anthropometrics   Height: 62 in  Weight: 89 lb per standing scale (12/8)  BMI: 16.3  BMI classification: Underweight:<18.5kg/m2   UBW: 95 lb per RD note (12/10)    Weight change: Per RD note (12/10)- \"Family not sure about pt's weight loss, states she fluctuates between 90-95 lb usually.\"      Labs reviewed   Labs reviewed: " Yes  Results from last 7 days   Lab Units  12/11/18   0714  12/09/18   0637  12/08/18   1514   SODIUM mmol/L  140  142  141   POTASSIUM mmol/L  3.5  3.7  3.4*   CHLORIDE mmol/L  111*  108  110   CO2 mmol/L  24.0  19.0*  24.7   BUN mg/dL  10  32*  17   CREATININE mg/dL  0.67  0.87  0.81   GLUCOSE mg/dL  96  53*  77   CALCIUM mg/dL  9.2  9.1  9.6   CHOLESTEROL mg/dL   --   160   --    TRIGLYCERIDES mg/dL   --   178*   --      Medications reviewed   Medications reviewed: Yes  Pertinent: IV reglan q 6 hrs    Needs Assessment   Height used: 62 in  Weight used: 89 lb     Estimated Calories needs: ~1200 kcal/day  MSJ= 867 x1.3= 1127 kcal  30-35 kcal/kg= 8022-4673 kcal    Estimated Protein needs: ~44 g pro/day  1.0-1.2 g/kg= 40-48 g pro     Current Nutrition Prescription   PO: NPO Diet    Evaluation of Received Nutrient/Fluid Intake:  8%/3 meals -- reflects PO intake prior to pt being NPO, pt was on a dysphagia level 2/cardiac diet, no straws and chocolate Boost Plus 4x/day      Nutrition Diagnosis   12/10  Problem Swallowing difficulty   Etiology Post-stroke complications   Signs/Symptoms Per SLP bedside eval 12/11, 12/12; plan for repeat MBS today (12/12)       12/12  Problem Inadequate energy intake   Etiology Clinical condition, current diet   Signs/Symptoms 8%/3 meals prior to being NPO; currently NPO       Intervention   Intervention: Follow treatment progress, Care plan reviewed   -If pt fails SLP MBS today then rec starting nutrition support, EN recs provided:  FiberSource HN at 45 ml/hr (goal volume= 990 ml/day) and free water at 15 ml/hr  -Will provide at goal volume:  1188 kcal, 99%  53 g pro, 120%  15 g fiber  802 ml water from EN  1132 ml water total      Goal:   General: Nutrition support treatment  PO: Initiate diet if pt passes SLP MBS eval today (12/12)  EN/PN: Initiate EN if pt does not pass SLP MBS eval (12/12)      Monitoring/Evaluation:       Monitoring/Evaluation: Per protocol, Weight, Symptoms,  Swallow function  Will Continue to follow per protocol  Michelle Quispe MS RD/LD CNSC  Time Spent: 45 minutes

## 2018-12-12 NOTE — PLAN OF CARE
Problem: Patient Care Overview  Goal: Plan of Care Review  Outcome: Ongoing (interventions implemented as appropriate)   12/12/18 1027   Coping/Psychosocial   Plan of Care Reviewed With patient   Plan of Care Review   Progress no change   OTHER   Outcome Summary PT REQUIRES MAX ASSIST FOR ALL MOBILITY BUT ORIENTED AND FOLLOWS COMMANDS 75%.  PT DEMONSTRATES POOR SITTING BALANCE, AND SIGNIFICANT VISUAL DEFICITS. WILL NEED IRF AT D/C.

## 2018-12-12 NOTE — DISCHARGE INSTR - DIET
MBS/VFSS/FEES 12/12/18    Reason for Referral  Patient was referred for a MBS to assess the efficiency of his/her swallow function, rule out aspiration and make recommendations regarding safe dietary consistencies, effective compensatory strategies, and safe eating environment.        Referring Physician: MD SULEIMAN        Recommendations/Treatment  Oral Prep Phase: impaired oral phase of swallowing  Oral Transit Phase: impaired  Oral Residue: impaired  Oral Phase, Comment: DNT solid given aspiration risk/fatigue.       SLP Swallowing Diagnosis: moderate, oral dysfunction, mod-severe, pharyngeal dysfunction  Functional Impact: risk of aspiration/pneumonia, risk of malnutrition, risk of dehydration  Rehab Potential/Prognosis, Swallowing: adequate, monitor progress closely  Swallow Criteria for Skilled Therapeutic Interventions Met: demonstrates skilled criteria    Therapy Frequency (Swallow): 5 days per week  Predicted Duration Therapy Intervention (Days): until discharge  SLP Diet Recommendation: NPO, temporary alternate methods of nutrition/hydration, other (see comments)(except medication may be crushed w/ puree if pt alert)  Recommended Diagnostics: VFSS (MBS)  SLP Rec. for Method of Medication Administration: meds crushed, with pudding or applesauce, meds via alternate route, as tolerated  Anticipated Dischage Disposition: anticipate therapy at next level of care      Oral Preparation/ Oral Phase  Oral Prep Phase: impaired oral phase of swallowing  Anterior Loss: thin liquids, nectar-thick liquids, secondary to reduced labial seal    Pharyngeal Phase  Initiation of Pharyngeal Swallow: bolus in pyriform sinuses  Pharyngeal Phase: impaired pharyngeal phase of swallowing  Penetration During the Swallow: honey-thick liquids, secondary to delayed swallow initiation or mistiming, secondary to reduced laryngeal elevation, secondary to reduced vestibular closure  Aspiration During the Swallow: thin liquids, nectar-thick  liquids, secondary to delayed swallow initiation or mistiming, secondary to reduced laryngeal elevation, secondary to reduced vestibular closure  Response to Aspiration: inconsistent response, weak cough/throat clear, other (see comments)(intermittent weak, delayed cough)  Pharyngeal Residue: all consistencies tested, diffuse within pharynx, secondary to reduced base of tongue retraction, secondary to reduced posterior pharyngeal wall stripping, secondary to reduced laryngeal elevation, secondary to reduced hyolaryngeal excursion, other (see comments)(mild-mod amount; greatest in valleculae - pools to pyriforms)  Response to Residue: other (see comments)(partially cleared w/ subsequent swallows)  Pharyngeal Phase, Comment: Alert to stimuli, but drowsy. Worsened swallow fxn c/t MBS completed 12/10.  There was aspiration of thin & nectar-thick liquid via tsp during the swallow. Penetration of honey-thick liquid via tsp during the swallow. Vestibular residue deepened to TVF w/ no cough response--eventual aspiration after the swallow deemed inevitable inevitable. Intermittently pt demonstrated delayed weak/nonproductive cough after aspiration. No aspiration of pudding directly observed during study; however, given waxing/waning status, reduced sensation, and high risk for fatigue, would rec: NPO x meds crushed w/ puree when alert. Consider alternate means of nutrition, as appropriate.           Prognosis  Rehab Potential/Prognosis: fair  SLC Criteria for Skilled Therapy Interventions Met: yes

## 2018-12-12 NOTE — PROGRESS NOTES
[facility]  PROGRESS NOTE    Patient Name: Anita Roche  : 1945  MRN: [id number]    Date of Admission: 2018  Length of Stay: 2  Primary Care Physician: Diann Ferreira APRN    Subjective   Subjective     CC:  CVA    HPI:  Nursing reports coughing when she eats or drinks, fever overnight  She has no complaints    ROS:  ROS limited due to mentation     Objective   Objective     Vital Signs:   Temp:  [98 °F (36.7 °C)-100.6 °F (38.1 °C)] 99.8 °F (37.7 °C)  Heart Rate:  [79-95] 86  Resp:  [16-18] 18  BP: (103-165)/(53-81) 165/81  Total (NIH Stroke Scale): 15     Physical Exam:  Gen-no acute distress, sitting up in chair  CV-RRR, S1 S2 normal, no m/r/g  Resp-scattered rhonchi throughout, normal WOB, on supplemental o2, coughing noted when drinking liquids  Abd-soft, NT, ND, +BS  Ext-no edema, PPP  Neuro-left side if flacced, left facial droop, slurred speech noted  Skin-no overt rashes noted  Psych-appropriate mood    Results Reviewed:  I have personally reviewed current lab, radiology, and data and agree.    Results from last 7 days   Lab Units  18   0714  18   1259  18   1514   WBC 10*3/mm3  11.38*  15.40*  11.25   HEMOGLOBIN g/dL  13.0  14.3  14.7   HEMATOCRIT %  40.4  44.1*  46.1   PLATELETS 10*3/mm3  175  176  178   INR    --    --   1.07     Results from last 7 days   Lab Units  18   0714  18   0637  18   1514   SODIUM mmol/L  140  142  141   POTASSIUM mmol/L  3.5  3.7  3.4*   CHLORIDE mmol/L  111*  108  110   CO2 mmol/L  24.0  19.0*  24.7   BUN mg/dL  10  32*  17   CREATININE mg/dL  0.67  0.87  0.81   GLUCOSE mg/dL  96  53*  77   CALCIUM mg/dL  9.2  9.1  9.6   ALT (SGPT) U/L   --    --   21   AST (SGOT) U/L   --    --   23     Estimated Creatinine Clearance: 40.5 mL/min (by C-G formula based on SCr of 0.67 mg/dL).  No results found for: BNP    Microbiology Results Abnormal     Procedure Component Value - Date/Time     Blood Culture - Blood, Arm, Right [[id number]] Collected:  12/09/18 1259    Lab Status:  Preliminary result Specimen:  Blood from Arm, Right Updated:  12/11/18 1330     Blood Culture No growth at 2 days    Blood Culture - Blood, Hand, Right [[id number]] Collected:  12/09/18 1306    Lab Status:  Preliminary result Specimen:  Blood from Hand, Right Updated:  12/11/18 1330     Blood Culture No growth at 2 days          Imaging Results (last 24 hours)     Procedure Component Value Units Date/Time    XR Chest 1 View [[id number]] Collected:  12/11/18 1410     Updated:  12/11/18 1415    Narrative:       EXAMINATION: XR CHEST 1 VW- 12/11/2018     INDICATION: fever, hypoxia, aspiration PNA; R13.10-Dysphagia,  unspecified; Z74.09-Other reduced mobility     TECHNIQUE:  Single view frontal chest.     COMPARISONS: 12/09/2018     FINDINGS:  Atherosclerosis noted of the aortic knob. There is improved  right lower lobe airspace disease. No pleural effusion or pneumothorax.  The cardiomediastinal silhouette is unchanged.       Impression:       Improved right lower lobe airspace disease.     D:  12/11/2018  E:  12/11/2018     This report was finalized on 12/11/2018 2:13 PM by Chandra Ceballos.       FL Video Swallow With Speech [[id number]] Collected:  12/10/18 1505     Updated:  12/10/18 5141    Narrative:       EXAMINATION: FL VIDEO SWALLOW W SPEECH-     INDICATION: dysphagia; R13.10-Dysphagia, unspecified; Z74.09-Other  reduced mobility; Z74.09-Other reduced mobility     TECHNIQUE: 48 seconds of fluoroscopic time was used for this exam. 1  associated image was saved. The patient was evaluated in the seated  lateral position while taking a variety of consistencies of barium by  mouth under the direction of speech pathology.     COMPARISON: NONE     FINDINGS: There was no penetration and no aspiration with any of the  media ingested.          Impression:       Fluoroscopy provided for a modified barium swallow. Please  see speech therapy  report for full details and recommendations.         This report was finalized on 12/10/2018 9:57 PM by DR. Trevin Lopez MD.           Results for orders placed during the hospital encounter of 12/08/18   Adult Transthoracic Echo Complete W/ Cont if Necessary Per Protocol (With Agitated Saline)    Narrative · Left ventricular systolic function is hyperdynamic (EF > 70).  · Mid LV cavitary gradient of 40 mmHg..  · Trace mitral valve regurgitation is present.  · Trace tricuspid valve regurgitation is present          I have reviewed the medications.    Assessment/Plan   Assessment / Plan     Active Hospital Problems    Diagnosis Date Noted   • **CVA (cerebral vascular accident) (CMS/HCC) [I63.9] 12/08/2018   • Acute respiratory failure with hypoxia (CMS/HCC) [J96.01] 12/10/2018   • Underweight [R63.6] 12/09/2018   • Sepsis due to pneumonia (CMS/HCC) [J18.9, A41.9] 12/09/2018   • Aspiration pneumonia (CMS/[facility]) [J69.0] 12/09/2018   • Hypokalemia [E87.6] 12/08/2018   • Tobacco use [Z72.0] 12/08/2018   • Atherosclerosis of coronary artery [I25.10] 11/08/2018     Description: Echo (4/2012): LVEF >65%     • Degeneration of intervertebral disc of lumbar region [M51.36] 03/31/2008       Brief Hospital Course to date:  Anita Roche is a 73 y.o. female with PMH significant for HTN, tobacco abuse, CAD, esophageal stricture, trigger finger s/p release 11/27/2018 transferred from the ER at [facility] for 3 days of HA, confusion, balance disturbances.  At the local ER, CT head was performed which showed an old left sided stroke but no acute insults.  However, left facial droop and left sided drift was noted and the patient was sent to [facility] for higher level of neuro care.  MRI upon arrival revealed large right hemispheric CVA.     R hemisphere infarct secondary to R ICA occlusion with residual left hemiparalysis, dysphagia  - ASA/Plavix  - Neuro has evaluated  - large infarct with guarded prognosis (somnelence, left HP, neglect,  dysphagia)  - on modified diet but I am concerned she is aspirating, will have speech come back by and reassess   - ST/PT/OT/CM -->  [facility] referral in progress    Sepsis (not POA)  Aspiration pna (not POA)  Acute hypoxic failure  - BCx x2 negative thus far  - Zosyn, add doxy given overnight fever, she is a smoker    DVT Prophylaxis:  Given stable neuro exam as of 12/10 added SQ hep prophy since bedbound from severity of CVA    CODE STATUS:   Code Status and Medical Interventions:   Ordered at: 12/08/18 2008     Level Of Support Discussed With:    Patient     Code Status:    CPR     Medical Interventions (Level of Support Prior to Arrest):    Full     Disposition: I expect the patient to be discharged TBD    Electronically signed by SAMREEN Avila, 12/11/18, 7:19 PM.

## 2018-12-12 NOTE — THERAPY RE-EVALUATION
Acute Care - Speech Language Pathology   Swallow Re-Evaluation [facility]   Clinical Swallow Evaluation     Patient Name: Anita Roche  : 1945  MRN: [id number]  Today's Date: 2018  Onset of Illness/Injury or Date of Surgery: 18     Referring Physician: MD SULEIMAN      Admit Date: 2018    Visit Dx:     ICD-10-CM ICD-9-CM   1. Dysphagia, unspecified type R13.10 787.20   2. Impaired mobility and ADLs Z74.09 799.89   3. Impaired functional mobility, balance, gait, and endurance Z74.09 V49.89   4. Cerebrovascular accident (CVA) due to occlusion of right carotid artery (CMS/HCC) I63.231 433.11   5. Cognitive communication deficit R41.841 799.52     Patient Active Problem List   Diagnosis   • Chronic fatigue syndrome   • Knee pain   • Closed wedge compression fracture of thoracic vertebra (CMS/HCC)   • Atherosclerosis of coronary artery   • Persistent cough   • Degeneration of intervertebral disc of lumbar region   • Stricture of esophagus   • Hand pain, left   • Trigger middle finger of left hand   • Hypokalemia   • CVA (cerebral vascular accident) (CMS/HCC)   • Tobacco use   • Underweight   • Sepsis due to pneumonia (CMS/HCC)   • Aspiration pneumonia (CMS/HCC)   • Acute respiratory failure with hypoxia (CMS/HCC)     Past Medical History:   Diagnosis Date   • Acid reflux    • Arthritis    • Atherosclerosis of coronary artery    • Chronic fatigue    • Coronary artery disease    • Degenerative lumbar disc    • Elevated cholesterol    • Esophageal stricture    • Hypertension    • PONV (postoperative nausea and vomiting)      Past Surgical History:   Procedure Laterality Date   • ABDOMINAL SURGERY     • BACK SURGERY     • CARDIAC CATHETERIZATION     • CATARACT EXTRACTION, BILATERAL     • CHOLECYSTECTOMY     • COLONOSCOPY     • ENDOSCOPY     • EYE SURGERY     • TONSILLECTOMY AND ADENOIDECTOMY     • TRIGGER FINGER RELEASE Right     Third and Fourth Fingers Dr. Ashley   • TUBAL ABDOMINAL LIGATION           SWALLOW EVALUATION (last 72 hours)      SLP Adult Swallow Evaluation     Row Name 12/12/18 0950 12/11/18 1200 12/10/18 1120 12/10/18 0930 12/09/18 1600       Rehab Evaluation    Document Type  re-evaluation  -AC  re-evaluation  -AC  evaluation  -MP  re-evaluation  -MP  evaluation  -SG    Subjective Information  complains of;pain headache  -AC  --  no complaints  -MP  complains of;pain at IV site; RN aware  -MP  no complaints;complains of  -SG    Patient Observations  --  --  lethargic  -MP  alert;cooperative  -MP  --    Patient/Family Observations  --  --  --  Daughter present  -MP  --    Patient Effort  --  fair  -AC  adequate  -MP  good  -MP  good  -SG    Symptoms Noted During/After Treatment  --  --  --  --  none  -SG       General Information    Patient Profile Reviewed  yes  -AC  yes  -AC  yes  -MP  yes  -MP  yes  -SG    Pertinent History Of Current Problem  Adm w/ multiple CVAs, aspiration pneumonia. Hx spinal fx, esophageal stricture s/p multiple dilations (most recent ~1yr ago per dtr). Pt's dtr reported pt eats very little at home--primarily drinks [employer] chocolate Boost.  -AC  --  See previous eval.  -MP  Pt admitted 12/8 w/ CVA. 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 PMH: HTN, tobacco abuse, CAD, esophageal stricture. CXR 12/8: no evidence of active or acute cardiopulmonary disease.  -MP  adm w/ CVA  -SG    Current Method of Nutrition  NPO  -AC  pureed;thin liquids thin via tsp only  -AC  NPO  -MP  NPO  -MP  NPO  -SG    Precautions/Limitations, Vision  --  --  WFL  -MP  WFL  -MP  WFL  -SG    Precautions/Limitations, Hearing  --  --  WFL  -MP  WFL  -MP  WFL  -SG    Prior Level of Function-Communication  --  --  other (see comments) baseline unknown  -MP  other (see comments) baseline unknown  -MP  other (see comments) unknown baseline  -SG    Prior Level of Function-Swallowing  --  --  other (see comments) baseline unknown  -MP  other (see comments) baseline unknown  -MP  other (see comments)  unknown baseline  -SG    Plans/Goals Discussed with  patient;agreed upon  -AC  patient and family;agreed upon  -AC  patient;agreed upon  -MP  patient;family;agreed upon  -MP  patient;agreed upon  -SG    Barriers to Rehab  medically complex  -AC  medically complex  -AC  none identified  -MP  none identified  -MP  none identified  -SG    Patient's Goals for Discharge  return to PO diet pt requesting coffee  -AC  patient did not state  -AC  patient did not state  -MP  patient did not state  -MP  return home  -SG    Family Goals for Discharge  --  patient able to eat/drink without coughing/choking  -AC  --  family did not state  -MP  --       Pain Assessment    Additional Documentation  Pain Scale: FACES Pre/Post-Treatment (Group)  -AC  --  Pain Scale: FACES Pre/Post-Treatment (Group)  -MP  Pain Scale: FACES Pre/Post-Treatment (Group)  -MP  --       Pain Scale: FACES Pre/Post-Treatment    Pain: FACES Scale, Pretreatment  6-->hurts even more  -AC  2-->hurts little bit  -AC  2-->hurts little bit  -MP  2-->hurts little bit  -MP  --    Pain: FACES Scale, Post-Treatment  6-->hurts even more  -AC  2-->hurts little bit  -AC  2-->hurts little bit  -MP  2-->hurts little bit  -MP  --    Pre/Post Treatment Pain Comment  Headache - RN notified  -AC  back/head/right hand - repositioned pt, per her request, and notified RN re: headache.  -AC  --  --  --       Oral Motor and Function    Dentition Assessment  upper dentures/partial in place;lower dentures/partial in place  -AC  --  --  natural, present and adequate  -MP  other (see comments) CNA, pt unable to open mouth on command  -SG    Secretion Management  WNL/WFL  -AC  --  --  dried secretions in oral cavity;other (see comments) continues to drool bilaterally  -MP  dried secretions in oral cavity;other (see comments) drooling noted bilaterally  -SG    Mucosal Quality  sticky  -AC  --  --  --  --    Volitional Swallow  --  --  --  --  delayed;weak  -SG    Volitional Cough  weak   -AC  --  --  --  WFL  -SG       Oral Musculature and Cranial Nerve Assessment    Oral Motor General Assessment  --  --  --  oral labial or buccal impairment  -MP  oral labial or buccal impairment  -SG    Oral Labial or Buccal Impairment, Detail, Cranial Nerve VII (Facial):  CN7: Motor Impairment;reduced ROM;reduced strength bilaterally;left labial droop;other (see comments) greatest on L  -AC  --  --  CN7: Motor Impairment;left labial droop;reduced strength bilaterally;reduced ROM  -MP  CN7: Motor Impairment;left labial droop;reduced strength bilaterally;reduced ROM  -SG    Lingual Impairment, Detail. Cranial Nerves IX, XII (Glossopharyngeal and Hypoglossal)  CN12: Motor Impairment;reduced lingual ROM;reduced strength;bilaterally  -AC  --  --  --  --    Oral Motor, Comment  --  --  --  --  Unable to fully assess 2' pt's dec'd cog status, unable to follow OM commands  -SG       General Eating/Swallowing Observations    Respiratory Support Currently in Use  --  --  --  nasal cannula  -MP  nasal cannula  -SG    Eating/Swallowing Skills  self-fed;fed by SLP  -AC  fed by SLP;self-fed  -AC  --  fed by SLP  -MP  fed by SLP;unsafe self-feeding skills observed;unaware of safety concerns  -SG    Positioning During Eating  upright 90 degree;upright in chair  -AC  upright 90 degree;upright in chair  -AC  --  upright 90 degree;upright in bed  -MP  upright 90 degree;upright in bed;needs frequent re-positioning;other (see comments) frequent repositioning by SLP  -SG    Utensils Used  spoon;cup;straw  -AC  spoon;cup  -AC  --  spoon  -  spoon;cup;straw  -SG    Consistencies Trialed  thin liquids;nectar/syrup-thick liquids;pureed  -AC  thin liquids;nectar/syrup-thick liquids;pureed  -AC  --  thin liquids;nectar/syrup-thick liquids;pureed  -MP  thin liquids;pureed  -SG       Respiratory    Respiratory Status  --  wheezing, stridor;during swallowing/eating  -AC  --  --  wheezing, stridor;during swallowing/eating  -SG    Respiratory  Pattern  --  --  --  --  decrease control/incoordination of breathing swallow  -SG       Clinical Swallow Eval    Oral Prep Phase  impaired  -AC  impaired  -AC  --  impaired  -MP  impaired  -SG    Oral Transit  impaired  -AC  impaired  -AC  --  impaired  -MP  impaired  -SG    Oral Residue  WFL  -AC  impaired  -AC  --  WFL  -MP  impaired  -SG    Pharyngeal Phase  suspected pharyngeal impairment  -AC  suspected pharyngeal impairment  -AC  --  suspected pharyngeal impairment  -MP  suspected pharyngeal impairment  -SG    Clinical Swallow Evaluation Summary  --  --  --  Clinical swallow re-evaluation completed. Pt given trials of thin, nectar-thick, and puree via tsp. Anterior loss of approximately 1/2 of thin liquid bolus, immediate cough and wet vocal quality noted. No overt s/sxs w/ NTL or puree via tsp. Rec NPO, meds crushed in puree, check to ensure cleared oral cavity. SLP will proceed w/ MBS today to determine safest PO diet.  -MP  CSE completed this date. Pt not alert in am, then getting in-room testing, pt seen for evaluation later in pm. Pt confused, lethargic, incoherent mumbling during evaluation. Inconsistent verbal responses, inconsistent alertness. Frequent repositioning required by SLP during assessment. Drooling noted bilaterally, but facial droop evident on left. Moderate dysarthria. Pt tested w/ thins and puree via tsp. Immediate gagging cough w/ small tsp of thins. Ant loss w/ all, pt unable to clear lips of puree, minimal amount taken from tsp. Wheezing cough noted after trials. No family present. Pt unable to express understanding w/ provided education. Rec: NPO is safest at this time, needs BS re-eval tomorrow, aggressive oral care, and SLC eval when pt more consistently responsive and alert.   -SG       Oral Prep Concerns    Oral Prep Concerns  anterior loss;other (see comments);incomplete or weak lip closure around spoon solids not tested  -AC  oral holding;anterior loss  -AC  --  other (see  comments) DNT solid  -MP  other (see comments) solids not tested  -SG    Oral Holding  --  all consistencies;other (see comments) likely affected by cognitive status/lethargy  -AC  --  --  --    Incomplete or Weak Lip Closure Around Spoon  nectar;other (see comments) around straw - pt u/a to pull liquid via straw  -AC  --  --  --  --    Anterior Loss  nectar;other (see comments) via cup  -AC  all consistencies;other (see comments) 2' reduced labial seal  -AC  --  --  --    Oral Prep Concerns, Comment  2' reduced labial seal  -AC  --  --  --  --       Oral Transit Concerns    Oral Transit Concerns  increased oral transit time  -AC  delayed initiation of bolus transit;increased oral transit time  -AC  --  increased oral transit time  -MP  delayed initiation of bolus transit  -SG    Delayed Intiation of Bolus Transit  --  all consistencies  -AC  --  --  thin  -SG    Increased Oral Transit Time  all consistencies  -AC  all consistencies  -AC  --  pudding  -MP  --    Oral Transit Concerns, Comment  2' reduced lingual strength/coordination  -AC  both 2' reduced lingual strength/coordination and likely affected by cognitive status/lethargy  -AC  --  --  --       Oral Residue Concerns    Oral Residue Concerns  --  diffuse residue throughout oral cavity  -AC  --  --  diffuse residue throughout oral cavity  -SG    Diffuse Residue Throughout Oral Cavity  --  pudding;other (see comments) removed w/ oral toothettes & oral suctioning  -AC  --  --  other (see comments) puree  -SG    Oral Residue Concerns, Comment  --  2' reduced lingual strength/ROM  -AC  --  --  --       Pharyngeal Phase Concerns    Pharyngeal Phase Concerns  cough;wet vocal quality  -AC  wet vocal quality;cough  -AC  --  cough;wet vocal quality  -MP  cough;wet vocal quality  -SG    Wet Vocal Quality  nectar;other (see comments) wet-sounding wheezing after cup drink  -AC  all consistencies;other (see comments) intermittent wet-sounding wheezing   -AC  --  thin   -MP  all consistencies  -SG    Cough  thin;other (see comments) immediate cough w/ ice chip & thin via tsp  -AC  thin;nectar;pudding;other (see comments) immediate cough-thin via tsp, delayed-nectar via tsp/puree  -AC  --  --  thin  -SG    Pharyngeal Phase Concerns, Comment  Pt more alert, drooling improved. Pt cont to demonstrate moderate oral dysphagia and overt s/sxs aspiration w/ thin & nectar-thick liquid. No overt s/sxs aspiration w/ puree.   -AC  APRN/RN called to see if pt could be re-evaluated due to pt developing fever overnight & coughing noted w/ PO today. Pt had difficulty holding up her head and maintaining alertness/attention during re-eval. Pt demonstrated overt clinical s/sxs aspiration today -- appears to be a change c/t yesterday afternoon. Pt's dtr concerned that Morphine pt was given last night is still affecting her today. Safest rec is NPO w/ meds via alternate route.  -AC  --  --  --       MBS/VFSS    Utensils Used  --  --  spoon;cup  -MP  --  --    Consistencies Trialed  --  --  thin liquids;pudding thick  -MP  --  --       MBS/VFSS Interpretation    Oral Prep Phase  --  --  impaired oral phase of swallowing  -MP  --  --    Oral Transit Phase  --  --  impaired  -MP  --  --    Oral Residue  --  --  impaired  -MP  --  --    VFSS Summary  --  --  SLP MBS evaluation completed. Pt w/ significantly decreased alertness level compared to bedside evaluation completed in AM. Discussed w/ RN. Per RN, pt given morphine prior to MBS. Given decreased alertness level, limited PO trials were completed during MBS. Pt w/ mild anterior loss w/ thin via tsp. Pt unable to accept thin via cup or straw. Increased A-P transit time w/ pudding. Mild-mod oral reside w/ pudding that partially cleared w/ consecutive swallows. DNT solid. Delay of initiation of the pharyngeal swallow to the level of the pyriform sinuses, though did not impact function (w/ small bolus size). No penetration/aspiration w/ thin liquid via tsp  size bolus or pudding. Mild vallecular residue w/ thin and pudding 2' reduced base of tongue retraction. Recommend: dysphagia level II diet w/ thin liquid via tsp only. No straws. Meds crushed in puree. Will need assist w/ all feeding. ONLY feed when alert/awake. If lethargic, hold tray. Follow standard aspiration precautions. SLP will f/u for dysphagia tx and advancement as able.  -MP  --  --       Oral Preparatory Phase    Oral Preparatory Phase  --  --  anterior loss  -MP  --  --    Anterior Loss  --  --  thin liquids  -MP  --  --       Oral Transit Phase    Impaired Oral Transit Phase  --  --  increased A-P transit time  -MP  --  --    Increased A-P Transit Time  --  --  pudding/puree  -MP  --  --       Oral Residue    Impaired Oral Residue  --  --  diffuse residue throughout oral cavity  -MP  --  --    Diffuse Residue throughout Oral Cavity  --  --  pudding/puree  -MP  --  --    Response to Oral Residue  --  --  other (see comments) partially cleared w/ consecutive swallows  -MP  --  --       Initiation of Pharyngeal Swallow    Initiation of Pharyngeal Swallow  --  --  bolus in pyriform sinuses  -MP  --  --    Pharyngeal Phase  --  --  functional pharyngeal phase of swallowing;other (see comments) for limited PO given (tsp size bolus of thin and pudding)  -MP  --  --    Pharyngeal Residue  --  --  pudding/puree;valleculae;secondary to reduced base of tongue retraction  -MP  --  --    Response to Residue  --  --  other (see comments) reduced but not cleared w/ consecutive swallows  -MP  --  --       Clinical Impression    SLP Swallowing Diagnosis  moderate;oral dysfunction;suspected pharyngeal dysfunction  -AC  mod-severe;oral dysfunction;suspected pharyngeal dysfunction  -AC  oral dysfunction;mild;pharyngeal dysfunction  -MP  oral dysfunction;suspected pharyngeal dysfunction  -MP  oral dysfunction;pharyngeal dysfunction  -SG    Functional Impact  risk of malnutrition;risk of dehydration;risk of  aspiration/pneumonia  -AC  risk of aspiration/pneumonia;risk of malnutrition;risk of dehydration  -AC  risk of malnutrition;risk of dehydration  -MP  risk of aspiration/pneumonia;risk of malnutrition;risk of dehydration  -MP  risk of aspiration/pneumonia;risk of malnutrition;risk of dehydration  -SG    Rehab Potential/Prognosis, Swallowing  adequate, monitor progress closely  -AC  adequate, monitor progress closely  -AC  good, to achieve stated therapy goals  -MP  good, to achieve stated therapy goals  -MP  good, to achieve stated therapy goals  -SG    Swallow Criteria for Skilled Therapeutic Interventions Met  demonstrates skilled criteria  -AC  demonstrates skilled criteria  -AC  demonstrates skilled criteria  -MP  demonstrates skilled criteria  -MP  demonstrates skilled criteria  -SG       Recommendations    Therapy Frequency (Swallow)  --  --  5 days per week  -MP  5 days per week  -MP  5 days per week  -SG    Predicted Duration Therapy Intervention (Days)  --  until discharge  -AC  until discharge  -MP  until discharge  -MP  until discharge  -SG    SLP Diet Recommendation  NPO;other (see comments) except medication may be crushed w/ puree  -AC  NPO  -AC  puree;thin liquids;other (see comments) via tsp only  -MP  NPO  -MP  NPO  -SG    Recommended Diagnostics  VFSS (MBS)  -AC  reassess via clinical swallow evaluation;other (see comments) when pt more consistently alert  -AC  --  VFSS (MBS)  -MP  reassess via clinical swallow evaluation  -SG    Recommended Precautions and Strategies  --  --  no straw;small bites of food and sips of liquid;liquid via spoon only;other (see comments) ONLY feed when alert/awake  -MP  --  --    SLP Rec. for Method of Medication Administration  meds crushed;with pudding or applesauce;as tolerated  -AC  meds via alternate route  -AC  meds crushed;with pudding or applesauce;as tolerated  -MP  meds crushed;with pudding or applesauce;as tolerated via alternate route if any s/sxs  -MP  meds  via alternate route  -SG    Monitor for Signs of Aspiration  --  --  yes;notify SLP if any concerns  -MP  yes;notify SLP if any concerns  -MP  --    Anticipated Dischage Disposition  anticipate therapy at next level of care  -AC  anticipate therapy at next level of care  -AC  unknown;anticipate therapy at next level of care  -MP  unknown;anticipate therapy at next level of care  -MP  --      User Key  (r) = Recorded By, (t) = Taken By, (c) = Cosigned By    Initials Name Effective Dates    SG Suri-Luis Felipe Rodasah Michela, MS CCC-SLP 06/22/15 -     AC Lukas Estrella COELLO, MS CCC-SLP 07/27/17 -     MP Steven Christina, MS, CFY-SLP 08/15/18 -           EDUCATION  The patient has been educated in the following areas:   Dysphagia (Swallowing Impairment) Oral Care/Hydration NPO rationale.    SLP Recommendation and Plan  SLP Swallowing Diagnosis: moderate, oral dysfunction, suspected pharyngeal dysfunction  SLP Diet Recommendation: NPO, other (see comments)(except medication may be crushed w/ puree)      Recommended Diagnostics: VFSS (MBS)  Swallow Criteria for Skilled Therapeutic Interventions Met: demonstrates skilled criteria  Anticipated Dischage Disposition: anticipate therapy at next level of care  Rehab Potential/Prognosis, Swallowing: adequate, monitor progress closely        Plan of Care Reviewed With: patient  Plan of Care Review  Plan of Care Reviewed With: patient    SLP GOALS     Row Name 12/11/18 1150 12/10/18 1120          Oral Nutrition/Hydration Goal 1 (SLP)    Oral Nutrition/Hydration Goal 1, SLP  --  LTG: Pt will return to regular diet, thin liquids w/ no overt s/sxs aspiration w/ 100% acc and no cues  -MP     Time Frame (Oral Nutrition/Hydration Goal 1, SLP)  --  by discharge  -MP        Oral Nutrition/Hydration Goal 2 (SLP)    Oral Nutrition/Hydration Goal 2, SLP  --  Pt will tolerate therapeutic trials of thin liquid via tsp and puree w/ no overt s/sxs aspiration w/ 100% acc and no cues  -MP     Time Frame  (Oral Nutrition/Hydration Goal 2, SLP)  --  short term goal (STG);by discharge  -MP        Oral Nutrition/Hydration Goal (SLP)    Oral Nutrition/Hydration Goal, SLP  --  Pt will tolerate trials of regular solid w/ no overt s/sxs aspiration or discomfort w/ 100% acc and no cues  -MP     Time Frame (Oral Nutrition/Hydration Goal, SLP)  --  short term goal (STG);by discharge  -MP        Lingual Strengthening Goal 1 (SLP)    Activity (Lingual Strengthening Goal 1, SLP)  --  increase lingual tone/sensation/control/coordination/movement  -MP     Increase Lingual Tone/Sensation/Control/Coordination/Movement  --  swallow trials;lingual resistance exercises  -MP     [place]/Accuracy (Lingual Strengthening Goal 1, SLP)  --  with minimal cues (75-90% accuracy)  -MP     Time Frame (Lingual Strengthening Goal 1, SLP)  --  short term goal (STG);by discharge  -MP        Pharyngeal Strengthening Exercise Goal 1 (SLP)    Activity (Pharyngeal Strengthening Goal 1, SLP)  --  increase timing;increase squeeze/positive pressure generation;increase tongue base retraction  -MP     Increase Timing  --  prepping - 3 second prep or suck swallow or 3-step swallow  -MP     Increase Squeeze/Positive Pressure Generation  --  hard effortful swallow  -MP     Increase Tongue Base Retraction  --  juan david  -MP     [place]/Accuracy (Pharyngeal Strengthening Goal 1, SLP)  --  with minimal cues (75-90% accuracy)  -MP     Time Frame (Pharyngeal Strengthening Goal 1, SLP)  --  short term goal (STG);by discharge  -MP        Swallow Compensatory Strategies Goal 1 (SLP)    Activity (Swallow Compensatory Strategies/Techniques Goal 1, SLP)  --  compensatory strategies;liquids by teaspoon only  -MP     [place]/Accuracy (Swallow Compensatory Strategies/Techniques Goal 1, SLP)  --  with minimal cues (75-90% accuracy)  -MP     Time Frame (Swallow Compensatory Strategies/Techniques Goal 1, SLP)  --  short term goal (STG);by discharge  -MP         Respiratory Support Goal 1 (SLP)    Improve Respiratory Support Goal 1 (SLP)  sustaining a vowel sound on exhalation;80%;with minimal cues (75-90%)  -AC  --     Time Frame (Respiratory Support Goal 1, SLP)  short term goal (STG);by discharge  -AC  --        Articulation Goal 1 (SLP)    Improve Articulation Goal 1 (SLP)  by over-articulating at word level;80%;with minimal cues (75-90%)  -AC  --     Time Frame (Articulation Goal 1, SLP)  short term goal (STG);by discharge  -AC  --        Attention Goal 1 (SLP)    Improve Attention by Goal 1 (SLP)  looking at speaker;attending to task;80%;independently (over 90% accuracy)  -AC  --     Time Frame (Attention Goal 1, SLP)  short term goal (STG);by discharge  -AC  --        Additional Goal 1 (SLP)    Additional Goal 1, SLP  LTG: Pt will improve cognitive-communication skills in order to participate in care in hospital setting w/ 100% acc w/ min cues.  -AC  --     Time Frame (Additional Goal 1, SLP)  by discharge  -AC  --       User Key  (r) = Recorded By, (t) = Taken By, (c) = Cosigned By    Initials Name Provider Type    AC Estrella Gaytan MS CCC-SLP Speech and Language Pathologist    Steven Nieto MS, CFY-SLP Speech and Language Pathologist             Time Calculation:   Time Calculation- SLP     Row Name 12/12/18 1100             Time Calculation- SLP    SLP Start Time  0950  -      SLP Received On  12/12/18  -        User Key  (r) = Recorded By, (t) = Taken By, (c) = Cosigned By    Initials Name Provider Type    Estrella Rueda MS CCC-SLP Speech and Language Pathologist          Therapy Charges for Today     Code Description Service Date Service Provider Modifiers Qty    26972542021 HC ST EVAL ORAL PHARYNG SWALLOW 3 12/11/2018 Estrella Gaytan MS CCC-SLP GN 1    [id number] HC ST EVAL SPEECH AND PROD W LANG  3 12/11/2018 Estrella Gaytan MS CCC-SLP GN 1    [id number] HC ST EVAL ORAL PHARYNG SWALLOW 5 12/12/2018 Estrella Gaytan MS CCC-SLP GN 1                Estrella Gaytan, MS CCC-SLP  12/12/2018

## 2018-12-12 NOTE — THERAPY TREATMENT NOTE
Acute Care - Physical Therapy Treatment Note  [facility]     Patient Name: Anita Roche  : 1945  MRN: [id number]  Today's Date: 2018  Onset of Illness/Injury or Date of Surgery: 18  Date of Referral to PT: 18  Referring Physician: MD SULEIMAN    Admit Date: 2018    Visit Dx:    ICD-10-CM ICD-9-CM   1. Dysphagia, unspecified type R13.10 787.20   2. Impaired mobility and ADLs Z74.09 799.89   3. Impaired functional mobility, balance, gait, and endurance Z74.09 V49.89   4. Cerebrovascular accident (CVA) due to occlusion of right carotid artery (CMS/HCC) I63.231 433.11   5. Cognitive communication deficit R41.841 799.52     Patient Active Problem List   Diagnosis   • Chronic fatigue syndrome   • Knee pain   • Closed wedge compression fracture of thoracic vertebra (CMS/HCC)   • Atherosclerosis of coronary artery   • Persistent cough   • Degeneration of intervertebral disc of lumbar region   • Stricture of esophagus   • Hand pain, left   • Trigger middle finger of left hand   • Hypokalemia   • CVA (cerebral vascular accident) (CMS/HCC)   • Tobacco use   • Underweight   • Sepsis due to pneumonia (CMS/HCC)   • Aspiration pneumonia (CMS/HCC)   • Acute respiratory failure with hypoxia (CMS/HCC)       Therapy Treatment    Rehabilitation Treatment Summary     Row Name 18 0923             Treatment Time/Intention    Discipline  physical therapist  -CD      Document Type  therapy note (daily note)  -CD      Subjective Information  complains of;pain HA. NSG TO GIVE PAIN MED AFTER SWALLOW ASSESSMENT BY SLP  -CD      Mode of Treatment  physical therapy  -CD      Patient/Family Observations  PT SUPINE UPON ARRIVAL ON RA. PT AGREEABLE TO THERAPY AND UIC. REQUESTED TO USE THE BATHROOM.   -CD      Care Plan Review  care plan/treatment goals reviewed;patient/other agree to care plan  -CD      Therapy Frequency (PT Clinical Impression)  daily  -CD      Existing Precautions/Restrictions  fall L HP. L  NEGLECT, NPO, VISUAL DEFICITS.   -CD      Recorded by [CD] Jamia Bocanegra, PT 12/12/18 1027      Row Name 12/12/18 0923             Vital Signs    Pre Systolic BP Rehab  -- VSS. NSG CLEARED FOR TREATMENT.   -CD      Recorded by [CD] Jamia Bocanegra, PT 12/12/18 1027      Row Name 12/12/18 0923             Cognitive Assessment/Intervention- PT/OT    Orientation Status (Cognition)  oriented to;person;place;time  -CD      Follows Commands (Cognition)  follows one step commands;75-90% accuracy;verbal cues/prompting required;repetition of directions required;increased processing time needed  -CD      Personal Safety Interventions  fall prevention program maintained;gait belt;muscle strengthening facilitated;nonskid shoes/slippers when out of bed;supervised activity  -CD      Recorded by [CD] Jamia Bocanegra, PT 12/12/18 1027      Row Name 12/12/18 0923             Safety Issues, Functional Mobility    Safety Issues Affecting Function (Mobility)  insight into deficits/self awareness;safety precaution awareness;safety precautions follow-through/compliance  -CD      Impairments Affecting Function (Mobility)  balance;coordination;endurance/activity tolerance;postural/trunk control;strength;visual/perceptual  -CD      Recorded by [CD] Jamia Bocanegra, PT 12/12/18 1027      Row Name 12/12/18 0923             Bed Mobility Assessment/Treatment    Bed Mobility Assessment/Treatment  rolling right;sidelying-sit  -CD      Rolling Right [place] (Bed Mobility)  moderate assist (50% patient effort)  -CD      Sidelying-Sit [place] (Bed Mobility)  maximum assist (25% patient effort);2 person assist;verbal cues  -CD      Bed Mobility, Safety Issues  decreased use of arms for pushing/pulling;decreased use of legs for bridging/pushing;impaired trunk control for bed mobility  -CD      Assistive Device (Bed Mobility)  bed rails;head of bed elevated;draw sheet  -CD      Recorded by [CD] Jamia Bocanegra, PT 12/12/18 1027      Row Name  12/12/18 0923             Transfer Assessment/Treatment    Transfer Assessment/Treatment  sit-stand transfer;stand-sit transfer;toilet transfer;bed-chair transfer  -CD      Comment (Transfers)  ALL TRANSFERS WITH B UE SUPPORT AND GAIT BELT.   -CD      Recorded by [CD] Jamia Bocanegra, PT 12/12/18 1027      Row Name 12/12/18 0923             Bed-Chair Transfer    Bed-Chair [place] (Transfers)  maximum assist (25% patient effort);verbal cues BED TO BSC AND BSC TO RECLINER.   -CD      Recorded by [CD] Jamia Bocanegra, PT 12/12/18 1027      Row Name 12/12/18 0923             Sit-Stand Transfer    Sit-Stand [place] (Transfers)  maximum assist (25% patient effort)  -CD      Recorded by [CD] Jamia Bocanegra, PT 12/12/18 1027      Row Name 12/12/18 0923             Stand-Sit Transfer    Stand-Sit [place] (Transfers)  maximum assist (25% patient effort)  -CD      Recorded by [CD] Jamia Bocanegra, PT 12/12/18 1027      Row Name 12/12/18 0923             Toilet Transfer    Type (Toilet Transfer)  sit-stand;stand-sit  -CD      [place] Level (Toilet Transfer)  maximum assist (25% patient effort)  -CD      Assistive Device (Toilet Transfer)  commode, bedside without drop arms  -CD      Recorded by [CD] Jamia Bocanegra, PT 12/12/18 1027      Row Name 12/12/18 0923             Motor Skills Assessment/Interventions    Additional Documentation  Balance (Group);Balance Interventions (Group);Therapeutic Exercise (Group);Therapeutic Exercise Interventions (Group)  -CD      Recorded by [CD] Jamia Bocanegra, PT 12/12/18 1027      Row Name 12/12/18 0923             Therapeutic Exercise    66742 - PT Therapeutic Exercise Minutes  10  -CD      30499 -  PT Neuromuscular Reeducation Minutes  20  -CD      Recorded by [CD] Jamia Bocanegra, PT 12/12/18 1027      Row Name 12/12/18 0923             Therapeutic Exercise    Upper Extremity Range of Motion (Therapeutic Exercise)  shoulder flexion/extension, left;elbow flexion/extension,  left;wrist flexion/extension, left  -CD      Lower Extremity (Therapeutic Exercise)  LAQ (long arc quad), bilateral;marching while seated HIP ABD/ADD, DF,   -CD      Exercise Type (Therapeutic Exercise)  AROM (active range of motion);PROM (passive range of motion) PROM L LE, AROM R LE.   -CD      Position (Therapeutic Exercise)  seated  -CD      Sets/Reps (Therapeutic Exercise)  1 SET OF 10 REPS.   -CD      Comment (Therapeutic Exercise)  CUES TO ATTEND TO L SIDE DURING PROM EXERCISES. WORKED ON CERVICAL ROTATION TO THE L AND TRACKING L, PT HAS R GAZE PREFERENCE/ L NEGLECT.   -CD      Recorded by [CD] Jamia Bocanegra, PT 12/12/18 1027      Row Name 12/12/18 0923             Balance    Balance  static sitting balance;static standing balance  -CD      Recorded by [CD] Jamia Bocanegra, PT 12/12/18 1027      Row Name 12/12/18 0923             Static Sitting Balance    Level of [place] (Unsupported Sitting, Static Balance)  maximal assist, 25 to 49% patient effort PROGRESSED TO INTERMITTENT MIN ASSIST.   -CD      Sitting Position (Unsupported Sitting, Static Balance)  sitting on edge of bed  -CD      Time Able to Maintain Position (Unsupported Sitting, Static Balance)  more than 5 minutes  -CD      Comment (Unsupported Sitting, Static Balance)  WORKED ON MIDLINE ORIENTATION, ATTENDING TO L SIDE OF ROOM, IDENTIFYING NUMBER OF FINGERS, TRACKING. .PERFORMED AT EOB AND ON BSC.   -CD      Recorded by [CD] Jamia Bocanegra, PT 12/12/18 1027      Row Name 12/12/18 0923             Static Standing Balance    Level of [place] (Supported Standing, Static Balance)  maximal assist, 25 to 49% patient effort  -CD      Time Able to Maintain Position (Supported Standing, Static Balance)  45 to 60 seconds  -CD      Assistive Device Utilized (Supported Standing, Static Balance)  -- VIA B UE SUPPORT AND GAIT BELT.   -CD      Comment (Supported Standing, Static Balance)  STOOD FOR HYGIENE AFTER BM. CARE TO BLOCK L KNEE. PT LEANS LEFT  AND L KNEE ONEAL.   -CD      Recorded by [CD] Jamia Bocanegra, PT 12/12/18 1027      Row Name 12/12/18 0923             Positioning and Restraints    Pre-Treatment Position  in bed  -CD      Post Treatment Position  chair  -CD      In Chair  reclined;call light within reach;encouraged to call for assist;notified nsg;exit alarm on;with other staff;on mechanical lift sling;legs elevated;RUE elevated;LUE elevated SLP ARRIVED TO TEST SWALLOW.   -CD      Recorded by [CD] Jamia Bocanegra, PT 12/12/18 1027      Row Name 12/12/18 0923             Pain Scale: Numbers Pre/Post-Treatment    Pain Scale: Numbers, Pretreatment  9/10  -CD      Pain Scale: Numbers, Post-Treatment  9/10  -CD      Pain Location  head  -CD      Pain Intervention(s)  Repositioned NSG AWARE AND TO GIVE MEDS ASAP WHEN SLP DONE.   -CD      Recorded by [CD] Jamia Bocanegra, PT 12/12/18 1027      Row Name 12/12/18 0923             Sensory Assessment/Intervention    Sensory General Assessment  light touch sensation deficits identified L LE.   -CD      Recorded by [CD] Jamia Bocanegra, PT 12/12/18 1027      Row Name 12/12/18 0923             Outcome Summary/Treatment Plan (PT)    Daily Summary of Progress (PT)  progress toward functional goals as expected  -CD      Anticipated Discharge Disposition (PT)  inpatient rehabilitation facility  -CD      Recorded by [CD] Jamia Bocanegra, PT 12/12/18 1027        User Key  (r) = Recorded By, (t) = Taken By, (c) = Cosigned By    Initials Name Effective Dates Discipline    CD Jamia Bocanegra, PT 06/19/15 -  PT                   Physical Therapy Education     Title: PT OT SLP Therapies (In Progress)     Topic: Physical Therapy (Done)     Point: Mobility training (Done)     Learning Progress Summary           Patient Acceptance, E, VU,NR by CD at 12/12/2018 10:27 AM    Comment:  POSTURAL AWARENESS, PROGRESSION OF POC, TRANSFER/BED MOBILITY TRAINING,    Acceptance, E, VU,NR by CD at 12/10/2018 10:49 AM    Comment:  BENEFITS OF  OOB ACTIVITY, POSTURAL AWARENESS, ATTENDING TO L SIDE, TRANSFER TRAINING                   Point: Home exercise program (Done)     Learning Progress Summary           Patient Acceptance, E, VU,NR by CD at 12/12/2018 10:27 AM    Comment:  POSTURAL AWARENESS, PROGRESSION OF POC, TRANSFER/BED MOBILITY TRAINING,    Acceptance, E, VU,NR by CD at 12/10/2018 10:49 AM    Comment:  BENEFITS OF OOB ACTIVITY, POSTURAL AWARENESS, ATTENDING TO L SIDE, TRANSFER TRAINING                   Point: Body mechanics (Done)     Learning Progress Summary           Patient Acceptance, E, VU,NR by CD at 12/12/2018 10:27 AM    Comment:  POSTURAL AWARENESS, PROGRESSION OF POC, TRANSFER/BED MOBILITY TRAINING,    Acceptance, E, VU,NR by CD at 12/10/2018 10:49 AM    Comment:  BENEFITS OF OOB ACTIVITY, POSTURAL AWARENESS, ATTENDING TO L SIDE, TRANSFER TRAINING                   Point: Precautions (Done)     Learning Progress Summary           Patient Acceptance, E, VU,NR by CD at 12/12/2018 10:27 AM    Comment:  POSTURAL AWARENESS, PROGRESSION OF POC, TRANSFER/BED MOBILITY TRAINING,    Acceptance, E, VU,NR by CD at 12/10/2018 10:49 AM    Comment:  BENEFITS OF OOB ACTIVITY, POSTURAL AWARENESS, ATTENDING TO L SIDE, TRANSFER TRAINING                               User Key     Initials Effective Dates Name Provider Type Discipline    CD 06/19/15 -  Jamia Bocanegra, PT Physical Therapist PT                PT Recommendation and Plan  Anticipated Discharge Disposition (PT): inpatient rehabilitation facility  Planned Therapy Interventions (PT Eval): balance training, home exercise program, bed mobility training, patient/family education, postural re-education, transfer training, strengthening, neuromuscular re-education  Therapy Frequency (PT Clinical Impression): daily  Outcome Summary/Treatment Plan (PT)  Daily Summary of Progress (PT): progress toward functional goals as expected  Anticipated Discharge Disposition (PT): inpatient rehabilitation  facility  Plan of Care Reviewed With: patient  Progress: no change  Outcome Summary: PT REQUIRES  MAX ASSIST FOR ALL MOBILITY. PT ORIENTED AND FOLLOWS COMMANDS 75%. C/O HA . PT DEMONSTRATES POOR SITTING BALANCE, AND SIGNIFICANT VISUAL DEFICITS. WILL NEED IRF AT D/C.     Outcome Measures     Row Name 12/12/18 0923 12/11/18 0931 12/10/18 0940       How much help from another person do you currently need...    Turning from your back to your side while in flat bed without using bedrails?  2  -CD  --  2  -CD    Moving from lying on back to sitting on the side of a flat bed without bedrails?  2  -CD  --  2  -CD    Moving to and from a bed to a chair (including a wheelchair)?  2  -CD  --  2  -CD    Standing up from a chair using your arms (e.g., wheelchair, bedside chair)?  2  -CD  --  2  -CD    Climbing 3-5 steps with a railing?  1  -CD  --  1  -CD    To walk in hospital room?  1  -CD  --  1  -CD    AM-PAC 6 Clicks Score  10  -CD  --  10  -CD       How much help from another is currently needed...    Putting on and taking off regular lower body clothing?  --  1  -AN  --    Bathing (including washing, rinsing, and drying)  --  1  -AN  --    Toileting (which includes using toilet bed pan or urinal)  --  1  -AN  --    Putting on and taking off regular upper body clothing  --  1  -AN  --    Taking care of personal grooming (such as brushing teeth)  --  1  -AN  --    Eating meals  --  2  -AN  --    Score  --  7  -AN  --       Modified [place] Scale    Modified [place] Scale  4 - Moderately severe disability.  Unable to walk without assistance, and unable to attend to own bodily needs without assistance.  -CD  4 - Moderately severe disability.  Unable to walk without assistance, and unable to attend to own bodily needs without assistance.  -AN  4 - Moderately severe disability.  Unable to walk without assistance, and unable to attend to own bodily needs without assistance.  -CD       Functional Assessment    Outcome Measure  Options  AM-[facility] 6 Clicks Basic Mobility (PT);Modified [place]  -CD  --  AM-PAC 6 Clicks Basic Mobility (PT);Modified [place]  -CD    Row Name 12/09/18 1532             How much help from another is currently needed...    Putting on and taking off regular lower body clothing?  1  -ST      Bathing (including washing, rinsing, and drying)  1  -ST      Toileting (which includes using toilet bed pan or urinal)  1  -ST      Putting on and taking off regular upper body clothing  1  -ST      Taking care of personal grooming (such as brushing teeth)  1  -ST      Eating meals  1  -ST      Score  6  -ST         Modified [place] Scale    Modified [place] Scale  5 - Severe disability.  Bedridden, incontinent, and requiring constant nursing care and attention.  -ST         Functional Assessment    Outcome Measure Options  AM-[facility] 6 Clicks Daily Activity (OT);Modified [place]  -ST        User Key  (r) = Recorded By, (t) = Taken By, (c) = Cosigned By    Initials Name Provider Type    CD Jamia Bocanegra, PT Physical Therapist    Rebeka Garcia, OTR Occupational Therapist    Juana Quintanilla, OT Occupational Therapist         Time Calculation:   PT Charges     Row Name 12/12/18 0923             Time Calculation    Start Time  0923  -CD      PT Received On  12/12/18  -CD      PT Goal Re-Cert Due Date  12/20/18  -CD         Time Calculation- PT    Total Timed Code Minutes- PT  30 minute(s)  -CD         Timed Charges    95692 - PT Therapeutic Exercise Minutes  10  -CD      10516 -  PT Neuromuscular Reeducation Minutes  20  -CD        User Key  (r) = Recorded By, (t) = Taken By, (c) = Cosigned By    Initials Name Provider Type    Jamia Harrell, PT Physical Therapist        Therapy Suggested Charges     Code   Minutes Charges    16595 (CPT®) Hc Pt Neuromusc Re Education Ea 15 Min 20 1    59966 (CPT®) Hc Pt Ther Proc Ea 15 Min 10 1    27188 (CPT®) Hc Gait Training Ea 15 Min      98389 (CPT®) Hc Pt Therapeutic Act Ea 15 Min      84622  (CPT®) Hc Pt Manual Therapy Ea 15 Min      86895 (CPT®) Hc Pt Iontophoresis Ea 15 Min      53732 (CPT®) Hc Pt Elec Stim Ea-Per 15 Min      51659 (CPT®) Hc Pt Ultrasound Ea 15 Min      22004 (CPT®) Hc Pt Self Care/Mgmt/Train Ea 15 Min      27240 (CPT®) Hc Pt Prosthetic (S) Train Initial Encounter, Each 15 Min      57341 (CPT®) Hc Pt Orthotic(S)/Prosthetic(S) Encounter, Each 15 Min      20886 (CPT®) Hc Orthotic(S) Mgmt/Train Initial Encounter, Each 15min      Total  30 2        Therapy Charges for Today     Code Description Service Date Service Provider Modifiers Qty    26166266744 HC PT THER SUPP EA 15 MIN 12/12/2018 Jamia Bocanegra, PT GP 2          PT G-Codes  Outcome Measure Options: AM-PAC 6 Clicks Basic Mobility (PT), Modified [place]  AM-PAC 6 Clicks Score: 10  Score: 7  Modified [place] Scale: 4 - Moderately severe disability.  Unable to walk without assistance, and unable to attend to own bodily needs without assistance.    Jamia Bocanegra, PT  12/12/2018

## 2018-12-12 NOTE — MBS/VFSS/FEES
Acute Care - Speech Language Pathology   Swallow Re-Evaluation [facility]   Modified Barium Swallow Study (MBS)     Patient Name: Anita Roche  : 1945  MRN: [id number]  Today's Date: 2018  Onset of Illness/Injury or Date of Surgery: 18     Referring Physician: MD SULEIMAN      Admit Date: 2018    Visit Dx:     ICD-10-CM ICD-9-CM   1. Oropharyngeal dysphagia R13.12 787.22   2. Impaired mobility and ADLs Z74.09 799.89   3. Impaired functional mobility, balance, gait, and endurance Z74.09 V49.89   4. Cerebrovascular accident (CVA) due to occlusion of right carotid artery (CMS/HCC) I63.231 433.11   5. Cognitive communication deficit R41.841 799.52     Patient Active Problem List   Diagnosis   • Chronic fatigue syndrome   • Knee pain   • Closed wedge compression fracture of thoracic vertebra (CMS/HCC)   • Atherosclerosis of coronary artery   • Persistent cough   • Degeneration of intervertebral disc of lumbar region   • Stricture of esophagus   • Hand pain, left   • Trigger middle finger of left hand   • Hypokalemia   • CVA (cerebral vascular accident) (CMS/HCC)   • Tobacco use   • Underweight   • Sepsis due to pneumonia (CMS/HCC)   • Aspiration pneumonia (CMS/HCC)   • Acute respiratory failure with hypoxia (CMS/HCC)     Past Medical History:   Diagnosis Date   • Acid reflux    • Arthritis    • Atherosclerosis of coronary artery    • Chronic fatigue    • Coronary artery disease    • Degenerative lumbar disc    • Elevated cholesterol    • Esophageal stricture    • Hypertension    • PONV (postoperative nausea and vomiting)      Past Surgical History:   Procedure Laterality Date   • ABDOMINAL SURGERY     • BACK SURGERY     • CARDIAC CATHETERIZATION     • CATARACT EXTRACTION, BILATERAL     • CHOLECYSTECTOMY     • COLONOSCOPY     • ENDOSCOPY     • EYE SURGERY     • TONSILLECTOMY AND ADENOIDECTOMY     • TRIGGER FINGER RELEASE Right     Third and Fourth Fingers Dr. Ashley   • TUBAL ABDOMINAL LIGATION           SWALLOW EVALUATION (last 72 hours)      SLP Adult Swallow Evaluation     Row Name 12/12/18 1500 12/12/18 0950 12/11/18 1200 12/10/18 1120 12/10/18 0930       Rehab Evaluation    Document Type  re-evaluation  -AC  re-evaluation  -AC  re-evaluation  -AC  evaluation  -MP  re-evaluation  -MP    Subjective Information  no complaints  -AC  complains of;pain headache  -AC  --  no complaints  -MP  complains of;pain at IV site; RN aware  -MP    Patient Observations  lethargic;cooperative  -AC  --  --  lethargic  -MP  alert;cooperative  -MP    Patient/Family Observations  Pt drowsy, alert to participate w/ stimuli. No family present.  -AC  --  --  --  Daughter present  -MP    Patient Effort  fair  -AC  --  fair  -AC  adequate  -MP  good  -MP    Comment  2' lethargy  -AC  --  --  --  --       General Information    Patient Profile Reviewed  yes  -AC  yes  -AC  yes  -AC  yes  -MP  yes  -MP    Pertinent History Of Current Problem  --  Adm w/ multiple CVAs, aspiration pneumonia. Hx spinal fx, esophageal stricture s/p multiple dilations (most recent ~1yr ago per dtr). Pt's dtr reported pt eats very little at home--primarily drinks [employer] chocolate Boost.  -AC  --  See previous eval.  -MP  Pt admitted 12/8 w/ CVA. 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 PMH: HTN, tobacco abuse, CAD, esophageal stricture. CXR 12/8: no evidence of active or acute cardiopulmonary disease.  -MP    Current Method of Nutrition  NPO  -AC  NPO  -AC  pureed;thin liquids thin via tsp only  -AC  NPO  -MP  NPO  -MP    Precautions/Limitations, Vision  --  --  --  WFL  -MP  WFL  -MP    Precautions/Limitations, Hearing  --  --  --  WFL  -MP  WFL  -MP    Prior Level of Function-Communication  --  --  --  other (see comments) baseline unknown  -MP  other (see comments) baseline unknown  -MP    Prior Level of Function-Swallowing  --  --  --  other (see comments) baseline unknown  -MP  other (see comments) baseline unknown  -MP    Plans/Goals Discussed  with  patient;agreed upon  -AC  patient;agreed upon  -AC  patient and family;agreed upon  -AC  patient;agreed upon  -MP  patient;family;agreed upon  -MP    Barriers to Rehab  medically complex  -AC  medically complex  -AC  medically complex  -AC  none identified  -MP  none identified  -MP    Patient's Goals for Discharge  return to PO diet pt still requesting coffee  -AC  return to PO diet pt requesting coffee  -AC  patient did not state  -AC  patient did not state  -MP  patient did not state  -MP    Family Goals for Discharge  --  --  patient able to eat/drink without coughing/choking  -AC  --  family did not state  -MP       Pain Assessment    Additional Documentation  Pain Scale: FACES Pre/Post-Treatment (Group)  -AC  Pain Scale: FACES Pre/Post-Treatment (Group)  -AC  --  Pain Scale: FACES Pre/Post-Treatment (Group)  -MP  Pain Scale: FACES Pre/Post-Treatment (Group)  -MP       Pain Scale: FACES Pre/Post-Treatment    Pain: FACES Scale, Pretreatment  0-->no hurt  -AC  6-->hurts even more  -AC  2-->hurts little bit  -AC  2-->hurts little bit  -MP  2-->hurts little bit  -MP    Pain: FACES Scale, Post-Treatment  0-->no hurt  -AC  6-->hurts even more  -AC  2-->hurts little bit  -AC  2-->hurts little bit  -MP  2-->hurts little bit  -MP    Pre/Post Treatment Pain Comment  --  Headache - RN notified  -AC  back/head/right hand - repositioned pt, per her request, and notified RN re: headache.  -AC  --  --       Oral Motor and Function    Dentition Assessment  --  upper dentures/partial in place;lower dentures/partial in place  -AC  --  --  natural, present and adequate  -MP    Secretion Management  --  WNL/WFL  -AC  --  --  dried secretions in oral cavity;other (see comments) continues to drool bilaterally  -MP    Mucosal Quality  --  sticky  -AC  --  --  --    Volitional Cough  --  weak  -AC  --  --  --       Oral Musculature and Cranial Nerve Assessment    Oral Motor General Assessment  --  --  --  --  oral labial or  buccal impairment  -MP    Oral Labial or Buccal Impairment, Detail, Cranial Nerve VII (Facial):  --  CN7: Motor Impairment;reduced ROM;reduced strength bilaterally;left labial droop;other (see comments) greatest on L  -AC  --  --  CN7: Motor Impairment;left labial droop;reduced strength bilaterally;reduced ROM  -MP    Lingual Impairment, Detail. Cranial Nerves IX, XII (Glossopharyngeal and Hypoglossal)  --  CN12: Motor Impairment;reduced lingual ROM;reduced strength;bilaterally  -  --  --  --       General Eating/Swallowing Observations    Respiratory Support Currently in Use  --  --  --  --  nasal cannula  -MP    Eating/Swallowing Skills  fed by staff/caregiver  -  self-fed;fed by SLP  -AC  fed by SLP;self-fed  -  --  fed by SLP  -MP    Positioning During Eating  upright 90 degree;upright in chair;other (see comments) lateral view  -  upright 90 degree;upright in chair  -  upright 90 degree;upright in chair  -  --  upright 90 degree;upright in bed  -    Utensils Used  --  spoon;cup;straw  -  spoon;cup  -  --  spoon  -MP    Consistencies Trialed  --  thin liquids;nectar/syrup-thick liquids;pureed  -  thin liquids;nectar/syrup-thick liquids;pureed  -  --  thin liquids;nectar/syrup-thick liquids;pureed  -MP       Respiratory    Respiratory Status  --  --  wheezing, stridor;during swallowing/eating  -  --  --       Clinical Swallow Eval    Oral Prep Phase  --  impaired  -AC  impaired  -AC  --  impaired  -MP    Oral Transit  --  impaired  -AC  impaired  -AC  --  impaired  -MP    Oral Residue  --  WFL  -AC  impaired  -AC  --  WFL  -MP    Pharyngeal Phase  --  suspected pharyngeal impairment  -AC  suspected pharyngeal impairment  -  --  suspected pharyngeal impairment  -MP    Clinical Swallow Evaluation Summary  --  --  --  --  Clinical swallow re-evaluation completed. Pt given trials of thin, nectar-thick, and puree via tsp. Anterior loss of approximately 1/2 of thin liquid bolus, immediate  cough and wet vocal quality noted. No overt s/sxs w/ NTL or puree via tsp. Rec NPO, meds crushed in puree, check to ensure cleared oral cavity. SLP will proceed w/ MBS today to determine safest PO diet.  -MP       Oral Prep Concerns    Oral Prep Concerns  --  anterior loss;other (see comments);incomplete or weak lip closure around spoon solids not tested  -AC  oral holding;anterior loss  -AC  --  other (see comments) DNT solid  -MP    Oral Holding  --  --  all consistencies;other (see comments) likely affected by cognitive status/lethargy  -AC  --  --    Incomplete or Weak Lip Closure Around Spoon  --  nectar;other (see comments) around straw - pt u/a to pull liquid via straw  -AC  --  --  --    Anterior Loss  --  nectar;other (see comments) via cup  -AC  all consistencies;other (see comments) 2' reduced labial seal  -AC  --  --    Oral Prep Concerns, Comment  --  2' reduced labial seal  -AC  --  --  --       Oral Transit Concerns    Oral Transit Concerns  --  increased oral transit time  -AC  delayed initiation of bolus transit;increased oral transit time  -AC  --  increased oral transit time  -MP    Delayed Intiation of Bolus Transit  --  --  all consistencies  -AC  --  --    Increased Oral Transit Time  --  all consistencies  -AC  all consistencies  -AC  --  pudding  -MP    Oral Transit Concerns, Comment  --  2' reduced lingual strength/coordination  -AC  both 2' reduced lingual strength/coordination and likely affected by cognitive status/lethargy  -AC  --  --       Oral Residue Concerns    Oral Residue Concerns  --  --  diffuse residue throughout oral cavity  -AC  --  --    Diffuse Residue Throughout Oral Cavity  --  --  pudding;other (see comments) removed w/ oral toothettes & oral suctioning  -AC  --  --    Oral Residue Concerns, Comment  --  --  2' reduced lingual strength/ROM  -AC  --  --       Pharyngeal Phase Concerns    Pharyngeal Phase Concerns  --  cough;wet vocal quality  -AC  wet vocal quality;cough   -AC  --  cough;wet vocal quality  -MP    Wet Vocal Quality  --  nectar;other (see comments) wet-sounding wheezing after cup drink  -AC  all consistencies;other (see comments) intermittent wet-sounding wheezing   -AC  --  thin  -MP    Cough  --  thin;other (see comments) immediate cough w/ ice chip & thin via tsp  -AC  thin;nectar;pudding;other (see comments) immediate cough-thin via tsp, delayed-nectar via tsp/puree  -AC  --  --    Pharyngeal Phase Concerns, Comment  --  Pt more alert, drooling improved. Pt cont to demonstrate moderate oral dysphagia and overt s/sxs aspiration w/ thin & nectar-thick liquid. No overt s/sxs aspiration w/ puree.   -AC  APRN/RN called to see if pt could be re-evaluated due to pt developing fever overnight & coughing noted w/ PO today. Pt had difficulty holding up her head and maintaining alertness/attention during re-eval. Pt demonstrated overt clinical s/sxs aspiration today -- appears to be a change c/t yesterday afternoon. Pt's dtr concerned that Morphine pt was given last night is still affecting her today. Safest rec is NPO w/ meds via alternate route.  -AC  --  --       MBS/VFSS    Utensils Used  spoon  -AC  --  --  spoon;cup  -MP  --    Consistencies Trialed  thin liquids;nectar/syrup-thick liquids;honey-thick liquids;pudding thick  -AC  --  --  thin liquids;pudding thick  -MP  --       MBS/VFSS Interpretation    Oral Prep Phase  impaired oral phase of swallowing  -AC  --  --  impaired oral phase of swallowing  -MP  --    Oral Transit Phase  impaired  -AC  --  --  impaired  -MP  --    Oral Residue  impaired  -AC  --  --  impaired  -MP  --    VFSS Summary  --  --  --  SLP MBS evaluation completed. Pt w/ significantly decreased alertness level compared to bedside evaluation completed in AM. Discussed w/ RN. Per RN, pt given morphine prior to MBS. Given decreased alertness level, limited PO trials were completed during MBS. Pt w/ mild anterior loss w/ thin via tsp. Pt unable to  accept thin via cup or straw. Increased A-P transit time w/ pudding. Mild-mod oral reside w/ pudding that partially cleared w/ consecutive swallows. DNT solid. Delay of initiation of the pharyngeal swallow to the level of the pyriform sinuses, though did not impact function (w/ small bolus size). No penetration/aspiration w/ thin liquid via tsp size bolus or pudding. Mild vallecular residue w/ thin and pudding 2' reduced base of tongue retraction. Recommend: dysphagia level II diet w/ thin liquid via tsp only. No straws. Meds crushed in puree. Will need assist w/ all feeding. ONLY feed when alert/awake. If lethargic, hold tray. Follow standard aspiration precautions. SLP will f/u for dysphagia tx and advancement as able.  -MP  --    Oral Phase, Comment  DNT solid given aspiration risk/fatigue.  -AC  --  --  --  --       Oral Preparatory Phase    Oral Preparatory Phase  anterior loss  -AC  --  --  anterior loss  -MP  --    Anterior Loss  thin liquids;nectar-thick liquids;secondary to reduced labial seal  -AC  --  --  thin liquids  -MP  --       Oral Transit Phase    Impaired Oral Transit Phase  increased A-P transit time  -AC  --  --  increased A-P transit time  -MP  --    Increased A-P Transit Time  all consistencies tested;discoordination of lingual movement;secondary to impaired cognitive status  -AC  --  --  pudding/puree  -MP  --       Oral Residue    Impaired Oral Residue  diffuse residue throughout oral cavity  -AC  --  --  diffuse residue throughout oral cavity  -MP  --    Diffuse Residue throughout Oral Cavity  all consistencies tested;secondary to reduced lingual strength;secondary to reduced lingual range of motion  -AC  --  --  pudding/puree  -MP  --    Response to Oral Residue  cleared residue;other (see comments) partially cleared w/ subsequent swallows  -AC  --  --  other (see comments) partially cleared w/ consecutive swallows  -MP  --    Oral Residue, Comment  Mild amount  -AC  --  --  --  --        Initiation of Pharyngeal Swallow    Initiation of Pharyngeal Swallow  bolus in pyriform sinuses  -AC  --  --  bolus in pyriform sinuses  -MP  --    Pharyngeal Phase  impaired pharyngeal phase of swallowing  -AC  --  --  functional pharyngeal phase of swallowing;other (see comments) for limited PO given (tsp size bolus of thin and pudding)  -MP  --    Penetration During the Swallow  honey-thick liquids;secondary to delayed swallow initiation or mistiming;secondary to reduced laryngeal elevation;secondary to reduced vestibular closure  -AC  --  --  --  --    Aspiration During the Swallow  thin liquids;nectar-thick liquids;secondary to delayed swallow initiation or mistiming;secondary to reduced laryngeal elevation;secondary to reduced vestibular closure  -AC  --  --  --  --    Response to Aspiration  inconsistent response;weak cough/throat clear;other (see comments) intermittent weak, delayed cough  -AC  --  --  --  --    Rosenbek's Scale  thin:;nectar:;8--->level 8;honey:;5--->level 5;pudding/puree:;1--->level 1  -AC  --  --  --  --    Pharyngeal Residue  all consistencies tested;diffuse within pharynx;secondary to reduced base of tongue retraction;secondary to reduced posterior pharyngeal wall stripping;secondary to reduced laryngeal elevation;secondary to reduced hyolaryngeal excursion;other (see comments) mild-mod amount; greatest in valleculae - pools to pyriforms  -AC  --  --  pudding/puree;valleculae;secondary to reduced base of tongue retraction  -MP  --    Response to Residue  other (see comments) partially cleared w/ subsequent swallows  -AC  --  --  other (see comments) reduced but not cleared w/ consecutive swallows  -MP  --    Pharyngeal Phase, Comment  Alert to stimuli, but drowsy. Worsened swallow fxn c/t MBS completed 12/10.  There was aspiration of thin & nectar-thick liquid via tsp during the swallow. Penetration of honey-thick liquid via tsp during the swallow. Vestibular residue deepened to TVF w/  no cough response--eventual aspiration after the swallow deemed inevitable inevitable. Intermittently pt demonstrated delayed weak/nonproductive cough after aspiration. No aspiration of pudding directly observed during study; however, given waxing/waning status, reduced sensation, and high risk for fatigue, would rec: NPO x meds crushed w/ puree when alert. Consider alternate means of nutrition, as appropriate.  -AC  --  --  --  --       Clinical Impression    SLP Swallowing Diagnosis  moderate;oral dysfunction;mod-severe;pharyngeal dysfunction  -AC  moderate;oral dysfunction;suspected pharyngeal dysfunction  -AC  mod-severe;oral dysfunction;suspected pharyngeal dysfunction  -AC  oral dysfunction;mild;pharyngeal dysfunction  -MP  oral dysfunction;suspected pharyngeal dysfunction  -MP    Functional Impact  risk of aspiration/pneumonia;risk of malnutrition;risk of dehydration  -AC  risk of malnutrition;risk of dehydration;risk of aspiration/pneumonia  -AC  risk of aspiration/pneumonia;risk of malnutrition;risk of dehydration  -AC  risk of malnutrition;risk of dehydration  -MP  risk of aspiration/pneumonia;risk of malnutrition;risk of dehydration  -MP    Rehab Potential/Prognosis, Swallowing  adequate, monitor progress closely  -AC  adequate, monitor progress closely  -AC  adequate, monitor progress closely  -AC  good, to achieve stated therapy goals  -MP  good, to achieve stated therapy goals  -MP    Swallow Criteria for Skilled Therapeutic Interventions Met  demonstrates skilled criteria  -AC  demonstrates skilled criteria  -AC  demonstrates skilled criteria  -AC  demonstrates skilled criteria  -MP  demonstrates skilled criteria  -MP       Recommendations    Therapy Frequency (Swallow)  5 days per week  -AC  --  --  5 days per week  -MP  5 days per week  -MP    Predicted Duration Therapy Intervention (Days)  until discharge  -AC  --  until discharge  -AC  until discharge  -MP  until discharge  -MP    SLP Diet  Recommendation  NPO;temporary alternate methods of nutrition/hydration;other (see comments) except medication may be crushed w/ puree if pt alert  -AC  NPO;other (see comments) except medication may be crushed w/ puree  -AC  NPO  -AC  puree;thin liquids;other (see comments) via tsp only  -MP  NPO  -MP    Recommended Diagnostics  --  VFSS ([facility])  -AC  reassess via clinical swallow evaluation;other (see comments) when pt more consistently alert  -AC  --  VFSS (MBS)  -MP    Recommended Precautions and Strategies  --  --  --  no straw;small bites of food and sips of liquid;liquid via spoon only;other (see comments) ONLY feed when alert/awake  -MP  --    SLP Rec. for Method of Medication Administration  meds crushed;with pudding or applesauce;meds via alternate route;as tolerated  -AC  meds crushed;with pudding or applesauce;as tolerated  -AC  meds via alternate route  -  meds crushed;with pudding or applesauce;as tolerated  -  meds crushed;with pudding or applesauce;as tolerated via alternate route if any s/sxs  -MP    Monitor for Signs of Aspiration  --  --  --  yes;notify SLP if any concerns  -MP  yes;notify SLP if any concerns  -MP    Anticipated Dischage Disposition  anticipate therapy at next level of care  -AC  anticipate therapy at next level of care  -AC  anticipate therapy at next level of care  -AC  unknown;anticipate therapy at next level of care  -  unknown;anticipate therapy at next level of care  -    Row Name 12/09/18 1600                   Rehab Evaluation    Document Type  evaluation  -SG        Subjective Information  no complaints;complains of  -SG        Patient Effort  good  -SG        Symptoms Noted During/After Treatment  none  -SG           General Information    Patient Profile Reviewed  yes  -SG        Pertinent History Of Current Problem  adm w/ CVA  -SG        Current Method of Nutrition  NPO  -SG        Precautions/Limitations, Vision  WFL  -SG        Precautions/Limitations, Hearing   WFL  -SG        Prior Level of Function-Communication  other (see comments) unknown baseline  -SG        Prior Level of Function-Swallowing  other (see comments) unknown baseline  -SG        Plans/Goals Discussed with  patient;agreed upon  -SG        Barriers to Rehab  none identified  -SG        Patient's Goals for Discharge  return home  -SG           Oral Motor and Function    Dentition Assessment  other (see comments) CNA, pt unable to open mouth on command  -SG        Secretion Management  dried secretions in oral cavity;other (see comments) drooling noted bilaterally  -SG        Volitional Swallow  delayed;weak  -SG        Volitional Cough  WFL  -SG           Oral Musculature and Cranial Nerve Assessment    Oral Motor General Assessment  oral labial or buccal impairment  -SG        Oral Labial or Buccal Impairment, Detail, Cranial Nerve VII (Facial):  CN7: Motor Impairment;left labial droop;reduced strength bilaterally;reduced ROM  -SG        Oral Motor, Comment  Unable to fully assess 2' pt's dec'd cog status, unable to follow OM commands  -SG           General Eating/Swallowing Observations    Respiratory Support Currently in Use  nasal cannula  -SG        Eating/Swallowing Skills  fed by SLP;unsafe self-feeding skills observed;unaware of safety concerns  -SG        Positioning During Eating  upright 90 degree;upright in bed;needs frequent re-positioning;other (see comments) frequent repositioning by SLP  -SG        Utensils Used  spoon;cup;straw  -SG        Consistencies Trialed  thin liquids;pureed  -SG           Respiratory    Respiratory Status  wheezing, stridor;during swallowing/eating  -SG        Respiratory Pattern  decrease control/incoordination of breathing swallow  -SG           Clinical Swallow Eval    Oral Prep Phase  impaired  -SG        Oral Transit  impaired  -SG        Oral Residue  impaired  -SG        Pharyngeal Phase  suspected pharyngeal impairment  -SG        Clinical Swallow  Evaluation Summary  CSE completed this date. Pt not alert in am, then getting in-room testing, pt seen for evaluation later in pm. Pt confused, lethargic, incoherent mumbling during evaluation. Inconsistent verbal responses, inconsistent alertness. Frequent repositioning required by SLP during assessment. Drooling noted bilaterally, but facial droop evident on left. Moderate dysarthria. Pt tested w/ thins and puree via tsp. Immediate gagging cough w/ small tsp of thins. Ant loss w/ all, pt unable to clear lips of puree, minimal amount taken from tsp. Wheezing cough noted after trials. No family present. Pt unable to express understanding w/ provided education. Rec: NPO is safest at this time, needs BS re-eval tomorrow, aggressive oral care, and SLC eval when pt more consistently responsive and alert.   -SG           Oral Prep Concerns    Oral Prep Concerns  other (see comments) solids not tested  -SG           Oral Transit Concerns    Oral Transit Concerns  delayed initiation of bolus transit  -SG        Delayed Intiation of Bolus Transit  thin  -SG           Oral Residue Concerns    Oral Residue Concerns  diffuse residue throughout oral cavity  -SG        Diffuse Residue Throughout Oral Cavity  other (see comments) puree  -SG           Pharyngeal Phase Concerns    Pharyngeal Phase Concerns  cough;wet vocal quality  -SG        Wet Vocal Quality  all consistencies  -SG        Cough  thin  -SG           Clinical Impression    SLP Swallowing Diagnosis  oral dysfunction;pharyngeal dysfunction  -SG        Functional Impact  risk of aspiration/pneumonia;risk of malnutrition;risk of dehydration  -SG        Rehab Potential/Prognosis, Swallowing  good, to achieve stated therapy goals  -SG        Swallow Criteria for Skilled Therapeutic Interventions Met  demonstrates skilled criteria  -SG           Recommendations    Therapy Frequency (Swallow)  5 days per week  -SG        Predicted Duration Therapy Intervention (Days)   until discharge  -        SLP Diet Recommendation  NPO  -SG        Recommended Diagnostics  reassess via clinical swallow evaluation  -        SLP Rec. for Method of Medication Administration  meds via alternate route  -SG          User Key  (r) = Recorded By, (t) = Taken By, (c) = Cosigned By    Initials Name Effective Dates     Rauljohana-Tatyana Rodas, MS CCC-SLP 06/22/15 -     AC Lukas Estrella S, MS CCC-SLP 07/27/17 -     Steven Nieto, MS, CF-SLP 08/15/18 -           EDUCATION  The patient has been educated in the following areas:   Dysphagia (Swallowing Impairment) Oral Care/Hydration NPO rationale.    SLP Recommendation and Plan  SLP Swallowing Diagnosis: moderate, oral dysfunction, mod-severe, pharyngeal dysfunction  SLP Diet Recommendation: NPO, temporary alternate methods of nutrition/hydration, other (see comments)(except medication may be crushed w/ puree if pt alert)  Swallow Criteria for Skilled Therapeutic Interventions Met: demonstrates skilled criteria  Anticipated Dischage Disposition: anticipate therapy at next level of care  Rehab Potential/Prognosis, Swallowing: adequate, monitor progress closely  Therapy Frequency (Swallow): 5 days per week  Predicted Duration Therapy Intervention (Days): until discharge       Plan of Care Reviewed With: patient  Plan of Care Review  Plan of Care Reviewed With: patient    SLP GOALS     Row Name 12/12/18 1500 12/11/18 1150 12/10/18 1120       Oral Nutrition/Hydration Goal 1 (SLP)    Oral Nutrition/Hydration Goal 1, SLP  LTG: Pt will demonstrate improved swallowing skills to the level where repeat instrumental swallow study is indicated.  -AC  --  LTG: Pt will return to regular diet, thin liquids w/ no overt s/sxs aspiration w/ 100% acc and no cues  -MP    Time Frame (Oral Nutrition/Hydration Goal 1, SLP)  by discharge  -AC  --  by discharge  -MP    Progress/Outcomes (Oral Nutrition/Hydration Goal 1, SLP)  goal revised this date  -AC  --  --        Oral Nutrition/Hydration Goal 2 (SLP)    Oral Nutrition/Hydration Goal 2, SLP  Pt will tolerate therapeutic trials of H2O w/o s/sxs aspiration w/ 70% acc w/o cues.  -AC  --  Pt will tolerate therapeutic trials of thin liquid via tsp and puree w/ no overt s/sxs aspiration w/ 100% acc and no cues  -MP    Time Frame (Oral Nutrition/Hydration Goal 2, SLP)  short term goal (STG);by discharge  -AC  --  short term goal (STG);by discharge  -MP    Progress/Outcomes (Oral Nutrition/Hydration Goal 2, SLP)  goal revised this date  -AC  --  --       Oral Nutrition/Hydration Goal (SLP)    Oral Nutrition/Hydration Goal, SLP  Pt will tolerate trials of regular solid w/ no overt s/sxs aspiration or discomfort w/ 100% acc and no cues  -AC  --  Pt will tolerate trials of regular solid w/ no overt s/sxs aspiration or discomfort w/ 100% acc and no cues  -MP    Time Frame (Oral Nutrition/Hydration Goal, SLP)  short term goal (STG);by discharge  -AC  --  short term goal (STG);by discharge  -MP    Progress/Outcomes (Oral Nutrition/Hydration Goal, SLP)  goal no longer appropriate  -AC  --  --       Lingual Strengthening Goal 1 (SLP)    Activity (Lingual Strengthening Goal 1, SLP)  --  --  increase lingual tone/sensation/control/coordination/movement  -MP    Increase Lingual Tone/Sensation/Control/Coordination/Movement  lingual resistance exercises  -AC  --  swallow trials;lingual resistance exercises  -MP    [place]/Accuracy (Lingual Strengthening Goal 1, SLP)  with minimal cues (75-90% accuracy)  -AC  --  with minimal cues (75-90% accuracy)  -MP    Time Frame (Lingual Strengthening Goal 1, SLP)  short term goal (STG);by discharge  -AC  --  short term goal (STG);by discharge  -MP    Progress/Outcomes (Lingual Strengthening Goal 1, SLP)  goal revised this date  -AC  --  --       Pharyngeal Strengthening Exercise Goal 1 (SLP)    Activity (Pharyngeal Strengthening Goal 1, SLP)  increase timing;increase superior movement of the  hyolaryngeal complex;increase anterior movement of the hyolaryngeal complex;increase closure at entrance to airway/closure of airway at glottis;increase squeeze/positive pressure generation;increase tongue base retraction  -AC  --  increase timing;increase squeeze/positive pressure generation;increase tongue base retraction  -MP    Increase Timing  prepping - 3 second prep or suck swallow or 3-step swallow  -AC  --  prepping - 3 second prep or suck swallow or 3-step swallow  -MP    Increase Superior Movement of the Hyolaryngeal Complex  effortful pitch glide (falsetto + pharyngeal squeeze)  -AC  --  --    Increase Anterior Movement of the Hyolaryngeal Complex  chin tuck against resistance (CTAR)  -AC  --  --    Increase Closure at Entrance to Airway/Closure of Airway at Glottis  super-supraglottic swallow  -AC  --  --    Increase Squeeze/Positive Pressure Generation  hard effortful swallow  -AC  --  hard effortful swallow  -MP    Increase Tongue Base Retraction  juan david  -AC  --  juan david  -MP    [place]/Accuracy (Pharyngeal Strengthening Goal 1, SLP)  with minimal cues (75-90% accuracy)  -AC  --  with minimal cues (75-90% accuracy)  -MP    Time Frame (Pharyngeal Strengthening Goal 1, SLP)  short term goal (STG);by discharge  -AC  --  short term goal (STG);by discharge  -MP    Progress/Outcomes (Pharyngeal Strengthening Goal 1, SLP)  goal revised this date  -AC  --  --       Swallow Compensatory Strategies Goal 1 (SLP)    Activity (Swallow Compensatory Strategies/Techniques Goal 1, SLP)  compensatory strategies;liquids by teaspoon only  -AC  --  compensatory strategies;liquids by teaspoon only  -MP    [place]/Accuracy (Swallow Compensatory Strategies/Techniques Goal 1, SLP)  with minimal cues (75-90% accuracy)  -AC  --  with minimal cues (75-90% accuracy)  -MP    Time Frame (Swallow Compensatory Strategies/Techniques Goal 1, SLP)  short term goal (STG);by discharge  -AC  --  short term goal (STG);by discharge   -MP    Progress/Outcomes (Swallow Compensatory Strategies/Techniques Goal 1, SLP)  goal no longer appropriate  -AC  --  --       Respiratory Support Goal 1 (SLP)    Improve Respiratory Support Goal 1 (SLP)  --  sustaining a vowel sound on exhalation;80%;with minimal cues (75-90%)  -AC  --    Time Frame (Respiratory Support Goal 1, SLP)  --  short term goal (STG);by discharge  -AC  --       Articulation Goal 1 (SLP)    Improve Articulation Goal 1 (SLP)  --  by over-articulating at word level;80%;with minimal cues (75-90%)  -AC  --    Time Frame (Articulation Goal 1, SLP)  --  short term goal (STG);by discharge  -AC  --       Attention Goal 1 (SLP)    Improve Attention by Goal 1 (SLP)  --  looking at speaker;attending to task;80%;independently (over 90% accuracy)  -AC  --    Time Frame (Attention Goal 1, SLP)  --  short term goal (STG);by discharge  -AC  --       Additional Goal 1 (SLP)    Additional Goal 1, SLP  --  LTG: Pt will improve cognitive-communication skills in order to participate in care in hospital setting w/ 100% acc w/ min cues.  -AC  --    Time Frame (Additional Goal 1, SLP)  --  by discharge  -AC  --      User Key  (r) = Recorded By, (t) = Taken By, (c) = Cosigned By    Initials Name Provider Type    Estrella Rueda MS CCC-SLP Speech and Language Pathologist    Steven Nieto MS, CFY-SLP Speech and Language Pathologist          Time Calculation:   Time Calculation- SLP     Row Name 12/12/18 1539 12/12/18 1100          Time Calculation- SLP    SLP Start Time  1420  -  0950  -     SLP Received On  12/12/18  -AC  12/12/18  -       User Key  (r) = Recorded By, (t) = Taken By, (c) = Cosigned By    Initials Name Provider Type    Estrella Rueda MS CCC-SLP Speech and Language Pathologist        Therapy Charges for Today     Code Description Service Date Service Provider Modifiers Qty    25756318183 HC ST EVAL ORAL PHARYNG SWALLOW 3 12/11/2018 Estrella Gaytan MS CCC-SLP GN 1    [id number]  HC ST EVAL SPEECH AND PROD W LANG  3 12/11/2018 Estrella Gaytan, MS CCC-SLP GN 1    [id number] HC ST EVAL ORAL PHARYNG SWALLOW 5 12/12/2018 Estrella Gaytan, MS CCC-SLP GN 1    [id number] HC ST MOTION FLUORO EVAL SWALLOW 5 12/12/2018 Estrella Gaytan, MS CCC-SLP GN 1           Estrella Gaytan MS CCC-SLP  12/12/2018

## 2018-12-12 NOTE — PLAN OF CARE
Problem: Patient Care Overview  Goal: Plan of Care Review  Outcome: Ongoing (interventions implemented as appropriate)   12/12/18 0406   Coping/Psychosocial   Plan of Care Reviewed With patient   OTHER   Outcome Summary Pt continues to c/o headache, fatigue. Improvment with pt tracking L, turning head to L this date. Continue OT POC, will need IRF at discharge.

## 2018-12-13 ENCOUNTER — APPOINTMENT (OUTPATIENT)
Dept: GENERAL RADIOLOGY | Facility: HOSPITAL | Age: 73
End: 2018-12-13

## 2018-12-13 PROCEDURE — 94799 UNLISTED PULMONARY SVC/PX: CPT

## 2018-12-13 PROCEDURE — 25010000002 PIPERACILLIN SOD-TAZOBACTAM PER 1 G: Performed by: FAMILY MEDICINE

## 2018-12-13 PROCEDURE — 99233 SBSQ HOSP IP/OBS HIGH 50: CPT | Performed by: HOSPITALIST

## 2018-12-13 PROCEDURE — 92507 TX SP LANG VOICE COMM INDIV: CPT

## 2018-12-13 PROCEDURE — 25010000002 METOCLOPRAMIDE PER 10 MG: Performed by: FAMILY MEDICINE

## 2018-12-13 PROCEDURE — 74018 RADEX ABDOMEN 1 VIEW: CPT

## 2018-12-13 PROCEDURE — 25010000002 HEPARIN (PORCINE) PER 1000 UNITS: Performed by: FAMILY MEDICINE

## 2018-12-13 PROCEDURE — 92526 ORAL FUNCTION THERAPY: CPT

## 2018-12-13 PROCEDURE — 25010000002 LORAZEPAM PER 2 MG: Performed by: HOSPITALIST

## 2018-12-13 PROCEDURE — 94640 AIRWAY INHALATION TREATMENT: CPT

## 2018-12-13 RX ORDER — MORPHINE SULFATE 2 MG/ML
1 INJECTION, SOLUTION INTRAMUSCULAR; INTRAVENOUS ONCE
Status: COMPLETED | OUTPATIENT
Start: 2018-12-14 | End: 2018-12-13

## 2018-12-13 RX ORDER — IPRATROPIUM BROMIDE AND ALBUTEROL SULFATE 2.5; .5 MG/3ML; MG/3ML
3 SOLUTION RESPIRATORY (INHALATION) 4 TIMES DAILY PRN
Status: DISCONTINUED | OUTPATIENT
Start: 2018-12-13 | End: 2018-12-18 | Stop reason: HOSPADM

## 2018-12-13 RX ORDER — LORAZEPAM 2 MG/ML
0.5 INJECTION INTRAMUSCULAR ONCE
Status: COMPLETED | OUTPATIENT
Start: 2018-12-13 | End: 2018-12-13

## 2018-12-13 RX ADMIN — IPRATROPIUM BROMIDE AND ALBUTEROL SULFATE 3 ML: 2.5; .5 SOLUTION RESPIRATORY (INHALATION) at 13:18

## 2018-12-13 RX ADMIN — METOCLOPRAMIDE 5 MG: 5 INJECTION, SOLUTION INTRAMUSCULAR; INTRAVENOUS at 08:53

## 2018-12-13 RX ADMIN — LISINOPRIL 10 MG: 10 TABLET ORAL at 08:52

## 2018-12-13 RX ADMIN — METOCLOPRAMIDE 5 MG: 5 INJECTION, SOLUTION INTRAMUSCULAR; INTRAVENOUS at 03:54

## 2018-12-13 RX ADMIN — SODIUM CHLORIDE, PRESERVATIVE FREE 3 ML: 5 INJECTION INTRAVENOUS at 21:34

## 2018-12-13 RX ADMIN — TAZOBACTAM SODIUM AND PIPERACILLIN SODIUM 3.38 G: 375; 3 INJECTION, SOLUTION INTRAVENOUS at 21:34

## 2018-12-13 RX ADMIN — SODIUM CHLORIDE, PRESERVATIVE FREE 3 ML: 5 INJECTION INTRAVENOUS at 21:35

## 2018-12-13 RX ADMIN — TAZOBACTAM SODIUM AND PIPERACILLIN SODIUM 3.38 G: 375; 3 INJECTION, SOLUTION INTRAVENOUS at 06:11

## 2018-12-13 RX ADMIN — DILTIAZEM HYDROCHLORIDE 240 MG: 240 CAPSULE, COATED, EXTENDED RELEASE ORAL at 08:53

## 2018-12-13 RX ADMIN — ATENOLOL 25 MG: 25 TABLET ORAL at 08:52

## 2018-12-13 RX ADMIN — METOCLOPRAMIDE 5 MG: 5 INJECTION, SOLUTION INTRAMUSCULAR; INTRAVENOUS at 13:51

## 2018-12-13 RX ADMIN — METOCLOPRAMIDE 5 MG: 5 INJECTION, SOLUTION INTRAMUSCULAR; INTRAVENOUS at 21:33

## 2018-12-13 RX ADMIN — CLOPIDOGREL BISULFATE 75 MG: 75 TABLET ORAL at 08:52

## 2018-12-13 RX ADMIN — ASPIRIN 300 MG: 300 SUPPOSITORY RECTAL at 11:14

## 2018-12-13 RX ADMIN — DOCUSATE SODIUM AND SENNOSIDES 2 TABLET: 8.6; 5 TABLET, FILM COATED ORAL at 08:52

## 2018-12-13 RX ADMIN — ACETAMINOPHEN 650 MG: 325 TABLET ORAL at 10:34

## 2018-12-13 RX ADMIN — LORAZEPAM 0.5 MG: 2 INJECTION, SOLUTION INTRAMUSCULAR; INTRAVENOUS at 17:22

## 2018-12-13 RX ADMIN — DOXYCYCLINE 100 MG: 100 INJECTION, POWDER, LYOPHILIZED, FOR SOLUTION INTRAVENOUS at 11:14

## 2018-12-13 RX ADMIN — HEPARIN SODIUM 5000 UNITS: 5000 INJECTION INTRAVENOUS; SUBCUTANEOUS at 06:10

## 2018-12-13 RX ADMIN — MORPHINE SULFATE 1 MG: 10 INJECTION INTRAVENOUS at 23:59

## 2018-12-13 RX ADMIN — DOCUSATE SODIUM AND SENNOSIDES 2 TABLET: 8.6; 5 TABLET, FILM COATED ORAL at 21:35

## 2018-12-13 RX ADMIN — IPRATROPIUM BROMIDE AND ALBUTEROL SULFATE 3 ML: 2.5; .5 SOLUTION RESPIRATORY (INHALATION) at 09:21

## 2018-12-13 RX ADMIN — SODIUM CHLORIDE, PRESERVATIVE FREE 3 ML: 5 INJECTION INTRAVENOUS at 08:53

## 2018-12-13 RX ADMIN — HEPARIN SODIUM 5000 UNITS: 5000 INJECTION INTRAVENOUS; SUBCUTANEOUS at 13:51

## 2018-12-13 RX ADMIN — HEPARIN SODIUM 5000 UNITS: 5000 INJECTION INTRAVENOUS; SUBCUTANEOUS at 21:33

## 2018-12-13 RX ADMIN — DOXYCYCLINE 100 MG: 100 INJECTION, POWDER, LYOPHILIZED, FOR SOLUTION INTRAVENOUS at 21:34

## 2018-12-13 RX ADMIN — TAZOBACTAM SODIUM AND PIPERACILLIN SODIUM 3.38 G: 375; 3 INJECTION, SOLUTION INTRAVENOUS at 13:52

## 2018-12-13 NOTE — THERAPY TREATMENT NOTE
Acute Care - Speech Language Pathology Treatment Note  [facility]     Patient Name: Anita Roche  : 1945  MRN: [id number]  Today's Date: 2018         Admit Date: 2018    Visit Dx:      ICD-10-CM ICD-9-CM   1. Oropharyngeal dysphagia R13.12 787.22   2. Impaired mobility and ADLs Z74.09 799.89   3. Impaired functional mobility, balance, gait, and endurance Z74.09 V49.89   4. Cerebrovascular accident (CVA) due to occlusion of right carotid artery (CMS/HCC) I63.231 433.11   5. Cognitive communication deficit R41.841 799.52     Patient Active Problem List   Diagnosis   • Chronic fatigue syndrome   • Knee pain   • Closed wedge compression fracture of thoracic vertebra (CMS/HCC)   • Atherosclerosis of coronary artery   • Persistent cough   • Degeneration of intervertebral disc of lumbar region   • Stricture of esophagus   • Hand pain, left   • Trigger middle finger of left hand   • Hypokalemia   • CVA (cerebral vascular accident) (CMS/HCC)   • Tobacco use   • Underweight   • Sepsis due to pneumonia (CMS/HCC)   • Aspiration pneumonia (CMS/HCC)   • Acute respiratory failure with hypoxia (CMS/HCC)        Therapy Treatment  Rehabilitation Treatment Summary     Row Name 18 1100             Treatment Time/Intention    Discipline  speech language pathologist  -MP      Document Type  therapy note (daily note)  -MP      Subjective Information  no complaints  -MP      Mode of Treatment  individual therapy;speech-language pathology  -MP      Patient/Family Observations  No family present  -MP      Care Plan Review  care plan/treatment goals reviewed;patient/other agree to care plan  -MP      Therapy Frequency (Swallow)  5 days per week  -MP      Therapy Frequency (SLP SLC)  5 days per week  -MP      Patient Effort  adequate  -MP      Recorded by [MP] Steven Christina, MS, CFY-SLP 18 1124      Row Name 18 1100             Pain Assessment    Additional Documentation  Pain Scale: FACES  Pre/Post-Treatment (Group)  -MP      Recorded by [MP] Steven Christina MS, CFY-SLP 12/13/18 1124      Row Name 12/13/18 1100             Pain Scale: FACES Pre/Post-Treatment    Pain: FACES Scale, Pretreatment  2-->hurts little bit  -MP      Pain: FACES Scale, Post-Treatment  2-->hurts little bit  -MP      Recorded by [MP] Steven Christina MS, CFSARY-SLP 12/13/18 1124      Row Name 12/13/18 1100             Outcome Summary/Treatment Plan (SLP)    Daily Summary of Progress (SLP)  progress toward functional goals as expected  -MP      Plan for Continued Treatment (SLP)  Per RN, decision has not yet been made in regards to pt having NG tube placed. Dysphagia & speech-language tx completed this date. Safest recommendation continues to be NPO w/ meds crushed in applesauce if pt alert/awake. SLP will continue to follow for tx.  -MP      Anticipated Dischage Disposition  unknown;anticipate therapy at next level of care  -MP      Recorded by [MP] Steven Christina MS, [facility] 12/13/18 1124        User Key  (r) = Recorded By, (t) = Taken By, (c) = Cosigned By    Initials Name Effective Dates Discipline    MP Steven Christina MS, [facility]-SLP 08/15/18 -  SLP          EDUCATION  The patient has been educated in the following areas:   Cognitive Impairment Communication Impairment.    SLP Recommendation and Plan        Swallow Criteria for Skilled Therapeutic Interventions Met: demonstrates skilled criteria  Anticipated Dischage Disposition: unknown, anticipate therapy at next level of care     Therapy Frequency (Swallow): 5 days per week  Predicted Duration Therapy Intervention (Days): until discharge       Plan of Care Reviewed With: patient  Plan of Care Review  Plan of Care Reviewed With: patient  Daily Summary of Progress (SLP): progress toward functional goals as expected  Plan for Continued Treatment (SLP): Per RN, decision has not yet been made in regards to pt having NG tube placed. Dysphagia & speech-language tx completed  this date. Safest recommendation continues to be NPO w/ meds crushed in applesauce if pt alert/awake. SLP will continue to follow for tx.    SLP GOALS     Row Name 12/13/18 1100 12/12/18 1500 12/11/18 1150       Oral Nutrition/Hydration Goal 1 (SLP)    Oral Nutrition/Hydration Goal 1, SLP  LTG: Pt will demonstrate improved swallowing skills to the level where repeat instrumental swallow study is indicated.  -MP  LTG: Pt will demonstrate improved swallowing skills to the level where repeat instrumental swallow study is indicated.  -AC  --    Time Frame (Oral Nutrition/Hydration Goal 1, SLP)  by discharge  -MP  by discharge  -AC  --    Progress/Outcomes (Oral Nutrition/Hydration Goal 1, SLP)  continuing progress toward goal  -MP  goal revised this date  -AC  --       Oral Nutrition/Hydration Goal 2 (SLP)    Oral Nutrition/Hydration Goal 2, SLP  Pt will tolerate therapeutic trials of H2O w/o s/sxs aspiration w/ 70% acc w/o cues.  -MP  Pt will tolerate therapeutic trials of H2O w/o s/sxs aspiration w/ 70% acc w/o cues.  -AC  --    Time Frame (Oral Nutrition/Hydration Goal 2, SLP)  short term goal (STG);by discharge  -MP  short term goal (STG);by discharge  -AC  --    Barriers (Oral Nutrition/Hydration Goal 2, SLP)  Cough on 1/4 trials of 1/2 tsp thin liquid  -MP  --  --    Progress/Outcomes (Oral Nutrition/Hydration Goal 2, SLP)  good progress toward goal  -MP  goal revised this date  -AC  --       Oral Nutrition/Hydration Goal (SLP)    Oral Nutrition/Hydration Goal, SLP  --  Pt will tolerate trials of regular solid w/ no overt s/sxs aspiration or discomfort w/ 100% acc and no cues  -AC  --    Time Frame (Oral Nutrition/Hydration Goal, SLP)  --  short term goal (STG);by discharge  -AC  --    Progress/Outcomes (Oral Nutrition/Hydration Goal, SLP)  --  goal no longer appropriate  -AC  --       Lingual Strengthening Goal 1 (SLP)    Increase Lingual Tone/Sensation/Control/Coordination/Movement  lingual resistance exercises   -MP  lingual resistance exercises  -AC  --    [place]/Accuracy (Lingual Strengthening Goal 1, SLP)  with minimal cues (75-90% accuracy)  -MP  with minimal cues (75-90% accuracy)  -AC  --    Time Frame (Lingual Strengthening Goal 1, SLP)  short term goal (STG);by discharge  -MP  short term goal (STG);by discharge  -AC  --    Progress/Outcomes (Lingual Strengthening Goal 1, SLP)  continuing progress toward goal  -MP  goal revised this date  -AC  --       Pharyngeal Strengthening Exercise Goal 1 (SLP)    Activity (Pharyngeal Strengthening Goal 1, SLP)  increase timing;increase superior movement of the hyolaryngeal complex;increase anterior movement of the hyolaryngeal complex;increase closure at entrance to airway/closure of airway at glottis;increase squeeze/positive pressure generation;increase tongue base retraction  -MP  increase timing;increase superior movement of the hyolaryngeal complex;increase anterior movement of the hyolaryngeal complex;increase closure at entrance to airway/closure of airway at glottis;increase squeeze/positive pressure generation;increase tongue base retraction  -AC  --    Increase Timing  prepping - 3 second prep or suck swallow or 3-step swallow  -MP  prepping - 3 second prep or suck swallow or 3-step swallow  -AC  --    Increase Superior Movement of the Hyolaryngeal Complex  effortful pitch glide (falsetto + pharyngeal squeeze)  -MP  effortful pitch glide (falsetto + pharyngeal squeeze)  -AC  --    Increase Anterior Movement of the Hyolaryngeal Complex  chin tuck against resistance (CTAR)  -MP  chin tuck against resistance (CTAR)  -AC  --    Increase Closure at Entrance to Airway/Closure of Airway at Glottis  super-supraglottic swallow  -MP  super-supraglottic swallow  -AC  --    Increase Squeeze/Positive Pressure Generation  hard effortful swallow  -MP  hard effortful swallow  -AC  --    Increase Tongue Base Retraction  juan david  -MP  juan david  -AC  --    [place]/Accuracy  (Pharyngeal Strengthening Goal 1, SLP)  with minimal cues (75-90% accuracy)  -MP  with minimal cues (75-90% accuracy)  -AC  --    Time Frame (Pharyngeal Strengthening Goal 1, SLP)  short term goal (STG);by discharge  -MP  short term goal (STG);by discharge  -AC  --    Barriers (Pharyngeal Strengthening Goal 1, SLP)  Provided handout and explanation of each exercise. Completed 3 reps of each w/ pt. Required moderate cues for each w/ SSG being the hardest for pt to follow.  -MP  --  --    Progress/Outcomes (Pharyngeal Strengthening Goal 1, SLP)  continuing progress toward goal  -MP  goal revised this date  -AC  --       Swallow Compensatory Strategies Goal 1 (SLP)    Activity (Swallow Compensatory Strategies/Techniques Goal 1, SLP)  --  compensatory strategies;liquids by teaspoon only  -AC  --    [place]/Accuracy (Swallow Compensatory Strategies/Techniques Goal 1, SLP)  --  with minimal cues (75-90% accuracy)  -AC  --    Time Frame (Swallow Compensatory Strategies/Techniques Goal 1, SLP)  --  short term goal (STG);by discharge  -AC  --    Progress/Outcomes (Swallow Compensatory Strategies/Techniques Goal 1, SLP)  --  goal no longer appropriate  -AC  --       Respiratory Support Goal 1 (SLP)    Improve Respiratory Support Goal 1 (SLP)  sustaining a vowel sound on exhalation;80%;with minimal cues (75-90%)  -MP  --  sustaining a vowel sound on exhalation;80%;with minimal cues (75-90%)  -AC    Time Frame (Respiratory Support Goal 1, SLP)  short term goal (STG);by discharge  -MP  --  short term goal (STG);by discharge  -AC    Progress/Outcomes (Respiratory Support Goal 1, SLP)  goal ongoing  -MP  --  --       Articulation Goal 1 (SLP)    Improve Articulation Goal 1 (SLP)  by over-articulating at word level;80%;with minimal cues (75-90%)  -MP  --  by over-articulating at word level;80%;with minimal cues (75-90%)  -AC    Time Frame (Articulation Goal 1, SLP)  short term goal (STG);by discharge  -MP  --  short term goal  (STG);by discharge  -AC    Progress/Outcomes (Articulation Goal 1, SLP)  goal ongoing  -MP  --  --       Attention Goal 1 (SLP)    Improve Attention by Goal 1 (SLP)  looking at speaker;attending to task;80%;independently (over 90% accuracy)  -MP  --  looking at speaker;attending to task;80%;independently (over 90% accuracy)  -AC    Time Frame (Attention Goal 1, SLP)  short term goal (STG);by discharge  -MP  --  short term goal (STG);by discharge  -AC    Progress (Attention Goal 1, SLP)  50%;with moderate cues (50-74%)  -MP  --  --    Progress/Outcomes (Attention Goal 1, SLP)  continuing progress toward goal  -MP  --  --       Additional Goal 1 (SLP)    Additional Goal 1, SLP  LTG: Pt will improve cognitive-communication skills in order to participate in care in hospital setting w/ 100% acc w/ min cues.  -MP  --  LTG: Pt will improve cognitive-communication skills in order to participate in care in hospital setting w/ 100% acc w/ min cues.  -AC    Time Frame (Additional Goal 1, SLP)  by discharge  -MP  --  by discharge  -AC    Progress/Outcomes (Additional Goal 1, SLP)  continuing progress toward goal  -MP  --  --      User Key  (r) = Recorded By, (t) = Taken By, (c) = Cosigned By    Initials Name Provider Type    AC Estrella Gaytan MS CCC-SLP Speech and Language Pathologist    Steven Nieto MS, CFY-SLP Speech and Language Pathologist              Time Calculation:     Time Calculation- SLP     Row Name 12/13/18 1127             Time Calculation- SLP    SLP Start Time  1100  -      SLP Received On  12/13/18  -MP        User Key  (r) = Recorded By, (t) = Taken By, (c) = Cosigned By    Initials Name Provider Type    Steven Nieto MS, CFY-SLP Speech and Language Pathologist          Therapy Charges for Today     Code Description Service Date Service Provider Modifiers Qty    66872299269 HC ST TREATMENT SWALLOW 2 12/13/2018 Steven Christina MS, CFY-SLP GN 1    [id number] HC ST TREATMENT SPEECH 1  2018 Steven Christina, MS, CFY-SLP GN 1                     Steven hCristina MS, CFY-SLP  2018   and Acute Care - Speech Language Pathology   Swallow Treatment Note CHAD Villatoro     Patient Name: Anita Roche  : 1945  MRN: [id number]  Today's Date: 2018  Onset of Illness/Injury or Date of Surgery: 18     Referring Physician: MD SULEIMAN      Admit Date: 2018    Visit Dx:      ICD-10-CM ICD-9-CM   1. Oropharyngeal dysphagia R13.12 787.22   2. Impaired mobility and ADLs Z74.09 799.89   3. Impaired functional mobility, balance, gait, and endurance Z74.09 V49.89   4. Cerebrovascular accident (CVA) due to occlusion of right carotid artery (CMS/HCC) I63.231 433.11   5. Cognitive communication deficit R41.841 799.52     Patient Active Problem List   Diagnosis   • Chronic fatigue syndrome   • Knee pain   • Closed wedge compression fracture of thoracic vertebra (CMS/HCC)   • Atherosclerosis of coronary artery   • Persistent cough   • Degeneration of intervertebral disc of lumbar region   • Stricture of esophagus   • Hand pain, left   • Trigger middle finger of left hand   • Hypokalemia   • CVA (cerebral vascular accident) (CMS/HCC)   • Tobacco use   • Underweight   • Sepsis due to pneumonia (CMS/HCC)   • Aspiration pneumonia (CMS/HCC)   • Acute respiratory failure with hypoxia (CMS/HCC)       Therapy Treatment  Rehabilitation Treatment Summary     Row Name 18 1100             Treatment Time/Intention    Discipline  speech language pathologist  -MP      Document Type  therapy note (daily note)  -MP      Subjective Information  no complaints  -MP      Mode of Treatment  individual therapy;speech-language pathology  -MP      Patient/Family Observations  No family present  -MP      Care Plan Review  care plan/treatment goals reviewed;patient/other agree to care plan  -MP      Therapy Frequency (Swallow)  5 days per week  -MP      Therapy Frequency (SLP SLC)  5 days per week  -MP       Patient Effort  adequate  -MP      Recorded by [MP] Steven Christina MS, CFY-SLP 12/13/18 1124      Row Name 12/13/18 1100             Pain Assessment    Additional Documentation  Pain Scale: FACES Pre/Post-Treatment (Group)  -MP      Recorded by [MP] Steven Christina MS, CFY-SLP 12/13/18 1124      Row Name 12/13/18 1100             Pain Scale: FACES Pre/Post-Treatment    Pain: FACES Scale, Pretreatment  2-->hurts little bit  -MP      Pain: FACES Scale, Post-Treatment  2-->hurts little bit  -MP      Recorded by [MP] Steven Christina MS, CFY-SLP 12/13/18 1124      Row Name 12/13/18 1100             Outcome Summary/Treatment Plan (SLP)    Daily Summary of Progress (SLP)  progress toward functional goals as expected  -MP      Plan for Continued Treatment (SLP)  Per RN, decision has not yet been made in regards to pt having NG tube placed. Dysphagia & speech-language tx completed this date. Safest recommendation continues to be NPO w/ meds crushed in applesauce if pt alert/awake. SLP will continue to follow for tx.  -MP      Anticipated Dischage Disposition  unknown;anticipate therapy at next level of care  -MP      Recorded by [MP] Steven Christina MS, CFSARY-SLP 12/13/18 1124        User Key  (r) = Recorded By, (t) = Taken By, (c) = Cosigned By    Initials Name Effective Dates Discipline    MP tSeven Christina MS, CFY-SLP 08/15/18 -  SLP          Outcome Summary  Outcome Summary/Treatment Plan (SLP)  Daily Summary of Progress (SLP): progress toward functional goals as expected (12/13/18 1100 : Steven Christina MS, CFY-SLP)  Plan for Continued Treatment (SLP): Per RN, decision has not yet been made in regards to pt having NG tube placed. Dysphagia & speech-language tx completed this date. Safest recommendation continues to be NPO w/ meds crushed in applesauce if pt alert/awake. SLP will continue to follow for tx. (12/13/18 1100 : Steven Christina MS, CFY-SLP)  Anticipated Dischage Disposition: unknown, anticipate  therapy at next level of care (12/13/18 1100 : Steven Christina, MS, CFY-SLP)      SLP GOALS     Row Name 12/13/18 1100 12/12/18 1500 12/11/18 1150       Oral Nutrition/Hydration Goal 1 (SLP)    Oral Nutrition/Hydration Goal 1, SLP  LTG: Pt will demonstrate improved swallowing skills to the level where repeat instrumental swallow study is indicated.  -MP  LTG: Pt will demonstrate improved swallowing skills to the level where repeat instrumental swallow study is indicated.  -AC  --    Time Frame (Oral Nutrition/Hydration Goal 1, SLP)  by discharge  -MP  by discharge  -AC  --    Progress/Outcomes (Oral Nutrition/Hydration Goal 1, SLP)  continuing progress toward goal  -MP  goal revised this date  -AC  --       Oral Nutrition/Hydration Goal 2 (SLP)    Oral Nutrition/Hydration Goal 2, SLP  Pt will tolerate therapeutic trials of H2O w/o s/sxs aspiration w/ 70% acc w/o cues.  -MP  Pt will tolerate therapeutic trials of H2O w/o s/sxs aspiration w/ 70% acc w/o cues.  -AC  --    Time Frame (Oral Nutrition/Hydration Goal 2, SLP)  short term goal (STG);by discharge  -MP  short term goal (STG);by discharge  -AC  --    Barriers (Oral Nutrition/Hydration Goal 2, SLP)  Cough on 1/4 trials of 1/2 tsp thin liquid  -MP  --  --    Progress/Outcomes (Oral Nutrition/Hydration Goal 2, SLP)  good progress toward goal  -MP  goal revised this date  -AC  --       Oral Nutrition/Hydration Goal (SLP)    Oral Nutrition/Hydration Goal, SLP  --  Pt will tolerate trials of regular solid w/ no overt s/sxs aspiration or discomfort w/ 100% acc and no cues  -AC  --    Time Frame (Oral Nutrition/Hydration Goal, SLP)  --  short term goal (STG);by discharge  -AC  --    Progress/Outcomes (Oral Nutrition/Hydration Goal, SLP)  --  goal no longer appropriate  -AC  --       Lingual Strengthening Goal 1 (SLP)    Increase Lingual Tone/Sensation/Control/Coordination/Movement  lingual resistance exercises  -MP  lingual resistance exercises  -AC  --     [place]/Accuracy (Lingual Strengthening Goal 1, SLP)  with minimal cues (75-90% accuracy)  -MP  with minimal cues (75-90% accuracy)  -AC  --    Time Frame (Lingual Strengthening Goal 1, SLP)  short term goal (STG);by discharge  -MP  short term goal (STG);by discharge  -AC  --    Progress/Outcomes (Lingual Strengthening Goal 1, SLP)  continuing progress toward goal  -MP  goal revised this date  -AC  --       Pharyngeal Strengthening Exercise Goal 1 (SLP)    Activity (Pharyngeal Strengthening Goal 1, SLP)  increase timing;increase superior movement of the hyolaryngeal complex;increase anterior movement of the hyolaryngeal complex;increase closure at entrance to airway/closure of airway at glottis;increase squeeze/positive pressure generation;increase tongue base retraction  -MP  increase timing;increase superior movement of the hyolaryngeal complex;increase anterior movement of the hyolaryngeal complex;increase closure at entrance to airway/closure of airway at glottis;increase squeeze/positive pressure generation;increase tongue base retraction  -AC  --    Increase Timing  prepping - 3 second prep or suck swallow or 3-step swallow  -MP  prepping - 3 second prep or suck swallow or 3-step swallow  -AC  --    Increase Superior Movement of the Hyolaryngeal Complex  effortful pitch glide (falsetto + pharyngeal squeeze)  -MP  effortful pitch glide (falsetto + pharyngeal squeeze)  -AC  --    Increase Anterior Movement of the Hyolaryngeal Complex  chin tuck against resistance (CTAR)  -MP  chin tuck against resistance (CTAR)  -AC  --    Increase Closure at Entrance to Airway/Closure of Airway at Glottis  super-supraglottic swallow  -MP  super-supraglottic swallow  -AC  --    Increase Squeeze/Positive Pressure Generation  hard effortful swallow  -MP  hard effortful swallow  -AC  --    Increase Tongue Base Retraction  juan david  -MP  juan david  -AC  --    [place]/Accuracy (Pharyngeal Strengthening Goal 1, SLP)  with minimal  cues (75-90% accuracy)  -MP  with minimal cues (75-90% accuracy)  -AC  --    Time Frame (Pharyngeal Strengthening Goal 1, SLP)  short term goal (STG);by discharge  -MP  short term goal (STG);by discharge  -AC  --    Barriers (Pharyngeal Strengthening Goal 1, SLP)  Provided handout and explanation of each exercise. Completed 3 reps of each w/ pt. Required moderate cues for each w/ SSG being the hardest for pt to follow.  -MP  --  --    Progress/Outcomes (Pharyngeal Strengthening Goal 1, SLP)  continuing progress toward goal  -MP  goal revised this date  -AC  --       Swallow Compensatory Strategies Goal 1 (SLP)    Activity (Swallow Compensatory Strategies/Techniques Goal 1, SLP)  --  compensatory strategies;liquids by teaspoon only  -AC  --    [place]/Accuracy (Swallow Compensatory Strategies/Techniques Goal 1, SLP)  --  with minimal cues (75-90% accuracy)  -AC  --    Time Frame (Swallow Compensatory Strategies/Techniques Goal 1, SLP)  --  short term goal (STG);by discharge  -AC  --    Progress/Outcomes (Swallow Compensatory Strategies/Techniques Goal 1, SLP)  --  goal no longer appropriate  -AC  --       Respiratory Support Goal 1 (SLP)    Improve Respiratory Support Goal 1 (SLP)  sustaining a vowel sound on exhalation;80%;with minimal cues (75-90%)  -MP  --  sustaining a vowel sound on exhalation;80%;with minimal cues (75-90%)  -AC    Time Frame (Respiratory Support Goal 1, SLP)  short term goal (STG);by discharge  -MP  --  short term goal (STG);by discharge  -AC    Progress/Outcomes (Respiratory Support Goal 1, SLP)  goal ongoing  -MP  --  --       Articulation Goal 1 (SLP)    Improve Articulation Goal 1 (SLP)  by over-articulating at word level;80%;with minimal cues (75-90%)  -MP  --  by over-articulating at word level;80%;with minimal cues (75-90%)  -AC    Time Frame (Articulation Goal 1, SLP)  short term goal (STG);by discharge  -MP  --  short term goal (STG);by discharge  -AC    Progress/Outcomes  (Articulation Goal 1, SLP)  goal ongoing  -MP  --  --       Attention Goal 1 (SLP)    Improve Attention by Goal 1 (SLP)  looking at speaker;attending to task;80%;independently (over 90% accuracy)  -MP  --  looking at speaker;attending to task;80%;independently (over 90% accuracy)  -AC    Time Frame (Attention Goal 1, SLP)  short term goal (STG);by discharge  -MP  --  short term goal (STG);by discharge  -AC    Progress (Attention Goal 1, SLP)  50%;with moderate cues (50-74%)  -MP  --  --    Progress/Outcomes (Attention Goal 1, SLP)  continuing progress toward goal  -MP  --  --       Additional Goal 1 (SLP)    Additional Goal 1, SLP  LTG: Pt will improve cognitive-communication skills in order to participate in care in hospital setting w/ 100% acc w/ min cues.  -MP  --  LTG: Pt will improve cognitive-communication skills in order to participate in care in hospital setting w/ 100% acc w/ min cues.  -AC    Time Frame (Additional Goal 1, SLP)  by discharge  -MP  --  by discharge  -AC    Progress/Outcomes (Additional Goal 1, SLP)  continuing progress toward goal  -MP  --  --      User Key  (r) = Recorded By, (t) = Taken By, (c) = Cosigned By    Initials Name Provider Type    AC Estrella Gaytan MS CCC-SLP Speech and Language Pathologist    Steven Nieto MS, CFY-SLP Speech and Language Pathologist          EDUCATION  The patient has been educated in the following areas:   Dysphagia (Swallowing Impairment) NPO rationale.    SLP Recommendation and Plan                       Anticipated Dischage Disposition: unknown, anticipate therapy at next level of care     Therapy Frequency (Swallow): 5 days per week          Plan of Care Reviewed With: patient  Plan of Care Review  Plan of Care Reviewed With: patient  Daily Summary of Progress (SLP): progress toward functional goals as expected  Plan for Continued Treatment (SLP): Per RN, decision has not yet been made in regards to pt having NG tube placed. Dysphagia &  speech-language tx completed this date. Safest recommendation continues to be NPO w/ meds crushed in applesauce if pt alert/awake. SLP will continue to follow for tx.           Time Calculation:   Time Calculation- SLP     Row Name 12/13/18 1127             Time Calculation- SLP    SLP Start Time  1100  -MP      SLP Received On  12/13/18  -MP        User Key  (r) = Recorded By, (t) = Taken By, (c) = Cosigned By    Initials Name Provider Type    Steven Nieto MS, CFY-SLP Speech and Language Pathologist          Therapy Charges for Today     Code Description Service Date Service Provider Modifiers Qty    [id number] HC ST TREATMENT SWALLOW 2 12/13/2018 Steven Christina MS, CFY-SLP GN 1    [id number] HC ST TREATMENT SPEECH 1 12/13/2018 Steven Christina MS, CFSARY-SLP GN 1                 Steven Christina MS, JESSI-SLP  12/13/2018

## 2018-12-13 NOTE — PLAN OF CARE
Problem: Patient Care Overview  Goal: Plan of Care Review  Outcome: Ongoing (interventions implemented as appropriate)   12/13/18 1127   Coping/Psychosocial   Plan of Care Reviewed With patient   SLP treatment completed. Will continue to address dysphagia and cognitive-linguistic skills. Please see note for further details and recommendations.

## 2018-12-13 NOTE — PROGRESS NOTES
[facility] Medicine Services  PROGRESS NOTE    Patient Name: Anita Roche  : 1945  MRN: [id number]    Date of Admission: 2018  Length of Stay: 4  Primary Care Physician: Diann Ferreira APRN    Subjective   Subjective     CC:  Hospital follow up for CVA    HPI:  Patient seen this morning. She is asking for water. Says to talk to her daughter Teresa about the feeding tube.     ROS:  ROS limited due to mentation   Denies pain, SOA    Objective   Objective     Vital Signs:   Temp:  [98 °F (36.7 °C)-99.6 °F (37.6 °C)] 98 °F (36.7 °C)  Heart Rate:  [76-94] 78  Resp:  [16-18] 18  BP: (161-174)/(74-95) 174/79  Total (NIH Stroke Scale): 17     Physical Exam:  Gen-no acute distress  HENT-NCAT, mucous membranes slightly dry  CV-RRR, S1 S2 normal, no m/r/g  Resp-mildly decreased at the bases, no wheezes or rales  Abd-soft, NT, ND, +BS  Ext-no edema  Neuro-awake alert, occasional confusion, left hemiparesis  Skin-no rashes  Psych-appropriate mood    Results Reviewed:  I have personally reviewed current lab, radiology, and data and agree.    Results from last 7 days   Lab Units  18   0714  18   1259  18   1514   WBC 10*3/mm3  11.38*  15.40*  11.25   HEMOGLOBIN g/dL  13.0  14.3  14.7   HEMATOCRIT %  40.4  44.1*  46.1   PLATELETS 10*3/mm3  175  176  178   INR    --    --   1.07     Results from last 7 days   Lab Units  18   0714  18   0637  18   1514   SODIUM mmol/L  140  142  141   POTASSIUM mmol/L  3.5  3.7  3.4*   CHLORIDE mmol/L  111*  108  110   CO2 mmol/L  24.0  19.0*  24.7   BUN mg/dL  10  32*  17   CREATININE mg/dL  0.67  0.87  0.81   GLUCOSE mg/dL  96  53*  77   CALCIUM mg/dL  9.2  9.1  9.6   ALT (SGPT) U/L   --    --   21   AST (SGOT) U/L   --    --   23     Estimated Creatinine Clearance: 40.5 mL/min (by C-G formula based on SCr of 0.67 mg/dL).  No results found for: BNP    Microbiology Results Abnormal     Procedure Component Value -  Date/Time    Blood Culture - Blood, Arm, Right [[id number]] Collected:  12/09/18 1259    Lab Status:  Preliminary result Specimen:  Blood from Arm, Right Updated:  12/13/18 1331     Blood Culture No growth at 4 days    Blood Culture - Blood, Hand, Right [[id number]] Collected:  12/09/18 1306    Lab Status:  Preliminary result Specimen:  Blood from Hand, Right Updated:  12/13/18 1331     Blood Culture No growth at 4 days          Imaging Results (last 24 hours)     Procedure Component Value Units Date/Time    FL Video Swallow With Speech [[id number]] Collected:  12/12/18 1457     Updated:  12/12/18 1521    Narrative:       EXAMINATION: FL VIDEO SWALLOW W SPEECH-     INDICATION: dysphagia; R13.10-Dysphagia, unspecified; Z74.09-Other  reduced mobility; Z74.09-Other reduced mobility; I63.231-Cerebral  infarction due to unspecified occlusion or stenosis of right carotid  arteries; R41.841-Cognitive communication deficit     TECHNIQUE: 54 seconds of fluoroscopic time was used for this exam. 1  associated image was saved. The patient was evaluated in the seated  lateral position while taking a variety of consistencies of barium by  mouth under the direction of speech pathology.     COMPARISON: NONE     FINDINGS: There was aspiration with sips of thin, nectar, and honey  consistency barium. There was no penetration and no aspiration with  pudding consistency barium.          Impression:       Fluoroscopy provided for a modified barium swallow. Please  see speech therapy report for full details and recommendations.         This report was finalized on 12/12/2018 3:19 PM by Chandra Ceballos.           Results for orders placed during the hospital encounter of 12/08/18   Adult Transthoracic Echo Complete W/ Cont if Necessary Per Protocol (With Agitated Saline)    Narrative · Left ventricular systolic function is hyperdynamic (EF > 70).  · Mid LV cavitary gradient of 40 mmHg..  · Trace mitral valve regurgitation is present.  · Trace  tricuspid valve regurgitation is present          I have reviewed the medications.    Assessment/Plan   Assessment / Plan     Active Hospital Problems    Diagnosis Date Noted   • **CVA (cerebral vascular accident) (CMS/[facility]) [I63.9] 12/08/2018   • Acute respiratory failure with hypoxia (CMS/[facility]) [J96.01] 12/10/2018   • Underweight [R63.6] 12/09/2018   • Sepsis due to pneumonia (CMS/[facility]) [J18.9, A41.9] 12/09/2018   • Aspiration pneumonia (CMS/[facility]) [J69.0] 12/09/2018   • Hypokalemia [E87.6] 12/08/2018   • Tobacco use [Z72.0] 12/08/2018   • Atherosclerosis of coronary artery [I25.10] 11/08/2018     Description: Echo (4/2012): LVEF >65%     • Degeneration of intervertebral disc of lumbar region [M51.36] 03/31/2008       Brief Hospital Course to date:  Anita Roche is a 73 y.o. female with PMH significant for HTN, tobacco abuse, CAD, esophageal stricture, and trigger finger s/p release 11/27/2018 transferred from the ER at [place] for 3 days of headache, confusion,  And balance disturbances.  At the local ER, CT head was performed which showed an old left sided stroke but no acute insults.  However, left facial droop and left sided drift was noted and the patient was sent to [facility] for higher level of care. MRI upon arrival revealed multiple acute lacunar infarcts within the right cerebral hemisphere, as well as a meningioma in the left parafalcine region.    Right hemispheric infarct secondary to right ICA occlusion with residual left hemiparesis and dysphagia  - ASA/Plavix  - Neuro has evaluated  - large infarct with guarded prognosis (somnelence, left hemiparesis, neglect, dysphagia)  - remains NPO; MBS 12/12 showing aspiration with many consistencies, SLP recommends Keofeed--discussed with daughter Teresa today, agreeable to placing Keofeed today  - ST/PT/OT/CM: [facility] referral in progress    Sepsis (not POA)  Aspiration pneumonia (not POA)  Acute hypoxic respiratory failure  - blood cultures negative  - Zosyn Day 5,  Doxycycline Day 3  - leukocytosis and fever improved    Diarrhea  - now appears resolved ?antibiotic related  - cancel C.diff PCR    DVT Prophylaxis:  Given stable neuro exam as of 12/10 added SQH prophy since bedbound from severity of CVA    CODE STATUS:   Code Status and Medical Interventions:   Ordered at: 12/08/18 2008     Level Of Support Discussed With:    Patient     Code Status:    CPR     Medical Interventions (Level of Support Prior to Arrest):    Full     Disposition: I expect the patient to be discharged TBD    Electronically signed by Paz Cali MD, 12/13/18, 2:24 PM.

## 2018-12-13 NOTE — CONSULTS
Nutrition Services    Patient Name:  Anita Roche  YOB: 1945  MRN: [id number]  Admit Date:  12/8/2018     RD to order per RD rec's 12/12:   Keofeed placement today.     FiberSource HN at 45 ml/hr (goal volume= 990 ml/day) and free water at 15 ml/hr  -Will provide at goal volume:  1188 kcal, 99%  53 g pro, 120%  15 g fiber  802 ml water from EN  1132 ml water total         Electronically signed by:  Yudelka Jarvis RD  12/13/18 2:49 PM   20 min

## 2018-12-14 ENCOUNTER — APPOINTMENT (OUTPATIENT)
Dept: GENERAL RADIOLOGY | Facility: HOSPITAL | Age: 73
End: 2018-12-14

## 2018-12-14 LAB
ANION GAP SERPL CALCULATED.3IONS-SCNC: 14 MMOL/L (ref 3–11)
BACTERIA SPEC AEROBE CULT: NORMAL
BACTERIA SPEC AEROBE CULT: NORMAL
BUN BLD-MCNC: 22 MG/DL (ref 9–23)
BUN/CREAT SERPL: 30.6 (ref 7–25)
CALCIUM SPEC-SCNC: 10.2 MG/DL (ref 8.7–10.4)
CHLORIDE SERPL-SCNC: 112 MMOL/L (ref 99–109)
CO2 SERPL-SCNC: 21 MMOL/L (ref 20–31)
CREAT BLD-MCNC: 0.72 MG/DL (ref 0.6–1.3)
DEPRECATED RDW RBC AUTO: 50.4 FL (ref 37–54)
ERYTHROCYTE [DISTWIDTH] IN BLOOD BY AUTOMATED COUNT: 13.5 % (ref 11.3–14.5)
GFR SERPL CREATININE-BSD FRML MDRD: 80 ML/MIN/1.73
GLUCOSE BLD-MCNC: 105 MG/DL (ref 70–100)
HCT VFR BLD AUTO: 42.4 % (ref 34.5–44)
HGB BLD-MCNC: 13.6 G/DL (ref 11.5–15.5)
MCH RBC QN AUTO: 32.6 PG (ref 27–31)
MCHC RBC AUTO-ENTMCNC: 32.1 G/DL (ref 32–36)
MCV RBC AUTO: 101.7 FL (ref 80–99)
PLATELET # BLD AUTO: 270 10*3/MM3 (ref 150–450)
PMV BLD AUTO: 12.4 FL (ref 6–12)
POTASSIUM BLD-SCNC: 2.9 MMOL/L (ref 3.5–5.5)
RBC # BLD AUTO: 4.17 10*6/MM3 (ref 3.89–5.14)
SODIUM BLD-SCNC: 147 MMOL/L (ref 132–146)
WBC NRBC COR # BLD: 11.33 10*3/MM3 (ref 3.5–10.8)

## 2018-12-14 PROCEDURE — 97530 THERAPEUTIC ACTIVITIES: CPT

## 2018-12-14 PROCEDURE — 74018 RADEX ABDOMEN 1 VIEW: CPT

## 2018-12-14 PROCEDURE — 80048 BASIC METABOLIC PNL TOTAL CA: CPT | Performed by: HOSPITALIST

## 2018-12-14 PROCEDURE — 25010000002 MORPHINE PER 10 MG: Performed by: HOSPITALIST

## 2018-12-14 PROCEDURE — 25010000002 MORPHINE SULFATE (PF) 2 MG/ML SOLUTION: Performed by: NURSE PRACTITIONER

## 2018-12-14 PROCEDURE — 97110 THERAPEUTIC EXERCISES: CPT

## 2018-12-14 PROCEDURE — 92507 TX SP LANG VOICE COMM INDIV: CPT

## 2018-12-14 PROCEDURE — 25010000002 HEPARIN (PORCINE) PER 1000 UNITS: Performed by: FAMILY MEDICINE

## 2018-12-14 PROCEDURE — 99232 SBSQ HOSP IP/OBS MODERATE 35: CPT | Performed by: NURSE PRACTITIONER

## 2018-12-14 PROCEDURE — 25010000002 PIPERACILLIN SOD-TAZOBACTAM PER 1 G: Performed by: FAMILY MEDICINE

## 2018-12-14 PROCEDURE — 92526 ORAL FUNCTION THERAPY: CPT

## 2018-12-14 PROCEDURE — 85027 COMPLETE CBC AUTOMATED: CPT | Performed by: HOSPITALIST

## 2018-12-14 PROCEDURE — 25010000002 METOCLOPRAMIDE PER 10 MG: Performed by: FAMILY MEDICINE

## 2018-12-14 RX ORDER — MORPHINE SULFATE 2 MG/ML
1 INJECTION, SOLUTION INTRAMUSCULAR; INTRAVENOUS ONCE
Status: COMPLETED | OUTPATIENT
Start: 2018-12-14 | End: 2018-12-14

## 2018-12-14 RX ORDER — HYDROCODONE BITARTRATE AND ACETAMINOPHEN 5; 325 MG/1; MG/1
1 TABLET ORAL EVERY 6 HOURS PRN
Status: DISCONTINUED | OUTPATIENT
Start: 2018-12-14 | End: 2018-12-15

## 2018-12-14 RX ADMIN — HEPARIN SODIUM 5000 UNITS: 5000 INJECTION INTRAVENOUS; SUBCUTANEOUS at 05:43

## 2018-12-14 RX ADMIN — METOCLOPRAMIDE 5 MG: 5 INJECTION, SOLUTION INTRAMUSCULAR; INTRAVENOUS at 14:33

## 2018-12-14 RX ADMIN — MORPHINE SULFATE 1 MG: 2 INJECTION, SOLUTION INTRAMUSCULAR; INTRAVENOUS at 15:37

## 2018-12-14 RX ADMIN — SODIUM CHLORIDE, PRESERVATIVE FREE 3 ML: 5 INJECTION INTRAVENOUS at 21:06

## 2018-12-14 RX ADMIN — DOCUSATE SODIUM AND SENNOSIDES 2 TABLET: 8.6; 5 TABLET, FILM COATED ORAL at 09:13

## 2018-12-14 RX ADMIN — DOXYCYCLINE 100 MG: 100 INJECTION, POWDER, LYOPHILIZED, FOR SOLUTION INTRAVENOUS at 21:05

## 2018-12-14 RX ADMIN — METOCLOPRAMIDE 5 MG: 5 INJECTION, SOLUTION INTRAMUSCULAR; INTRAVENOUS at 09:04

## 2018-12-14 RX ADMIN — HEPARIN SODIUM 5000 UNITS: 5000 INJECTION INTRAVENOUS; SUBCUTANEOUS at 14:32

## 2018-12-14 RX ADMIN — METOCLOPRAMIDE 5 MG: 5 INJECTION, SOLUTION INTRAMUSCULAR; INTRAVENOUS at 01:15

## 2018-12-14 RX ADMIN — SODIUM CHLORIDE, PRESERVATIVE FREE 3 ML: 5 INJECTION INTRAVENOUS at 09:13

## 2018-12-14 RX ADMIN — CLOPIDOGREL BISULFATE 75 MG: 75 TABLET ORAL at 09:04

## 2018-12-14 RX ADMIN — TAZOBACTAM SODIUM AND PIPERACILLIN SODIUM 3.38 G: 375; 3 INJECTION, SOLUTION INTRAVENOUS at 23:07

## 2018-12-14 RX ADMIN — POTASSIUM CHLORIDE 40 MEQ: 750 CAPSULE, EXTENDED RELEASE ORAL at 16:21

## 2018-12-14 RX ADMIN — ACETAMINOPHEN 650 MG: 325 TABLET ORAL at 18:28

## 2018-12-14 RX ADMIN — ATENOLOL 25 MG: 25 TABLET ORAL at 09:04

## 2018-12-14 RX ADMIN — HEPARIN SODIUM 5000 UNITS: 5000 INJECTION INTRAVENOUS; SUBCUTANEOUS at 23:08

## 2018-12-14 RX ADMIN — DILTIAZEM HYDROCHLORIDE 240 MG: 240 CAPSULE, COATED, EXTENDED RELEASE ORAL at 09:04

## 2018-12-14 RX ADMIN — TAZOBACTAM SODIUM AND PIPERACILLIN SODIUM 3.38 G: 375; 3 INJECTION, SOLUTION INTRAVENOUS at 05:42

## 2018-12-14 RX ADMIN — DOXYCYCLINE 100 MG: 100 INJECTION, POWDER, LYOPHILIZED, FOR SOLUTION INTRAVENOUS at 10:41

## 2018-12-14 RX ADMIN — METOCLOPRAMIDE 5 MG: 5 INJECTION, SOLUTION INTRAMUSCULAR; INTRAVENOUS at 21:05

## 2018-12-14 RX ADMIN — HYDROCODONE BITARTRATE AND ACETAMINOPHEN 1 TABLET: 5; 325 TABLET ORAL at 15:04

## 2018-12-14 RX ADMIN — TAZOBACTAM SODIUM AND PIPERACILLIN SODIUM 3.38 G: 375; 3 INJECTION, SOLUTION INTRAVENOUS at 14:33

## 2018-12-14 RX ADMIN — POTASSIUM CHLORIDE 40 MEQ: 750 CAPSULE, EXTENDED RELEASE ORAL at 09:04

## 2018-12-14 RX ADMIN — HYDROCODONE BITARTRATE AND ACETAMINOPHEN 1 TABLET: 5; 325 TABLET ORAL at 21:19

## 2018-12-14 RX ADMIN — POTASSIUM CHLORIDE 40 MEQ: 750 CAPSULE, EXTENDED RELEASE ORAL at 12:27

## 2018-12-14 RX ADMIN — DOCUSATE SODIUM AND SENNOSIDES 2 TABLET: 8.6; 5 TABLET, FILM COATED ORAL at 21:05

## 2018-12-14 RX ADMIN — ASPIRIN 300 MG: 300 SUPPOSITORY RECTAL at 09:04

## 2018-12-14 RX ADMIN — LISINOPRIL 10 MG: 10 TABLET ORAL at 09:04

## 2018-12-14 NOTE — PROGRESS NOTES
[facility] Medicine Services  PROGRESS NOTE    Patient Name: Anita Roche  : 1945  MRN: [id number]    Date of Admission: 2018  Length of Stay: 5  Primary Care Physician: Diann Ferreira APRN    Subjective   Subjective     CC:  Hospital follow up for CVA    HPI:  Patient sitting up in bed in NAD. She is using a mouth swab. Per nurse no acute events overnight per nursing. Patient follows commands.     ROS:  Gen- No fevers, chills  CV- No chest pain, palpitations  Resp- No cough, dyspnea  GI- No N/V/D, abd pain    Denies pain, SOA    Objective   Objective     Vital Signs:   Temp:  [98 °F (36.7 °C)-98.7 °F (37.1 °C)] 98 °F (36.7 °C)  Heart Rate:  [77-90] 77  Resp:  [16-18] 18  BP: (123-177)/(59-89) 162/89  Total (NIH Stroke Scale): 17     Physical Exam:  Gen-no acute distress  HENT-NCAT, mucous membranes slightly dry  CV-RRR, S1 S2 normal, no m/r/g  Resp-mildly decreased at the bases, no wheezes or rales  Abd-soft, NT, ND, +BS  Ext-no edema  Neuro-awake alert, occasional confusion, left hemiparesis  Skin-no rashes  Psych-appropriate mood    Results Reviewed:  I have personally reviewed current lab, radiology, and data and agree.    Results from last 7 days   Lab Units  18   0714  18   1259  18   1514   WBC 10*3/mm3  11.33*  11.38*  15.40*  11.25   HEMOGLOBIN g/dL  13.6  13.0  14.3  14.7   HEMATOCRIT %  42.4  40.4  44.1*  46.1   PLATELETS 10*3/mm3  270  175  176  178   INR    --    --    --   1.07     Results from last 7 days   Lab Units  1818  18   0714  18   0637  18   1514   SODIUM mmol/L  147*  140  142  141   POTASSIUM mmol/L  2.9*  3.5  3.7  3.4*   CHLORIDE mmol/L  112*  111*  108  110   CO2 mmol/L  21.0  24.0  19.0*  24.7   BUN mg/dL  22  10  32*  17   CREATININE mg/dL  0.72  0.67  0.87  0.81   GLUCOSE mg/dL  105*  96  53*  77   CALCIUM mg/dL  10.2  9.2  9.1  9.6   ALT (SGPT) U/L   --    --    --   21   AST  (SGOT) U/L   --    --    --   23     Estimated Creatinine Clearance: 40.5 mL/min (by C-G formula based on SCr of 0.72 mg/dL).  No results found for: BNP    Microbiology Results Abnormal     Procedure Component Value - Date/Time    Blood Culture - Blood, Arm, Right [[id number]] Collected:  12/09/18 1259    Lab Status:  Preliminary result Specimen:  Blood from Arm, Right Updated:  12/13/18 1331     Blood Culture No growth at 4 days    Blood Culture - Blood, Hand, Right [[id number]] Collected:  12/09/18 1306    Lab Status:  Preliminary result Specimen:  Blood from Hand, Right Updated:  12/13/18 1331     Blood Culture No growth at 4 days          Imaging Results (last 24 hours)     Procedure Component Value Units Date/Time    XR Abdomen KUB [[id number]] Collected:  12/14/18 0408     Updated:  12/14/18 0457    Narrative:       EXAM:    XR Abdomen, 1 View     EXAM DATE/TIME:    12/14/2018 4:08 AM     CLINICAL HISTORY:    73 years old, female; Cerebral infarction due to unspecified occlusion or   stenosis of right carotid arteries; Dysphagia, oropharyngeal phase; Cognitive   communication deficit; Other reduced mobility; Other reduced mobility; Device   placement; Gi device; Other: Keofeed placement; Additional info: Check feeding   tube placement     TECHNIQUE:    Frontal supine view of the abdomen/pelvis.     COMPARISON:    CR XR ABDOMEN KUB 12/13/2018 6:17 PM     FINDINGS:    Tubes, catheters and devices:  Enteric tube tip projects over the lower lumbar   spine, likely within the gastric body lumen.    Gastrointestinal tract: Normal. No bowel dilation.    Organs:  Surgical clips project over the right upper abdomen, post   cholecystectomy.    Vasculature:  Phleboliths within the pelvis.    Bones/joints:  Mild degenerative changes of the hips and sacroiliac joints.       Impression:       Enteric tube tip projects over the lower lumbar spine, likely within the   gastric body lumen.     THIS DOCUMENT HAS BEEN ELECTRONICALLY  SIGNED BY MELO LAN MD    XR Abdomen KUB [[id number]] Collected:  12/13/18 1816     Updated:  12/13/18 1926    Narrative:       EXAM:    XR Abdomen, 1 View     EXAM DATE/TIME:    12/13/2018 6:16 PM     CLINICAL HISTORY:    73 years old, female; Cerebral infarction due to unspecified occlusion or   stenosis of right carotid arteries; Dysphagia, oropharyngeal phase; Cognitive   communication deficit; Other reduced mobility; Other reduced mobility; Device   placement; Gi device; Other: See notes; Patient HX: Keofeed placement;   Additional info: Kio feed placement     TECHNIQUE:    Frontal supine view of the abdomen/pelvis.     COMPARISON:    No relevant prior studies available.     FINDINGS:    Tubes, catheters and devices:  Enteric tube tip is in the distal stomach.    Gastrointestinal tract: Normal. No bowel dilation.    Organs:  Surgical clips project over the right upper abdomen, post   cholecystectomy.    Bones/joints: Unremarkable for age.       Impression:       Enteric tube tip is in the distal stomach.     THIS DOCUMENT HAS BEEN ELECTRONICALLY SIGNED BY MELO LAN MD        Results for orders placed during the hospital encounter of 12/08/18   Adult Transthoracic Echo Complete W/ Cont if Necessary Per Protocol (With Agitated Saline)    Narrative · Left ventricular systolic function is hyperdynamic (EF > 70).  · Mid LV cavitary gradient of 40 mmHg..  · Trace mitral valve regurgitation is present.  · Trace tricuspid valve regurgitation is present          I have reviewed the medications.    Assessment/Plan   Assessment / Plan     Active Hospital Problems    Diagnosis Date Noted   • **CVA (cerebral vascular accident) (CMS/[facility]) [I63.9] 12/08/2018   • Acute respiratory failure with hypoxia (CMS/[facility]) [J96.01] 12/10/2018   • Underweight [R63.6] 12/09/2018   • Sepsis due to pneumonia (CMS/[facility]) [J18.9, A41.9] 12/09/2018   • Aspiration pneumonia (CMS/[facility]) [J69.0] 12/09/2018   • Hypokalemia [E87.6] 12/08/2018   •  Tobacco use [Z72.0] 12/08/2018   • Atherosclerosis of coronary artery [I25.10] 11/08/2018     Description: Echo (4/2012): LVEF >65%     • Degeneration of intervertebral disc of lumbar region [M51.36] 03/31/2008       Brief Hospital Course to date:  Anita Roche is a 73 y.o. female with PMH significant for HTN, tobacco abuse, CAD, esophageal stricture, and trigger finger s/p release 11/27/2018 transferred from the ER at [place] for 3 days of headache, confusion,  And balance disturbances.  At the local ER, CT head was performed which showed an old left sided stroke but no acute insults.  However, left facial droop and left sided drift was noted and the patient was sent to [facility] for higher level of care. MRI upon arrival revealed multiple acute lacunar infarcts within the right cerebral hemisphere, as well as a meningioma in the left parafalcine region.    Right hemispheric infarct secondary to right ICA occlusion with residual left hemiparesis and dysphagia  - ASA/Plavix  - Neuro has evaluated  - large infarct with guarded prognosis (somnelence, left hemiparesis, neglect, dysphagia)  - remains NPO; MBS 12/12 showing aspiration with many consistencies, SLP recommends Keofeed--discussed with daughter Teresa today, agreeable to placing Keofeed, awaiting ICU nurse to come with machine to advance keofeed   --   - ST/PT/OT/CM: [facility] referral in progress    Sepsis (not POA)  Aspiration pneumonia (not POA)  Acute hypoxic respiratory failure  - blood cultures negative  - Zosyn Day 6, Doxycycline Day 4  - leukocytosis and fever improved    Diarrhea  - now appears resolved ?antibiotic related  - cancel C.diff PCR    DVT Prophylaxis:  Given stable neuro exam as of 12/10 added SQH prophy since bedbound from severity of CVA    CODE STATUS:   Code Status and Medical Interventions:   Ordered at: 12/08/18 2008     Level Of Support Discussed With:    Patient     Code Status:    CPR     Medical Interventions (Level of Support Prior to  Arrest):    Full     Disposition: I expect the patient to be discharged TBD to rehab     Electronically signed by SAMREEN Carbone, 12/14/18, 12:20 PM.

## 2018-12-14 NOTE — PLAN OF CARE
Problem: Patient Care Overview  Goal: Plan of Care Review  Outcome: Ongoing (interventions implemented as appropriate)   12/14/18 3105   Coping/Psychosocial   Plan of Care Reviewed With patient;grandchild(jannet)   SLP treatment completed. Will continue to address dysphagia and cognitive-communication skills through treatment. Please see note for further details and recommendations.

## 2018-12-14 NOTE — THERAPY TREATMENT NOTE
Acute Care - Occupational Therapy Treatment Note  [facility]     Patient Name: Anita Roche  : 1945  MRN: [id number]  Today's Date: 2018  Onset of Illness/Injury or Date of Surgery: 18  Date of Referral to OT: 18  Referring Physician: MD SULEIMAN    Admit Date: 2018       ICD-10-CM ICD-9-CM   1. Oropharyngeal dysphagia R13.12 787.22   2. Impaired mobility and ADLs Z74.09 799.89   3. Impaired functional mobility, balance, gait, and endurance Z74.09 V49.89   4. Cerebrovascular accident (CVA) due to occlusion of right carotid artery (CMS/HCC) I63.231 433.11   5. Cognitive communication deficit R41.841 799.52     Patient Active Problem List   Diagnosis   • Chronic fatigue syndrome   • Knee pain   • Closed wedge compression fracture of thoracic vertebra (CMS/HCC)   • Atherosclerosis of coronary artery   • Persistent cough   • Degeneration of intervertebral disc of lumbar region   • Stricture of esophagus   • Hand pain, left   • Trigger middle finger of left hand   • Hypokalemia   • CVA (cerebral vascular accident) (CMS/HCC)   • Tobacco use   • Underweight   • Sepsis due to pneumonia (CMS/HCC)   • Aspiration pneumonia (CMS/HCC)   • Acute respiratory failure with hypoxia (CMS/HCC)     Past Medical History:   Diagnosis Date   • Acid reflux    • Arthritis    • Atherosclerosis of coronary artery    • Chronic fatigue    • Coronary artery disease    • Degenerative lumbar disc    • Elevated cholesterol    • Esophageal stricture    • Hypertension    • PONV (postoperative nausea and vomiting)      Past Surgical History:   Procedure Laterality Date   • ABDOMINAL SURGERY     • BACK SURGERY     • CARDIAC CATHETERIZATION     • CATARACT EXTRACTION, BILATERAL     • CHOLECYSTECTOMY     • COLONOSCOPY     • ENDOSCOPY     • EYE SURGERY     • TONSILLECTOMY AND ADENOIDECTOMY     • TRIGGER FINGER RELEASE Right     Third and Fourth Fingers Dr. Ashley   • TUBAL ABDOMINAL LIGATION         Therapy  Treatment    Rehabilitation Treatment Summary     Row Name 12/14/18 1440             Treatment Time/Intention    Discipline  occupational therapist  -AN      Document Type  therapy note (daily note)  -AN      Subjective Information  complains of;pain  -AN      Mode of Treatment  occupational therapy  -AN      Patient/Family Observations  Grandson present; pt reclined in chair, NG tube feed, UE's elevated  -AN      Care Plan Review  care plan/treatment goals reviewed;risks/benefits reviewed  -AN      Patient Effort  good  -AN      Comment  Improvement with tracking; pt continues to  c/o head pain  -AN      Existing Precautions/Restrictions  fall;NPO  (Significant)   -AN      Treatment Considerations/Comments  L side neglect, NPO  -AN      Recorded by [AN] Juana Carson, OT 12/14/18 1513      Row Name 12/14/18 1440             Vital Signs    Pre Systolic BP Rehab  -- RN cleared for tx  -AN      Recorded by [AN] Juana Carson OT 12/14/18 1513      Row Name 12/14/18 1440             Cognitive Assessment/Intervention- PT/OT    Affect/Mental Status (Cognitive)  WFL  -AN      Orientation Status (Cognition)  oriented to;person;place  -AN      Follows Commands (Cognition)  follows one step commands;75-90% accuracy  -AN      Personal Safety Interventions  fall prevention program maintained  -AN      Recorded by [AN] Juana Carson OT 12/14/18 1513      Row Name 12/14/18 1440             Bed Mobility Assessment/Treatment    Comment (Bed Mobility)  pt in chair  -AN      Recorded by [AN] Juana Carson OT 12/14/18 1513      Row Name 12/14/18 1440             ADL Assessment/Intervention    BADL Assessment/Intervention  grooming  -AN      Recorded by [AN] Juana Carson OT 12/14/18 1513      Row Name 12/14/18 1440             Grooming Assessment/Training    [place] Level (Grooming)  wash face, hands;moderate assist (50% patient effort)  -AN      Grooming Position  supported sitting  -AN      Comment (Grooming)  needs  tactile and verbal cues to attend to L side of face  -AN      Recorded by [AN] Juana Carson, OT 12/14/18 1513      Row Name 12/14/18 1440             Self-Feeding Assessment/Training    Comment (Feeding)  able to dip swab to wet and stick in her mouth  -AN      Recorded by [AN] Juana Carson, OT 12/14/18 1513      Row Name 12/14/18 1440             Therapeutic Exercise    46515 - OT Therapeutic Exercise Minutes  15  -AN      51744 - OT Therapeutic Activity Minutes  8  -AN      Recorded by [AN] Juana Carson, OT 12/14/18 1513      Row Name 12/14/18 1440             Therapeutic Exercise    Upper Extremity Range of Motion (Therapeutic Exercise)  shoulder flexion/extension, bilateral;shoulder horizontal abduction/adduction, bilateral;shoulder internal/external rotation, bilateral;elbow flexion/extension, bilateral;forearm supination/pronation, bilateral;wrist flexion/extension, bilateral  -AN      Core Strength (Therapeutic Exercise)  other (see comments) forward flexion and maintaining from chair; L UE WB  -AN      Exercise Type (Therapeutic Exercise)  AROM (active range of motion);AAROM (active assistive range of motion)  -AN      Sets/Reps (Therapeutic Exercise)  2/15  -AN      Recorded by [AN] Juana Carson, OT 12/14/18 1513      Row Name 12/14/18 1440             Static Sitting Balance    Level of [place] (Unsupported Sitting, Static Balance)  moderate assist, 50 to 74% patient effort  -AN      Sitting Position (Unsupported Sitting, Static Balance)  sitting in chair  -AN      Comment (Unsupported Sitting, Static Balance)  forward flex away from chair, min assist intermittently  -AN      Recorded by [AN] Juana Carson, OT 12/14/18 1513      Row Name 12/14/18 1440             Pain Scale: Numbers Pre/Post-Treatment    Pain Scale: Numbers, Pretreatment  6/10  -AN      Pain Scale: Numbers, Post-Treatment  6/10  -AN      Pain Location - Side  Right  -AN      Pain Location  head  -AN      Pain Intervention(s)   Repositioned notified RN  -AN      Recorded by [AN] Juana Carson, OT 12/14/18 1513      Row Name 12/14/18 1440             Vision Assessment/Intervention    Vision Assessment Comment  Tracking and gaze to midline, much improved; pt can maintain more than 2 minutes  -AN      Recorded by [AN] Juana Carson, OT 12/14/18 1513      Row Name 12/14/18 1440             Outcome Summary/Treatment Plan (OT)    Daily Summary of Progress (OT)  progress toward functional goals as expected  -AN      Anticipated Discharge Disposition (OT)  inpatient rehabilitation facility  -AN      Recorded by [AN] Juana Carson, OT 12/14/18 1513        User Key  (r) = Recorded By, (t) = Taken By, (c) = Cosigned By    Initials Name Effective Dates Discipline    AN Juana Carson, OT 06/22/15 -  OT             Occupational Therapy Education     Title: PT OT SLP Therapies (Done)     Topic: Occupational Therapy (Done)     Point: ADL training (Done)     Description: Instruct learner(s) on proper safety adaptation and remediation techniques during self care or transfers.   Instruct in proper use of assistive devices.    Learning Progress Summary           Patient Acceptance, E,D, VU,NR by AN at 12/14/2018  2:40 PM    Comment:  Educated on benefits of all ex this date.    Acceptance, E, VU by EM at 12/14/2018  4:38 AM    Acceptance, E,D, VU,NR by AN at 12/11/2018 10:18 AM    Comment:  ADL retraining wtih feeding and education on body mechanics for balance.    Acceptance, E,TB,D, VU,DU by ST at 12/9/2018  3:32 PM    Comment:  role of OT, benefits of activity, bed mobility, POC, rolling, deficits, neglect                   Point: Home exercise program (Done)     Description: Instruct learner(s) on appropriate technique for monitoring, assisting and/or progressing therapeutic exercises/activities.    Learning Progress Summary           Patient Acceptance, E,D, VU,NR by AN at 12/14/2018  2:40 PM    Comment:  Educated on benefits of all ex this date.     Acceptance, E, VU by EM at 12/14/2018  4:38 AM    Acceptance, E,D, VU,DU,NR by AN at 12/12/2018  4:31 PM    Comment:  Educated on UE ex, sensation training; visual ex    Acceptance, E,TB,D, VU,DU by ST at 12/9/2018  3:32 PM    Comment:  role of OT, benefits of activity, bed mobility, POC, rolling, deficits, neglect                   Point: Precautions (Done)     Description: Instruct learner(s) on prescribed precautions during self-care and functional transfers.    Learning Progress Summary           Patient Acceptance, E, VU by EM at 12/14/2018  4:38 AM    Acceptance, E,TB,D, VU,DU by ST at 12/9/2018  3:32 PM    Comment:  role of OT, benefits of activity, bed mobility, POC, rolling, deficits, neglect                   Point: Body mechanics (Done)     Description: Instruct learner(s) on proper positioning and spine alignment during self-care, functional mobility activities and/or exercises.    Learning Progress Summary           Patient Acceptance, E, VU by EM at 12/14/2018  4:38 AM    Acceptance, E,D, VU,NR by AN at 12/11/2018 10:18 AM    Comment:  ADL retraining wtih feeding and education on body mechanics for balance.    Acceptance, E,TB,D, VU,DU by  at 12/9/2018  3:32 PM    Comment:  role of OT, benefits of activity, bed mobility, POC, rolling, deficits, neglect                               User Key     Initials Effective Dates Name Provider Type Discipline     06/10/18 -  Rebeka Ospina OTR Occupational Therapist OT    AN 06/22/15 -  Juana Carson, OT Occupational Therapist OT    EM 06/12/17 -  Génesis Fuentes RN Registered Nurse Nurse                OT Recommendation and Plan  Outcome Summary/Treatment Plan (OT)  Daily Summary of Progress (OT): progress toward functional goals as expected  Anticipated Discharge Disposition (OT): inpatient rehabilitation facility  Daily Summary of Progress (OT): progress toward functional goals as expected  Plan of Care Review  Plan of Care Reviewed With:  bart(jannet), patient  Plan of Care Reviewed With: bart(jannet), patient  Outcome Summary: Pt with noted improvment in visual tracking to midline and static sistting balance. Pt with NG tube and trace L UE adduction/flex in shoulder. Will need IRF at discharge.   Outcome Measures     Row Name 12/14/18 1440 12/12/18 1609 12/12/18 0923       How much help from another person do you currently need...    Turning from your back to your side while in flat bed without using bedrails?  --  --  2  -CD    Moving from lying on back to sitting on the side of a flat bed without bedrails?  --  --  2  -CD    Moving to and from a bed to a chair (including a wheelchair)?  --  --  2  -CD    Standing up from a chair using your arms (e.g., wheelchair, bedside chair)?  --  --  2  -CD    Climbing 3-5 steps with a railing?  --  --  1  -CD    To walk in hospital room?  --  --  1  -CD    AM-PAC 6 Clicks Score  --  --  10  -CD       How much help from another is currently needed...    Putting on and taking off regular lower body clothing?  1  -AN  1  -AN  --    Bathing (including washing, rinsing, and drying)  1  -AN  1  -AN  --    Toileting (which includes using toilet bed pan or urinal)  1  -AN  1  -AN  --    Putting on and taking off regular upper body clothing  1  -AN  1  -AN  --    Taking care of personal grooming (such as brushing teeth)  2  -AN  2  -AN  --    Eating meals  1  -AN  1  -AN  --    Score  7  -AN  7  -AN  --       Modified [place] Scale    Modified [place] Scale  --  --  4 - Moderately severe disability.  Unable to walk without assistance, and unable to attend to own bodily needs without assistance.  -CD       Functional Assessment    Outcome Measure Options  --  --  AM-PAC 6 Clicks Basic Mobility (PT);Modified [place]  -CD      User Key  (r) = Recorded By, (t) = Taken By, (c) = Cosigned By    Initials Name Provider Type    CD Jamia Bocanegra, PT Physical Therapist    Juana Quintanilla, OT Occupational Therapist            Time Calculation:   Time Calculation- OT     Row Name 12/14/18 1515 12/14/18 1440          Time Calculation- OT    OT Start Time  1440  -AN  --     Total Timed Code Minutes- OT  23 minute(s)  -AN  --     OT Received On  12/14/18  -AN  --     OT Goal Re-Cert Due Date  12/19/18  -AN  --        Timed Charges    17058 - OT Therapeutic Exercise Minutes  --  15  -AN     37063 - OT Therapeutic Activity Minutes  --  8  -AN       User Key  (r) = Recorded By, (t) = Taken By, (c) = Cosigned By    Initials Name Provider Type    AN Juana Carson OT Occupational Therapist           Therapy Suggested Charges     Code   Minutes Charges    70929 (CPT®) Hc Ot Neuromusc Re Education Ea 15 Min      61561 (CPT®) Hc Ot Ther Proc Ea 15 Min 15 1    17702 (CPT®) Hc Ot Therapeutic Act Ea 15 Min 8 1    87163 (CPT®) Hc Ot Manual Therapy Ea 15 Min      52242 (CPT®) Hc Ot Iontophoresis Ea 15 Min      42454 (CPT®) Hc Ot Elec Stim Ea-Per 15 Min      88977 (CPT®) Hc Ot Ultrasound Ea 15 Min      31243 (CPT®) Hc Ot Self Care/Mgmt/Train Ea 15 Min      Total  23 2        Therapy Charges for Today     Code Description Service Date Service Provider Modifiers Qty    32627361539 HC OT THERAPEUTIC ACT EA 15 MIN 12/14/2018 Juana Carson OT GO 1    73178645005 HC OT THER PROC EA 15 MIN 12/14/2018 Juana Casron OT GO 1               Juana Carson OT  12/14/2018

## 2018-12-14 NOTE — PLAN OF CARE
Problem: Fall Risk (Adult)  Goal: Identify Related Risk Factors and Signs and Symptoms  Outcome: Ongoing (interventions implemented as appropriate)   12/14/18 0436   Fall Risk (Adult)   Related Risk Factors (Fall Risk) age-related changes     Goal: Absence of Fall  Outcome: Ongoing (interventions implemented as appropriate)   12/12/18 0306   Fall Risk (Adult)   Absence of Fall making progress toward outcome       Problem: Patient Care Overview  Goal: Plan of Care Review  Outcome: Ongoing (interventions implemented as appropriate)   12/12/18 1719 12/13/18 2000   Coping/Psychosocial   Plan of Care Reviewed With --  patient   Plan of Care Review   Progress no change --    OTHER   Outcome Summary pt more alert today, able to take meds crushed in applesauce but failed barium swallow test and swallow evals. waiting on family to discuss possible keofeed as pt is unwilling to make decision. no complaints of pain but does complain of headache. left sided facial droop still predominant. reviewed options and plan of care with pt who verbalized understanding but needs further teaching. will continue to monitor. --      Goal: Individualization and Mutuality  Outcome: Ongoing (interventions implemented as appropriate)    Goal: Discharge Needs Assessment  Outcome: Ongoing (interventions implemented as appropriate)   12/10/18 0912   Discharge Needs Assessment   Readmission Within the Last 30 Days no previous admission in last 30 days   Concerns to be Addressed discharge planning   Patient/Family Anticipates Transition to home with family   Patient/Family Anticipated Services at Transition none   Transportation Anticipated family or friend will provide   Anticipated Changes Related to Illness none   Equipment Needed After Discharge none   Disability   Equipment Currently Used at Home none     Goal: Interprofessional Rounds/Family Conf  Outcome: Ongoing (interventions implemented as appropriate)      Problem: Skin Injury Risk  (Adult)  Goal: Identify Related Risk Factors and Signs and Symptoms  Outcome: Ongoing (interventions implemented as appropriate)   12/08/18 1938   Skin Injury Risk (Adult)   Related Risk Factors (Skin Injury Risk) advanced age;medication     Goal: Skin Health and Integrity  Outcome: Ongoing (interventions implemented as appropriate)   12/08/18 1938   Skin Injury Risk (Adult)   Skin Health and Integrity making progress toward outcome

## 2018-12-14 NOTE — PROGRESS NOTES
Continued Stay Note  [facility]     Patient Name: Anita Roche  MRN: [id number]  Today's Date: 12/14/2018    Admit Date: 12/8/2018    Discharge Plan     Row Name 12/14/18 1058       Plan    Plan Comments  Pt not medically ready for discharge. [facility] has been notified and will continue to follow.        Discharge Codes    No documentation.       Expected Discharge Date and Time     Expected Discharge Date Expected Discharge Time    Dec 14, 2018             Alaina Stevens RN

## 2018-12-14 NOTE — THERAPY TREATMENT NOTE
Acute Care - Speech Language Pathology   Swallow Progress Note [facility]     Patient Name: Anita Roche  : 1945  MRN: [id number]  Today's Date: 2018  Onset of Illness/Injury or Date of Surgery: 18     Referring Physician: MD SULEIMAN      Admit Date: 2018    Visit Dx:      ICD-10-CM ICD-9-CM   1. Oropharyngeal dysphagia R13.12 787.22   2. Impaired mobility and ADLs Z74.09 799.89   3. Impaired functional mobility, balance, gait, and endurance Z74.09 V49.89   4. Cerebrovascular accident (CVA) due to occlusion of right carotid artery (CMS/HCC) I63.231 433.11   5. Cognitive communication deficit R41.841 799.52     Patient Active Problem List   Diagnosis   • Chronic fatigue syndrome   • Knee pain   • Closed wedge compression fracture of thoracic vertebra (CMS/HCC)   • Atherosclerosis of coronary artery   • Persistent cough   • Degeneration of intervertebral disc of lumbar region   • Stricture of esophagus   • Hand pain, left   • Trigger middle finger of left hand   • Hypokalemia   • CVA (cerebral vascular accident) (CMS/HCC)   • Tobacco use   • Underweight   • Sepsis due to pneumonia (CMS/HCC)   • Aspiration pneumonia (CMS/HCC)   • Acute respiratory failure with hypoxia (CMS/HCC)       Therapy Treatment  Rehabilitation Treatment Summary     Row Name 18 1500 18 1440          Treatment Time/Intention    Discipline  speech language pathologist  -VO  occupational therapist  -AN     Document Type  therapy note (daily note)  -VO  therapy note (daily note)  -AN     Subjective Information  complains of;pain  -VO  complains of;pain  -AN     Mode of Treatment  speech-language pathology  -VO  occupational therapy  -AN     Patient/Family Observations  Grandson present  -VO  Grandson present; pt reclined in chair, NG tube feed, UE's elevated  -AN     Care Plan Review  care plan/treatment goals reviewed;risks/benefits reviewed;current/potential barriers reviewed;patient/other agree to care plan  -VO   care plan/treatment goals reviewed;risks/benefits reviewed  -AN     Care Plan Review, Other Participant(s)  family  -VO  --     Therapy Frequency (Swallow)  5 days per week  -VO  --     Therapy Frequency (SLP SLC)  5 days per week  -VO  --     Patient Effort  good  -VO  good  -AN     Comment  Pt w/ L-side neglect, requires cues to attend to speaker on L side.  -VO  Improvement with tracking; pt continues to  c/o head pain  -AN     Existing Precautions/Restrictions  --  fall;NPO  (Significant)   -AN     Treatment Considerations/Comments  L side neglect  -VO  L side neglect, NPO  -AN     Recorded by [VO] Seema Nance MA,CCC-SLP 12/14/18 1538 [AN] Juana Carson, OT 12/14/18 1513     Row Name 12/14/18 1440             Vital Signs    Pre Systolic BP Rehab  -- RN cleared for tx  -AN      Recorded by [AN] Juana Carson, OT 12/14/18 1513      Row Name 12/14/18 1440             Cognitive Assessment/Intervention- PT/OT    Affect/Mental Status (Cognitive)  WFL  -AN      Orientation Status (Cognition)  oriented to;person;place  -AN      Follows Commands (Cognition)  follows one step commands;75-90% accuracy  -AN      Personal Safety Interventions  fall prevention program maintained  -AN      Recorded by [AN] Juana Carson, OT 12/14/18 1513      Row Name 12/14/18 1440             Bed Mobility Assessment/Treatment    Comment (Bed Mobility)  pt in chair  -AN      Recorded by [AN] Juana Carson, OT 12/14/18 1513      Row Name 12/14/18 1440             ADL Assessment/Intervention    BADL Assessment/Intervention  grooming  -AN      Recorded by [AN] Juana Carson, OT 12/14/18 1513      Row Name 12/14/18 1440             Grooming Assessment/Training    [place] Level (Grooming)  wash face, hands;moderate assist (50% patient effort)  -AN      Grooming Position  supported sitting  -AN      Comment (Grooming)  needs tactile and verbal cues to attend to L side of face  -AN      Recorded by [AN] Juana Carson, OT 12/14/18  1513      Row Name 12/14/18 1440             Self-Feeding Assessment/Training    Comment (Feeding)  able to dip swab to wet and stick in her mouth  -AN      Recorded by [AN] Juana Carson, OT 12/14/18 1513      Row Name 12/14/18 1440             Therapeutic Exercise    37966 - OT Therapeutic Exercise Minutes  15  -AN      20516 - OT Therapeutic Activity Minutes  8  -AN      Recorded by [AN] Juana Carson, OT 12/14/18 1513      Row Name 12/14/18 1440             Therapeutic Exercise    Upper Extremity Range of Motion (Therapeutic Exercise)  shoulder flexion/extension, bilateral;shoulder horizontal abduction/adduction, bilateral;shoulder internal/external rotation, bilateral;elbow flexion/extension, bilateral;forearm supination/pronation, bilateral;wrist flexion/extension, bilateral  -AN      Core Strength (Therapeutic Exercise)  other (see comments) forward flexion and maintaining from chair; L UE WB  -AN      Exercise Type (Therapeutic Exercise)  AROM (active range of motion);AAROM (active assistive range of motion)  -AN      Sets/Reps (Therapeutic Exercise)  2/15  -AN      Recorded by [AN] Juana Carson, OT 12/14/18 1513      Row Name 12/14/18 1440             Static Sitting Balance    Level of [place] (Unsupported Sitting, Static Balance)  moderate assist, 50 to 74% patient effort  -AN      Sitting Position (Unsupported Sitting, Static Balance)  sitting in chair  -AN      Comment (Unsupported Sitting, Static Balance)  forward flex away from chair, min assist intermittently  -AN      Recorded by [AN] Juana Carson, OT 12/14/18 1513      Row Name 12/14/18 1500             Pain Assessment    Additional Documentation  Pain Scale: FACES Pre/Post-Treatment (Group)  -VO      Recorded by [VO] Seema Nance MA,CCC-SLP 12/14/18 1538      Row Name 12/14/18 1500 12/14/18 1440          Pain Scale: Numbers Pre/Post-Treatment    Pain Scale: Numbers, Pretreatment  6/10  -VO  6/10  -AN     Pain Scale: Numbers,  Post-Treatment  6/10  -VO  6/10  -AN     Pain Location - Side  --  Right  -AN     Pain Location  head  -VO  head  -AN     Pain Intervention(s)  -- RN administered medications  -VO  Repositioned notified RN  -AN     Recorded by [VO] Seema Nance MA,CCC-SLP 12/14/18 1538 [AN] Juana Carson, OT 12/14/18 1513     Row Name 12/14/18 1440             Vision Assessment/Intervention    Vision Assessment Comment  Tracking and gaze to midline, much improved; pt can maintain more than 2 minutes  -AN      Recorded by [AN] Juana Carson, OT 12/14/18 1513      Row Name 12/14/18 1440             Outcome Summary/Treatment Plan (OT)    Daily Summary of Progress (OT)  progress toward functional goals as expected  -AN      Anticipated Discharge Disposition (OT)  inpatient rehabilitation facility  -AN      Recorded by [AN] Juana Carson, OT 12/14/18 1513      Row Name 12/14/18 1500             Outcome Summary/Treatment Plan (SLP)    Daily Summary of Progress (SLP)  progress toward functional goals as expected  -VO      Plan for Continued Treatment (SLP)  Pt now w/ NG. Noted cup of thins at bedside that pt is taking sips of with swab. Provided edu on risk of aspiration and provided oral care kit. Trialed thins via ice/tsp/cup and pudding. Cough w/ thins. Participated well in swallow excs. Needs consistent cues to attend to L side. Has handout for excs.  -VO      Anticipated Dischage Disposition  unknown;anticipate therapy at next level of care  -VO      Recorded by [VO] Seema Nance MA,CCC-SLP 12/14/18 1538        User Key  (r) = Recorded By, (t) = Taken By, (c) = Cosigned By    Initials Name Effective Dates Discipline    AN Juana Carson, OT 06/22/15 -  OT    VO Seema Nance MA,CCC-SLP 10/24/18 -  SLP          Outcome Summary  Outcome Summary/Treatment Plan (SLP)  Daily Summary of Progress (SLP): progress toward functional goals as expected (12/14/18 1500 : Seema Nance MA,CCC-SLP)  Plan for Continued  Treatment (SLP): Pt now w/ NG. Noted cup of thins at bedside that pt is taking sips of with swab. Provided edu on risk of aspiration and provided oral care kit. Trialed thins via ice/tsp/cup and pudding. Cough w/ thins. Participated well in swallow excs. Needs consistent cues to attend to L side. Has handout for excs. (12/14/18 1500 : Seema Nance MA,CCC-SLP)  Anticipated Dischage Disposition: unknown, anticipate therapy at next level of care (12/14/18 1500 : Seema Nance MA,CCC-SLP)      SLP GOALS     Row Name 12/14/18 1500 12/13/18 1100 12/12/18 1500       Oral Nutrition/Hydration Goal 1 (SLP)    Oral Nutrition/Hydration Goal 1, SLP  LTG: Pt will demonstrate improved swallowing skills to the level where repeat instrumental swallow study is indicated.  -VO  LTG: Pt will demonstrate improved swallowing skills to the level where repeat instrumental swallow study is indicated.  -MP  LTG: Pt will demonstrate improved swallowing skills to the level where repeat instrumental swallow study is indicated.  -AC    Time Frame (Oral Nutrition/Hydration Goal 1, SLP)  by discharge  -VO  by discharge  -MP  by discharge  -AC    Progress/Outcomes (Oral Nutrition/Hydration Goal 1, SLP)  continuing progress toward goal  -VO  continuing progress toward goal  -MP  goal revised this date  -AC       Oral Nutrition/Hydration Goal 2 (SLP)    Oral Nutrition/Hydration Goal 2, SLP  Pt will tolerate therapeutic trials of H2O w/o s/sxs aspiration w/ 70% acc w/o cues.  -VO  Pt will tolerate therapeutic trials of H2O w/o s/sxs aspiration w/ 70% acc w/o cues.  -MP  Pt will tolerate therapeutic trials of H2O w/o s/sxs aspiration w/ 70% acc w/o cues.  -AC    Time Frame (Oral Nutrition/Hydration Goal 2, SLP)  short term goal (STG);by discharge  -VO  short term goal (STG);by discharge  -MP  short term goal (STG);by discharge  -AC    Barriers (Oral Nutrition/Hydration Goal 2, SLP)  Cough w/ tsp sips of thin.  -VO  Cough on 1/4 trials of  1/2 tsp thin liquid  -MP  --    Progress/Outcomes (Oral Nutrition/Hydration Goal 2, SLP)  good progress toward goal  -VO  good progress toward goal  -MP  goal revised this date  -       Oral Nutrition/Hydration Goal (SLP)    Oral Nutrition/Hydration Goal, SLP  --  --  Pt will tolerate trials of regular solid w/ no overt s/sxs aspiration or discomfort w/ 100% acc and no cues  -AC    Time Frame (Oral Nutrition/Hydration Goal, SLP)  --  --  short term goal (STG);by discharge  -AC    Progress/Outcomes (Oral Nutrition/Hydration Goal, SLP)  --  --  goal no longer appropriate  -AC       Lingual Strengthening Goal 1 (SLP)    Increase Lingual Tone/Sensation/Control/Coordination/Movement  lingual resistance exercises  -VO  lingual resistance exercises  -MP  lingual resistance exercises  -AC    [place]/Accuracy (Lingual Strengthening Goal 1, SLP)  with minimal cues (75-90% accuracy)  -VO  with minimal cues (75-90% accuracy)  -MP  with minimal cues (75-90% accuracy)  -AC    Time Frame (Lingual Strengthening Goal 1, SLP)  short term goal (STG);by discharge  -VO  short term goal (STG);by discharge  -MP  short term goal (STG);by discharge  -AC    Barriers (Lingual Strengthening Goal 1, SLP)  50% w/ min cues  -VO  --  --    Progress/Outcomes (Lingual Strengthening Goal 1, SLP)  continuing progress toward goal  -VO  continuing progress toward goal  -MP  goal revised this date  -       Pharyngeal Strengthening Exercise Goal 1 (SLP)    Activity (Pharyngeal Strengthening Goal 1, SLP)  increase timing;increase superior movement of the hyolaryngeal complex;increase anterior movement of the hyolaryngeal complex;increase closure at entrance to airway/closure of airway at glottis;increase squeeze/positive pressure generation;increase tongue base retraction  -VO  increase timing;increase superior movement of the hyolaryngeal complex;increase anterior movement of the hyolaryngeal complex;increase closure at entrance to airway/closure  of airway at glottis;increase squeeze/positive pressure generation;increase tongue base retraction  -MP  increase timing;increase superior movement of the hyolaryngeal complex;increase anterior movement of the hyolaryngeal complex;increase closure at entrance to airway/closure of airway at glottis;increase squeeze/positive pressure generation;increase tongue base retraction  -AC    Increase Timing  prepping - 3 second prep or suck swallow or 3-step swallow  -VO  prepping - 3 second prep or suck swallow or 3-step swallow  -MP  prepping - 3 second prep or suck swallow or 3-step swallow  -AC    Increase Superior Movement of the Hyolaryngeal Complex  effortful pitch glide (falsetto + pharyngeal squeeze)  -VO  effortful pitch glide (falsetto + pharyngeal squeeze)  -MP  effortful pitch glide (falsetto + pharyngeal squeeze)  -AC    Increase Anterior Movement of the Hyolaryngeal Complex  chin tuck against resistance (CTAR)  -VO  chin tuck against resistance (CTAR)  -MP  chin tuck against resistance (CTAR)  -AC    Increase Closure at Entrance to Airway/Closure of Airway at Glottis  super-supraglottic swallow  -VO  super-supraglottic swallow  -MP  super-supraglottic swallow  -AC    Increase Squeeze/Positive Pressure Generation  hard effortful swallow  -VO  hard effortful swallow  -MP  hard effortful swallow  -AC    Increase Tongue Base Retraction  juan david  -VO  juan david  -MP  juan david  -AC    [place]/Accuracy (Pharyngeal Strengthening Goal 1, SLP)  with minimal cues (75-90% accuracy)  -VO  with minimal cues (75-90% accuracy)  -MP  with minimal cues (75-90% accuracy)  -AC    Time Frame (Pharyngeal Strengthening Goal 1, SLP)  short term goal (STG);by discharge  -VO  short term goal (STG);by discharge  -MP  short term goal (STG);by discharge  -AC    Barriers (Pharyngeal Strengthening Goal 1, SLP)  Reviewed handout and exercises. Effort- 50%, CTAR-60%, juan david-10%. Unable to participate in the rest 2' c/o headache.  -VO  Provided  handout and explanation of each exercise. Completed 3 reps of each w/ pt. Required moderate cues for each w/ SSG being the hardest for pt to follow.  -MP  --    Progress/Outcomes (Pharyngeal Strengthening Goal 1, SLP)  continuing progress toward goal  -VO  continuing progress toward goal  -MP  goal revised this date  -AC       Swallow Compensatory Strategies Goal 1 (SLP)    Activity (Swallow Compensatory Strategies/Techniques Goal 1, SLP)  --  --  compensatory strategies;liquids by teaspoon only  -AC    [place]/Accuracy (Swallow Compensatory Strategies/Techniques Goal 1, SLP)  --  --  with minimal cues (75-90% accuracy)  -AC    Time Frame (Swallow Compensatory Strategies/Techniques Goal 1, SLP)  --  --  short term goal (STG);by discharge  -AC    Progress/Outcomes (Swallow Compensatory Strategies/Techniques Goal 1, SLP)  --  --  goal no longer appropriate  -AC       Respiratory Support Goal 1 (SLP)    Improve Respiratory Support Goal 1 (SLP)  sustaining a vowel sound on exhalation;80%;with minimal cues (75-90%)  -VO  sustaining a vowel sound on exhalation;80%;with minimal cues (75-90%)  -MP  --    Time Frame (Respiratory Support Goal 1, SLP)  short term goal (STG);by discharge  -VO  short term goal (STG);by discharge  -MP  --    Progress/Outcomes (Respiratory Support Goal 1, SLP)  goal ongoing  -VO  goal ongoing  -MP  --       Articulation Goal 1 (SLP)    Improve Articulation Goal 1 (SLP)  by over-articulating at word level;80%;with minimal cues (75-90%)  -VO  by over-articulating at word level;80%;with minimal cues (75-90%)  -MP  --    Time Frame (Articulation Goal 1, SLP)  short term goal (STG);by discharge  -VO  short term goal (STG);by discharge  -MP  --    Progress (Articulation Goal 1, SLP)  40%;with minimal cues (75-90%)  -VO  --  --    Progress/Outcomes (Articulation Goal 1, SLP)  good progress toward goal  -VO  goal ongoing  -MP  --       Attention Goal 1 (SLP)    Improve Attention by Goal 1 (SLP)   looking at speaker;attending to task;80%;independently (over 90% accuracy)  -VO  looking at speaker;attending to task;80%;independently (over 90% accuracy)  -MP  --    Time Frame (Attention Goal 1, SLP)  short term goal (STG);by discharge  -VO  short term goal (STG);by discharge  -MP  --    Progress (Attention Goal 1, SLP)  60%;with moderate cues (50-74%)  -VO  50%;with moderate cues (50-74%)  -MP  --    Progress/Outcomes (Attention Goal 1, SLP)  continuing progress toward goal  -VO  continuing progress toward goal  -MP  --       Additional Goal 1 (SLP)    Additional Goal 1, SLP  LTG: Pt will improve cognitive-communication skills in order to participate in care in hospital setting w/ 100% acc w/ min cues.  -VO  LTG: Pt will improve cognitive-communication skills in order to participate in care in hospital setting w/ 100% acc w/ min cues.  -MP  --    Time Frame (Additional Goal 1, SLP)  by discharge  -VO  by discharge  -MP  --    Progress/Outcomes (Additional Goal 1, SLP)  continuing progress toward goal  -VO  continuing progress toward goal  -MP  --      User Key  (r) = Recorded By, (t) = Taken By, (c) = Cosigned By    Initials Name Provider Type    AC Estrella Gaytan MS CCC-SLP Speech and Language Pathologist    Seema Quezada MA,CCC-SLP Speech and Language Pathologist    Steven Nieto MS, CFY-SLP Speech and Language Pathologist          EDUCATION  The patient has been educated in the following areas:   Cognitive Impairment Communication Impairment Dysphagia (Swallowing Impairment) Oral Care/Hydration NPO rationale.    SLP Recommendation and Plan                       Anticipated Dischage Disposition: unknown, anticipate therapy at next level of care     Therapy Frequency (Swallow): 5 days per week          Plan of Care Reviewed With: patient, grandchild(jannet)  Plan of Care Review  Plan of Care Reviewed With: patient, grandchild(jannet)  Daily Summary of Progress (SLP): progress toward functional goals  as expected  Plan for Continued Treatment (SLP): Pt now w/ NG. Noted cup of thins at bedside that pt is taking sips of with swab. Provided edu on risk of aspiration and provided oral care kit. Trialed thins via ice/tsp/cup and pudding. Cough w/ thins. Participated well in swallow excs. Needs consistent cues to attend to L side. Has handout for excs.           Time Calculation:   Time Calculation- SLP     Row Name 12/14/18 1542             Time Calculation- SLP    SLP Start Time  1500  -VO      SLP Received On  12/14/18  -VO        User Key  (r) = Recorded By, (t) = Taken By, (c) = Cosigned By    Initials Name Provider Type    VO Seema Nance MA,CCC-SLP Speech and Language Pathologist          Therapy Charges for Today     Code Description Service Date Service Provider Modifiers Qty    17844883711 HC ST TREATMENT SWALLOW 3 12/14/2018 Seema Nance MA,CCC-SLP GN 1    14649970067 HC ST TREATMENT SPEECH 2 12/14/2018 Seema Nance MA,CCC-SLP GN 1                 Seema Nance MA,CCC-SLP  12/14/2018

## 2018-12-14 NOTE — PLAN OF CARE
Problem: Patient Care Overview  Goal: Plan of Care Review  Outcome: Ongoing (interventions implemented as appropriate)   12/14/18 1440   Coping/Psychosocial   Plan of Care Reviewed With grandchild(jannet);patient   Plan of Care Review   Progress improving   OTHER   Outcome Summary Pt with noted improvment in visual tracking to midline and static sistting balance. Pt with NG tube and trace L UE adduction/flex in shoulder. Will need IRF at discharge.

## 2018-12-14 NOTE — PROGRESS NOTES
"Clinical Nutrition   Reason For Visit: Follow-up protocol, EN    Patient Name: Anita Roche  YOB: 1945  MRN: [id number]  Date of Encounter: 12/14/18 5:50 PM  Admission date: 12/8/2018    Comment: When tube in place begin feeding as ordered: Fibersource HN to goal 45ml/hr w water at 15 ml/hr, conservative given period of minimal or no intake. If TF delmar consider incr to rate of 50 m/hr w water at 20 ml/hr.     Nutrition Assessment     Admission Problem List:  L-sided weakness  HA  R hemisphere infarct secondary to ICA occlusion  Scattered infarcts  Aspiration PNA      Applicable medical tests/procedures since admission:  MBS (12/10)- rec puree with thins, no straws  SLP bedside (12/11)- rec NPO  SLP bedside (12/12)- rec NPO. Plan for MBS - failed  SLP trials of liquid (12/14)- cont NPO diet order at this time        PMH: She  has a past medical history of Acid reflux, Arthritis, Atherosclerosis of coronary artery, Chronic fatigue, Coronary artery disease, Degenerative lumbar disc, Elevated cholesterol, Esophageal stricture, Hypertension, and PONV (postoperative nausea and vomiting).   PSxH: She  has a past surgical history that includes Cataract extraction, bilateral; Cholecystectomy; Tonsillectomy and adenoidectomy; Back surgery; Trigger finger release (Right); Abdominal surgery; Cardiac catheterization; Colonoscopy; Esophagogastroduodenoscopy; Eye surgery; Tubal ligation; and TRIGGER FINGER  RELEASE LEFT THIRD (Left, 11/27/2018).        Reported/Observed/Food/Nutrition Related History     Rpt tube not yet in place for feeding. Family rpt has had no sigif nutrition 11 days; had been taking 3 Ensure Plus / da at home.    Anthropometrics   Height: 62 in  Weight: 89 lb per standing scale (12/8)  BMI: 16.3  BMI classification: Underweight:<18.5kg/m2   UBW: 95 lb per RD note (12/10)  IBW: 110 lb    Weight change: Per RD note (12/10)- \"Family not sure about pt's weight loss, states she fluctuates between " "90-95 lb usually.\"    Medications reviewed   Medications reviewed: Yes    Needs Assessment     Height used: 62 in  Weight used: 89 lb actual  IBW: 59 kg     Estimated Calories needs: 1315/da   Based on:   30-35 Kcal/kg actual wt = 0530-2878 Kcal    Estimated Protein needs: ~60 g PRO/day  1..2 g/kg IBW= 60 g PrRO    Current Nutrition Prescription   PO: NPO Diet    EN: Fibersource HN to goal 45 ml/hr w water at 15 ml/hr   At goal will supply 1188 Kcal = 90% est need, 53 gm PRO = 88% est need, 15 g Fiber, 802 ml H20 In TF 1132 total ml Free Water.         Nutrition Diagnosis   12/10 revised 12/14  Problem Swallowing difficulty   Etiology Post-stroke complications   Signs/Symptoms Per SLP eval        12/12 revised 12/14  Problem Inadequate nutrition intake   Etiology Clinical condition   Signs/Symptoms NPO       Intervention   Intervention: Follow treatment progress, Care plan reviewed   -If pt delmar TF suggest increase to   FiberSource HN at 50 ml/hr (goal volume= 1100 ml/day) and free water at 20 ml/hr  -Will provide at goal volume:  1320 Kcal, 100% est need  59 g PRO, 98% est need  17 g fiber  891 ml Water from EN  1331 ml Free Water total      Goal:   General: Nutrition support treatment  PO: Initiate diet if cleared by SLP  EN/PN: Initiate EN, Establish EN tolerance     Monitoring/Evaluation:       Monitoring/Evaluation: Per protocol, Weight, Symptoms, Swallow function  Will Continue to follow per protocol  Dione Ocampo RD  Time Spent: 45 minutes  "

## 2018-12-15 PROBLEM — E87.0 HYPERNATREMIA: Status: ACTIVE | Noted: 2018-12-15

## 2018-12-15 LAB
ANION GAP SERPL CALCULATED.3IONS-SCNC: 9 MMOL/L (ref 3–11)
BUN BLD-MCNC: 23 MG/DL (ref 9–23)
BUN/CREAT SERPL: 35.9 (ref 7–25)
CALCIUM SPEC-SCNC: 10.2 MG/DL (ref 8.7–10.4)
CHLORIDE SERPL-SCNC: 114 MMOL/L (ref 99–109)
CO2 SERPL-SCNC: 26 MMOL/L (ref 20–31)
CREAT BLD-MCNC: 0.64 MG/DL (ref 0.6–1.3)
GFR SERPL CREATININE-BSD FRML MDRD: 91 ML/MIN/1.73
GLUCOSE BLD-MCNC: 148 MG/DL (ref 70–100)
MAGNESIUM SERPL-MCNC: 1.8 MG/DL (ref 1.3–2.7)
POTASSIUM BLD-SCNC: 3.8 MMOL/L (ref 3.5–5.5)
SODIUM BLD-SCNC: 149 MMOL/L (ref 132–146)

## 2018-12-15 PROCEDURE — 25010000002 PIPERACILLIN SOD-TAZOBACTAM PER 1 G: Performed by: FAMILY MEDICINE

## 2018-12-15 PROCEDURE — 25010000002 HEPARIN (PORCINE) PER 1000 UNITS: Performed by: FAMILY MEDICINE

## 2018-12-15 PROCEDURE — 25010000002 METOCLOPRAMIDE PER 10 MG: Performed by: FAMILY MEDICINE

## 2018-12-15 PROCEDURE — 97530 THERAPEUTIC ACTIVITIES: CPT

## 2018-12-15 PROCEDURE — 97110 THERAPEUTIC EXERCISES: CPT

## 2018-12-15 PROCEDURE — 83735 ASSAY OF MAGNESIUM: CPT | Performed by: NURSE PRACTITIONER

## 2018-12-15 PROCEDURE — 80048 BASIC METABOLIC PNL TOTAL CA: CPT | Performed by: NURSE PRACTITIONER

## 2018-12-15 PROCEDURE — 99233 SBSQ HOSP IP/OBS HIGH 50: CPT | Performed by: NURSE PRACTITIONER

## 2018-12-15 RX ORDER — SODIUM CHLORIDE 450 MG/100ML
75 INJECTION, SOLUTION INTRAVENOUS CONTINUOUS
Status: ACTIVE | OUTPATIENT
Start: 2018-12-15 | End: 2018-12-15

## 2018-12-15 RX ORDER — DILTIAZEM HYDROCHLORIDE 60 MG/1
60 TABLET, FILM COATED ORAL EVERY 6 HOURS SCHEDULED
Status: DISCONTINUED | OUTPATIENT
Start: 2018-12-15 | End: 2018-12-18 | Stop reason: HOSPADM

## 2018-12-15 RX ORDER — HYDROCODONE BITARTRATE AND ACETAMINOPHEN 5; 325 MG/1; MG/1
0.5 TABLET ORAL EVERY 6 HOURS PRN
Status: DISCONTINUED | OUTPATIENT
Start: 2018-12-15 | End: 2018-12-18 | Stop reason: HOSPADM

## 2018-12-15 RX ORDER — ACETAMINOPHEN 160 MG/5ML
650 SOLUTION ORAL EVERY 6 HOURS PRN
Status: DISCONTINUED | OUTPATIENT
Start: 2018-12-15 | End: 2018-12-18 | Stop reason: HOSPADM

## 2018-12-15 RX ADMIN — HYDROCODONE BITARTRATE AND ACETAMINOPHEN 0.5 TABLET: 5; 325 TABLET ORAL at 12:04

## 2018-12-15 RX ADMIN — HEPARIN SODIUM 5000 UNITS: 5000 INJECTION INTRAVENOUS; SUBCUTANEOUS at 14:45

## 2018-12-15 RX ADMIN — SODIUM CHLORIDE, PRESERVATIVE FREE 10 ML: 5 INJECTION INTRAVENOUS at 14:45

## 2018-12-15 RX ADMIN — CLOPIDOGREL BISULFATE 75 MG: 75 TABLET ORAL at 09:21

## 2018-12-15 RX ADMIN — ACETAMINOPHEN 650 MG: 650 SOLUTION ORAL at 22:38

## 2018-12-15 RX ADMIN — DILTIAZEM HYDROCHLORIDE 60 MG: 60 TABLET, FILM COATED ORAL at 18:40

## 2018-12-15 RX ADMIN — ASPIRIN 300 MG: 300 SUPPOSITORY RECTAL at 09:21

## 2018-12-15 RX ADMIN — SODIUM CHLORIDE 75 ML/HR: 4.5 INJECTION, SOLUTION INTRAVENOUS at 09:37

## 2018-12-15 RX ADMIN — METOCLOPRAMIDE 5 MG: 5 INJECTION, SOLUTION INTRAMUSCULAR; INTRAVENOUS at 04:23

## 2018-12-15 RX ADMIN — METOCLOPRAMIDE 5 MG: 5 INJECTION, SOLUTION INTRAMUSCULAR; INTRAVENOUS at 22:04

## 2018-12-15 RX ADMIN — METOCLOPRAMIDE 5 MG: 5 INJECTION, SOLUTION INTRAMUSCULAR; INTRAVENOUS at 08:30

## 2018-12-15 RX ADMIN — HYDROCODONE BITARTRATE AND ACETAMINOPHEN 1 TABLET: 5; 325 TABLET ORAL at 04:23

## 2018-12-15 RX ADMIN — TAZOBACTAM SODIUM AND PIPERACILLIN SODIUM 3.38 G: 375; 3 INJECTION, SOLUTION INTRAVENOUS at 14:45

## 2018-12-15 RX ADMIN — LISINOPRIL 10 MG: 10 TABLET ORAL at 09:20

## 2018-12-15 RX ADMIN — HYDROCODONE BITARTRATE AND ACETAMINOPHEN 0.5 TABLET: 5; 325 TABLET ORAL at 18:40

## 2018-12-15 RX ADMIN — DOCUSATE SODIUM AND SENNOSIDES 2 TABLET: 8.6; 5 TABLET, FILM COATED ORAL at 09:21

## 2018-12-15 RX ADMIN — HEPARIN SODIUM 5000 UNITS: 5000 INJECTION INTRAVENOUS; SUBCUTANEOUS at 06:20

## 2018-12-15 RX ADMIN — DOXYCYCLINE 100 MG: 100 INJECTION, POWDER, LYOPHILIZED, FOR SOLUTION INTRAVENOUS at 22:04

## 2018-12-15 RX ADMIN — DOXYCYCLINE 100 MG: 100 INJECTION, POWDER, LYOPHILIZED, FOR SOLUTION INTRAVENOUS at 09:24

## 2018-12-15 RX ADMIN — TAZOBACTAM SODIUM AND PIPERACILLIN SODIUM 3.38 G: 375; 3 INJECTION, SOLUTION INTRAVENOUS at 22:04

## 2018-12-15 RX ADMIN — METOCLOPRAMIDE 5 MG: 5 INJECTION, SOLUTION INTRAMUSCULAR; INTRAVENOUS at 14:45

## 2018-12-15 RX ADMIN — SODIUM CHLORIDE, PRESERVATIVE FREE 3 ML: 5 INJECTION INTRAVENOUS at 09:30

## 2018-12-15 RX ADMIN — ATENOLOL 25 MG: 25 TABLET ORAL at 09:21

## 2018-12-15 RX ADMIN — HEPARIN SODIUM 5000 UNITS: 5000 INJECTION INTRAVENOUS; SUBCUTANEOUS at 22:04

## 2018-12-15 RX ADMIN — TAZOBACTAM SODIUM AND PIPERACILLIN SODIUM 3.38 G: 375; 3 INJECTION, SOLUTION INTRAVENOUS at 06:20

## 2018-12-15 RX ADMIN — DILTIAZEM HYDROCHLORIDE 60 MG: 60 TABLET, FILM COATED ORAL at 12:04

## 2018-12-15 NOTE — PLAN OF CARE
Problem: Fall Risk (Adult)  Goal: Identify Related Risk Factors and Signs and Symptoms  Outcome: Outcome(s) achieved Date Met: 12/15/18    Goal: Absence of Fall  Outcome: Ongoing (interventions implemented as appropriate)      Problem: Patient Care Overview  Goal: Plan of Care Review  Outcome: Ongoing (interventions implemented as appropriate)   12/15/18 0657   Coping/Psychosocial   Plan of Care Reviewed With patient;daughter   Plan of Care Review   Progress no change   OTHER   Outcome Summary VSS. Pt's potassium this AM at 3.8. Tolerating tube feeding well; feed rate increased to 35. Complains of pain in head and PRN pain medication controls it well. Will continue to monitor.      Goal: Individualization and Mutuality  Outcome: Ongoing (interventions implemented as appropriate)    Goal: Discharge Needs Assessment  Outcome: Ongoing (interventions implemented as appropriate)    Goal: Interprofessional Rounds/Family Conf  Outcome: Outcome(s) achieved Date Met: 12/15/18      Problem: Skin Injury Risk (Adult)  Goal: Identify Related Risk Factors and Signs and Symptoms  Outcome: Outcome(s) achieved Date Met: 12/15/18    Goal: Skin Health and Integrity  Outcome: Ongoing (interventions implemented as appropriate)

## 2018-12-15 NOTE — PROGRESS NOTES
[facility] Medicine Services  PROGRESS NOTE    Patient Name: Anita Roche  : 1945  MRN: [id number]    Date of Admission: 2018  Length of Stay: 6  Primary Care Physician: Diann Ferreira APRN    Subjective   Subjective     CC:  Hospital follow up for CVA    HPI:  Patient sleeping. Barely opens eyes to converse, very sleepy. RN at bedside reports has gotten [place] this morning for headache/ leg pain.   ROS:  Gen- No fevers, chills  CV- No chest pain, palpitations  Resp- No cough, dyspnea  GI- No N/V/D, abd pain    Denies pain, SOA    Objective   Objective     Vital Signs:   Temp:  [97.7 °F (36.5 °C)-98.7 °F (37.1 °C)] 97.7 °F (36.5 °C)  Heart Rate:  [74-79] 79  Resp:  [14-18] 18  BP: (134-162)/(61-89) 145/66  Total (NIH Stroke Scale): 16     Physical Exam:  Gen-no acute distress  HENT-NCAT, mucous membranes slightly dry  CV-RRR, S1 S2 normal, no m/r/g  Resp-mildly decreased at the bases, no wheezes or rales  Abd-soft, NT, ND, +BS  Ext-no edema, cords or erythema to RLE  Neuro-sleeping but arouses and follows command. Falls back to sleep quickly. occasional confusion, left hemiparesis  Skin-no rashes  Psych-appropriate mood    Results Reviewed:  I have personally reviewed current lab, radiology, and data and agree.    Results from last 7 days   Lab Units  18   0518  18   0714  18   1259  18   1514   WBC 10*3/mm3  11.33*  11.38*  15.40*  11.25   HEMOGLOBIN g/dL  13.6  13.0  14.3  14.7   HEMATOCRIT %  42.4  40.4  44.1*  46.1   PLATELETS 10*3/mm3  270  175  176  178   INR    --    --    --   1.07     Results from last 7 days   Lab Units  12/15/18   0535  18   0518  18   0714   18   1514   SODIUM mmol/L  149*  147*  140   < >  141   POTASSIUM mmol/L  3.8  2.9*  3.5   < >  3.4*   CHLORIDE mmol/L  114*  112*  111*   < >  110   CO2 mmol/L  26.0  21.0  24.0   < >  24.7   BUN mg/dL  23  22  10   < >  17   CREATININE mg/dL  0.64  0.72  0.67   <  >  0.81   GLUCOSE mg/dL  148*  105*  96   < >  77   CALCIUM mg/dL  10.2  10.2  9.2   < >  9.6   ALT (SGPT) U/L   --    --    --    --   21   AST (SGOT) U/L   --    --    --    --   23    < > = values in this interval not displayed.     Estimated Creatinine Clearance: 40.5 mL/min (by C-G formula based on SCr of 0.64 mg/dL).  No results found for: BNP    Microbiology Results Abnormal     Procedure Component Value - Date/Time    Blood Culture - Blood, Arm, Right [[id number]] Collected:  12/09/18 1259    Lab Status:  Final result Specimen:  Blood from Arm, Right Updated:  12/14/18 1331     Blood Culture No growth at 5 days    Blood Culture - Blood, Hand, Right [[id number]] Collected:  12/09/18 1306    Lab Status:  Final result Specimen:  Blood from Hand, Right Updated:  12/14/18 1331     Blood Culture No growth at 5 days          Imaging Results (last 24 hours)     Procedure Component Value Units Date/Time    XR Abdomen KUB [[id number]] Collected:  12/14/18 1652     Updated:  12/14/18 1958    Narrative:          EXAMINATION: XR ABDOMEN KUB - 12/14/2018     INDICATION:  R13.12-Dysphagia, oropharyngeal phase; Z74.09-Other reduced  mobility; I63.231-Cerebral infarction due to unspecified occlusion or  stenosis of right carotid arteries; R41.841-Cognitive communication  deficit.      COMPARISON: Abdominal radiograph earlier same day.     FINDINGS: Feeding tube has been repositioned terminating within the  distal duodenum overlying the left upper quadrant, presumably  Ligament of Treitz and proximal jejunum.           Impression:       Feeding tube tip now overlies the left upper quadrant,  presumably within the distal duodenum or proximal jejunum near the  ligament of Treitz.     DICTATED:   12/14/2018  EDITED/ls :   12/14/2018               Results for orders placed during the hospital encounter of 12/08/18   Adult Transthoracic Echo Complete W/ Cont if Necessary Per Protocol (With Agitated Saline)    Narrative · Left  ventricular systolic function is hyperdynamic (EF > 70).  · Mid LV cavitary gradient of 40 mmHg..  · Trace mitral valve regurgitation is present.  · Trace tricuspid valve regurgitation is present          I have reviewed the medications.    Assessment/Plan   Assessment / Plan     Active Hospital Problems    Diagnosis Date Noted   • **CVA (cerebral vascular accident) (CMS/[facility]) [I63.9] 12/08/2018   • Hypernatremia [E87.0] 12/15/2018   • Acute respiratory failure with hypoxia (CMS/[facility]) [J96.01] 12/10/2018   • Underweight [R63.6] 12/09/2018   • Sepsis due to pneumonia (CMS/[facility]) [J18.9, A41.9] 12/09/2018   • Aspiration pneumonia (CMS/[facility]) [J69.0] 12/09/2018   • Hypokalemia [E87.6] 12/08/2018   • Tobacco use [Z72.0] 12/08/2018   • Atherosclerosis of coronary artery [I25.10] 11/08/2018     Description: Echo (4/2012): LVEF >65%     • Degeneration of intervertebral disc of lumbar region [M51.36] 03/31/2008       Brief Hospital Course to date:  Anita Roche is a 73 y.o. female with PMH significant for HTN, tobacco abuse, CAD, esophageal stricture, and trigger finger s/p release 11/27/2018 transferred from the ER at [place] for 3 days of headache, confusion,  And balance disturbances.  At the local ER, CT head was performed which showed an old left sided stroke but no acute insults.  However, left facial droop and left sided drift was noted and the patient was sent to [facility] for higher level of care. MRI upon arrival revealed multiple acute lacunar infarcts within the right cerebral hemisphere, as well as a meningioma in the left parafalcine region.    Right hemispheric infarct secondary to right ICA occlusion with residual left hemiparesis and dysphagia  - ASA/Plavix  - Neuro has evaluated  - large infarct with guarded prognosis (somnelence, left hemiparesis, neglect, dysphagia)  - remains NPO; MBS 12/12 showing aspiration with many consistencies. Keofeed placed 12/14 and TF started, not at goal rate yet (50ml/hr)  - ST/PT/OT/CM:  [facility] referral in progress  -- change cardizem to immediate release to be administered through FT    Sepsis (not POA)  Aspiration pneumonia (not POA)  Acute hypoxic respiratory failure  - blood cultures negative  - Zosyn Day 7, Doxycycline Day 5  - leukocytosis and fever improved    Diarrhea  - now appears resolved ?antibiotic related  - cancel C.diff PCR    Lethargy  -- decrease to 2.5/325mg Norco  -- unsure if patient able to participate in [facility] rehab- may need SNF, monitor    Hypernatremia  -- appear dry- TF started and will continue to increase to goal  -- 1/2 NS today and repeat BMP in am        DVT Prophylaxis:  Given stable neuro exam as of 12/10 added SQH prophy since bedbound from severity of CVA    CODE STATUS:   Code Status and Medical Interventions:   Ordered at: 12/08/18 2008     Level Of Support Discussed With:    Patient     Code Status:    CPR     Medical Interventions (Level of Support Prior to Arrest):    Full     Disposition: I expect the patient to be discharged TBD to rehab     Electronically signed by SAMREEN Russell, 12/15/18, 10:07 AM.

## 2018-12-15 NOTE — THERAPY TREATMENT NOTE
Acute Care - Physical Therapy Treatment Note  [facility]     Patient Name: Anita Roche  : 1945  MRN: [id number]  Today's Date: 12/15/2018  Onset of Illness/Injury or Date of Surgery: 18  Date of Referral to PT: 18  Referring Physician: MD SULEIMAN    Admit Date: 2018    Visit Dx:    ICD-10-CM ICD-9-CM   1. Oropharyngeal dysphagia R13.12 787.22   2. Impaired mobility and ADLs Z74.09 799.89   3. Impaired functional mobility, balance, gait, and endurance Z74.09 V49.89   4. Cerebrovascular accident (CVA) due to occlusion of right carotid artery (CMS/HCC) I63.231 433.11   5. Cognitive communication deficit R41.841 799.52     Patient Active Problem List   Diagnosis   • Chronic fatigue syndrome   • Knee pain   • Closed wedge compression fracture of thoracic vertebra (CMS/HCC)   • Atherosclerosis of coronary artery   • Persistent cough   • Degeneration of intervertebral disc of lumbar region   • Stricture of esophagus   • Hand pain, left   • Trigger middle finger of left hand   • Hypokalemia   • CVA (cerebral vascular accident) (CMS/HCC)   • Tobacco use   • Underweight   • Sepsis due to pneumonia (CMS/HCC)   • Aspiration pneumonia (CMS/HCC)   • Acute respiratory failure with hypoxia (CMS/HCC)   • Hypernatremia       Therapy Treatment    Rehabilitation Treatment Summary     Row Name 12/15/18 1115             Treatment Time/Intention    Discipline  physical therapist  -LS      Document Type  therapy note (daily note)  -LS      Subjective Information  complains of;pain  -LS      Patient Effort  good  -LS      Existing Precautions/Restrictions  fall;NPO  -LS      Treatment Considerations/Comments  L-weakness and neglect  -LS      Recorded by [LS] Jolene Cheatham, PT 12/15/18 1895      Row Name 12/15/18 1117             Vital Signs    Pre Systolic BP Rehab  126  -LS      Pre Treatment Diastolic BP  59  -LS      Pretreatment Heart Rate (beats/min)  70  -LS      Pre SpO2 (%)  94  -LS      O2 Delivery Pre  Treatment  room air  -LS      Pre Patient Position  Supine  -LS      Intra Patient Position  Standing  -LS      Post Patient Position  Sitting  -LS      Recorded by [LS] Jolene Cheatham, PT 12/15/18 1447      Row Name 12/15/18 1115             Cognitive Assessment/Intervention- PT/OT    Affect/Mental Status (Cognitive)  low arousal/lethargic  -LS      Orientation Status (Cognition)  oriented to;person;place;situation  -LS      Follows Commands (Cognition)  follows one step commands;75-90% accuracy;repetition of directions required;verbal cues/prompting required  -LS      Cognitive Function (Cognitive)  safety deficit  -LS      Safety Deficit (Cognitive)  moderate deficit;safety precautions awareness  -LS      Personal Safety Interventions  fall prevention program maintained;gait belt;nonskid shoes/slippers when out of bed  -LS      Recorded by [LS] Jolene Cheatham, PT 12/15/18 1447      Row Name 12/15/18 1115             Bed Mobility Assessment/Treatment    Supine-Sit-Supine [place] (Bed Mobility)  maximum assist (25% patient effort);verbal cues  -LS      Recorded by [LS] Jolene Cheatham, PT 12/15/18 1447      Row Name 12/15/18 1115             Transfer Assessment/Treatment    Comment (Transfers)  STS x2; PT/tech on either side of pt for BUE support.   -LS      Recorded by [LS] Jolene Cheatham, PT 12/15/18 1447      Row Name 12/15/18 1115             Bed-Chair Transfer    Bed-Chair [place] (Transfers)  moderate assist (50% patient effort);2 person assist;verbal cues  -LS      Recorded by [LS] Jolene Cheatham, PT 12/15/18 1447      Row Name 12/15/18 1115             Sit-Stand Transfer    Sit-Stand [place] (Transfers)  moderate assist (50% patient effort);2 person assist;verbal cues  -LS      Recorded by [LS] Jolene Cheatham, PT 12/15/18 1447      Row Name 12/15/18 1115             Stand-Sit Transfer    Stand-Sit [place] (Transfers)  moderate assist (50% patient effort);2 person assist;verbal cues  -LS       Recorded by [LS] Jolene Cheatham, PT 12/15/18 1447      Row Name 12/15/18 1115             Gait/Stairs Assessment/Training    [place] Level (Gait)  unable to assess  -LS      Recorded by [LS] Jolene Cheatham, PT 12/15/18 1447      Row Name 12/15/18 1115             Therapeutic Exercise    Therapeutic Exercise  seated, lower extremities  -LS      Additional Documentation  Therapeutic Exercise (Row)  -LS      13156 - PT Therapeutic Exercise Minutes  5  -LS      23344 - PT Therapeutic Activity Minutes  20  -LS      Recorded by [LS] Jolene Cheatham, PT 12/15/18 1447      Row Name 12/15/18 1115             Lower Extremity Seated Therapeutic Exercise    Performed, Seated Lower Extremity (Therapeutic Exercise)  hip flexion/extension;LAQ (long arc quad), knee extension;ankle dorsiflexion/plantarflexion  -LS      Exercise Type, Seated Lower Extremity (Therapeutic Exercise)  AAROM (active assistive range of motion)  -LS      Sets/Reps Detail, Seated Lower Extremity (Therapeutic Exercise)  1/10  -LS      Recorded by [LS] Jolene Cheatham, PT 12/15/18 1447      Row Name 12/15/18 1115             Static Sitting Balance    Level of [place] (Unsupported Sitting, Static Balance)  moderate assist, 50 to 74% patient effort  -LS      Sitting Position (Unsupported Sitting, Static Balance)  sitting in chair  -LS      Comment (Unsupported Sitting, Static Balance)  progressed to moments of min A with cues  -LS      Recorded by [LS] Jolene Cheatham, PT 12/15/18 1447      Row Name 12/15/18 1115             Static Standing Balance    Level of [place] (Supported Standing, Static Balance)  moderate assist, 50 to 74% patient effort  -LS      Time Able to Maintain Position (Supported Standing, Static Balance)  1 to 2 minutes  -LS      Comment (Supported Standing, Static Balance)  30s x2  -LS      Recorded by [LS] Jolene Cheatham, PT 12/15/18 1447      Row Name 12/15/18 1115             Positioning and Restraints    Pre-Treatment  Position  in bed  -LS      Post Treatment Position  chair  -LS      In Chair  notified nsg;reclined;call light within reach;encouraged to call for assist;exit alarm on;RUE elevated;LUE elevated;waffle cushion;on mechanical lift sling;heels elevated  -LS      Recorded by [LS] Jolene Cheatham, PT 12/15/18 1447      Row Name 12/15/18 1115             Pain Scale: Numbers Pre/Post-Treatment    Pain Location  head  -LS      Pain Intervention(s)  Repositioned;Ambulation/increased activity  -LS      Recorded by [LS] Jolene Cheatham, PT 12/15/18 1447      Row Name 12/15/18 1115             Pain Scale: FACES Pre/Post-Treatment    Pain: FACES Scale, Pretreatment  2-->hurts little bit  -LS      Pain: FACES Scale, Post-Treatment  2-->hurts little bit  -LS      Pre/Post Treatment Pain Comment  tolerated  -LS      Recorded by [LS] Jolene Cheatham, PT 12/15/18 1447      Row Name 12/15/18 1115             Plan of Care Review    Plan of Care Reviewed With  patient  -LS      Recorded by [LS] Jolene Cheatham, PT 12/15/18 1447      Row Name 12/15/18 1115             Outcome Summary/Treatment Plan (PT)    Daily Summary of Progress (PT)  progress toward functional goals is good  -LS      Recorded by [LS] Jolene Cheatham, PT 12/15/18 1447        User Key  (r) = Recorded By, (t) = Taken By, (c) = Cosigned By    Initials Name Effective Dates Discipline     Jolene Cheatham, PT 06/19/15 -  PT                   Physical Therapy Education     Title: PT OT SLP Therapies (In Progress)     Topic: Physical Therapy (In Progress)     Point: Mobility training (In Progress)     Learning Progress Summary           Patient Acceptance, E,D, NR by LS at 12/15/2018 11:15 AM    Acceptance, E, VU by EM at 12/14/2018  4:38 AM    Acceptance, E, VU,NR by CD at 12/12/2018 10:27 AM    Comment:  POSTURAL AWARENESS, PROGRESSION OF POC, TRANSFER/BED MOBILITY TRAINING,    Acceptance, E, VU,NR by CD at 12/10/2018 10:49 AM    Comment:  BENEFITS OF OOB ACTIVITY, POSTURAL  AWARENESS, ATTENDING TO L SIDE, TRANSFER TRAINING                   Point: Home exercise program (In Progress)     Learning Progress Summary           Patient Acceptance, E,D, NR by LS at 12/15/2018 11:15 AM    Acceptance, E, VU by EM at 12/14/2018  4:38 AM    Acceptance, E, VU,NR by CD at 12/12/2018 10:27 AM    Comment:  POSTURAL AWARENESS, PROGRESSION OF POC, TRANSFER/BED MOBILITY TRAINING,    Acceptance, E, VU,NR by CD at 12/10/2018 10:49 AM    Comment:  BENEFITS OF OOB ACTIVITY, POSTURAL AWARENESS, ATTENDING TO L SIDE, TRANSFER TRAINING                   Point: Body mechanics (In Progress)     Learning Progress Summary           Patient Acceptance, E,D, NR by LS at 12/15/2018 11:15 AM    Acceptance, E, VU by EM at 12/14/2018  4:38 AM    Acceptance, E, VU,NR by CD at 12/12/2018 10:27 AM    Comment:  POSTURAL AWARENESS, PROGRESSION OF POC, TRANSFER/BED MOBILITY TRAINING,    Acceptance, E, VU,NR by CD at 12/10/2018 10:49 AM    Comment:  BENEFITS OF OOB ACTIVITY, POSTURAL AWARENESS, ATTENDING TO L SIDE, TRANSFER TRAINING                   Point: Precautions (In Progress)     Learning Progress Summary           Patient Acceptance, E,D, NR by LS at 12/15/2018 11:15 AM    Acceptance, E, VU by EM at 12/14/2018  4:38 AM    Acceptance, E, VU,NR by CD at 12/12/2018 10:27 AM    Comment:  POSTURAL AWARENESS, PROGRESSION OF POC, TRANSFER/BED MOBILITY TRAINING,    Acceptance, E, VU,NR by CD at 12/10/2018 10:49 AM    Comment:  BENEFITS OF OOB ACTIVITY, POSTURAL AWARENESS, ATTENDING TO L SIDE, TRANSFER TRAINING                               User Key     Initials Effective Dates Name Provider Type Discipline    CD 06/19/15 -  Jamia Bocanegra, PT Physical Therapist PT    LS 06/19/15 -  Jolene Cheatham, PT Physical Therapist PT    EM 06/12/17 -  Génesis Fuentes, RN Registered Nurse Nurse                PT Recommendation and Plan     Outcome Summary/Treatment Plan (PT)  Daily Summary of Progress (PT): progress toward functional goals  is good  Plan of Care Reviewed With: patient  Progress: improving  Outcome Summary: Pt demonstrated increased indep with STS transfer and pivot to recliner; constant vc's to attend to task. Limited today by c/o fatigue and noted cont L-weakness/ neglect. Will cont PT POC as clinically warranted.   Outcome Measures     Row Name 12/15/18 1115 12/14/18 1440 12/12/18 1609       How much help from another person do you currently need...    Turning from your back to your side while in flat bed without using bedrails?  2  -LS  --  --    Moving from lying on back to sitting on the side of a flat bed without bedrails?  2  -LS  --  --    Moving to and from a bed to a chair (including a wheelchair)?  2  -LS  --  --    Standing up from a chair using your arms (e.g., wheelchair, bedside chair)?  2  -LS  --  --    Climbing 3-5 steps with a railing?  1  -LS  --  --    To walk in hospital room?  1  -LS  --  --    AM-PAC 6 Clicks Score  10  -LS  --  --       How much help from another is currently needed...    Putting on and taking off regular lower body clothing?  --  1  -AN  1  -AN    Bathing (including washing, rinsing, and drying)  --  1  -AN  1  -AN    Toileting (which includes using toilet bed pan or urinal)  --  1  -AN  1  -AN    Putting on and taking off regular upper body clothing  --  1  -AN  1  -AN    Taking care of personal grooming (such as brushing teeth)  --  2  -AN  2  -AN    Eating meals  --  1  -AN  1  -AN    Score  --  7  -AN  7  -AN       Modified Martin Scale    Modified [place] Scale  4 - Moderately severe disability.  Unable to walk without assistance, and unable to attend to own bodily needs without assistance.  -LS  --  --       Functional Assessment    Outcome Measure Options  AM-PAC 6 Clicks Basic Mobility (PT)  -LS  --  --      User Key  (r) = Recorded By, (t) = Taken By, (c) = Cosigned By    Initials Name Provider Type    AN Juana Carson, OT Occupational Therapist    LS Jolene Cheatham, PT Physical  Therapist         Time Calculation:   PT Charges     Row Name 12/15/18 1115             Time Calculation    Start Time  1115  -LS      PT Received On  12/15/18  -LS         Time Calculation- PT    Total Timed Code Minutes- PT  25 minute(s)  -LS         Timed Charges    77638 - PT Therapeutic Exercise Minutes  5  -LS      25934 - PT Therapeutic Activity Minutes  20  -LS        User Key  (r) = Recorded By, (t) = Taken By, (c) = Cosigned By    Initials Name Provider Type    LS Jolene Cheatham, PT Physical Therapist        Therapy Suggested Charges     Code   Minutes Charges    58567 (CPT®) Hc Pt Neuromusc Re Education Ea 15 Min      02525 (CPT®) Hc Pt Ther Proc Ea 15 Min 5 1    75528 (CPT®) Hc Gait Training Ea 15 Min      86347 (CPT®) Hc Pt Therapeutic Act Ea 15 Min 20 1    51129 (CPT®) Hc Pt Manual Therapy Ea 15 Min      79903 (CPT®) Hc Pt Iontophoresis Ea 15 Min      08028 (CPT®) Hc Pt Elec Stim Ea-Per 15 Min      93671 (CPT®) Hc Pt Ultrasound Ea 15 Min      53453 (CPT®) Hc Pt Self Care/Mgmt/Train Ea 15 Min      49373 (CPT®) Hc Pt Prosthetic (S) Train Initial Encounter, Each 15 Min      08680 (CPT®) Hc Pt Orthotic(S)/Prosthetic(S) Encounter, Each 15 Min      34400 (CPT®) Hc Orthotic(S) Mgmt/Train Initial Encounter, Each 15min      Total  25 2        Therapy Charges for Today     Code Description Service Date Service Provider Modifiers Qty    62398368507 HC PT THER PROC EA 15 MIN 12/15/2018 Jolene Cheatham, PT GP 1    57867676472 HC PT THERAPEUTIC ACT EA 15 MIN 12/15/2018 Jolene Cheatham, PT GP 1    23522621644 HC PT THER SUPP EA 15 MIN 12/15/2018 Jolene Cheatham, PT GP 2          PT G-Codes  Outcome Measure Options: AM-PAC 6 Clicks Basic Mobility (PT)  AM-PAC 6 Clicks Score: 10  Score: 7  Modified Martin Scale: 4 - Moderately severe disability.  Unable to walk without assistance, and unable to attend to own bodily needs without assistance.    Jolene Cheatham, PT  12/15/2018

## 2018-12-15 NOTE — PLAN OF CARE
Problem: Patient Care Overview  Goal: Plan of Care Review  Outcome: Ongoing (interventions implemented as appropriate)   12/14/18 2028   Coping/Psychosocial   Plan of Care Reviewed With patient;daughter;grandchild(jannet)   Plan of Care Review   Progress no change   OTHER   Outcome Summary Advancement of feeding tube successful; tube feeding initiated @1845 today. So far, pt tolerating well. Complains of moderate-severe back pain and headache; provider consulted and new orders for pain managment placed--will continue to monitor.

## 2018-12-15 NOTE — PLAN OF CARE
Problem: Patient Care Overview  Goal: Plan of Care Review  Outcome: Ongoing (interventions implemented as appropriate)   12/15/18 1115   Coping/Psychosocial   Plan of Care Reviewed With patient   Plan of Care Review   Progress improving   OTHER   Outcome Summary Pt demonstrated increased indep with STS transfer and pivot to recliner; constant vc's to attend to task. Limited today by c/o fatigue and noted cont L-weakness/ neglect. Will cont PT POC as clinically warranted.

## 2018-12-16 ENCOUNTER — APPOINTMENT (OUTPATIENT)
Dept: CT IMAGING | Facility: HOSPITAL | Age: 73
End: 2018-12-16

## 2018-12-16 LAB
ANION GAP SERPL CALCULATED.3IONS-SCNC: 6 MMOL/L (ref 3–11)
BUN BLD-MCNC: 23 MG/DL (ref 9–23)
BUN/CREAT SERPL: 32.9 (ref 7–25)
CALCIUM SPEC-SCNC: 9.6 MG/DL (ref 8.7–10.4)
CHLORIDE SERPL-SCNC: 109 MMOL/L (ref 99–109)
CO2 SERPL-SCNC: 30 MMOL/L (ref 20–31)
CREAT BLD-MCNC: 0.7 MG/DL (ref 0.6–1.3)
GFR SERPL CREATININE-BSD FRML MDRD: 82 ML/MIN/1.73
GLUCOSE BLD-MCNC: 77 MG/DL (ref 70–100)
GLUCOSE BLDC GLUCOMTR-MCNC: 120 MG/DL (ref 70–130)
POTASSIUM BLD-SCNC: 3.7 MMOL/L (ref 3.5–5.5)
SODIUM BLD-SCNC: 145 MMOL/L (ref 132–146)

## 2018-12-16 PROCEDURE — 25010000002 METOCLOPRAMIDE PER 10 MG: Performed by: FAMILY MEDICINE

## 2018-12-16 PROCEDURE — 80048 BASIC METABOLIC PNL TOTAL CA: CPT | Performed by: NURSE PRACTITIONER

## 2018-12-16 PROCEDURE — 70450 CT HEAD/BRAIN W/O DYE: CPT

## 2018-12-16 PROCEDURE — 97110 THERAPEUTIC EXERCISES: CPT

## 2018-12-16 PROCEDURE — 99232 SBSQ HOSP IP/OBS MODERATE 35: CPT | Performed by: INTERNAL MEDICINE

## 2018-12-16 PROCEDURE — 25010000002 HEPARIN (PORCINE) PER 1000 UNITS: Performed by: FAMILY MEDICINE

## 2018-12-16 PROCEDURE — 25010000002 PIPERACILLIN SOD-TAZOBACTAM PER 1 G: Performed by: FAMILY MEDICINE

## 2018-12-16 PROCEDURE — 82962 GLUCOSE BLOOD TEST: CPT

## 2018-12-16 RX ORDER — CYCLOBENZAPRINE HCL 10 MG
5 TABLET ORAL NIGHTLY
Status: DISCONTINUED | OUTPATIENT
Start: 2018-12-16 | End: 2018-12-18 | Stop reason: HOSPADM

## 2018-12-16 RX ADMIN — SODIUM CHLORIDE, PRESERVATIVE FREE 3 ML: 5 INJECTION INTRAVENOUS at 07:55

## 2018-12-16 RX ADMIN — DOXYCYCLINE 100 MG: 100 INJECTION, POWDER, LYOPHILIZED, FOR SOLUTION INTRAVENOUS at 10:00

## 2018-12-16 RX ADMIN — HEPARIN SODIUM 5000 UNITS: 5000 INJECTION INTRAVENOUS; SUBCUTANEOUS at 06:21

## 2018-12-16 RX ADMIN — SODIUM CHLORIDE, PRESERVATIVE FREE 3 ML: 5 INJECTION INTRAVENOUS at 10:00

## 2018-12-16 RX ADMIN — ATENOLOL 25 MG: 25 TABLET ORAL at 10:00

## 2018-12-16 RX ADMIN — SODIUM CHLORIDE, PRESERVATIVE FREE 3 ML: 5 INJECTION INTRAVENOUS at 22:03

## 2018-12-16 RX ADMIN — HEPARIN SODIUM 5000 UNITS: 5000 INJECTION INTRAVENOUS; SUBCUTANEOUS at 14:30

## 2018-12-16 RX ADMIN — DILTIAZEM HYDROCHLORIDE 60 MG: 60 TABLET, FILM COATED ORAL at 18:31

## 2018-12-16 RX ADMIN — LISINOPRIL 10 MG: 10 TABLET ORAL at 10:00

## 2018-12-16 RX ADMIN — DILTIAZEM HYDROCHLORIDE 60 MG: 60 TABLET, FILM COATED ORAL at 12:24

## 2018-12-16 RX ADMIN — METOCLOPRAMIDE 5 MG: 5 INJECTION, SOLUTION INTRAMUSCULAR; INTRAVENOUS at 03:51

## 2018-12-16 RX ADMIN — TAZOBACTAM SODIUM AND PIPERACILLIN SODIUM 3.38 G: 375; 3 INJECTION, SOLUTION INTRAVENOUS at 06:15

## 2018-12-16 RX ADMIN — HYDROCODONE BITARTRATE AND ACETAMINOPHEN 0.5 TABLET: 5; 325 TABLET ORAL at 12:24

## 2018-12-16 RX ADMIN — METOCLOPRAMIDE 5 MG: 5 INJECTION, SOLUTION INTRAMUSCULAR; INTRAVENOUS at 10:00

## 2018-12-16 RX ADMIN — HEPARIN SODIUM 5000 UNITS: 5000 INJECTION INTRAVENOUS; SUBCUTANEOUS at 22:04

## 2018-12-16 RX ADMIN — CLOPIDOGREL BISULFATE 75 MG: 75 TABLET ORAL at 10:00

## 2018-12-16 RX ADMIN — ASPIRIN 300 MG: 300 SUPPOSITORY RECTAL at 10:00

## 2018-12-16 RX ADMIN — HYDROCODONE BITARTRATE AND ACETAMINOPHEN 0.5 TABLET: 5; 325 TABLET ORAL at 18:32

## 2018-12-16 RX ADMIN — DILTIAZEM HYDROCHLORIDE 60 MG: 60 TABLET, FILM COATED ORAL at 00:38

## 2018-12-16 RX ADMIN — HYDROCODONE BITARTRATE AND ACETAMINOPHEN 0.5 TABLET: 5; 325 TABLET ORAL at 06:15

## 2018-12-16 RX ADMIN — SODIUM CHLORIDE, PRESERVATIVE FREE 3 ML: 5 INJECTION INTRAVENOUS at 22:06

## 2018-12-16 RX ADMIN — CYCLOBENZAPRINE HYDROCHLORIDE 5 MG: 10 TABLET, FILM COATED ORAL at 22:03

## 2018-12-16 RX ADMIN — HYDROCODONE BITARTRATE AND ACETAMINOPHEN 0.5 TABLET: 5; 325 TABLET ORAL at 00:38

## 2018-12-16 RX ADMIN — DILTIAZEM HYDROCHLORIDE 60 MG: 60 TABLET, FILM COATED ORAL at 06:15

## 2018-12-16 NOTE — PLAN OF CARE
Problem: Patient Care Overview  Goal: Plan of Care Review  Outcome: Ongoing (interventions implemented as appropriate)   12/16/18 1610   Coping/Psychosocial   Plan of Care Reviewed With patient   Plan of Care Review   Progress improving   OTHER   Outcome Summary Mild improvement. Pt. limited by pain distracion. Treatment focus on rolling, L sided attention and self ROM, but pt. limited focus.

## 2018-12-16 NOTE — PLAN OF CARE
Problem: Fall Risk (Adult)  Goal: Absence of Fall  Outcome: Ongoing (interventions implemented as appropriate)      Problem: Patient Care Overview  Goal: Plan of Care Review  Outcome: Ongoing (interventions implemented as appropriate)   12/16/18 4447   Coping/Psychosocial   Plan of Care Reviewed With patient   Plan of Care Review   Progress improving   OTHER   Outcome Summary pt did dnot rest well throughout night, c/o headache, offered ice but pt refused, tylenol given in between norco tablets, stroke scale 15, pt frustrated because she wants to get up to [facility], nurse and tech tried but could not safely transfer pt to [facility] used bedpan instead. VSS, will continue to monitor.     Goal: Individualization and Mutuality  Outcome: Ongoing (interventions implemented as appropriate)    Goal: Discharge Needs Assessment  Outcome: Ongoing (interventions implemented as appropriate)      Problem: Skin Injury Risk (Adult)  Goal: Skin Health and Integrity  Outcome: Ongoing (interventions implemented as appropriate)      Problem: Stroke (Ischemic) (Adult)  Goal: Signs and Symptoms of Listed Potential Problems Will be Absent, Minimized or Managed (Stroke)  Outcome: Ongoing (interventions implemented as appropriate)

## 2018-12-16 NOTE — PLAN OF CARE
Problem: Fall Risk (Adult)  Goal: Absence of Fall  Outcome: Ongoing (interventions implemented as appropriate)   12/16/18 1659   Fall Risk (Adult)   Absence of Fall making progress toward outcome       Problem: Patient Care Overview  Goal: Plan of Care Review  Outcome: Ongoing (interventions implemented as appropriate)   12/16/18 1659   Coping/Psychosocial   Plan of Care Reviewed With patient   Plan of Care Review   Progress improving   OTHER   Outcome Summary Improving mentation, improved pain control. NIHSS=15. VSS. PT/OT working w/ pt. Family not here today.     Goal: Individualization and Mutuality  Outcome: Ongoing (interventions implemented as appropriate)   12/16/18 1659   Individualization   Patient Specific Preferences wants to get out of bed to bedside commode, does not like bedpan, frequently incontinent due to urgency.   Patient Specific Goals (Include Timeframe) within 1 week.   Patient Specific Interventions PT/OT w/ progressive mobility as tolerated. Pt still not balancing yet. Total lift. Attempted bedside commode, unsuccessful.     Goal: Discharge Needs Assessment  Outcome: Ongoing (interventions implemented as appropriate)   12/10/18 0912 12/15/18 1900 12/16/18 1659   Discharge Needs Assessment   Readmission Within the Last 30 Days no previous admission in last 30 days --  --    Concerns to be Addressed --  --  discharge planning   Patient/Family Anticipates Transition to --  inpatient rehabilitation facility;long term care facility --    Patient/Family Anticipated Services at Transition --  rehabilitation services;skilled nursing --    Transportation Anticipated --  --  health plan transportation   Anticipated Changes Related to Illness --  inability to care for self;inability to work --    Outpatient/Agency/Support Group Needs --  inpatient rehabilitation facility;long term acute care facility --    Current Discharge Risk --  dependent with mobility/activities of daily living;physical impairment  --    Discharge Coordination/Progress --  Slow progress w/ rehab, will need inpt rehab and/or LTC placement. --    Disability   Equipment Currently Used at Home none --  --        Problem: Skin Injury Risk (Adult)  Goal: Skin Health and Integrity  Outcome: Ongoing (interventions implemented as appropriate)   12/16/18 1659   Skin Injury Risk (Adult)   Skin Health and Integrity making progress toward outcome       Problem: Stroke (Ischemic) (Adult)  Goal: Signs and Symptoms of Listed Potential Problems Will be Absent, Minimized or Managed (Stroke)  Outcome: Ongoing (interventions implemented as appropriate)   12/16/18 1659   Goal/Outcome Evaluation   Problems Assessed (Stroke (Ischemic)) eating/swallowing impairment;motor/sensory impairment;situational response

## 2018-12-16 NOTE — PLAN OF CARE
Problem: Stroke (Ischemic) (Adult)  Goal: Signs and Symptoms of Listed Potential Problems Will be Absent, Minimized or Managed (Stroke)  Outcome: Ongoing (interventions implemented as appropriate)   12/16/18 1610   Goal/Outcome Evaluation   Problems Assessed (Stroke (Ischemic)) cognitive impairment;motor/sensory impairment;muscle tone abnormal   Problems Assessed (Stroke (Ischemic)) cognitive impairment;motor/sensory impairment;muscle tone abnormal

## 2018-12-16 NOTE — PLAN OF CARE
Problem: Fall Risk (Adult)  Goal: Absence of Fall  Outcome: Ongoing (interventions implemented as appropriate)   12/15/18 1900   Fall Risk (Adult)   Absence of Fall making progress toward outcome       Problem: Patient Care Overview  Goal: Plan of Care Review  Outcome: Outcome(s) achieved Date Met: 12/15/18   12/15/18 1900   Coping/Psychosocial   Plan of Care Reviewed With patient;daughter   Plan of Care Review   Progress improving   OTHER   Outcome Summary Phylicia tube feeding @ goal, up w/PT (pivot to chair), lifted back to bed, still left hemiparesis & neglect, NIHSS-15; Headache & rt leg pain improved w/ Rx.      Goal: Individualization and Mutuality  Outcome: Ongoing (interventions implemented as appropriate)   12/15/18 1900   Mutuality/Individual Preferences   How Would You and/or Your Support Person Like to Participate in Your Care? Daughter to be involved in all aspects of care & decisions, per pt's request.     Goal: Discharge Needs Assessment  Outcome: Ongoing (interventions implemented as appropriate)   12/10/18 0912 12/15/18 1900   Discharge Needs Assessment   Readmission Within the Last 30 Days no previous admission in last 30 days --    Concerns to be Addressed --  discharge planning   Patient/Family Anticipates Transition to --  inpatient rehabilitation facility;long term care facility   Patient/Family Anticipated Services at Transition --  rehabilitation services;skilled nursing   Transportation Concerns --  car, none   Transportation Anticipated --  family or friend will provide   Anticipated Changes Related to Illness --  inability to care for self;inability to work   Equipment Needed After Discharge --  other (see comments)  (to be determined)   Outpatient/Agency/Support Group Needs --  inpatient rehabilitation facility;long term acute care facility   Current Discharge Risk --  dependent with mobility/activities of daily living;physical impairment   Discharge Coordination/Progress --  Slow progress w/  rehab, will need inpt rehab and/or LTC placement.   Disability   Equipment Currently Used at Home none --        Problem: Skin Injury Risk (Adult)  Goal: Skin Health and Integrity  Outcome: Ongoing (interventions implemented as appropriate)   12/15/18 1900   Skin Injury Risk (Adult)   Skin Health and Integrity making progress toward outcome

## 2018-12-16 NOTE — PROGRESS NOTES
[facility] Medicine Services  INPATIENT PROGRESS NOTE    Date of Admission: 12/8/2018  Length of Stay: 7  Primary Care Physician: Diann Ferreira APRN    Subjective     Chief Complaint: stroke    HPI:patient still with dense left side weakness. Tolerating tube feeds, has had some diarrhea.  She reports constantly posterior headache and neck pain.  Review Of Systems:   Patient denies headaches, fever, chills, shortness of breath, chest pain, cough, abdominal pain, nausea or vomiting,rash, itching or bleeding      Objective      Vitals:   99.2 °F (37.3 °C) Temp  Min: 97.6 °F (36.4 °C)  Max: 99.2 °F (37.3 °C)    121/65 BP  Min: 108/64  Max: 154/77    61 Pulse  Min: 61  Max: 80    20 Resp  Min: 14  Max: 20    97 % SpO2  Min: 91 %  Max: 97 %    room air     2 No Data Recorded     Patient is alert and talkative uncomfortable and frustrated  in no distress at rest  Neck is without mass or JVD  Heart is Reg wo murmur  Lungs are clear wo wheeze or crackle  Abd is soft without HSM or mass, not distended or tender to palpation  Dense left hemiparesis  Skin is without rash  Neurologic exam is nonfocal   Mood is appropriate      Results Review:    I have reviewed the labs, radiology results and diagnostic studies.    Results from last 7 days   Lab Units  12/14/18   0518  12/11/18   0714   WBC 10*3/mm3  11.33*  11.38*   HEMOGLOBIN g/dL  13.6  13.0   HEMATOCRIT %  42.4  40.4   PLATELETS 10*3/mm3  270  175     Results from last 7 days   Lab Units  12/16/18   0544  12/15/18   0535  12/14/18   0518   SODIUM mmol/L  145  149*  147*   POTASSIUM mmol/L  3.7  3.8  2.9*   CHLORIDE mmol/L  109  114*  112*   CO2 mmol/L  30.0  26.0  21.0   BUN mg/dL  23  23  22   CREATININE mg/dL  0.70  0.64  0.72   GLUCOSE mg/dL  77  148*  105*   CALCIUM mg/dL  9.6  10.2  10.2       Microbiology Results Abnormal     Procedure Component Value - Date/Time    Blood Culture - Blood, Arm, Right [[id number]] Collected:  12/09/18  1259    Lab Status:  Final result Specimen:  Blood from Arm, Right Updated:  12/14/18 1331     Blood Culture No growth at 5 days    Blood Culture - Blood, Hand, Right [[id number]] Collected:  12/09/18 1306    Lab Status:  Final result Specimen:  Blood from Hand, Right Updated:  12/14/18 1331     Blood Culture No growth at 5 days        Xr Abdomen Kub    Result Date: 12/14/2018  Feeding tube tip now overlies the left upper quadrant, presumably within the distal duodenum or proximal jejunum near the ligament of Treitz.  DICTATED:   12/14/2018 EDITED/ls :   12/14/2018       Results for orders placed during the hospital encounter of 12/08/18   Adult Transthoracic Echo Complete W/ Cont if Necessary Per Protocol (With Agitated Saline)    Narrative · Left ventricular systolic function is hyperdynamic (EF > 70).  · Mid LV cavitary gradient of 40 mmHg..  · Trace mitral valve regurgitation is present.  · Trace tricuspid valve regurgitation is present          I have reviewed the medications.    Assessment/Plan     Assessment/Problem List    CVA (cerebral vascular accident) (CMS/HCC)    Atherosclerosis of coronary artery    Degeneration of intervertebral disc of lumbar region    Hypokalemia    Tobacco use    Underweight    Sepsis due to pneumonia (CMS/HCC)    Aspiration pneumonia (CMS/HCC)    Acute respiratory failure with hypoxia (CMS/HCC)    Hypernatremia      Brief Hospital Course to date:  Anita Roche is a 73 y.o. female with PMH significant for HTN, tobacco abuse, CAD, esophageal stricture, and trigger finger s/p release 11/27/2018 transferred from the ER at [place] for 3 days of headache, confusion,  And balance disturbances.  At the local ER, CT head was performed which showed an old left sided stroke but no acute insults.  However, left facial droop and left sided drift was noted and the patient was sent to [facility] for higher level of care. MRI upon arrival revealed multiple acute lacunar infarcts within the right cerebral  hemisphere, as well as a meningioma in the left parafalcine region.     Right hemispheric infarct secondary to right ICA occlusion with residual left hemiparesis and dysphagia  - ASA/Plavix  - Neuro has evaluated  - large infarct with guarded prognosis (somnelence improved, left hemiparesis, neglect, dysphagia)  - remains NPO; MBS 12/12 showing aspiration with many consistencies. Keofeed placed 12/14 and TF started, not at goal rate yet (50ml/hr)  - ST/PT/OT/CM: [facility] referral in progress  -- change cardizem to immediate release to be administered through FT     Sepsis (not POA)  Aspiration pneumonia (not POA)  Acute hypoxic respiratory failure  - blood cultures negative  - will stop abx  - leukocytosis and fever improved     Diarrhea  - now appears resolved ?antibiotic related  - cancel C.diff PCR     Lethargy improved, headache persists-   will repeat CT of head for safe measure.  -- decrease to 2.5/325mg Norco  -- still hopeful for [facility] rehab- may need SNF, monitor   consider topamax or muscle relaxant for headache  Hypernatremia  -- appear dry- TF started and will continue to increase to goal  -- stop IVfs make sure free water is sufficient.      DVT prophylaxis:  Discharge Planning: I expect patient to be discharged to  Rehab when able  Electronically signed by Marixa Zhang MD, 12/16/18 1:16 PM .

## 2018-12-16 NOTE — THERAPY TREATMENT NOTE
Acute Care - Occupational Therapy Treatment Note  [facility]     Patient Name: Anita Roche  : 1945  MRN: [id number]  Today's Date: 2018  Onset of Illness/Injury or Date of Surgery: 18  Date of Referral to OT: 18  Referring Physician: MD SULEIMAN    Admit Date: 2018       ICD-10-CM ICD-9-CM   1. Oropharyngeal dysphagia R13.12 787.22   2. Impaired mobility and ADLs Z74.09 799.89   3. Impaired functional mobility, balance, gait, and endurance Z74.09 V49.89   4. Cerebrovascular accident (CVA) due to occlusion of right carotid artery (CMS/HCC) I63.231 433.11   5. Cognitive communication deficit R41.841 799.52     Patient Active Problem List   Diagnosis   • Chronic fatigue syndrome   • Knee pain   • Closed wedge compression fracture of thoracic vertebra (CMS/HCC)   • Atherosclerosis of coronary artery   • Persistent cough   • Degeneration of intervertebral disc of lumbar region   • Stricture of esophagus   • Hand pain, left   • Trigger middle finger of left hand   • Hypokalemia   • CVA (cerebral vascular accident) (CMS/HCC)   • Tobacco use   • Underweight   • Sepsis due to pneumonia (CMS/HCC)   • Aspiration pneumonia (CMS/HCC)   • Acute respiratory failure with hypoxia (CMS/HCC)   • Hypernatremia     Past Medical History:   Diagnosis Date   • Acid reflux    • Arthritis    • Atherosclerosis of coronary artery    • Chronic fatigue    • Coronary artery disease    • Degenerative lumbar disc    • Elevated cholesterol    • Esophageal stricture    • Hypertension    • PONV (postoperative nausea and vomiting)      Past Surgical History:   Procedure Laterality Date   • ABDOMINAL SURGERY     • BACK SURGERY     • CARDIAC CATHETERIZATION     • CATARACT EXTRACTION, BILATERAL     • CHOLECYSTECTOMY     • COLONOSCOPY     • ENDOSCOPY     • EYE SURGERY     • TONSILLECTOMY AND ADENOIDECTOMY     • TRIGGER FINGER RELEASE Right     Third and Fourth Fingers Dr. Ashley   • TUBAL ABDOMINAL LIGATION         Therapy  Treatment    Rehabilitation Treatment Summary     Row Name 12/16/18 1610             Treatment Time/Intention    Discipline  occupational therapist  -JAVIER      Document Type  therapy note (daily note)  -JAVIER      Subjective Information  complains of;weakness;pain;fatigue being cold  -JAVIER      Mode of Treatment  individual therapy;occupational therapy  -JAVIER      Patient/Family Observations  no family present. Pt. supine in bed with L LE turned out funny and complaining of hip pain. Pt. had also pulled blanket off L side of body.  Feeding tube, L wrist IV.  -JAVIER      Care Plan Review  care plan/treatment goals reviewed;risks/benefits reviewed;current/potential barriers reviewed  -JAVIER      Patient Effort  adequate  -JAVIER      Existing Precautions/Restrictions  fall;non-weight bearing L neglect and flaccid, feeding tube  -JAVIER      Recorded by [JAVIER] Lisa Concepcion OT 12/16/18 1650      Row Name 12/16/18 1610             Vital Signs    Pre Systolic BP Rehab  149  -JAVIER      Pre Treatment Diastolic BP  78  -JAVIER      Post Systolic BP Rehab  -- left with aid to get BP  -JAVIER      Pretreatment Heart Rate (beats/min)  70  -JAVIER      Posttreatment Heart Rate (beats/min)  69  -JAVIER      Pre SpO2 (%)  96  -JAVIER      O2 Delivery Pre Treatment  room air  -JAVIER      Post SpO2 (%)  95  -JAVIER      O2 Delivery Post Treatment  room air  -JAVIER      Pre Patient Position  Supine  -JAVIER      Intra Patient Position  Supine  -JAVIER      Post Patient Position  Supine  -JAVIER      Recorded by [JAVIER] Lisa Concepcion OT 12/16/18 1650      Row Name 12/16/18 1610             Cognitive Assessment/Intervention- PT/OT    Affect/Mental Status (Cognitive)  agitated milding due to hip and head pain and being cold  -JAVIER      Orientation Status (Cognition)  oriented to;person  -JAVIER      Follows Commands (Cognition)  follows one step commands;over 90% accuracy;repetition of directions required;verbal cues/prompting required;physical/tactile prompts required  -JAVIER      Cognitive Function  (Cognitive)  safety deficit  -JAVIER      Safety Deficit (Cognitive)  moderate deficit;awareness of need for assistance;insight into deficits/self awareness;safety precautions awareness;safety precautions follow-through/compliance  -JAVIER      Personal Safety Interventions  fall prevention program maintained;gait belt;nonskid shoes/slippers when out of bed  -JAVIER      Recorded by [JAVIER] Lisa Concepcion, OT 12/16/18 1650      Row Name 12/16/18 1610             Safety Issues, Functional Mobility    Safety Issues Affecting Function (Mobility)  insight into deficits/self awareness;problem solving;safety precaution awareness;safety precautions follow-through/compliance  -JAVIER      Impairments Affecting Function (Mobility)  balance;coordination;grasp;pain;postural/trunk control;strength;sensation/sensory awareness  -JAVIER      Recorded by [JAVIER] Lisa Concepcion, OT 12/16/18 1650      Row Name 12/16/18 1610             Bed Mobility Assessment/Treatment    Rolling Left [place] (Bed Mobility)  minimum assist (75% patient effort);verbal cues;nonverbal cues (demo/gesture)  -JAVIER      Rolling Right [place] (Bed Mobility)  maximum assist (25% patient effort);verbal cues;nonverbal cues (demo/gesture)  -JAVIER      Bed Mobility, Safety Issues  decreased use of arms for pushing/pulling;decreased use of legs for bridging/pushing;impaired trunk control for bed mobility;cognitive deficits limit understanding  -JAVIER      Assistive Device (Bed Mobility)  bed rails;draw sheet  -JAVIER      Comment (Bed Mobility)  Pt. assisted with RUE and RLE to turn for pad to be changed and cleaned due to incontinent.  Pt. unable to follow cues as well to roll right side.  -JAVIER      Recorded by [JAVIER] Lisa Concepcion, OT 12/16/18 1650      Row Name 12/16/18 1610             Functional Mobility    Functional Mobility- Comment  medical status.  -JAVIER      Recorded by [JAVIER] Lisa Concepcion, OT 12/16/18 1650      Row Name 12/16/18 1610             Transfer Assessment/Treatment     Comment (Transfers)  Pt. with hip pain and soiled so did not attempt  -JAVIER      Recorded by [JAVIER] Lisa Concepcion, OT 12/16/18 1650      Row Name 12/16/18 1610             ADL Assessment/Intervention    76347 - OT Self Care/Mgmt Minutes  5  -JAVIER      Recorded by [JAVIER] Lisa Concepcion, OT 12/16/18 1650      Row Name 12/16/18 1610             Toileting Assessment/Training    [place] Level (Toileting)  change pad/brief;perform perineal hygiene;dependent (less than 25% patient effort)  -JAVIER      Comment (Toileting)  worked with pt. on rolling and holding self to side to assist with cleaning.  -JAVIER      Recorded by [JAVIER] Lisa Concepcion, OT 12/16/18 1650      Row Name 12/16/18 1610             BADL Safety/Performance    Impairments, BADL Safety/Performance  cognition;endurance/activity tolerance;grasp/prehension;strength;range of motion  -JAVIER      Cognitive Impairments, BADL Safety/Performance  insight into deficits/self awareness;judgment;problem solving/reasoning  -JAVIER      Skilled BADL Treatment/Intervention  BADL process/adaptation training  -JAVIER      Progress in BADL Status  decreased burden of care noted  -JAVIER      Recorded by [JAVIER] Lisa Concepcion, OT 12/16/18 1650      Row Name 12/16/18 1610             Therapeutic Exercise    45751 - OT Therapeutic Exercise Minutes  10  -JAVIER      16607 - OT Therapeutic Activity Minutes  6  -JAVIER      Recorded by [JAVIER] Lisa Concepcion, OT 12/16/18 1650      Row Name 12/16/18 1610             Lower Extremity Seated Therapeutic Exercise    Performed, Seated Lower Extremity (Therapeutic Exercise)  hip abduction/adduction;hip flexion/extension;hip external/internal rotation;knee flexion/extension;ankle dorsiflexion/plantarflexion LLE  -JAVIER      Exercise Type, Seated Lower Extremity (Therapeutic Exercise)  PROM (passive range of motion)  -JAVIER      Sets/Reps Detail, Seated Lower Extremity (Therapeutic Exercise)  1/10  -JAVIER      Recorded by [JAVIER] Lisa Concepcion, OT 12/16/18 1650      Row Name  12/16/18 1610             Therapeutic Exercise    Upper Extremity Range of Motion (Therapeutic Exercise)  shoulder flexion/extension, left;shoulder abduction/adduction, left;elbow flexion/extension, bilateral;forearm supination/pronation, left;wrist flexion/extension, left scapula elevation with WB LUE, RUE AROM  -JAVIER      Hand (Therapeutic Exercise)  finger flexion/extension, left;thumb finger opposition, left  -JAVIER      Exercise Type (Therapeutic Exercise)  AROM (active range of motion);PROM (passive range of motion);resistive exercises resistance R biceps and triceps, PROM/SROM LUE  -JAVIER      Position (Therapeutic Exercise)  supine  -JAVIER      Sets/Reps (Therapeutic Exercise)  1/10  -JAVIER      Expected Outcome (Therapeutic Exercise)  increase active range of motion;improve functional tolerance, self-care activity  -JAVIER      Comment (Therapeutic Exercise)  worked with pt. doing SROM LUE to help keep ROM and decrease pain.  Pt. would only attempt 5 reps each 3 exer.  -JAVIER      Recorded by [JAVIER] Lisa Concepcion OT 12/16/18 1650      Row Name 12/16/18 1610             Positioning and Restraints    Pre-Treatment Position  in bed  -JAVIER      Post Treatment Position  bed  -JAVIER      In Bed  side lying left;call light within reach;encouraged to call for assist;pillow between legs did not adjust alarm  -JAVIER      Recorded by [JAVIER] Lisa Concepcion, NIK 12/16/18 1651      Row Name 12/16/18 1610             Pain Scale: Numbers Pre/Post-Treatment    Pain Location - Side  Left  -JAVIER      Pain Location  hip per pt. also headache and general leg pain  -JAVIER      Pain Intervention(s)  Heat applied;Ambulation/increased activity;Repositioned warm blanket, ROM  -JAVIER      Recorded by [JAVIER] Lisa Concepcion OT 12/16/18 1650      Row Name 12/16/18 1610             Pain Scale: FACES Pre/Post-Treatment    Pain: FACES Scale, Pretreatment  4-->hurts little more  -JAVIER      Pain: FACES Scale, Post-Treatment  4-->hurts little more  -JAVIER      Recorded by [JAVIER] Jamey  Lisa Vaz, OT 12/16/18 1650      Row Name 12/16/18 1610             Sensory Assessment/Intervention    Additional Documentation  Vision Assessment/Intervention (Group)  -JAVIER      Recorded by [JAVIER] Lisa Concepcion, OT 12/16/18 1650      Row Name 12/16/18 1610             Vision Assessment/Intervention    Vision Assessment Comment  worked with pt. finding object with eyes to left and watching LUE mvmt.  Pt. ID 2/2 number fingers to left.  -JAVIER      Recorded by [JAVIER] Lisa Concepcion, OT 12/16/18 1650      Row Name 12/16/18 1610             Plan of Care Review    Plan of Care Reviewed With  patient  -JAVIER      Recorded by [JAVIER] Lisa Concepcion OT 12/16/18 1650      Row Name 12/16/18 1610             Outcome Summary/Treatment Plan (OT)    Daily Summary of Progress (OT)  progress toward functional goals is gradual  -JAVIER      Barriers to Overall Progress (OT)  pain   -JAVIER      Plan for Continued Treatment (OT)  Cont OT POC  -JAVIER      Recorded by [JAVIER] Lisa Concepcion OT 12/16/18 1650        User Key  (r) = Recorded By, (t) = Taken By, (c) = Cosigned By    Initials Name Effective Dates Discipline    Lisa Gaytan, OT 06/08/18 -  OT           Rehab Goal Summary     Row Name 12/16/18 1610             Transfer Goal 1 (OT)    Progress/Outcome (Transfer Goal 1, OT)  goal ongoing  -JAVIER         Dressing Goal 1 (OT)    Progress/Outcome (Dressing Goal 1, OT)  goal ongoing  -JAVIER         Balance Goal 1 (OT)    Progress/Outcomes (Balance Goal 1, OT)  goal ongoing  -JAVIER         Isolated Movement Goal 1 (OT)    Progress/Outcomes (Isolated Movement Goal 1, OT)  goal ongoing  -JAVIER        User Key  (r) = Recorded By, (t) = Taken By, (c) = Cosigned By    Initials Name Provider Type Discipline    Lisa Gaytan, OT Occupational Therapist OT        Occupational Therapy Education     Title: PT OT SLP Therapies (In Progress)     Topic: Occupational Therapy (In Progress)     Point: ADL training (In Progress)     Description: Instruct learner(s) on  proper safety adaptation and remediation techniques during self care or transfers.   Instruct in proper use of assistive devices.    Learning Progress Summary           Patient Acceptance, E,D, NR by JAVIER at 12/16/2018  4:10 PM    Comment:  Self ROM, bed mobility, L sided protection.    Acceptance, E,D, VU,NR by AN at 12/14/2018  2:40 PM    Comment:  Educated on benefits of all ex this date.    Acceptance, E, VU by EM at 12/14/2018  4:38 AM    Acceptance, E,D, VU,NR by AN at 12/11/2018 10:18 AM    Comment:  ADL retraining wtih feeding and education on body mechanics for balance.    Acceptance, E,TB,D, VU,DU by ST at 12/9/2018  3:32 PM    Comment:  role of OT, benefits of activity, bed mobility, POC, rolling, deficits, neglect                   Point: Home exercise program (In Progress)     Description: Instruct learner(s) on appropriate technique for monitoring, assisting and/or progressing therapeutic exercises/activities.    Learning Progress Summary           Patient Acceptance, E,D, NR by JAVIER at 12/16/2018  4:10 PM    Comment:  Self ROM, bed mobility, L sided protection.    Acceptance, E,D, VU,NR by AN at 12/14/2018  2:40 PM    Comment:  Educated on benefits of all ex this date.    Acceptance, E, VU by EM at 12/14/2018  4:38 AM    Acceptance, E,D, VU,DU,NR by AN at 12/12/2018  4:31 PM    Comment:  Educated on UE ex, sensation training; visual ex    Acceptance, E,TB,D, VU,DU by ST at 12/9/2018  3:32 PM    Comment:  role of OT, benefits of activity, bed mobility, POC, rolling, deficits, neglect                   Point: Precautions (Done)     Description: Instruct learner(s) on prescribed precautions during self-care and functional transfers.    Learning Progress Summary           Patient Acceptance, E, VU by EM at 12/14/2018  4:38 AM    Acceptance, E,TB,D, VU,DU by ST at 12/9/2018  3:32 PM    Comment:  role of OT, benefits of activity, bed mobility, POC, rolling, deficits, neglect                   Point: Body  mechanics (Done)     Description: Instruct learner(s) on proper positioning and spine alignment during self-care, functional mobility activities and/or exercises.    Learning Progress Summary           Patient Acceptance, MRAISA, VU by EM at 12/14/2018  4:38 AM    Acceptance, E,D, VU,NR by AN at 12/11/2018 10:18 AM    Comment:  ADL retraining wtih feeding and education on body mechanics for balance.    Acceptance, E,TB,ABIGAIL, ISAAC,DU by ST at 12/9/2018  3:32 PM    Comment:  role of OT, benefits of activity, bed mobility, POC, rolling, deficits, neglect                               User Key     Initials Effective Dates Name Provider Type Discipline    JAVIER 06/08/18 -  Lisa Concepcion, OT Occupational Therapist OT    ST 06/10/18 -  Rebeka Ospina, OTR Occupational Therapist OT    AN 06/22/15 -  Juana Carson, OT Occupational Therapist OT    EM 06/12/17 -  Génesis Fuentes RN Registered Nurse Nurse                OT Recommendation and Plan  Outcome Summary/Treatment Plan (OT)  Daily Summary of Progress (OT): progress toward functional goals is gradual  Barriers to Overall Progress (OT): pain   Plan for Continued Treatment (OT): Cont OT POC  Daily Summary of Progress (OT): progress toward functional goals is gradual  Plan of Care Review  Plan of Care Reviewed With: patient  Plan of Care Reviewed With: patient  Outcome Summary: Mild improvement.  Pt. limited by pain distracion.  Treatment focus on rolling, L sided attention and self ROM, but pt. limited focus.  Outcome Measures     Row Name 12/16/18 1610 12/15/18 1115 12/14/18 1440       How much help from another person do you currently need...    Turning from your back to your side while in flat bed without using bedrails?  --  2  -LS  --    Moving from lying on back to sitting on the side of a flat bed without bedrails?  --  2  -LS  --    Moving to and from a bed to a chair (including a wheelchair)?  --  2  -LS  --    Standing up from a chair using your arms (e.g.,  wheelchair, bedside chair)?  --  2  -LS  --    Climbing 3-5 steps with a railing?  --  1  -LS  --    To walk in hospital room?  --  1  -LS  --    AM-PAC 6 Clicks Score  --  10  -LS  --       How much help from another is currently needed...    Putting on and taking off regular lower body clothing?  1  -JAVIER  --  1  -AN    Bathing (including washing, rinsing, and drying)  1  -JAVIER  --  1  -AN    Toileting (which includes using toilet bed pan or urinal)  1  -JAVIER  --  1  -AN    Putting on and taking off regular upper body clothing  1  -JAVIER  --  1  -AN    Taking care of personal grooming (such as brushing teeth)  2  -JAVIER  --  2  -AN    Eating meals  1  -JAVIER  --  1  -AN    Score  7  -JAVIER  --  7  -AN       Modified Toombs Scale    Modified Toombs Scale  4 - Moderately severe disability.  Unable to walk without assistance, and unable to attend to own bodily needs without assistance.  -JAVIER  4 - Moderately severe disability.  Unable to walk without assistance, and unable to attend to own bodily needs without assistance.  -LS  --       Functional Assessment    Outcome Measure Options  AM-PAC 6 Clicks Daily Activity (OT)  -JAVIER  AM-PAC 6 Clicks Basic Mobility (PT)  -LS  --      User Key  (r) = Recorded By, (t) = Taken By, (c) = Cosigned By    Initials Name Provider Type    Lisa Gaytan, OT Occupational Therapist    Juana Quintanilla, OT Occupational Therapist    Jolene Siddiqi, PT Physical Therapist           Time Calculation:   Time Calculation- OT     Row Name 12/16/18 1610             Time Calculation- OT    OT Start Time  1610  -JAVIER      OT Received On  12/16/18  -JAVIER      OT Goal Re-Cert Due Date  12/19/18  -JAVIER         Timed Charges    09948 - OT Therapeutic Exercise Minutes  10  -JAVIER      01376 - OT Therapeutic Activity Minutes  6  -JAVIER      33980 - OT Self Care/Mgmt Minutes  5  -JAVIER        User Key  (r) = Recorded By, (t) = Taken By, (c) = Cosigned By    Initials Name Provider Type    Lisa Gaytan, OT Occupational  Therapist           Therapy Suggested Charges     Code   Minutes Charges    98611 (CPT®) Hc Ot Neuromusc Re Education Ea 15 Min      84957 (CPT®) Hc Ot Ther Proc Ea 15 Min 10 1    37549 (CPT®) Hc Ot Therapeutic Act Ea 15 Min 6     94040 (CPT®) Hc Ot Manual Therapy Ea 15 Min      03366 (CPT®) Hc Ot Iontophoresis Ea 15 Min      86608 (CPT®) Hc Ot Elec Stim Ea-Per 15 Min      22644 (CPT®) Hc Ot Ultrasound Ea 15 Min      37269 (CPT®) Hc Ot Self Care/Mgmt/Train Ea 15 Min 5     Total  21 1        Therapy Charges for Today     Code Description Service Date Service Provider Modifiers Qty    76394675538 HC OT THER PROC EA 15 MIN 12/16/2018 Lisa Concepcion OT GO 1               Lisa Concepcion OT  12/16/2018

## 2018-12-17 LAB
GLUCOSE BLDC GLUCOMTR-MCNC: 122 MG/DL (ref 70–130)
GLUCOSE BLDC GLUCOMTR-MCNC: 129 MG/DL (ref 70–130)
GLUCOSE BLDC GLUCOMTR-MCNC: 135 MG/DL (ref 70–130)
GLUCOSE BLDC GLUCOMTR-MCNC: 138 MG/DL (ref 70–130)

## 2018-12-17 PROCEDURE — 25010000002 HEPARIN (PORCINE) PER 1000 UNITS: Performed by: FAMILY MEDICINE

## 2018-12-17 PROCEDURE — 97112 NEUROMUSCULAR REEDUCATION: CPT

## 2018-12-17 PROCEDURE — 92507 TX SP LANG VOICE COMM INDIV: CPT

## 2018-12-17 PROCEDURE — 97110 THERAPEUTIC EXERCISES: CPT

## 2018-12-17 PROCEDURE — 82962 GLUCOSE BLOOD TEST: CPT

## 2018-12-17 PROCEDURE — 92526 ORAL FUNCTION THERAPY: CPT

## 2018-12-17 PROCEDURE — 99232 SBSQ HOSP IP/OBS MODERATE 35: CPT | Performed by: INTERNAL MEDICINE

## 2018-12-17 RX ORDER — DEXTROSE MONOHYDRATE 25 G/50ML
25 INJECTION, SOLUTION INTRAVENOUS
Status: DISCONTINUED | OUTPATIENT
Start: 2018-12-17 | End: 2018-12-18 | Stop reason: HOSPADM

## 2018-12-17 RX ORDER — NICOTINE POLACRILEX 4 MG
15 LOZENGE BUCCAL
Status: DISCONTINUED | OUTPATIENT
Start: 2018-12-17 | End: 2018-12-18 | Stop reason: HOSPADM

## 2018-12-17 RX ADMIN — DILTIAZEM HYDROCHLORIDE 60 MG: 60 TABLET, FILM COATED ORAL at 00:18

## 2018-12-17 RX ADMIN — CYCLOBENZAPRINE HYDROCHLORIDE 5 MG: 10 TABLET, FILM COATED ORAL at 20:57

## 2018-12-17 RX ADMIN — HYDROCODONE BITARTRATE AND ACETAMINOPHEN 0.5 TABLET: 5; 325 TABLET ORAL at 20:57

## 2018-12-17 RX ADMIN — SODIUM CHLORIDE, PRESERVATIVE FREE 3 ML: 5 INJECTION INTRAVENOUS at 09:18

## 2018-12-17 RX ADMIN — HEPARIN SODIUM 5000 UNITS: 5000 INJECTION INTRAVENOUS; SUBCUTANEOUS at 05:26

## 2018-12-17 RX ADMIN — DILTIAZEM HYDROCHLORIDE 60 MG: 60 TABLET, FILM COATED ORAL at 05:30

## 2018-12-17 RX ADMIN — DILTIAZEM HYDROCHLORIDE 60 MG: 60 TABLET, FILM COATED ORAL at 18:53

## 2018-12-17 RX ADMIN — DILTIAZEM HYDROCHLORIDE 60 MG: 60 TABLET, FILM COATED ORAL at 23:29

## 2018-12-17 RX ADMIN — SODIUM CHLORIDE, PRESERVATIVE FREE 3 ML: 5 INJECTION INTRAVENOUS at 20:58

## 2018-12-17 RX ADMIN — ACETAMINOPHEN 650 MG: 650 SOLUTION ORAL at 18:05

## 2018-12-17 RX ADMIN — CLOPIDOGREL BISULFATE 75 MG: 75 TABLET ORAL at 09:17

## 2018-12-17 RX ADMIN — ASPIRIN 300 MG: 300 SUPPOSITORY RECTAL at 09:17

## 2018-12-17 RX ADMIN — HYDROCODONE BITARTRATE AND ACETAMINOPHEN 0.5 TABLET: 5; 325 TABLET ORAL at 05:31

## 2018-12-17 RX ADMIN — HYDROCODONE BITARTRATE AND ACETAMINOPHEN 0.5 TABLET: 5; 325 TABLET ORAL at 11:59

## 2018-12-17 RX ADMIN — ATENOLOL 25 MG: 25 TABLET ORAL at 09:17

## 2018-12-17 RX ADMIN — DILTIAZEM HYDROCHLORIDE 60 MG: 60 TABLET, FILM COATED ORAL at 11:58

## 2018-12-17 RX ADMIN — HEPARIN SODIUM 5000 UNITS: 5000 INJECTION INTRAVENOUS; SUBCUTANEOUS at 23:03

## 2018-12-17 RX ADMIN — HEPARIN SODIUM 5000 UNITS: 5000 INJECTION INTRAVENOUS; SUBCUTANEOUS at 13:14

## 2018-12-17 RX ADMIN — SODIUM CHLORIDE, PRESERVATIVE FREE 3 ML: 5 INJECTION INTRAVENOUS at 20:57

## 2018-12-17 RX ADMIN — LISINOPRIL 10 MG: 10 TABLET ORAL at 09:17

## 2018-12-17 NOTE — PROGRESS NOTES
[facility] Medicine Services  INPATIENT PROGRESS NOTE    Date of Admission: 12/8/2018  Length of Stay: 8  Primary Care Physician: Diann Ferreira APRN    Subjective     Chief Complaint: stroke    HPI:patient still with dense left side weakness. Tolerating tube feeds, has had some diarrhea.  She reports nothing except massage helps her headache and neck pain, says she is thirsty  Review Of Systems:   Patient denies headaches, fever, chills, shortness of breath, chest pain, cough, abdominal pain, nausea or vomiting,rash, itching or bleeding      Objective      Vitals:   98 °F (36.7 °C) Temp  Min: 97.1 °F (36.2 °C)  Max: 98.3 °F (36.8 °C)    117/65 BP  Min: 114/64  Max: 149/66    75 Pulse  Min: 66  Max: 86    16 Resp  Min: 16  Max: 18    97 % SpO2  Min: 95 %  Max: 99 %    room air     2 No Data Recorded     Patient is alert and talkative uncomfortable and frustrated  in no distress at rest  Neck is without mass or JVD  Heart is Reg wo murmur  Lungs are clear wo wheeze or crackle  Abd is soft without HSM or mass, not distended or tender to palpation  Dense left hemiparesis  Skin is without rash  Neurologic exam shows dense left side weakness, wet speech  Mood is appropriate      Results Review:    I have reviewed the labs, radiology results and diagnostic studies.    Results from last 7 days   Lab Units  12/14/18   0518  12/11/18   0714   WBC 10*3/mm3  11.33*  11.38*   HEMOGLOBIN g/dL  13.6  13.0   HEMATOCRIT %  42.4  40.4   PLATELETS 10*3/mm3  270  175     Results from last 7 days   Lab Units  12/16/18   0544  12/15/18   0535  12/14/18   0518   SODIUM mmol/L  145  149*  147*   POTASSIUM mmol/L  3.7  3.8  2.9*   CHLORIDE mmol/L  109  114*  112*   CO2 mmol/L  30.0  26.0  21.0   BUN mg/dL  23  23  22   CREATININE mg/dL  0.70  0.64  0.72   GLUCOSE mg/dL  77  148*  105*   CALCIUM mg/dL  9.6  10.2  10.2       Microbiology Results Abnormal     Procedure Component Value - Date/Time    Blood  Culture - Blood, Arm, Right [[id number]] Collected:  12/09/18 1259    Lab Status:  Final result Specimen:  Blood from Arm, Right Updated:  12/14/18 1331     Blood Culture No growth at 5 days    Blood Culture - Blood, Hand, Right [[id number]] Collected:  12/09/18 1306    Lab Status:  Final result Specimen:  Blood from Hand, Right Updated:  12/14/18 1331     Blood Culture No growth at 5 days        Ct Head Without Contrast    Result Date: 12/16/2018  Right ISH/MCA watershed acute infarct. No acute intracranial hemorrhage. THIS DOCUMENT HAS BEEN ELECTRONICALLY SIGNED BY OSCAR SILVESTRE MD    Results for orders placed during the hospital encounter of 12/08/18   Adult Transthoracic Echo Complete W/ Cont if Necessary Per Protocol (With Agitated Saline)    Narrative · Left ventricular systolic function is hyperdynamic (EF > 70).  · Mid LV cavitary gradient of 40 mmHg..  · Trace mitral valve regurgitation is present.  · Trace tricuspid valve regurgitation is present          I have reviewed the medications.    Assessment/Plan     Assessment/Problem List    CVA (cerebral vascular accident) (CMS/HCC)    Atherosclerosis of coronary artery    Degeneration of intervertebral disc of lumbar region    Hypokalemia    Tobacco use    Underweight    Sepsis due to pneumonia (CMS/HCC)    Aspiration pneumonia (CMS/HCC)    Acute respiratory failure with hypoxia (CMS/HCC)    Hypernatremia      Brief Hospital Course to date:  Anita Roche is a 73 y.o. female with PMH significant for HTN, tobacco abuse, CAD, esophageal stricture, and trigger finger s/p release 11/27/2018 transferred from the ER at [place] for 3 days of headache, confusion,  And balance disturbances.  At the local ER, CT head was performed which showed an old left sided stroke but no acute insults.  However, left facial droop and left sided drift was noted and the patient was sent to [facility] for higher level of care. MRI upon arrival revealed multiple acute lacunar infarcts within the  right cerebral hemisphere, as well as a meningioma in the left parafalcine region.     Right hemispheric infarct secondary to right ICA occlusion with residual left hemiparesis and dysphagia  - ASA/Plavix  - Neuro has evaluated  - large infarct with guarded prognosis (somnelence improved, left hemiparesis, neglect, dysphagia)  - remains NPO; MBS 12/12 showing aspiration with many consistencies. Keofeed placed 12/14 and TF started, at goal rate, will add water for thirst and oral care  - ST/PT/OT/CM: [facility] referral in progress  -- change cardizem to immediate release to be administered through FT     Sepsis (not POA)  Aspiration pneumonia (not POA)  Acute hypoxic respiratory failure  - blood cultures negative  - will stop abx  - leukocytosis and fever improved     Diarrhea  - now appears resolved ?antibiotic related  - cancel C.diff PCR     Lethargy improved, headache persists-   repeat CT of head only shows evolving stroke  -- decrease to 2.5/325mg [place]  -- [facility] rehab- tomorrow      Hypernatremia  -- appear dry- TF started and will continue to increase to goal  -- stop IVfs make sure free water is sufficient.      DVT prophylaxis:HEP SQ  Discharge Planning: I expect patient to be discharged to  [facility] tomorrow  Electronically signed by Marixa Zhang MD, 12/17/18 3:59 PM .

## 2018-12-17 NOTE — THERAPY TREATMENT NOTE
Acute Care - Speech Language Pathology Treatment Note  [facility]     Patient Name: Anita Roche  : 1945  MRN: [id number]  Today's Date: 2018         Admit Date: 2018    Visit Dx:      ICD-10-CM ICD-9-CM   1. Oropharyngeal dysphagia R13.12 787.22   2. Impaired mobility and ADLs Z74.09 799.89   3. Impaired functional mobility, balance, gait, and endurance Z74.09 V49.89   4. Cerebrovascular accident (CVA) due to occlusion of right carotid artery (CMS/HCC) I63.231 433.11   5. Cognitive communication deficit R41.841 799.52     Patient Active Problem List   Diagnosis   • Chronic fatigue syndrome   • Knee pain   • Closed wedge compression fracture of thoracic vertebra (CMS/HCC)   • Atherosclerosis of coronary artery   • Persistent cough   • Degeneration of intervertebral disc of lumbar region   • Stricture of esophagus   • Hand pain, left   • Trigger middle finger of left hand   • Hypokalemia   • CVA (cerebral vascular accident) (CMS/HCC)   • Tobacco use   • Underweight   • Sepsis due to pneumonia (CMS/HCC)   • Aspiration pneumonia (CMS/HCC)   • Acute respiratory failure with hypoxia (CMS/HCC)   • Hypernatremia        Therapy Treatment  Rehabilitation Treatment Summary     Row Name 18 1400 18 0904          Treatment Time/Intention    Discipline  speech language pathologist  -AC  physical therapist  -SC     Document Type  therapy note (daily note)  -  therapy note (daily note)  -SC     Subjective Information  complains of;weakness;fatigue  -  complains of;pain  -SC     Mode of Treatment  --  physical therapy  -SC     Patient/Family Observations  Pt alert only w/ continuous stimuli. Cooperative w/ cues. Poor eye contact. No family present. RN reported pt c/o pain earlier, so recently administered pain meds.  -AC  l sidelying in bed  -SC     Care Plan Review  evaluation/treatment results reviewed;care plan/treatment goals reviewed;risks/benefits reviewed;current/potential barriers  reviewed;patient/other agree to care plan  -AC  risks/benefits reviewed  -SC     Therapy Frequency (PT Clinical Impression)  --  daily  -SC     Therapy Frequency (Swallow)  5 days per week  -AC  --     Therapy Frequency (SLP SLC)  5 days per week  -AC  --     Patient Effort  fair  -AC  good  -SC     Treatment Considerations/Comments  --  L side neglect, ng tube, L   -SC2     Patient Response to Treatment  --  good effort  -SC3     Recorded by [AC] Estrella Gaytan MS CCC-SLP 12/17/18 1427 [SC] Adán Gonzalez, PT 12/17/18 1011  [SC2] Adán Gonzalez, PT 12/17/18 1014  [SC3] Adán Gonzalez, PT 12/17/18 1027     Row Name 12/17/18 0904             Vital Signs    Pre Systolic BP Rehab  120  -SC      Pre Treatment Diastolic BP  68  -SC      Post Systolic BP Rehab  127  -SC      Post Treatment Diastolic BP  68  -SC      Pretreatment Heart Rate (beats/min)  100  -SC      Posttreatment Heart Rate (beats/min)  93  -SC      Recorded by [SC] Adán Gonzalez, PT 12/17/18 1027      Row Name 12/17/18 0904             Cognitive Assessment/Intervention- PT/OT    Affect/Mental Status (Cognitive)  WFL  -SC      Orientation Status (Cognition)  oriented to;person;place;time  -SC      Follows Commands (Cognition)  follows one step commands;over 90% accuracy;delayed response/completion;repetition of directions required  -SC      Cognitive Function (Cognitive)  executive function deficit  -SC      Executive Function Deficit (Cognition)  insight/awareness of deficits;judgment  -SC      Personal Safety Interventions  gait belt;fall prevention program maintained  -SC      Recorded by [SC] Adán Gonzalez, PT 12/17/18 1027      Row Name 12/17/18 0904             Safety Issues, Functional Mobility    Safety Issues Affecting Function (Mobility)  insight into deficits/self awareness;judgment  -SC      Impairments Affecting Function (Mobility)  balance;motor control;postural/trunk control;strength  -SC      Recorded by [SC] Adán Gonzalez, PT  12/17/18 1027      Row Name 12/17/18 0904             Bed Mobility Assessment/Treatment    Bed Mobility Assessment/Treatment  scooting/bridging  -SC      Scooting/Bridging [place] (Bed Mobility)  maximum assist (25% patient effort);verbal cues  -SC      Supine-Sit-Supine [place] (Bed Mobility)  maximum assist (25% patient effort);verbal cues  -SC      Sidelying-Sit [place] (Bed Mobility)  maximum assist (25% patient effort);verbal cues  -SC      Bed Mobility, Safety Issues  decreased use of legs for bridging/pushing;impaired trunk control for bed mobility  -SC      Assistive Device (Bed Mobility)  draw sheet;bed rails  -SC      Comment (Bed Mobility)  up to edge of bed with cueing. Reuqired much assit to get legs off bed. Working on pushing up with R UE sidelyig to sitting   -SC      Recorded by [SC] Adán Gonzalez, PT 12/17/18 1027      Row Name 12/17/18 0904             Transfer Assessment/Treatment    Transfer Assessment/Treatment  sit-stand transfer;stand-sit transfer  -SC      Comment (Transfers)  workind on standing from edge of bed. x2 assist   -SC      Recorded by [SC] Adán Gonzalez, PT 12/17/18 1027      Row Name 12/17/18 0904             Bed-Chair Transfer    Bed-Chair [place] (Transfers)  maximum assist (25% patient effort);2 person assist  -SC      Assistive Device (Bed-Chair Transfers)  -- stand pivot with PT assist cues for upright posture  -SC      Recorded by [SC] Adán Gonzalez, PT 12/17/18 1027      Row Name 12/17/18 0904             Sit-Stand Transfer    Sit-Stand [place] (Transfers)  maximum assist (25% patient effort);2 person assist;verbal cues  -SC      Assistive Device (Sit-Stand Transfers)  other (see comments) gt belt and HHA x2  -SC      Recorded by [SC] Adán Gonzalez, PT 12/17/18 1027      Row Name 12/17/18 0904             Stand-Sit Transfer    Stand-Sit [place] (Transfers)  2 person assist;maximum assist (25% patient effort) gait belt and HHAx2  -SC       Recorded by [SC] Adán Gonzalez, PT 12/17/18 1027      Row Name 12/17/18 0904             Gait/Stairs Assessment/Training    Comment (Gait/Stairs)  non ambulatory  -SC      Recorded by [SC] Adán Gonzalez, PT 12/17/18 1027      Row Name 12/17/18 0904             Therapeutic Exercise    47397 - PT Therapeutic Exercise Minutes  7  -SC      66431 -  PT Neuromuscular Reeducation Minutes  9  -SC      29822 - PT Therapeutic Activity Minutes  7  -SC      Recorded by [SC] Adán Gonzalez, PT 12/17/18 1027      Row Name 12/17/18 0904             Therapeutic Exercise    Upper Extremity Range of Motion (Therapeutic Exercise)  shoulder flexion/extension, left;shoulder abduction/adduction, left  -SC      Lower Extremity Range of Motion (Therapeutic Exercise)  hip flexion/extension, left;hip internal/external rotation, left;knee flexion/extension, left;ankle dorsiflexion/plantar flexion, left  -SC      Exercise Type (Therapeutic Exercise)  PROM (passive range of motion)  -SC      Position (Therapeutic Exercise)  seated  -SC      Sets/Reps (Therapeutic Exercise)  10  -SC      Recorded by [SC] Adán Gonzalez, PT 12/17/18 1027      Row Name 12/17/18 0904             Balance    Balance  static sitting balance;static standing balance;dynamic sitting balance;dynamic standing balance  -SC      Recorded by [SC] Adán Gonzalez, PT 12/17/18 1027      Row Name 12/17/18 0904             Static Sitting Balance    Level of [place] (Unsupported Sitting, Static Balance)  moderate assist, 50 to 74% patient effort  -SC      Sitting Position (Unsupported Sitting, Static Balance)  sitting on edge of bed  -SC      Time Able to Maintain Position (Unsupported Sitting, Static Balance)  more than 5 minutes  -SC      Comment (Unsupported Sitting, Static Balance)  progressed to contact guard. cues for midline and upright gaze.  Some tactile and physical cues to sustain minline gaze.  -SC      Recorded by [SC] Adán Gonzalez, PT 12/17/18 1027       Row Name 12/17/18 0904             Static Standing Balance    Level of [place] (Supported Standing, Static Balance)  moderate assist, 50 to 74% patient effort  -SC      Time Able to Maintain Position (Supported Standing, Static Balance)  1 to 2 minutes  -SC      Assistive Device Utilized (Supported Standing, Static Balance)  other (see comments) hha x2  -SC      Comment (Supported Standing, Static Balance)  cues for upright trunk. Rerquired manual facilitation . Unable to sustain for long  -SC      Recorded by [SC] Adán Gonzalez, PT 12/17/18 1027      Row Name 12/17/18 0904             Positioning and Restraints    Pre-Treatment Position  in bed  -SC      Post Treatment Position  chair  -SC      In Bed  sitting;notified nsg;with nsg;call light within reach;sitting EOB;encouraged to call for assist;exit alarm on  -SC      Recorded by [SC] Adán Gonzalez, PT 12/17/18 1027      Row Name 12/17/18 1400 12/17/18 0904          Pain Assessment    Additional Documentation  Pain Scale: FACES Pre/Post-Treatment (Group)  -  Pain Scale: FACES Pre/Post-Treatment (Group)  -SC     Recorded by [AC] Estrella Gaytan, MS CCC-SLP 12/17/18 1427 [SC] Adán Gonzalez, PT 12/17/18 1027     Row Name 12/17/18 0904             Pain Scale: Numbers Pre/Post-Treatment    Pain Location - Side  Left  -SC      Pain Location  -- neck, low back, hip  -SC      Pain Intervention(s)  Repositioned rom  -SC      Recorded by [SC] Adán Gonzalez, PT 12/17/18 1027      Row Name 12/17/18 1400 12/17/18 0904          Pain Scale: FACES Pre/Post-Treatment    Pain: FACES Scale, Pretreatment  0-->no hurt  -AC  6-->hurts even more  -SC     Pain: FACES Scale, Post-Treatment  0-->no hurt  -AC  6-->hurts even more  -SC     Recorded by [AC] Estrella Gaytan, MS CCC-SLP 12/17/18 1427 [SC] Adán Gonzalez, PT 12/17/18 1027     Row Name 12/17/18 0904             Vision Assessment/Intervention    Visual Motor Impairment  visual tracking, left  -SC      Visual  Processing Deficit  fran-inattention/neglect, left  -SC      Recorded by [SC] Adán Gonzalez, PT 12/17/18 1027      Row Name 12/17/18 0904             Coping    Observed Emotional State  calm;flat;accepting  -SC      Verbalized Emotional State  acceptance  -SC      Recorded by [SC] Adán Gonzalez, PT 12/17/18 1027      Row Name 12/17/18 0904             Outcome Summary/Treatment Plan (PT)    Daily Summary of Progress (PT)  progress toward functional goals is good  -SC      Recorded by [SC] Adán Gonzalez, PT 12/17/18 1027      Row Name 12/17/18 1400             Outcome Summary/Treatment Plan (SLP)    Daily Summary of Progress (SLP)  progress towards functional goals is fair  -AC      Barriers to Overall Progress (SLP)  Lethargy  -AC      Plan for Continued Treatment (SLP)  Pt's NIH improving per chart; however, pt lethargic when re-assessed in tx this afternoon. Given lethargy & overt s/sxs aspiration w/ limited PO trialed, rec: cont NPO, alternative means of nutrition. Pt would benefit from cont'd SLP services for oropharyngeal dysphagia and speech/language.  -AC      Anticipated Dischage Disposition  inpatient rehabilitation facility;anticipate therapy at next level of care  -AC      Recorded by [AC] Estrella Gaytan MS CCC-SLP 12/17/18 7757        User Key  (r) = Recorded By, (t) = Taken By, (c) = Cosigned By    Initials Name Effective Dates Discipline    SC Adán Gonzalez, PT 06/19/15 -  PT    AC Estrella Gaytan MS CCC-SLP 07/27/17 -  SLP          EDUCATION  The patient has been educated in the following areas:   Cognitive Impairment Communication Impairment Dysphagia (Swallowing Impairment) Oral Care/Hydration NPO rationale.    SLP Recommendation and Plan     Therapy Frequency (Swallow): 5 days per week  Predicted Duration Therapy Intervention (Days): until discharge       Plan of Care Reviewed With: patient  Plan of Care Review  Plan of Care Reviewed With: patient  Daily Summary of Progress (SLP): progress  towards functional goals is fair  Plan for Continued Treatment (SLP): Pt's NIH improving per chart; however, pt lethargic when re-assessed in tx this afternoon. Given lethargy & overt s/sxs aspiration w/ limited PO trialed, rec: cont NPO, alternative means of nutrition. Pt would benefit from cont'd SLP services for oropharyngeal dysphagia and speech/language.    SLP GOALS     Row Name 12/17/18 1400 12/14/18 1500          Oral Nutrition/Hydration Goal 1 (SLP)    Oral Nutrition/Hydration Goal 1, SLP  LTG: Pt will demonstrate improved swallowing skills to the level where repeat instrumental swallow study is indicated.  -AC  LTG: Pt will demonstrate improved swallowing skills to the level where repeat instrumental swallow study is indicated.  -VO     Time Frame (Oral Nutrition/Hydration Goal 1, SLP)  by discharge  -AC  by discharge  -VO     Progress/Outcomes (Oral Nutrition/Hydration Goal 1, SLP)  progress slower than expected  -AC  continuing progress toward goal  -VO        Oral Nutrition/Hydration Goal 2 (SLP)    Oral Nutrition/Hydration Goal 2, SLP  Pt will tolerate therapeutic trials of H2O w/o s/sxs aspiration w/ 70% acc w/o cues.  -AC  Pt will tolerate therapeutic trials of H2O w/o s/sxs aspiration w/ 70% acc w/o cues.  -VO     Time Frame (Oral Nutrition/Hydration Goal 2, SLP)  short term goal (STG);by discharge  -AC  short term goal (STG);by discharge  -VO     Barriers (Oral Nutrition/Hydration Goal 2, SLP)  Significant anterior loss & hard immediate coughing w/ thin liquid via tsp. Seemingly delayed pharyngeal swallow initiation w/ limited trials puree per laryngeal palpation. Limited trials 2' lethargy/required constant stimuli to remain awake/participate.  -AC  Cough w/ tsp sips of thin.  -VO     Progress/Outcomes (Oral Nutrition/Hydration Goal 2, SLP)  progress slower than expected  -AC  good progress toward goal  -VO        Lingual Strengthening Goal 1 (SLP)    Increase Lingual  Tone/Sensation/Control/Coordination/Movement  lingual resistance exercises  -AC  lingual resistance exercises  -VO     [place]/Accuracy (Lingual Strengthening Goal 1, SLP)  with minimal cues (75-90% accuracy)  -AC  with minimal cues (75-90% accuracy)  -VO     Time Frame (Lingual Strengthening Goal 1, SLP)  short term goal (STG);by discharge  -AC  short term goal (STG);by discharge  -VO     Barriers (Lingual Strengthening Goal 1, SLP)  lethargy  -AC  50% w/ min cues  -VO     Progress/Outcomes (Lingual Strengthening Goal 1, SLP)  goal ongoing  -AC  continuing progress toward goal  -VO        Pharyngeal Strengthening Exercise Goal 1 (SLP)    Activity (Pharyngeal Strengthening Goal 1, SLP)  --  increase timing;increase superior movement of the hyolaryngeal complex;increase anterior movement of the hyolaryngeal complex;increase closure at entrance to airway/closure of airway at glottis;increase squeeze/positive pressure generation;increase tongue base retraction  -VO     Increase Timing  prepping - 3 second prep or suck swallow or 3-step swallow  -AC  prepping - 3 second prep or suck swallow or 3-step swallow  -VO     Increase Superior Movement of the Hyolaryngeal Complex  effortful pitch glide (falsetto + pharyngeal squeeze)  -AC  effortful pitch glide (falsetto + pharyngeal squeeze)  -VO     Increase Anterior Movement of the Hyolaryngeal Complex  chin tuck against resistance (CTAR)  -AC  chin tuck against resistance (CTAR)  -VO     Increase Closure at Entrance to Airway/Closure of Airway at Glottis  super-supraglottic swallow  -AC  super-supraglottic swallow  -VO     Increase Squeeze/Positive Pressure Generation  hard effortful swallow  -AC  hard effortful swallow  -VO     Increase Tongue Base Retraction  juan david  -AC  juan david  -VO     [place]/Accuracy (Pharyngeal Strengthening Goal 1, SLP)  with minimal cues (75-90% accuracy)  -AC  with minimal cues (75-90% accuracy)  -VO     Time Frame (Pharyngeal  Strengthening Goal 1, SLP)  short term goal (STG);by discharge  -AC  short term goal (STG);by discharge  -VO     Barriers (Pharyngeal Strengthening Goal 1, SLP)  Effortful swallow x1 w/ max cues. Lethargy affecting pt's ability to complete excs.  -AC  Reviewed handout and exercises. Effort- 50%, CTAR-60%, juan david-10%. Unable to participate in the rest 2' c/o headache.  -VO     Progress/Outcomes (Pharyngeal Strengthening Goal 1, SLP)  progress slower than expected  -AC  continuing progress toward goal  -VO        Respiratory Support Goal 1 (SLP)    Improve Respiratory Support Goal 1 (SLP)  sustaining a vowel sound on exhalation;80%;with minimal cues (75-90%)  -AC  sustaining a vowel sound on exhalation;80%;with minimal cues (75-90%)  -VO     Time Frame (Respiratory Support Goal 1, SLP)  short term goal (STG);by discharge  -AC  short term goal (STG);by discharge  -VO     Barriers (Respiratory Support Goal 1, SLP)  Lethargy  -AC  --     Progress (Respiratory Support Skills Goal 1, SLP)  10%;with inconsistent cues  -AC  --     Progress/Outcomes (Respiratory Support Goal 1, SLP)  progress slower than expected  -AC  goal ongoing  -VO     Comment (Respiratory Support Goal 1, SLP)  Pt demonstrating poor breath support when attempting to respond to ?s. U/a to audibly phonate after 2-3 words. Pt u/a to follow commands to sustain a vowel on exhale 2' lethargy.  -AC  --        Articulation Goal 1 (SLP)    Improve Articulation Goal 1 (SLP)  by over-articulating at word level;80%;with minimal cues (75-90%)  -AC  by over-articulating at word level;80%;with minimal cues (75-90%)  -VO     Time Frame (Articulation Goal 1, SLP)  short term goal (STG);by discharge  -AC  short term goal (STG);by discharge  -VO     Progress (Articulation Goal 1, SLP)  10%;with minimal cues (75-90%)  -AC  40%;with minimal cues (75-90%)  -VO     Progress/Outcomes (Articulation Goal 1, SLP)  progress slower than expected  -AC  good progress toward goal  -VO      Comment (Articulation Goal 1, SLP)  Pt had difficulty overarticulating w/ cues, suspect largely affected by lethargy.  -AC  --        Attention Goal 1 (SLP)    Improve Attention by Goal 1 (SLP)  looking at speaker;attending to task;80%;independently (over 90% accuracy)  -AC  looking at speaker;attending to task;80%;independently (over 90% accuracy)  -VO     Time Frame (Attention Goal 1, SLP)  short term goal (STG);by discharge  -AC  short term goal (STG);by discharge  -VO     Barriers (Attention Goal 1, SLP)  Lethargy  -AC  --     Progress (Attention Goal 1, SLP)  30%;with minimal cues (75-90%)  -AC  60%;with moderate cues (50-74%)  -VO     Progress/Outcomes (Attention Goal 1, SLP)  progress slower than expected  -AC  continuing progress toward goal  -VO        Additional Goal 1 (SLP)    Additional Goal 1, SLP  LTG: Pt will improve cognitive-communication skills in order to participate in care in hospital setting w/ 100% acc w/ min cues.  -AC  LTG: Pt will improve cognitive-communication skills in order to participate in care in hospital setting w/ 100% acc w/ min cues.  -VO     Time Frame (Additional Goal 1, SLP)  by discharge  -AC  by discharge  -VO     Progress/Outcomes (Additional Goal 1, SLP)  progress slower than expected  -AC  continuing progress toward goal  -VO       User Key  (r) = Recorded By, (t) = Taken By, (c) = Cosigned By    Initials Name Provider Type    Estrella Rueda MS CCC-SLP Speech and Language Pathologist    VO Seema Nance MA,CCC-SLP Speech and Language Pathologist          Time Calculation:     Time Calculation- SLP     Row Name 12/17/18 1434             Time Calculation- SLP    SLP Start Time  1400  -AC      SLP Received On  12/17/18  -AC        User Key  (r) = Recorded By, (t) = Taken By, (c) = Cosigned By    Initials Name Provider Type    Estrella Rueda MS CCC-SLP Speech and Language Pathologist          Therapy Charges for Today     Code Description Service Date  Service Provider Modifiers Qty    30024074866 HC ST TREATMENT SWALLOW 2 12/17/2018 Estrella Gaytan, MS CCC-SLP GN 1    [id number] HC ST TREATMENT SPEECH 1 12/17/2018 Estrella Gaytan, MS CCC-SLP GN 1          Estrella Gaytan, MS LYNNETTE-SLP  12/17/2018

## 2018-12-17 NOTE — PROGRESS NOTES
"Clinical Nutrition   Reason For Visit: Follow-up protocol, EN    Patient Name: Anita Roche  YOB: 1945  MRN: [id number]  Date of Encounter: 12/17/18 1:21 PM  Admission date: 12/8/2018    Comment:  Suggest increasing TF rate to adequately meet pt's optimal nutrition needs.   Recommend increasing FiberSource HN @ 50 ml/hr x 22 hr (goal volume= 1100 ml/day) and free water at 20 ml/hr x 22 hr    Nutrition Assessment     Admission Problem List:  L-sided weakness  HA  R hemisphere infarct secondary to ICA occlusion  Scattered infarcts  Aspiration PNA      Applicable medical tests/procedures since admission:  MBS (12/10)- rec puree with thins, no straws  SLP bedside (12/11)- rec NPO  SLP bedside (12/12)- rec NPO. Plan for MBS - failed  SLP trials of liquid (12/14)- cont NPO diet order at this time    Applicable PMH:  -Acid reflux  -Atherosclerosis of coronary artery  -CVA  -Elevated Cholesterol  -HTN  -Postop N/V     Reported/Observed/Food/Nutrition Related History     Pt tolerating current tube feed Fibersource HN @ 45ml/hr, free water @ 15ml/hr. Pt had BM yesterday (12/16).      Anthropometrics   Height: 62 in  Weight: 89 lb per standing scale (12/8)  BMI: 16.3  BMI classification: Underweight:<18.5kg/m2   UBW: 95 lb per RD note (12/10)  IBW: 110 lb    Weight change: Per RD note (12/10)- \"Family not sure about pt's weight loss, states she fluctuates between 90-95 lb usually.\"    Medications reviewed   Medications reviewed: Yes    Needs Assessment     Height used: 62 in  Weight used: 89 lb actual  IBW: 59 kg     Estimated Calories needs: 9933-1848 kcal/d  Based on:   30-35 Kcal/kg actBW = 7296-3048 kcal/d    Estimated Protein needs: ~54 g/day  1.2-1.5 g/kg actBW = 48-60 g PRO/d     Current Nutrition Prescription   PO: NPO Diet    EN: Fibersource HN to goal 45 ml/hr w water at 15 ml/hr   This provides: 1188 Kcal = 91% est need, 53 gm PRO = 98% est need, 15 g Fiber, 802 ml H20 In TF 1132 total ml Free Water.   "       Nutrition Diagnosis   12/10   Problem Swallowing difficulty   Etiology Post-stroke complications   Signs/Symptoms Per SLP eval; NPO   Ongoing; revised 12/17    12/10  Problem Inadequate nutrition intake   Etiology Clinical condition   Signs/Symptoms NPO   Ongoing; revised 12/17    Intervention   1. Intervention: Follow treatment progress, Care plan reviewed   2. Suggest increasing TF rate to adequately meet pt's optimal nutrition needs.   Recommend FiberSource HN @ 50 ml/hr x 22 hr (goal volume= 1100 ml/day) and free water at 20 ml/hr x 22 hr.  Will provide at goal volume:  1320 Kcal, 101% est need  59 g Pro, 109% est need  17 g fiber  891 ml Water from EN  1331 ml FW total      Goal:   General: Nutrition support treatment  PO: Establish PO, Initiate diet once medically appropriate  EN/PN: Adjust EN   Additional Goals: Pt is able to tolerate TF at new goal rate of 50 ml/hr with FW flush @ 20 ml/hr x 22 hr.      Monitoring/Evaluation:       Monitoring/Evaluation: Per protocol, I&O, EN delivery/tolerance, Weight, Symptoms, Swallow function  Will Continue to follow per protocol  Suzy Rendon RD  Time Spent: 45 minutes

## 2018-12-17 NOTE — PROGRESS NOTES
Case Management Discharge Note    Final Note: Per Celia with [facility], they are able to offer patient a bed on the stroke unit tomorrow if she is medically ready for discharge. Met with patient in the room, and she would like to accept the bed. Ambulance scheduled with [facility]) for Tuesday, 12/18/18, at 11:00. PCS is on the chart. Notified patient's daughter, Teresa, of the discharge plan. CM will fax the DC summary when available. RN, please call report to [phone], and please send a copy of the DC summary/AVS in the discharge packet. Thank you.     Destination - Selection Complete      Service Provider Request Status Selected Services Address Phone Number Fax Number    [facility] Selected Inpatient Rehabilitation [address] [address] [phone]      Durable Medical Equipment      No service has been selected for the patient.      Dialysis/Infusion      No service has been selected for the patient.      Home Medical Care      No service has been selected for the patient.      Community Resources      No service has been selected for the patient.        Ambulance: [facility](Ref #[id number])    Final Discharge Disposition Code: 62 - inpatient rehab facility

## 2018-12-17 NOTE — PLAN OF CARE
Problem: Patient Care Overview  Goal: Plan of Care Review  Outcome: Ongoing (interventions implemented as appropriate)   12/17/18 2739   OTHER   Outcome Summary Works well with PT. Continues to be limited by L hemiparesis, neglect and weakness. She is non ambulatory. Recommend inpatient rehab

## 2018-12-17 NOTE — PLAN OF CARE
Problem: Stroke (Ischemic) (Adult)  Goal: Signs and Symptoms of Listed Potential Problems Will be Absent, Minimized or Managed (Stroke)  Outcome: Ongoing (interventions implemented as appropriate)  VSS, incontinent of bowel and bladder, stated she can only tell when its coming out. CT scan of head done, results pending, fibersource HN 45 ml/hr 15 cc h20 flush per/hour, turned q 2 hours, occasionaly states she sees flies around the trash, very preoccupied with cleanliness and sterility

## 2018-12-17 NOTE — PLAN OF CARE
Problem: Patient Care Overview  Goal: Plan of Care Review  Outcome: Ongoing (interventions implemented as appropriate)   12/17/18 1433   Coping/Psychosocial   Plan of Care Reviewed With patient   SLP treatment completed. 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 Please see note for further details and recommendations.      Problem: Stroke (Ischemic) (Adult)  Goal: Signs and Symptoms of Listed Potential Problems Will be Absent, Minimized or Managed (Stroke)  Outcome: Ongoing (interventions implemented as appropriate)   12/17/18 1433   Goal/Outcome Evaluation   Problems Assessed (Stroke (Ischemic)) communication impairment;cognitive impairment;eating/swallowing impairment   Problems Assessed (Stroke (Ischemic)) communication impairment;cognitive impairment;eating/swallowing impairment

## 2018-12-17 NOTE — THERAPY TREATMENT NOTE
Acute Care - Physical Therapy Treatment Note  [facility]     Patient Name: Anita Roche  : 1945  MRN: [id number]  Today's Date: 2018  Onset of Illness/Injury or Date of Surgery: 18  Date of Referral to PT: 18  Referring Physician: MD SULEIMAN    Admit Date: 2018    Visit Dx:    ICD-10-CM ICD-9-CM   1. Oropharyngeal dysphagia R13.12 787.22   2. Impaired mobility and ADLs Z74.09 799.89   3. Impaired functional mobility, balance, gait, and endurance Z74.09 V49.89   4. Cerebrovascular accident (CVA) due to occlusion of right carotid artery (CMS/HCC) I63.231 433.11   5. Cognitive communication deficit R41.841 799.52     Patient Active Problem List   Diagnosis   • Chronic fatigue syndrome   • Knee pain   • Closed wedge compression fracture of thoracic vertebra (CMS/HCC)   • Atherosclerosis of coronary artery   • Persistent cough   • Degeneration of intervertebral disc of lumbar region   • Stricture of esophagus   • Hand pain, left   • Trigger middle finger of left hand   • Hypokalemia   • CVA (cerebral vascular accident) (CMS/HCC)   • Tobacco use   • Underweight   • Sepsis due to pneumonia (CMS/HCC)   • Aspiration pneumonia (CMS/HCC)   • Acute respiratory failure with hypoxia (CMS/HCC)   • Hypernatremia       Therapy Treatment    Rehabilitation Treatment Summary     Row Name 18 0904             Treatment Time/Intention    Discipline  physical therapist  -SC      Document Type  therapy note (daily note)  -SC      Subjective Information  complains of;pain  -SC      Mode of Treatment  physical therapy  -SC      Patient/Family Observations  l sidelying in bed  -SC      Care Plan Review  risks/benefits reviewed  -SC      Therapy Frequency (PT Clinical Impression)  daily  -SC      Patient Effort  good  -SC      Treatment Considerations/Comments  L side neglect, ng tube, L   -SC2      Patient Response to Treatment  good effort  -SC3      Recorded by [SC] Adán Gonzalez, PT 18 1011  [SC2]  Adán Gonzalez, PT 12/17/18 1014  [SC3] Adán Gonzalez, PT 12/17/18 1027      Row Name 12/17/18 0904             Vital Signs    Pre Systolic BP Rehab  120  -SC      Pre Treatment Diastolic BP  68  -SC      Post Systolic BP Rehab  127  -SC      Post Treatment Diastolic BP  68  -SC      Pretreatment Heart Rate (beats/min)  100  -SC      Posttreatment Heart Rate (beats/min)  93  -SC      Recorded by [SC] Lisa Doloreshelena RAMIREZ, PT 12/17/18 1027      Row Name 12/17/18 0904             Cognitive Assessment/Intervention- PT/OT    Affect/Mental Status (Cognitive)  WFL  -SC      Orientation Status (Cognition)  oriented to;person;place;time  -SC      Follows Commands (Cognition)  follows one step commands;over 90% accuracy;delayed response/completion;repetition of directions required  -SC      Cognitive Function (Cognitive)  executive function deficit  -SC      Executive Function Deficit (Cognition)  insight/awareness of deficits;judgment  -SC      Personal Safety Interventions  gait belt;fall prevention program maintained  -SC      Recorded by [SC] Lisa Adán RAMIREZ, PT 12/17/18 1027      Row Name 12/17/18 0904             Safety Issues, Functional Mobility    Safety Issues Affecting Function (Mobility)  insight into deficits/self awareness;judgment  -SC      Impairments Affecting Function (Mobility)  balance;motor control;postural/trunk control;strength  -SC      Recorded by [SC] Lisa Doloreshelena RAMIREZ, PT 12/17/18 1027      Row Name 12/17/18 0904             Bed Mobility Assessment/Treatment    Bed Mobility Assessment/Treatment  scooting/bridging  -SC      Scooting/Bridging [place] (Bed Mobility)  maximum assist (25% patient effort);verbal cues  -SC      Supine-Sit-Supine [place] (Bed Mobility)  maximum assist (25% patient effort);verbal cues  -SC      Sidelying-Sit [place] (Bed Mobility)  maximum assist (25% patient effort);verbal cues  -SC      Bed Mobility, Safety Issues  decreased use of legs for  bridging/pushing;impaired trunk control for bed mobility  -SC      Assistive Device (Bed Mobility)  draw sheet;bed rails  -SC      Comment (Bed Mobility)  up to edge of bed with cueing. Reuqired much assit to get legs off bed. Working on pushing up with R UE sidelyig to sitting   -SC      Recorded by [SC] Adán Gonzalez, PT 12/17/18 1027      Row Name 12/17/18 0904             Transfer Assessment/Treatment    Transfer Assessment/Treatment  sit-stand transfer;stand-sit transfer  -SC      Comment (Transfers)  workind on standing from edge of bed. x2 assist   -SC      Recorded by [SC] Adán Gonzalez, PT 12/17/18 1027      Row Name 12/17/18 0904             Bed-Chair Transfer    Bed-Chair [place] (Transfers)  maximum assist (25% patient effort);2 person assist  -SC      Assistive Device (Bed-Chair Transfers)  -- stand pivot with PT assist cues for upright posture  -SC      Recorded by [SC] Adán Gonzalez, PT 12/17/18 1027      Row Name 12/17/18 0904             Sit-Stand Transfer    Sit-Stand [place] (Transfers)  maximum assist (25% patient effort);2 person assist;verbal cues  -SC      Assistive Device (Sit-Stand Transfers)  other (see comments) gt belt and HHA x2  -SC      Recorded by [SC] Adán Gonzalez, PT 12/17/18 1027      Row Name 12/17/18 0904             Stand-Sit Transfer    Stand-Sit [place] (Transfers)  2 person assist;maximum assist (25% patient effort) gait belt and HHAx2  -SC      Recorded by [SC] Adán Gonzalez, PT 12/17/18 1027      Row Name 12/17/18 0904             Gait/Stairs Assessment/Training    Comment (Gait/Stairs)  non ambulatory  -SC      Recorded by [SC] Adán Gonzalez, PT 12/17/18 1027      Row Name 12/17/18 0904             Therapeutic Exercise    31774 - PT Therapeutic Exercise Minutes  7  -SC      41674 -  PT Neuromuscular Reeducation Minutes  9  -SC      15095 - PT Therapeutic Activity Minutes  7  -SC      Recorded by [SC] Adán Gonzalez, PT 12/17/18 1027      Row  Name 12/17/18 0904             Therapeutic Exercise    Upper Extremity Range of Motion (Therapeutic Exercise)  shoulder flexion/extension, left;shoulder abduction/adduction, left  -SC      Lower Extremity Range of Motion (Therapeutic Exercise)  hip flexion/extension, left;hip internal/external rotation, left;knee flexion/extension, left;ankle dorsiflexion/plantar flexion, left  -SC      Exercise Type (Therapeutic Exercise)  PROM (passive range of motion)  -SC      Position (Therapeutic Exercise)  seated  -SC      Sets/Reps (Therapeutic Exercise)  10  -SC      Recorded by [SC] Adán Gonzalez, PT 12/17/18 1027      Row Name 12/17/18 0904             Balance    Balance  static sitting balance;static standing balance;dynamic sitting balance;dynamic standing balance  -SC      Recorded by [SC] Adán Gonzalez, PT 12/17/18 1027      Row Name 12/17/18 0904             Static Sitting Balance    Level of [place] (Unsupported Sitting, Static Balance)  moderate assist, 50 to 74% patient effort  -SC      Sitting Position (Unsupported Sitting, Static Balance)  sitting on edge of bed  -SC      Time Able to Maintain Position (Unsupported Sitting, Static Balance)  more than 5 minutes  -SC      Comment (Unsupported Sitting, Static Balance)  progressed to contact guard. cues for midline and upright gaze.  Some tactile and physical cues to sustain minline gaze.  -SC      Recorded by [SC] Adán Gonzalez, PT 12/17/18 1027      Row Name 12/17/18 0904             Static Standing Balance    Level of [place] (Supported Standing, Static Balance)  moderate assist, 50 to 74% patient effort  -SC      Time Able to Maintain Position (Supported Standing, Static Balance)  1 to 2 minutes  -SC      Assistive Device Utilized (Supported Standing, Static Balance)  other (see comments) hha x2  -SC      Comment (Supported Standing, Static Balance)  cues for upright trunk. Rerquired manual facilitation . Unable to sustain for long  -SC       Recorded by [SC] Adán Gonzalez, PT 12/17/18 1027      Row Name 12/17/18 0904             Positioning and Restraints    Pre-Treatment Position  in bed  -SC      Post Treatment Position  chair  -SC      In Bed  sitting;notified nsg;with nsg;call light within reach;sitting EOB;encouraged to call for assist;exit alarm on  -SC      Recorded by [SC] Adán Gonzalez, PT 12/17/18 1027      Row Name 12/17/18 0904             Pain Assessment    Additional Documentation  Pain Scale: FACES Pre/Post-Treatment (Group)  -SC      Recorded by [SC] Adán Gonzalez, PT 12/17/18 1027      Row Name 12/17/18 0904             Pain Scale: Numbers Pre/Post-Treatment    Pain Location - Side  Left  -SC      Pain Location  -- neck, low back, hip  -SC      Pain Intervention(s)  Repositioned rom  -SC      Recorded by [SC] Adán Gonzalez, PT 12/17/18 1027      Row Name 12/17/18 0904             Pain Scale: FACES Pre/Post-Treatment    Pain: FACES Scale, Pretreatment  6-->hurts even more  -SC      Pain: FACES Scale, Post-Treatment  6-->hurts even more  -SC      Recorded by [SC] Adán Gonzalez, PT 12/17/18 1027      Row Name 12/17/18 0904             Vision Assessment/Intervention    Visual Motor Impairment  visual tracking, left  -SC      Visual Processing Deficit  fran-inattention/neglect, left  -SC      Recorded by [SC] Adán Gonzalez, PT 12/17/18 1027      Row Name 12/17/18 0904             Coping    Observed Emotional State  calm;flat;accepting  -SC      Verbalized Emotional State  acceptance  -SC      Recorded by [SC] Adán Gonzalez, PT 12/17/18 1027      Row Name 12/17/18 0904             Outcome Summary/Treatment Plan (PT)    Daily Summary of Progress (PT)  progress toward functional goals is good  -SC      Recorded by [SC] Adán Gonzalez, PT 12/17/18 1027        User Key  (r) = Recorded By, (t) = Taken By, (c) = Cosigned By    Initials Name Effective Dates Discipline    SC Adán Gonzalez, PT 06/19/15 -  PT                   Physical  Therapy Education     Title: PT OT SLP Therapies (In Progress)     Topic: Physical Therapy (Done)     Point: Mobility training (Done)     Learning Progress Summary           Patient Acceptance, E, VU,DU,NR by SC at 12/17/2018  9:04 AM    Comment:  reviewed benefits of activity    Acceptance, E,D, NR by LS at 12/15/2018 11:15 AM    Acceptance, E, VU by EM at 12/14/2018  4:38 AM    Acceptance, E, VU,NR by CD at 12/12/2018 10:27 AM    Comment:  POSTURAL AWARENESS, PROGRESSION OF POC, TRANSFER/BED MOBILITY TRAINING,    Acceptance, E, VU,NR by CD at 12/10/2018 10:49 AM    Comment:  BENEFITS OF OOB ACTIVITY, POSTURAL AWARENESS, ATTENDING TO L SIDE, TRANSFER TRAINING                   Point: Home exercise program (Done)     Learning Progress Summary           Patient Acceptance, E, VU,DU,NR by SC at 12/17/2018  9:04 AM    Comment:  reviewed benefits of activity    Acceptance, E,D, NR by  at 12/15/2018 11:15 AM    Acceptance, E, VU by EM at 12/14/2018  4:38 AM    Acceptance, E, VU,NR by CD at 12/12/2018 10:27 AM    Comment:  POSTURAL AWARENESS, PROGRESSION OF POC, TRANSFER/BED MOBILITY TRAINING,    Acceptance, E, VU,NR by CD at 12/10/2018 10:49 AM    Comment:  BENEFITS OF OOB ACTIVITY, POSTURAL AWARENESS, ATTENDING TO L SIDE, TRANSFER TRAINING                   Point: Body mechanics (Done)     Learning Progress Summary           Patient Acceptance, E, VU,DU,NR by SC at 12/17/2018  9:04 AM    Comment:  reviewed benefits of activity    Acceptance, E,D, NR by  at 12/15/2018 11:15 AM    Acceptance, E, VU by EM at 12/14/2018  4:38 AM    Acceptance, E, VU,NR by CD at 12/12/2018 10:27 AM    Comment:  POSTURAL AWARENESS, PROGRESSION OF POC, TRANSFER/BED MOBILITY TRAINING,    Acceptance, E, VU,NR by CD at 12/10/2018 10:49 AM    Comment:  BENEFITS OF OOB ACTIVITY, POSTURAL AWARENESS, ATTENDING TO L SIDE, TRANSFER TRAINING                   Point: Precautions (Done)     Learning Progress Summary           Patient Acceptance, E,  VU,DU,NR by SC at 12/17/2018  9:04 AM    Comment:  reviewed benefits of activity    Acceptance, E,D, NR by  at 12/15/2018 11:15 AM    Acceptance, E, VU by EM at 12/14/2018  4:38 AM    Acceptance, E, VU,NR by CD at 12/12/2018 10:27 AM    Comment:  POSTURAL AWARENESS, PROGRESSION OF POC, TRANSFER/BED MOBILITY TRAINING,    Acceptance, E, VU,NR by CD at 12/10/2018 10:49 AM    Comment:  BENEFITS OF OOB ACTIVITY, POSTURAL AWARENESS, ATTENDING TO L SIDE, TRANSFER TRAINING                               User Key     Initials Effective Dates Name Provider Type Discipline    SC 06/19/15 -  Adán Gonzalez, PT Physical Therapist PT    CD 06/19/15 -  Jamia Bocanegra, PT Physical Therapist PT     06/19/15 -  Jolene Cheatham, PT Physical Therapist PT    EM 06/12/17 -  Génesis Fuentes, RN Registered Nurse Nurse                PT Recommendation and Plan  Therapy Frequency (PT Clinical Impression): daily  Outcome Summary/Treatment Plan (PT)  Daily Summary of Progress (PT): progress toward functional goals is good  Outcome Summary: Works well with PT. Continues to bed limtied by L hemiparesis, neglect and weakness. She is non ambulatory. Recommend inpatient rehab  Outcome Measures     Row Name 12/17/18 0904 12/16/18 1610 12/15/18 1115       How much help from another person do you currently need...    Turning from your back to your side while in flat bed without using bedrails?  2  -SC  --  2  -LS    Moving from lying on back to sitting on the side of a flat bed without bedrails?  2  -SC  --  2  -LS    Moving to and from a bed to a chair (including a wheelchair)?  2  -SC  --  2  -LS    Standing up from a chair using your arms (e.g., wheelchair, bedside chair)?  2  -SC  --  2  -LS    Climbing 3-5 steps with a railing?  1  -SC  --  1  -LS    To walk in hospital room?  1  -SC  --  1  -LS    AM-PAC 6 Clicks Score  10  -SC  --  10  -LS       How much help from another is currently needed...    Putting on and taking off regular lower body  clothing?  --  1  -JAVIER  --    Bathing (including washing, rinsing, and drying)  --  1  -JAVIER  --    Toileting (which includes using toilet bed pan or urinal)  --  1  -JAVIER  --    Putting on and taking off regular upper body clothing  --  1  -JAVIER  --    Taking care of personal grooming (such as brushing teeth)  --  2  -JAVIER  --    Eating meals  --  1  -JAVIER  --    Score  --  7  -JAVIER  --       Modified Martin Scale    Modified Gallia Scale  --  4 - Moderately severe disability.  Unable to walk without assistance, and unable to attend to own bodily needs without assistance.  -JAVIER  4 - Moderately severe disability.  Unable to walk without assistance, and unable to attend to own bodily needs without assistance.  -       Functional Assessment    Outcome Measure Options  AM-PAC 6 Clicks Basic Mobility (PT)  -SC  AM-PAC 6 Clicks Daily Activity (OT)  -JAVIER  AM-PAC 6 Clicks Basic Mobility (PT)  -    Row Name 12/14/18 1440             How much help from another is currently needed...    Putting on and taking off regular lower body clothing?  1  -AN      Bathing (including washing, rinsing, and drying)  1  -AN      Toileting (which includes using toilet bed pan or urinal)  1  -AN      Putting on and taking off regular upper body clothing  1  -AN      Taking care of personal grooming (such as brushing teeth)  2  -AN      Eating meals  1  -AN      Score  7  -AN        User Key  (r) = Recorded By, (t) = Taken By, (c) = Cosigned By    Initials Name Provider Type    SC Adán Gonzalez, PT Physical Therapist    Lisa Gaytan, OT Occupational Therapist    Juana Quintanilla, OT Occupational Therapist    Jolene Siddiqi, PT Physical Therapist         Time Calculation:   PT Charges     Row Name 12/17/18 1030 12/17/18 0904          Time Calculation    Start Time  --  0904  -SC     PT Received On  --  12/17/18  -SC     PT Goal Re-Cert Due Date  --  12/20/18  -SC        Time Calculation- PT    Total Timed Code Minutes- PT  23 minute(s)  -SC   --        Timed Charges    74216 - PT Therapeutic Exercise Minutes  --  7  -SC     83482 -  PT Neuromuscular Reeducation Minutes  --  9  -SC     96699 - PT Therapeutic Activity Minutes  --  7  -SC       User Key  (r) = Recorded By, (t) = Taken By, (c) = Cosigned By    Initials Name Provider Type    SC Adán Gonzalez PT Physical Therapist        Therapy Suggested Charges     Code   Minutes Charges    25915 (CPT®) Hc Pt Neuromusc Re Education Ea 15 Min 9 1    08006 (CPT®) Hc Pt Ther Proc Ea 15 Min 7 1    81016 (CPT®) Hc Gait Training Ea 15 Min      65604 (CPT®) Hc Pt Therapeutic Act Ea 15 Min 7     29459 (CPT®) Hc Pt Manual Therapy Ea 15 Min      29250 (CPT®) Hc Pt Iontophoresis Ea 15 Min      70650 (CPT®) Hc Pt Elec Stim Ea-Per 15 Min      41894 (CPT®) Hc Pt Ultrasound Ea 15 Min      30933 (CPT®) Hc Pt Self Care/Mgmt/Train Ea 15 Min      32111 (CPT®) Hc Pt Prosthetic (S) Train Initial Encounter, Each 15 Min      15524 (CPT®) Hc Pt Orthotic(S)/Prosthetic(S) Encounter, Each 15 Min      22198 (CPT®) Hc Orthotic(S) Mgmt/Train Initial Encounter, Each 15min      Total  23 2        Therapy Charges for Today     Code Description Service Date Service Provider Modifiers Qty    41407559512 HC PT NEUROMUSC RE EDUCATION EA 15 MIN 12/17/2018 Adán Gonzalez, PT GP 1    43310485402 HC PT THER PROC EA 15 MIN 12/17/2018 Adán Gonzalez, PT GP 1    90373227911 HC PT THER SUPP EA 15 MIN 12/17/2018 Adán Gonzalez PT GP 2          PT G-Codes  Outcome Measure Options: AM-PAC 6 Clicks Basic Mobility (PT)  AM-PAC 6 Clicks Score: 10  Score: 7  Modified [place] Scale: 4 - Moderately severe disability.  Unable to walk without assistance, and unable to attend to own bodily needs without assistance.    Adán Gonzalez PT  12/17/2018

## 2018-12-18 VITALS
HEIGHT: 62 IN | SYSTOLIC BLOOD PRESSURE: 114 MMHG | BODY MASS INDEX: 18.13 KG/M2 | HEART RATE: 85 BPM | OXYGEN SATURATION: 98 % | DIASTOLIC BLOOD PRESSURE: 59 MMHG | WEIGHT: 98.5 LBS | RESPIRATION RATE: 16 BRPM | TEMPERATURE: 98.2 F

## 2018-12-18 PROBLEM — J69.0 ASPIRATION PNEUMONIA (HCC): Status: RESOLVED | Noted: 2018-12-09 | Resolved: 2018-12-18

## 2018-12-18 PROBLEM — E87.6 HYPOKALEMIA: Status: RESOLVED | Noted: 2018-12-08 | Resolved: 2018-12-18

## 2018-12-18 PROBLEM — A41.9 SEPSIS DUE TO PNEUMONIA (HCC): Status: RESOLVED | Noted: 2018-12-09 | Resolved: 2018-12-18

## 2018-12-18 PROBLEM — J96.01 ACUTE RESPIRATORY FAILURE WITH HYPOXIA (HCC): Status: RESOLVED | Noted: 2018-12-10 | Resolved: 2018-12-18

## 2018-12-18 PROBLEM — G44.59 OTHER COMPLICATED HEADACHE SYNDROME: Status: ACTIVE | Noted: 2018-12-18

## 2018-12-18 PROBLEM — J18.9 SEPSIS DUE TO PNEUMONIA (HCC): Status: RESOLVED | Noted: 2018-12-09 | Resolved: 2018-12-18

## 2018-12-18 PROBLEM — E87.0 HYPERNATREMIA: Status: RESOLVED | Noted: 2018-12-15 | Resolved: 2018-12-18

## 2018-12-18 LAB — GLUCOSE BLDC GLUCOMTR-MCNC: 116 MG/DL (ref 70–130)

## 2018-12-18 PROCEDURE — 25010000002 HEPARIN (PORCINE) PER 1000 UNITS: Performed by: FAMILY MEDICINE

## 2018-12-18 PROCEDURE — 99239 HOSP IP/OBS DSCHRG MGMT >30: CPT | Performed by: INTERNAL MEDICINE

## 2018-12-18 PROCEDURE — 82962 GLUCOSE BLOOD TEST: CPT

## 2018-12-18 RX ORDER — TRAMADOL HYDROCHLORIDE 50 MG/1
50 TABLET ORAL EVERY 6 HOURS PRN
Qty: 12 TABLET | Refills: 0 | Status: ON HOLD
Start: 2018-12-18 | End: 2019-04-20

## 2018-12-18 RX ORDER — HEPARIN SODIUM 5000 [USP'U]/ML
5000 INJECTION, SOLUTION INTRAVENOUS; SUBCUTANEOUS EVERY 8 HOURS SCHEDULED
Start: 2018-12-18 | End: 2019-03-28

## 2018-12-18 RX ORDER — ACETAMINOPHEN 325 MG/1
650 TABLET ORAL EVERY 6 HOURS PRN
Status: ON HOLD
Start: 2018-12-18 | End: 2019-04-20

## 2018-12-18 RX ORDER — CLOPIDOGREL BISULFATE 75 MG/1
75 TABLET ORAL DAILY
Qty: 30 TABLET
Start: 2018-12-19 | End: 2019-04-29 | Stop reason: HOSPADM

## 2018-12-18 RX ORDER — DILTIAZEM HYDROCHLORIDE 60 MG/1
60 TABLET, FILM COATED ORAL EVERY 6 HOURS SCHEDULED
Start: 2018-12-18 | End: 2019-03-28

## 2018-12-18 RX ORDER — IPRATROPIUM BROMIDE AND ALBUTEROL SULFATE 2.5; .5 MG/3ML; MG/3ML
3 SOLUTION RESPIRATORY (INHALATION) 4 TIMES DAILY PRN
Qty: 360 ML | Status: ON HOLD
Start: 2018-12-18 | End: 2019-04-20

## 2018-12-18 RX ORDER — CYCLOBENZAPRINE HCL 5 MG
5 TABLET ORAL NIGHTLY
Start: 2018-12-18 | End: 2019-03-28

## 2018-12-18 RX ADMIN — SODIUM CHLORIDE, PRESERVATIVE FREE 3 ML: 5 INJECTION INTRAVENOUS at 08:17

## 2018-12-18 RX ADMIN — HEPARIN SODIUM 5000 UNITS: 5000 INJECTION INTRAVENOUS; SUBCUTANEOUS at 05:30

## 2018-12-18 RX ADMIN — ASPIRIN 300 MG: 300 SUPPOSITORY RECTAL at 08:17

## 2018-12-18 RX ADMIN — CLOPIDOGREL BISULFATE 75 MG: 75 TABLET ORAL at 08:16

## 2018-12-18 RX ADMIN — DILTIAZEM HYDROCHLORIDE 60 MG: 60 TABLET, FILM COATED ORAL at 05:30

## 2018-12-18 RX ADMIN — LISINOPRIL 10 MG: 10 TABLET ORAL at 08:16

## 2018-12-18 RX ADMIN — ATENOLOL 25 MG: 25 TABLET ORAL at 08:16

## 2018-12-18 RX ADMIN — HYDROCODONE BITARTRATE AND ACETAMINOPHEN 0.5 TABLET: 5; 325 TABLET ORAL at 04:01

## 2018-12-18 NOTE — PLAN OF CARE
Problem: Fall Risk (Adult)  Goal: Absence of Fall  Outcome: Ongoing (interventions implemented as appropriate)   12/16/18 9771   Fall Risk (Adult)   Absence of Fall making progress toward outcome

## 2018-12-18 NOTE — DISCHARGE SUMMARY
[facility] Medicine Services  DISCHARGE SUMMARY    Patient Name: Anita Roche  : 1945  MRN: [id number]    Date of Admission: 2018  Date of Discharge: 18  Length of Stay: 9  Primary Care Physician: Diann Ferreira APRN    Consults     Date and Time Order Name Status Description    2018 0030 Inpatient Neurology Consult Stroke Completed         Hospital Course     Presenting Problem:   Ischemic stroke (CMS/HCC) [I63.9]  Left-sided weakness [R53.1]  CVA (cerebral vascular accident) (CMS/HCC) [I63.9]      Active Hospital Problems    Diagnosis Date Noted   • **CVA (cerebral vascular accident) (CMS/HCC) [I63.9] 2018   • Other complicated headache syndrome [G44.59] 2018   • Underweight [R63.6] 2018   • Tobacco use [Z72.0] 2018   • Atherosclerosis of coronary artery [I25.10] 2018   • Degeneration of intervertebral disc of lumbar region [M51.36] 2008      Resolved Hospital Problems    Diagnosis Date Noted Date Resolved   • Hypernatremia [E87.0] 12/15/2018 2018   • Acute respiratory failure with hypoxia (CMS/HCC) [J96.01] 12/10/2018 2018   • Sepsis due to pneumonia (CMS/HCC) [J18.9, A41.9] 2018   • Aspiration pneumonia (CMS/HCC) [J69.0] 2018   • Hypokalemia [E87.6] 2018          Hospital Course:  Anita Roche is a 73 y.o. female with PMH significant for HTN, tobacco abuse, CAD, esophageal stricture, and trigger finger s/p release 2018 transferred from the ER at [place] for 3 days of headache, confusion,  And balance disturbances.  At the local ER, CT head was performed which showed an old left sided stroke but no acute insults.  However, left facial droop and left sided drift was noted and [facility] MRI upon arrival revealed multiple acute lacunar infarcts within the right cerebral hemisphere, as well as a meningioma in the  left parafalcine region. The stroke was thought related to her Right ICA stenosis. Initially patient was quite lethargic and was diagnosis with sepsis related to aspiration pneumonia.  She was treated with ABX and improved, has been stable on RA and without respiratory concerns.  Since she has woken up her biggest complaint has been a headache .  A repeat CT of her head on 12/15 showed post infarct changes but no hemorrhagic changes.         MBS 12/12 showing aspiration with many consistencies. Keofeed placed 12/14 and TF started, at goal rate, will add water for thirst and oral care  - ST/PT/OT/CM: [facility] has accepted  -- change cardizem to immediate release to be administered through FT     She still has complaints of thirst and should monitor her sodium and allow free water with her tube feeds PRN thirst.  Hopefully over time keofeed can be removed and she will be able to eat.       Day of Discharge     HPI: patient has had a good night.  Still with frequent stool.  Vital Signs: Temp:  [98 °F (36.7 °C)-98.3 °F (36.8 °C)] 98.2 °F (36.8 °C)  Heart Rate:  [73-86] 85  Resp:  [15-16] 16  BP: (111-142)/(49-72) 114/59     Physical Exam:  Patient is sleepy  Neck is without mass or JVD  Heart is Reg wo murmur  Lungs are clear wo wheeze or crackle  Abd is soft without HSM or mass, not tender or distended  Complete left hemiparesis and neglect.  Skin is without rash  Mood is appropriate      Pertinent  and/or Most Recent Results       Results from last 7 days   Lab Units  12/16/18   0544  12/15/18   0535  12/14/18   0518   WBC 10*3/mm3   --    --   11.33*   HEMOGLOBIN g/dL   --    --   13.6   HEMATOCRIT %   --    --   42.4   PLATELETS 10*3/mm3   --    --   270   SODIUM mmol/L  145  149*  147*   POTASSIUM mmol/L  3.7  3.8  2.9*   CHLORIDE mmol/L  109  114*  112*   CO2 mmol/L  30.0  26.0  21.0   BUN mg/dL  23  23  22   CREATININE mg/dL  0.70  0.64  0.72   GLUCOSE mg/dL  77  148*  105*   CALCIUM mg/dL  9.6  10.2  10.2        Brief Urine Lab Results  (Last result in the past 365 days)      Color   Clarity   Blood   Leuk Est   Nitrite   Protein   CREAT   Urine HCG        12/09/18 1251 Yellow Clear Moderate (2+) Small (1+) Negative Negative             Ct Head Without Contrast    Result Date: 12/16/2018  Impression: Right ISH/MCA watershed acute infarct. No acute intracranial hemorrhage. THIS DOCUMENT HAS BEEN ELECTRONICALLY SIGNED BY OSCAR SILVESTRE MD    Mri Angiogram Head Without Contrast    Addendum Date: 12/9/2018    THIS REPORT CONTAINS FINDINGS THAT MAY BE CRITICAL TO PATIENT CARE. The findings were verbally communicated via telephone conference with Koki Munroe NP at 1:07 AM EST on 12/9/2018. The findings were acknowledged and understood. THIS DOCUMENT HAS BEEN ELECTRONICALLY SIGNED BY HELDER CARDOZA MD    Result Date: 12/9/2018  Impression: 1. There is occlusion of the right internal carotid artery, with partial reconstitution of the cavernous and supraclinoid right internal carotid artery. 2. There is significant asymmetric decreased signal intensity of M1 and M2 segments of the the right middle cerebral artery, consistent with decreased blood flow. 3. There is mild decreased signal intensity within the A1 segment of the right anterior cerebral artery, suggestive of decreased blood flow. 4. Motion artifact limits this study. CTA suggested. THIS DOCUMENT HAS BEEN ELECTRONICALLY SIGNED BY HELDER CARDOZA MD    Mri Angiogram Neck Without Contrast    Addendum Date: 12/9/2018    THIS REPORT CONTAINS FINDINGS THAT MAY BE CRITICAL TO PATIENT CARE. The findings were verbally communicated via telephone conference with Koki Munroe NP at 1:07 AM EST on 12/9/2018. The findings were acknowledged and understood. THIS DOCUMENT HAS BEEN ELECTRONICALLY SIGNED BY HELDER CARDOZA MD    Result Date: 12/9/2018  Impression: 1.  There is tapered narrowing of the proximal right common carotid artery, suggestive of dissection. There is occlusion of the  remaining right common carotid artery. 2.  Occlusion of the right internal and external carotid arteries. 3.  There is approximate 50% stenosis of the proximal left internal carotid artery. Mild stenosis is also visualized of the distal left common carotid artery. 4.  Mild stenosis of the proximal left external carotid artery. 5.  There is significant decreased signal intensity involving the proximal left subclavian artery, concerning for severe stenosis. There is moderate stenosis of the proximal right subclavian artery. 6.  A flow-void is identified within the proximal right vertebral artery. This can be contributed by artifact, although flow limiting pathology is considered. Artifact significantly limits evaluation of the distal V3 and proximal V4 segments of the right vertebral artery. 7.  Moderate to severe stenosis is visualized of the proximal left vertebral artery. This may be contributed by artifact. 8.  These findings can be further evaluated with CTA. 9.  Additional findings described above. THIS DOCUMENT HAS BEEN ELECTRONICALLY SIGNED BY HELDER CARDOZA MD    Mri Brain Without Contrast    Addendum Date: 12/9/2018    THIS REPORT CONTAINS FINDINGS THAT MAY BE CRITICAL TO PATIENT CARE. The findings were verbally communicated via telephone conference with Koki Munroe NP at 1:07 AM EST on 12/9/2018. The findings were acknowledged and understood. THIS DOCUMENT HAS BEEN ELECTRONICALLY SIGNED BY HELDER CARDOZA MD    Result Date: 12/9/2018  Impression: 1. Multiple foci of restricted diffusion are identified within the right cerebral hemisphere, consistent with acute infarcts. These infarcts are predominantly within the white matter consistent with lacunar infarcts, with several small cortical based infarcts. 2. There is loss of the normal flow void within the right internal carotid artery, consistent with decreased flow or occlusion. Refer to the MRA head study from the same day for further discussion. 3. Mild  atrophy. 4. In the left parafalcine region superiorly, there is an extra-axial mass measuring 1.2 x 0.7 x 1.3 cm. The likely etiology is a meningioma. Follow-up postcontrast sequences are recommended. 5. Additional findings described above. THIS DOCUMENT HAS BEEN ELECTRONICALLY SIGNED BY HELDER CARDOZA MD    Fl Video Swallow With Speech    Result Date: 12/12/2018  Impression: Fluoroscopy provided for a modified barium swallow. Please see speech therapy report for full details and recommendations.    This report was finalized on 12/12/2018 3:19 PM by Chandra Ceballos.      Fl Video Swallow With Speech    Result Date: 12/10/2018  Impression: Fluoroscopy provided for a modified barium swallow. Please see speech therapy report for full details and recommendations.    This report was finalized on 12/10/2018 9:57 PM by DR. Trevin Lopez MD.      Xr Chest 1 View    Result Date: 12/11/2018  Impression: Improved right lower lobe airspace disease.  D:  12/11/2018 E:  12/11/2018  This report was finalized on 12/11/2018 2:13 PM by Chandra Ceballos.      Xr Chest 1 View    Result Date: 12/9/2018  Impression: Right basal opacity could be compatible with aspiration as questioned clinically.  DICTATED:   12/9/2018 EDITED/ls :   12/9/2018  This report was finalized on 12/9/2018 12:55 PM by Chandra Ceballos.      Xr Chest 1 View    Result Date: 12/9/2018  Impression: No evidence of active or acute cardiopulmonary disease on today's chest radiograph.     This report was finalized on 12/9/2018 8:30 AM by Dr. Shashank Romero MD.      Ct Head Without Contrast Stroke Protocol    Result Date: 12/9/2018  Impression:  1. No evidence of an acute ischemic event 2. Probable meningioma along the anterior falx to the left of midline  The results were available to the emergency department at 3:17 PM by virtual radiologic  This report was finalized on 12/9/2018 8:31 AM by Dr. Shashank Romero MD.      Xr Abdomen Kub    Result Date: 12/17/2018  Impression: Feeding tube  tip now overlies the left upper quadrant, presumably within the distal duodenum or proximal jejunum near the ligament of Treitz.  DICTATED:   12/14/2018 EDITED/ls :   12/14/2018  This report was finalized on 12/17/2018 9:17 AM by Dr. Kenji Lozano.      Xr Abdomen Kub    Result Date: 12/14/2018  Impression: Enteric tube tip projects over the lower lumbar spine, likely within the gastric body lumen. THIS DOCUMENT HAS BEEN ELECTRONICALLY SIGNED BY MELO LAN MD    Xr Abdomen Kub    Result Date: 12/13/2018  Impression: Enteric tube tip is in the distal stomach. THIS DOCUMENT HAS BEEN ELECTRONICALLY SIGNED BY MELO LAN MD      Results for orders placed during the hospital encounter of 12/08/18   Adult Transthoracic Echo Complete W/ Cont if Necessary Per Protocol (With Agitated Saline)    Narrative · Left ventricular systolic function is hyperdynamic (EF > 70).  · Mid LV cavitary gradient of 40 mmHg..  · Trace mitral valve regurgitation is present.  · Trace tricuspid valve regurgitation is present            Discharge Details        Discharge Medications      New Medications      Instructions Start Date   acetaminophen 325 MG tablet  Commonly known as:  TYLENOL   650 mg, Oral, Every 6 Hours PRN      clopidogrel 75 MG tablet  Commonly known as:  PLAVIX   75 mg, Oral, Daily      cyclobenzaprine 5 MG tablet  Commonly known as:  FLEXERIL   5 mg, Oral, Nightly, For headache      diltiaZEM 60 MG tablet  Commonly known as:  CARDIZEM   60 mg, Oral, Every 6 Hours Scheduled      heparin (porcine) 5000 UNIT/ML injection   5,000 Units, Subcutaneous, Every 8 Hours Scheduled      ipratropium-albuterol 0.5-2.5 mg/3 ml nebulizer  Commonly known as:  DUO-NEB   3 mL, Nebulization, 4 Times Daily PRN         Continue These Medications      Instructions Start Date   aspirin 81 MG tablet   2 tablets, Oral, Daily      atenolol 25 MG tablet  Commonly known as:  TENORMIN   25 mg, Oral, Daily      cyanocobalamin 1000 MCG/ML injection   1,000  mcg, Intramuscular, Every 28 Days      lisinopril 10 MG tablet  Commonly known as:  PRINIVIL,ZESTRIL   10 mg, Oral, Daily      traMADol 50 MG tablet  Commonly known as:  ULTRAM   50 mg, Oral, Every 6 Hours PRN      vitamin D 12737 units capsule capsule  Commonly known as:  ERGOCALCIFEROL   50,000 Units, Oral, Weekly         Stop These Medications    CARDIZEM  MG 24 hr capsule  Generic drug:  diltiaZEM CD     HYDROcodone-acetaminophen 7.5-325 MG per tablet  Commonly known as:  NORCO        Discharge Disposition:  Rehab Facility or Unit (DC - External) TO [facility]    Discharge Diet:  Diet Instructions     MBS/VFSS/FEES 12/12/18    Reason for Referral  Patient was referred for a MBS to assess the efficiency of his/her swallow function, rule out aspiration and make recommendations regarding safe dietary consistencies, effective compensatory strategies, and safe eating environment.        Referring Physician: MD SULEIMAN        Recommendations/Treatment  Oral Prep Phase: impaired oral phase of swallowing  Oral Transit Phase: impaired  Oral Residue: impaired  Oral Phase, Comment: DNT solid given aspiration risk/fatigue.       SLP Swallowing Diagnosis: moderate, oral dysfunction, mod-severe, pharyngeal dysfunction  Functional Impact: risk of aspiration/pneumonia, risk of malnutrition, risk of dehydration  Rehab Potential/Prognosis, Swallowing: adequate, monitor progress closely  Swallow Criteria for Skilled Therapeutic Interventions Met: demonstrates skilled criteria    Therapy Frequency (Swallow): 5 days per week  Predicted Duration Therapy Intervention (Days): until discharge  SLP Diet Recommendation: NPO, temporary alternate methods of nutrition/hydration, other (see comments)(except medication may be crushed w/ puree if pt alert)  Recommended Diagnostics: VFSS (CHRISTOPHER)  SLP Rec. for Method of Medication Administration: meds crushed, with pudding or applesauce, meds via alternate route, as tolerated  Anticipated Dischage  Disposition: anticipate therapy at next level of care      Oral Preparation/ Oral Phase  Oral Prep Phase: impaired oral phase of swallowing  Anterior Loss: thin liquids, nectar-thick liquids, secondary to reduced labial seal    Pharyngeal Phase  Initiation of Pharyngeal Swallow: bolus in pyriform sinuses  Pharyngeal Phase: impaired pharyngeal phase of swallowing  Penetration During the Swallow: honey-thick liquids, secondary to delayed swallow initiation or mistiming, secondary to reduced laryngeal elevation, secondary to reduced vestibular closure  Aspiration During the Swallow: thin liquids, nectar-thick liquids, secondary to delayed swallow initiation or mistiming, secondary to reduced laryngeal elevation, secondary to reduced vestibular closure  Response to Aspiration: inconsistent response, weak cough/throat clear, other (see comments)(intermittent weak, delayed cough)  Pharyngeal Residue: all consistencies tested, diffuse within pharynx, secondary to reduced base of tongue retraction, secondary to reduced posterior pharyngeal wall stripping, secondary to reduced laryngeal elevation, secondary to reduced hyolaryngeal excursion, other (see comments)(mild-mod amount; greatest in valleculae - pools to pyriforms)  Response to Residue: other (see comments)(partially cleared w/ subsequent swallows)  Pharyngeal Phase, Comment: Alert to stimuli, but drowsy. Worsened swallow fxn c/t MBS completed 12/10.  There was aspiration of thin & nectar-thick liquid via tsp during the swallow. Penetration of honey-thick liquid via tsp during the swallow. Vestibular residue deepened to TVF w/ no cough response--eventual aspiration after the swallow deemed inevitable inevitable. Intermittently pt demonstrated delayed weak/nonproductive cough after aspiration. No aspiration of pudding directly observed during study; however, given waxing/waning status, reduced sensation, and high risk for fatigue, would rec: NPO x meds crushed w/  puree when alert. Consider alternate means of nutrition, as appropriate.           Prognosis  Rehab Potential/Prognosis: fair  SLC Criteria for Skilled Therapy Interventions Met: yes                 Discharge Activity:  as tolerated    Special Instructions:    No future appointments.    Time Spent on Discharge:  45 minutes    Electronically signed by Marixa Zhang MD 12/18/18 10:31 AM

## 2018-12-18 NOTE — PLAN OF CARE
Problem: Patient Care Overview  Goal: Plan of Care Review  Outcome: Ongoing (interventions implemented as appropriate)   12/16/18 1659 12/17/18 2000 12/18/18 0404   Coping/Psychosocial   Plan of Care Reviewed With --  patient --    Plan of Care Review   Progress improving --  --    OTHER   Outcome Summary --  --  pt VSS pt seeks out staff frequently, pt has L hemiparesis and cant seem to get comfortable pt has [facility] scheduled for 11am      Goal: Individualization and Mutuality  Outcome: Ongoing (interventions implemented as appropriate)   12/16/18 0454 12/16/18 1659   Mutuality/Individual Preferences   What Anxieties, Fears, Concerns, or Questions Do You Have About Your Care? why isn't my headache going away --    What Information Would Help Us Give You More Personalized Care? none --    How Would You and/or Your Support Person Like to Participate in Your Care? none --    Individualization   Patient Specific Preferences --  wants to get out of bed to bedside commode, does not like bedpan, frequently incontinent due to urgency.   Patient Specific Goals (Include Timeframe) --  within 1 week.   Patient Specific Interventions --  PT/OT w/ progressive mobility as tolerated. Pt still not balancing yet. Total lift. Attempted bedside commode, unsuccessful.   Mutuality/Individual Preferences   How to Address Anxieties/Fears keep my daughter informed --      Goal: Discharge Needs Assessment  Outcome: Ongoing (interventions implemented as appropriate)   12/10/18 0912 12/15/18 1900 12/16/18 1659   Discharge Needs Assessment   Readmission Within the Last 30 Days no previous admission in last 30 days --  --    Concerns to be Addressed --  --  discharge planning   Patient/Family Anticipates Transition to --  inpatient rehabilitation facility;long term care facility --    Patient/Family Anticipated Services at Transition --  rehabilitation services;skilled nursing --    Transportation Concerns --  car, none --    Transportation  Anticipated --  --  health plan transportation   Anticipated Changes Related to Illness --  inability to care for self;inability to work --    Equipment Needed After Discharge --  other (see comments)  (to be determined) --    Outpatient/Agency/Support Group Needs --  inpatient rehabilitation facility;long term acute care facility --    Current Discharge Risk --  dependent with mobility/activities of daily living;physical impairment --    Discharge Coordination/Progress --  Slow progress w/ rehab, will need inpt rehab and/or LTC placement. --    Disability   Equipment Currently Used at Home none --  --        Problem: Skin Injury Risk (Adult)  Goal: Skin Health and Integrity  Outcome: Ongoing (interventions implemented as appropriate)   12/16/18 1659   Skin Injury Risk (Adult)   Skin Health and Integrity making progress toward outcome       Problem: Stroke (Ischemic) (Adult)  Goal: Signs and Symptoms of Listed Potential Problems Will be Absent, Minimized or Managed (Stroke)  Outcome: Ongoing (interventions implemented as appropriate)   12/17/18 1433   Goal/Outcome Evaluation   Problems Assessed (Stroke (Ischemic)) communication impairment;cognitive impairment;eating/swallowing impairment   Problems Assessed (Stroke (Ischemic)) communication impairment;cognitive impairment;eating/swallowing impairment

## 2018-12-18 NOTE — DISCHARGE PLACEMENT REQUEST
"Leopoldo Koroma (73 y.o. Female)     From [place],   [phone]      Date of Birth Social Security Number Address Home Phone MRN    1945  PO   [facility] [address] [phone] [id number]    Zoroastrian Marital Status          Caodaism        Admission Date Admission Type Admitting Provider Attending Provider Department, Room/Bed    12/8/18 Urgent Marixa Zhang MD Woody, Kathryn E, MD [facility] 3E, S340/1    Discharge Date Discharge Disposition Discharge Destination         Rehab Facility or Unit (DC - External)              Attending Provider:  Marixa Zhang MD    Allergies:  Meperidine, Sulfa Antibiotics, Versed [Midazolam], Atorvastatin    Isolation:  None   Infection:  None   Code Status:  CPR    Ht:  157.5 cm (62.01\")   Wt:  44.7 kg (98 lb 8 oz)    Admission Cmt:  None   Principal Problem:  CVA (cerebral vascular accident) (CMS/[facility]) [I63.9]                 Active Insurance as of 12/8/2018     Primary Coverage     Payor Plan Insurance Group Employer/Plan Group    MEDICARE MEDICARE A & B      Payor Plan Address Payor Plan Phone Number Payor Plan Fax Number Effective Dates    [address] [phone]  5/1/2011 - None Entered    [facility] [id number]       Subscriber Name Subscriber Birth Date Member ID       LEOPOLDO KOROMA 1945 [id number]           Secondary Coverage     Payor Plan Insurance Group Employer/Plan Group    [facility] SUPP [id number]     Payor Plan Address Payor Plan Phone Number Payor Plan Fax Number Effective Dates    [address]   12/1/2016 - None Entered    [facility] [id number]       Subscriber Name Subscriber Birth Date Member ID       LEOPOLDO KOROMA 1945 [id number]                 Emergency Contacts      (Rel.) Home Phone Work Phone Mobile Phone    Bipin Rojas (Grandchild) [phone] -- [phone]    POP KOROMA (Daughter) -- -- [phone]                 Discharge Summary      Marixa Zhang " MD MARISA at 2018 10:30 AM              [facility] Medicine Services  DISCHARGE SUMMARY    Patient Name: Anita Roche  : 1945  MRN: [id number]    Date of Admission: 2018  Date of Discharge: 18  Length of Stay: 9  Primary Care Physician: Diann Ferreira APRN    Consults     Date and Time Order Name Status Description    2018 0030 Inpatient Neurology Consult Stroke Completed         Hospital Course     Presenting Problem:   Ischemic stroke (CMS/HCC) [I63.9]  Left-sided weakness [R53.1]  CVA (cerebral vascular accident) (CMS/HCC) [I63.9]      Active Hospital Problems    Diagnosis Date Noted   • **CVA (cerebral vascular accident) (CMS/HCC) [I63.9] 2018   • Other complicated headache syndrome [G44.59] 2018   • Underweight [R63.6] 2018   • Tobacco use [Z72.0] 2018   • Atherosclerosis of coronary artery [I25.10] 2018   • Degeneration of intervertebral disc of lumbar region [M51.36] 2008      Resolved Hospital Problems    Diagnosis Date Noted Date Resolved   • Hypernatremia [E87.0] 12/15/2018 2018   • Acute respiratory failure with hypoxia (CMS/HCC) [J96.01] 12/10/2018 2018   • Sepsis due to pneumonia (CMS/HCC) [J18.9, A41.9] 2018   • Aspiration pneumonia (CMS/HCC) [J69.0] 2018   • Hypokalemia [E87.6] 2018          Hospital Course:  Anita Roche is a 73 y.o. female with PMH significant for HTN, tobacco abuse, CAD, esophageal stricture, and trigger finger s/p release 2018 transferred from the ER at [place] for 3 days of headache, confusion,  And balance disturbances.  At the local ER, CT head was performed which showed an old left sided stroke but no acute insults.  However, left facial droop and left sided drift was noted and the patient was sent to [facility] for higher level of care. MRI upon arrival revealed multiple acute lacunar infarcts within the right cerebral  hemisphere, as well as a meningioma in the left parafalcine region. The stroke was thought related to her Right ICA stenosis. Initially patient was quite lethargic and was diagnosis with sepsis related to aspiration pneumonia.  She was treated with ABX and improved, has been stable on RA and without respiratory concerns.  Since she has woken up her biggest complaint has been a headache .  A repeat CT of her head on 12/15 showed post infarct changes but no hemorrhagic changes.         MBS 12/12 showing aspiration with many consistencies. Keofeed placed 12/14 and TF started, at goal rate, will add water for thirst and oral care  - ST/PT/OT/CM: [facility] has accepted  -- change cardizem to immediate release to be administered through FT     She still has complaints of thirst and should monitor her sodium and allow free water with her tube feeds PRN thirst.  Hopefully over time keofeed can be removed and she will be able to eat.       Day of Discharge     HPI: patient has had a good night.  Still with frequent stool.  Vital Signs: Temp:  [98 °F (36.7 °C)-98.3 °F (36.8 °C)] 98.2 °F (36.8 °C)  Heart Rate:  [73-86] 85  Resp:  [15-16] 16  BP: (111-142)/(49-72) 114/59     Physical Exam:  Patient is sleepy  Neck is without mass or JVD  Heart is Reg wo murmur  Lungs are clear wo wheeze or crackle  Abd is soft without HSM or mass, not tender or distended  Complete left hemiparesis and neglect.  Skin is without rash  Mood is appropriate      Pertinent  and/or Most Recent Results       Results from last 7 days   Lab Units  12/16/18   0544  12/15/18   0535  12/14/18   0518   WBC 10*3/mm3   --    --   11.33*   HEMOGLOBIN g/dL   --    --   13.6   HEMATOCRIT %   --    --   42.4   PLATELETS 10*3/mm3   --    --   270   SODIUM mmol/L  145  149*  147*   POTASSIUM mmol/L  3.7  3.8  2.9*   CHLORIDE mmol/L  109  114*  112*   CO2 mmol/L  30.0  26.0  21.0   BUN mg/dL  23  23  22   CREATININE mg/dL  0.70  0.64  0.72   GLUCOSE mg/dL  77  148*  105*    CALCIUM mg/dL  9.6  10.2  10.2       Brief Urine Lab Results  (Last result in the past 365 days)      Color   Clarity   Blood   Leuk Est   Nitrite   Protein   CREAT   Urine HCG        12/09/18 1251 Yellow Clear Moderate (2+) Small (1+) Negative Negative             Ct Head Without Contrast    Result Date: 12/16/2018  Impression: Right ISH/MCA watershed acute infarct. No acute intracranial hemorrhage. THIS DOCUMENT HAS BEEN ELECTRONICALLY SIGNED BY OSCAR SILVESTRE MD    Mri Angiogram Head Without Contrast    Addendum Date: 12/9/2018    THIS REPORT CONTAINS FINDINGS THAT MAY BE CRITICAL TO PATIENT CARE. The findings were verbally communicated via telephone conference with Koki Munroe NP at 1:07 AM EST on 12/9/2018. The findings were acknowledged and understood. THIS DOCUMENT HAS BEEN ELECTRONICALLY SIGNED BY HELDER CARDOZA MD    Result Date: 12/9/2018  Impression: 1. There is occlusion of the right internal carotid artery, with partial reconstitution of the cavernous and supraclinoid right internal carotid artery. 2. There is significant asymmetric decreased signal intensity of M1 and M2 segments of the the right middle cerebral artery, consistent with decreased blood flow. 3. There is mild decreased signal intensity within the A1 segment of the right anterior cerebral artery, suggestive of decreased blood flow. 4. Motion artifact limits this study. CTA suggested. THIS DOCUMENT HAS BEEN ELECTRONICALLY SIGNED BY HELDER CARDOZA MD    Mri Angiogram Neck Without Contrast    Addendum Date: 12/9/2018    THIS REPORT CONTAINS FINDINGS THAT MAY BE CRITICAL TO PATIENT CARE. The findings were verbally communicated via telephone conference with Koki Munroe NP at 1:07 AM EST on 12/9/2018. The findings were acknowledged and understood. THIS DOCUMENT HAS BEEN ELECTRONICALLY SIGNED BY HELDER CARDOZA MD    Result Date: 12/9/2018  Impression: 1.  There is tapered narrowing of the proximal right common carotid artery, suggestive of  dissection. There is occlusion of the remaining right common carotid artery. 2.  Occlusion of the right internal and external carotid arteries. 3.  There is approximate 50% stenosis of the proximal left internal carotid artery. Mild stenosis is also visualized of the distal left common carotid artery. 4.  Mild stenosis of the proximal left external carotid artery. 5.  There is significant decreased signal intensity involving the proximal left subclavian artery, concerning for severe stenosis. There is moderate stenosis of the proximal right subclavian artery. 6.  A flow-void is identified within the proximal right vertebral artery. This can be contributed by artifact, although flow limiting pathology is considered. Artifact significantly limits evaluation of the distal V3 and proximal V4 segments of the right vertebral artery. 7.  Moderate to severe stenosis is visualized of the proximal left vertebral artery. This may be contributed by artifact. 8.  These findings can be further evaluated with CTA. 9.  Additional findings described above. THIS DOCUMENT HAS BEEN ELECTRONICALLY SIGNED BY HELDER CARDOZA MD    Mri Brain Without Contrast    Addendum Date: 12/9/2018    THIS REPORT CONTAINS FINDINGS THAT MAY BE CRITICAL TO PATIENT CARE. The findings were verbally communicated via telephone conference with Koki Munroe NP at 1:07 AM EST on 12/9/2018. The findings were acknowledged and understood. THIS DOCUMENT HAS BEEN ELECTRONICALLY SIGNED BY HELDER CARDOZA MD    Result Date: 12/9/2018  Impression: 1. Multiple foci of restricted diffusion are identified within the right cerebral hemisphere, consistent with acute infarcts. These infarcts are predominantly within the white matter consistent with lacunar infarcts, with several small cortical based infarcts. 2. There is loss of the normal flow void within the right internal carotid artery, consistent with decreased flow or occlusion. Refer to the MRA head study from the same  day for further discussion. 3. Mild atrophy. 4. In the left parafalcine region superiorly, there is an extra-axial mass measuring 1.2 x 0.7 x 1.3 cm. The likely etiology is a meningioma. Follow-up postcontrast sequences are recommended. 5. Additional findings described above. THIS DOCUMENT HAS BEEN ELECTRONICALLY SIGNED BY HELDER CARDOZA MD    Fl Video Swallow With Speech    Result Date: 12/12/2018  Impression: Fluoroscopy provided for a modified barium swallow. Please see speech therapy report for full details and recommendations.    This report was finalized on 12/12/2018 3:19 PM by Chandra Ceballos.      Fl Video Swallow With Speech    Result Date: 12/10/2018  Impression: Fluoroscopy provided for a modified barium swallow. Please see speech therapy report for full details and recommendations.    This report was finalized on 12/10/2018 9:57 PM by DR. Trevin Lopez MD.      Xr Chest 1 View    Result Date: 12/11/2018  Impression: Improved right lower lobe airspace disease.  D:  12/11/2018 E:  12/11/2018  This report was finalized on 12/11/2018 2:13 PM by Chandra Ceballos.      Xr Chest 1 View    Result Date: 12/9/2018  Impression: Right basal opacity could be compatible with aspiration as questioned clinically.  DICTATED:   12/9/2018 EDITED/ls :   12/9/2018  This report was finalized on 12/9/2018 12:55 PM by Chandra Ceballos.      Xr Chest 1 View    Result Date: 12/9/2018  Impression: No evidence of active or acute cardiopulmonary disease on today's chest radiograph.     This report was finalized on 12/9/2018 8:30 AM by Dr. Shashank Romero MD.      Ct Head Without Contrast Stroke Protocol    Result Date: 12/9/2018  Impression:  1. No evidence of an acute ischemic event 2. Probable meningioma along the anterior falx to the left of midline  The results were available to the emergency department at 3:17 PM by virtual radiologic  This report was finalized on 12/9/2018 8:31 AM by Dr. Shashank Romero MD.      Xr Abdomen Kub    Result Date:  12/17/2018  Impression: Feeding tube tip now overlies the left upper quadrant, presumably within the distal duodenum or proximal jejunum near the ligament of Treitz.  DICTATED:   12/14/2018 EDITED/ls :   12/14/2018  This report was finalized on 12/17/2018 9:17 AM by Dr. Kenji Lozano.      Xr Abdomen Kub    Result Date: 12/14/2018  Impression: Enteric tube tip projects over the lower lumbar spine, likely within the gastric body lumen. THIS DOCUMENT HAS BEEN ELECTRONICALLY SIGNED BY MELO LAN MD    Xr Abdomen Kub    Result Date: 12/13/2018  Impression: Enteric tube tip is in the distal stomach. THIS DOCUMENT HAS BEEN ELECTRONICALLY SIGNED BY MELO LAN MD      Results for orders placed during the hospital encounter of 12/08/18   Adult Transthoracic Echo Complete W/ Cont if Necessary Per Protocol (With Agitated Saline)    Narrative · Left ventricular systolic function is hyperdynamic (EF > 70).  · Mid LV cavitary gradient of 40 mmHg..  · Trace mitral valve regurgitation is present.  · Trace tricuspid valve regurgitation is present            Discharge Details        Discharge Medications      New Medications      Instructions Start Date   acetaminophen 325 MG tablet  Commonly known as:  TYLENOL   650 mg, Oral, Every 6 Hours PRN      clopidogrel 75 MG tablet  Commonly known as:  PLAVIX   75 mg, Oral, Daily      cyclobenzaprine 5 MG tablet  Commonly known as:  FLEXERIL   5 mg, Oral, Nightly, For headache      diltiaZEM 60 MG tablet  Commonly known as:  CARDIZEM   60 mg, Oral, Every 6 Hours Scheduled      heparin (porcine) 5000 UNIT/ML injection   5,000 Units, Subcutaneous, Every 8 Hours Scheduled      ipratropium-albuterol 0.5-2.5 mg/3 ml nebulizer  Commonly known as:  DUO-NEB   3 mL, Nebulization, 4 Times Daily PRN         Continue These Medications      Instructions Start Date   aspirin 81 MG tablet   2 tablets, Oral, Daily      atenolol 25 MG tablet  Commonly known as:  TENORMIN   25 mg, Oral, Daily       cyanocobalamin 1000 MCG/ML injection   1,000 mcg, Intramuscular, Every 28 Days      lisinopril 10 MG tablet  Commonly known as:  PRINIVIL,ZESTRIL   10 mg, Oral, Daily      traMADol 50 MG tablet  Commonly known as:  ULTRAM   50 mg, Oral, Every 6 Hours PRN      vitamin D 98356 units capsule capsule  Commonly known as:  ERGOCALCIFEROL   50,000 Units, Oral, Weekly         Stop These Medications    CARDIZEM  MG 24 hr capsule  Generic drug:  diltiaZEM CD     HYDROcodone-acetaminophen 7.5-325 MG per tablet  Commonly known as:  NORCO        Discharge Disposition:  Rehab Facility or Unit (DC - External) TO [facility]    Discharge Diet:  Diet Instructions     MBS/VFSS/FEES 12/12/18    Reason for Referral  Patient was referred for a MBS to assess the efficiency of his/her swallow function, rule out aspiration and make recommendations regarding safe dietary consistencies, effective compensatory strategies, and safe eating environment.        Referring Physician: MD SULEIMAN        Recommendations/Treatment  Oral Prep Phase: impaired oral phase of swallowing  Oral Transit Phase: impaired  Oral Residue: impaired  Oral Phase, Comment: DNT solid given aspiration risk/fatigue.       SLP Swallowing Diagnosis: moderate, oral dysfunction, mod-severe, pharyngeal dysfunction  Functional Impact: risk of aspiration/pneumonia, risk of malnutrition, risk of dehydration  Rehab Potential/Prognosis, Swallowing: adequate, monitor progress closely  Swallow Criteria for Skilled Therapeutic Interventions Met: demonstrates skilled criteria    Therapy Frequency (Swallow): 5 days per week  Predicted Duration Therapy Intervention (Days): until discharge  SLP Diet Recommendation: NPO, temporary alternate methods of nutrition/hydration, other (see comments)(except medication may be crushed w/ puree if pt alert)  Recommended Diagnostics: VFSS (MBS)  SLP Rec. for Method of Medication Administration: meds crushed, with pudding or applesauce, meds via  alternate route, as tolerated  Anticipated Dischage Disposition: anticipate therapy at next level of care      Oral Preparation/ Oral Phase  Oral Prep Phase: impaired oral phase of swallowing  Anterior Loss: thin liquids, nectar-thick liquids, secondary to reduced labial seal    Pharyngeal Phase  Initiation of Pharyngeal Swallow: bolus in pyriform sinuses  Pharyngeal Phase: impaired pharyngeal phase of swallowing  Penetration During the Swallow: honey-thick liquids, secondary to delayed swallow initiation or mistiming, secondary to reduced laryngeal elevation, secondary to reduced vestibular closure  Aspiration During the Swallow: thin liquids, nectar-thick liquids, secondary to delayed swallow initiation or mistiming, secondary to reduced laryngeal elevation, secondary to reduced vestibular closure  Response to Aspiration: inconsistent response, weak cough/throat clear, other (see comments)(intermittent weak, delayed cough)  Pharyngeal Residue: all consistencies tested, diffuse within pharynx, secondary to reduced base of tongue retraction, secondary to reduced posterior pharyngeal wall stripping, secondary to reduced laryngeal elevation, secondary to reduced hyolaryngeal excursion, other (see comments)(mild-mod amount; greatest in valleculae - pools to pyriforms)  Response to Residue: other (see comments)(partially cleared w/ subsequent swallows)  Pharyngeal Phase, Comment: Alert to stimuli, but drowsy. Worsened swallow fxn c/t MBS completed 12/10.  There was aspiration of thin & nectar-thick liquid via tsp during the swallow. Penetration of honey-thick liquid via tsp during the swallow. Vestibular residue deepened to TVF w/ no cough response--eventual aspiration after the swallow deemed inevitable inevitable. Intermittently pt demonstrated delayed weak/nonproductive cough after aspiration. No aspiration of pudding directly observed during study; however, given waxing/waning status, reduced sensation, and high  risk for fatigue, would rec: NPO x meds crushed w/ puree when alert. Consider alternate means of nutrition, as appropriate.           Prognosis  Rehab Potential/Prognosis: fair  SLC Criteria for Skilled Therapy Interventions Met: yes                 Discharge Activity:  as tolerated    Special Instructions:    No future appointments.    Time Spent on Discharge:  45 minutes    Electronically signed by Marixa Zhang MD 12/18/18 10:31 AM            Electronically signed by Marixa Zhang MD at 12/18/2018 10:53 AM       Discharge Order (From admission, onward)    Start     Ordered    12/18/18 1027  Discharge patient  Once     Expected Discharge Date:  12/18/18    Expected Discharge Time:  Morning    Discharge Disposition:  Rehab Facility or Unit (DC - External)    Physician of Record for Attribution - Please select from Treatment Team:  KATHIE POSADA [[id number]]    Review needed by CMO to determine Physician of Record:  No       Question Answer Comment   Physician of Record for Attribution - Please select from Treatment Team KATHIE POSADA    Review needed by CMO to determine Physician of Record No        12/18/18 1029

## 2019-01-16 ENCOUNTER — LAB REQUISITION (OUTPATIENT)
Dept: LAB | Facility: HOSPITAL | Age: 74
End: 2019-01-16

## 2019-01-16 DIAGNOSIS — I10 ESSENTIAL (PRIMARY) HYPERTENSION: ICD-10-CM

## 2019-01-16 DIAGNOSIS — D64.9 ANEMIA: ICD-10-CM

## 2019-01-16 LAB
25(OH)D3 SERPL-MCNC: 59 NG/ML
ANION GAP SERPL CALCULATED.3IONS-SCNC: 5.4 MMOL/L (ref 3.6–11.2)
BUN BLD-MCNC: 24 MG/DL (ref 7–21)
BUN/CREAT SERPL: 27 (ref 7–25)
CALCIUM SPEC-SCNC: 10.1 MG/DL (ref 7.7–10)
CHLORIDE SERPL-SCNC: 112 MMOL/L (ref 99–112)
CO2 SERPL-SCNC: 24.6 MMOL/L (ref 24.3–31.9)
CREAT BLD-MCNC: 0.89 MG/DL (ref 0.43–1.29)
DEPRECATED RDW RBC AUTO: 54.3 FL (ref 37–54)
ERYTHROCYTE [DISTWIDTH] IN BLOOD BY AUTOMATED COUNT: 14.6 % (ref 11.5–14.5)
GFR SERPL CREATININE-BSD FRML MDRD: 62 ML/MIN/1.73
GLUCOSE BLD-MCNC: 92 MG/DL (ref 70–110)
HCT VFR BLD AUTO: 37.8 % (ref 37–47)
HGB BLD-MCNC: 11.7 G/DL (ref 12–16)
MCH RBC QN AUTO: 32.1 PG (ref 27–33)
MCHC RBC AUTO-ENTMCNC: 31 G/DL (ref 33–37)
MCV RBC AUTO: 103.8 FL (ref 80–94)
OSMOLALITY SERPL CALC.SUM OF ELEC: 286.8 MOSM/KG (ref 273–305)
PLATELET # BLD AUTO: 243 10*3/MM3 (ref 130–400)
PMV BLD AUTO: 11.9 FL (ref 6–10)
POTASSIUM BLD-SCNC: 4.1 MMOL/L (ref 3.5–5.3)
RBC # BLD AUTO: 3.64 10*6/MM3 (ref 4.2–5.4)
SODIUM BLD-SCNC: 142 MMOL/L (ref 135–153)
WBC NRBC COR # BLD: 8.91 10*3/MM3 (ref 4.5–12.5)

## 2019-01-16 PROCEDURE — 82306 VITAMIN D 25 HYDROXY: CPT | Performed by: INTERNAL MEDICINE

## 2019-01-16 PROCEDURE — 85027 COMPLETE CBC AUTOMATED: CPT | Performed by: INTERNAL MEDICINE

## 2019-01-16 PROCEDURE — 80048 BASIC METABOLIC PNL TOTAL CA: CPT | Performed by: INTERNAL MEDICINE

## 2019-01-23 ENCOUNTER — LAB REQUISITION (OUTPATIENT)
Dept: LAB | Facility: HOSPITAL | Age: 74
End: 2019-01-23

## 2019-01-23 DIAGNOSIS — I10 ESSENTIAL (PRIMARY) HYPERTENSION: ICD-10-CM

## 2019-01-23 LAB
ANION GAP SERPL CALCULATED.3IONS-SCNC: 7.1 MMOL/L (ref 3.6–11.2)
BASOPHILS # BLD AUTO: 0.07 10*3/MM3 (ref 0–0.3)
BASOPHILS NFR BLD AUTO: 0.8 % (ref 0–2)
BUN BLD-MCNC: 18 MG/DL (ref 7–21)
BUN/CREAT SERPL: 22.5 (ref 7–25)
CALCIUM SPEC-SCNC: 9.9 MG/DL (ref 7.7–10)
CHLORIDE SERPL-SCNC: 108 MMOL/L (ref 99–112)
CO2 SERPL-SCNC: 25.9 MMOL/L (ref 24.3–31.9)
CREAT BLD-MCNC: 0.8 MG/DL (ref 0.43–1.29)
DEPRECATED RDW RBC AUTO: 54.7 FL (ref 37–54)
EOSINOPHIL # BLD AUTO: 0.41 10*3/MM3 (ref 0–0.7)
EOSINOPHIL NFR BLD AUTO: 4.7 % (ref 0–7)
ERYTHROCYTE [DISTWIDTH] IN BLOOD BY AUTOMATED COUNT: 14.4 % (ref 11.5–14.5)
GFR SERPL CREATININE-BSD FRML MDRD: 71 ML/MIN/1.73
GLUCOSE BLD-MCNC: 92 MG/DL (ref 70–110)
HCT VFR BLD AUTO: 33.4 % (ref 37–47)
HGB BLD-MCNC: 10.5 G/DL (ref 12–16)
IMM GRANULOCYTES # BLD AUTO: 0.06 10*3/MM3 (ref 0–0.03)
IMM GRANULOCYTES NFR BLD AUTO: 0.7 % (ref 0–0.5)
LYMPHOCYTES # BLD AUTO: 1.85 10*3/MM3 (ref 1–3)
LYMPHOCYTES NFR BLD AUTO: 21.1 % (ref 16–46)
MCH RBC QN AUTO: 32.4 PG (ref 27–33)
MCHC RBC AUTO-ENTMCNC: 31.4 G/DL (ref 33–37)
MCV RBC AUTO: 103.1 FL (ref 80–94)
MONOCYTES # BLD AUTO: 0.96 10*3/MM3 (ref 0.1–0.9)
MONOCYTES NFR BLD AUTO: 11 % (ref 0–12)
NEUTROPHILS # BLD AUTO: 5.4 10*3/MM3 (ref 1.4–6.5)
NEUTROPHILS NFR BLD AUTO: 61.7 % (ref 40–75)
OSMOLALITY SERPL CALC.SUM OF ELEC: 282.8 MOSM/KG (ref 273–305)
PLATELET # BLD AUTO: 366 10*3/MM3 (ref 130–400)
PMV BLD AUTO: 11.7 FL (ref 6–10)
POTASSIUM BLD-SCNC: 3.7 MMOL/L (ref 3.5–5.3)
RBC # BLD AUTO: 3.24 10*6/MM3 (ref 4.2–5.4)
SODIUM BLD-SCNC: 141 MMOL/L (ref 135–153)
WBC NRBC COR # BLD: 8.75 10*3/MM3 (ref 4.5–12.5)

## 2019-01-23 PROCEDURE — 80048 BASIC METABOLIC PNL TOTAL CA: CPT | Performed by: INTERNAL MEDICINE

## 2019-01-23 PROCEDURE — 85025 COMPLETE CBC W/AUTO DIFF WBC: CPT | Performed by: INTERNAL MEDICINE

## 2019-01-30 ENCOUNTER — LAB REQUISITION (OUTPATIENT)
Dept: LAB | Facility: HOSPITAL | Age: 74
End: 2019-01-30

## 2019-01-30 DIAGNOSIS — I10 ESSENTIAL (PRIMARY) HYPERTENSION: ICD-10-CM

## 2019-01-30 LAB
ANION GAP SERPL CALCULATED.3IONS-SCNC: 5.2 MMOL/L (ref 3.6–11.2)
BASOPHILS # BLD AUTO: 0.04 10*3/MM3 (ref 0–0.3)
BASOPHILS NFR BLD AUTO: 0.5 % (ref 0–2)
BUN BLD-MCNC: 14 MG/DL (ref 7–21)
BUN/CREAT SERPL: 16.1 (ref 7–25)
CALCIUM SPEC-SCNC: 10.7 MG/DL (ref 7.7–10)
CHLORIDE SERPL-SCNC: 109 MMOL/L (ref 99–112)
CO2 SERPL-SCNC: 28.8 MMOL/L (ref 24.3–31.9)
CREAT BLD-MCNC: 0.87 MG/DL (ref 0.43–1.29)
DEPRECATED RDW RBC AUTO: 52.1 FL (ref 37–54)
EOSINOPHIL # BLD AUTO: 0.15 10*3/MM3 (ref 0–0.7)
EOSINOPHIL NFR BLD AUTO: 1.8 % (ref 0–7)
ERYTHROCYTE [DISTWIDTH] IN BLOOD BY AUTOMATED COUNT: 14 % (ref 11.5–14.5)
GFR SERPL CREATININE-BSD FRML MDRD: 64 ML/MIN/1.73
GLUCOSE BLD-MCNC: 98 MG/DL (ref 70–110)
HCT VFR BLD AUTO: 36.8 % (ref 37–47)
HGB BLD-MCNC: 11.6 G/DL (ref 12–16)
HYPOCHROMIA BLD QL: NORMAL
IMM GRANULOCYTES # BLD AUTO: 0.09 10*3/MM3 (ref 0–0.03)
IMM GRANULOCYTES NFR BLD AUTO: 1.1 % (ref 0–0.5)
LARGE PLATELETS: NORMAL
LYMPHOCYTES # BLD AUTO: 1.33 10*3/MM3 (ref 1–3)
LYMPHOCYTES NFR BLD AUTO: 15.5 % (ref 16–46)
MACROCYTES BLD QL SMEAR: NORMAL
MCH RBC QN AUTO: 32 PG (ref 27–33)
MCHC RBC AUTO-ENTMCNC: 31.5 G/DL (ref 33–37)
MCV RBC AUTO: 101.4 FL (ref 80–94)
MONOCYTES # BLD AUTO: 0.72 10*3/MM3 (ref 0.1–0.9)
MONOCYTES NFR BLD AUTO: 8.4 % (ref 0–12)
NEUTROPHILS # BLD AUTO: 6.24 10*3/MM3 (ref 1.4–6.5)
NEUTROPHILS NFR BLD AUTO: 72.7 % (ref 40–75)
OSMOLALITY SERPL CALC.SUM OF ELEC: 285.4 MOSM/KG (ref 273–305)
PLATELET # BLD AUTO: 385 10*3/MM3 (ref 130–400)
PMV BLD AUTO: 11.7 FL (ref 6–10)
POTASSIUM BLD-SCNC: 3.5 MMOL/L (ref 3.5–5.3)
RBC # BLD AUTO: 3.63 10*6/MM3 (ref 4.2–5.4)
SODIUM BLD-SCNC: 143 MMOL/L (ref 135–153)
WBC NRBC COR # BLD: 8.57 10*3/MM3 (ref 4.5–12.5)

## 2019-01-30 PROCEDURE — 85025 COMPLETE CBC W/AUTO DIFF WBC: CPT | Performed by: INTERNAL MEDICINE

## 2019-01-30 PROCEDURE — 80048 BASIC METABOLIC PNL TOTAL CA: CPT | Performed by: INTERNAL MEDICINE

## 2019-01-30 PROCEDURE — 85007 BL SMEAR W/DIFF WBC COUNT: CPT | Performed by: INTERNAL MEDICINE

## 2019-02-01 ENCOUNTER — LAB REQUISITION (OUTPATIENT)
Dept: LAB | Facility: HOSPITAL | Age: 74
End: 2019-02-01

## 2019-02-01 DIAGNOSIS — I10 ESSENTIAL (PRIMARY) HYPERTENSION: ICD-10-CM

## 2019-02-01 DIAGNOSIS — R39.198 OTHER DIFFICULTIES WITH MICTURITION: ICD-10-CM

## 2019-02-01 LAB
BACTERIA UR QL AUTO: ABNORMAL /HPF
BILIRUB UR QL STRIP: NEGATIVE
CLARITY UR: ABNORMAL
COD CRY URNS QL: ABNORMAL /HPF
COLOR UR: YELLOW
DEPRECATED RDW RBC AUTO: 49.4 FL (ref 37–54)
ERYTHROCYTE [DISTWIDTH] IN BLOOD BY AUTOMATED COUNT: 13.5 % (ref 11.5–14.5)
GLUCOSE UR STRIP-MCNC: NEGATIVE MG/DL
HCT VFR BLD AUTO: 34.1 % (ref 37–47)
HGB BLD-MCNC: 10.3 G/DL (ref 12–16)
HGB UR QL STRIP.AUTO: ABNORMAL
HYALINE CASTS UR QL AUTO: ABNORMAL /LPF
KETONES UR QL STRIP: NEGATIVE
LEUKOCYTE ESTERASE UR QL STRIP.AUTO: ABNORMAL
MCH RBC QN AUTO: 30.7 PG (ref 27–33)
MCHC RBC AUTO-ENTMCNC: 30.2 G/DL (ref 33–37)
MCV RBC AUTO: 101.8 FL (ref 80–94)
NITRITE UR QL STRIP: POSITIVE
PH UR STRIP.AUTO: 7 [PH] (ref 5–8)
PLATELET # BLD AUTO: 372 10*3/MM3 (ref 130–400)
PMV BLD AUTO: 11.7 FL (ref 6–10)
PROT UR QL STRIP: ABNORMAL
RBC # BLD AUTO: 3.35 10*6/MM3 (ref 4.2–5.4)
RBC # UR: ABNORMAL /HPF
REF LAB TEST METHOD: ABNORMAL
SP GR UR STRIP: 1.02 (ref 1–1.03)
SQUAMOUS #/AREA URNS HPF: ABNORMAL /HPF
UROBILINOGEN UR QL STRIP: ABNORMAL
WBC NRBC COR # BLD: 6.26 10*3/MM3 (ref 4.5–12.5)
WBC UR QL AUTO: ABNORMAL /HPF

## 2019-02-01 PROCEDURE — 87077 CULTURE AEROBIC IDENTIFY: CPT | Performed by: INTERNAL MEDICINE

## 2019-02-01 PROCEDURE — 87086 URINE CULTURE/COLONY COUNT: CPT | Performed by: INTERNAL MEDICINE

## 2019-02-01 PROCEDURE — 85027 COMPLETE CBC AUTOMATED: CPT | Performed by: INTERNAL MEDICINE

## 2019-02-01 PROCEDURE — 87186 SC STD MICRODIL/AGAR DIL: CPT | Performed by: INTERNAL MEDICINE

## 2019-02-01 PROCEDURE — 87147 CULTURE TYPE IMMUNOLOGIC: CPT | Performed by: INTERNAL MEDICINE

## 2019-02-01 PROCEDURE — 81001 URINALYSIS AUTO W/SCOPE: CPT | Performed by: INTERNAL MEDICINE

## 2019-02-04 LAB — BACTERIA SPEC AEROBE CULT: ABNORMAL

## 2019-02-06 ENCOUNTER — LAB REQUISITION (OUTPATIENT)
Dept: LAB | Facility: HOSPITAL | Age: 74
End: 2019-02-06

## 2019-02-06 DIAGNOSIS — I10 ESSENTIAL (PRIMARY) HYPERTENSION: ICD-10-CM

## 2019-02-06 LAB
ANION GAP SERPL CALCULATED.3IONS-SCNC: 6.8 MMOL/L (ref 3.6–11.2)
BASOPHILS # BLD AUTO: 0.04 10*3/MM3 (ref 0–0.3)
BASOPHILS NFR BLD AUTO: 0.4 % (ref 0–2)
BUN BLD-MCNC: 16 MG/DL (ref 7–21)
BUN/CREAT SERPL: 17.4 (ref 7–25)
CALCIUM SPEC-SCNC: 10.1 MG/DL (ref 7.7–10)
CHLORIDE SERPL-SCNC: 110 MMOL/L (ref 99–112)
CO2 SERPL-SCNC: 28.2 MMOL/L (ref 24.3–31.9)
CREAT BLD-MCNC: 0.92 MG/DL (ref 0.43–1.29)
DEPRECATED RDW RBC AUTO: 49 FL (ref 37–54)
EOSINOPHIL # BLD AUTO: 0.42 10*3/MM3 (ref 0–0.7)
EOSINOPHIL NFR BLD AUTO: 4.7 % (ref 0–7)
ERYTHROCYTE [DISTWIDTH] IN BLOOD BY AUTOMATED COUNT: 13.6 % (ref 11.5–14.5)
GFR SERPL CREATININE-BSD FRML MDRD: 60 ML/MIN/1.73
GLUCOSE BLD-MCNC: 105 MG/DL (ref 70–110)
HCT VFR BLD AUTO: 38.3 % (ref 37–47)
HGB BLD-MCNC: 11.8 G/DL (ref 12–16)
IMM GRANULOCYTES # BLD AUTO: 0.06 10*3/MM3 (ref 0–0.03)
IMM GRANULOCYTES NFR BLD AUTO: 0.7 % (ref 0–0.5)
LYMPHOCYTES # BLD AUTO: 1.57 10*3/MM3 (ref 1–3)
LYMPHOCYTES NFR BLD AUTO: 17.4 % (ref 16–46)
MCH RBC QN AUTO: 31 PG (ref 27–33)
MCHC RBC AUTO-ENTMCNC: 30.8 G/DL (ref 33–37)
MCV RBC AUTO: 100.5 FL (ref 80–94)
MONOCYTES # BLD AUTO: 0.78 10*3/MM3 (ref 0.1–0.9)
MONOCYTES NFR BLD AUTO: 8.7 % (ref 0–12)
NEUTROPHILS # BLD AUTO: 6.14 10*3/MM3 (ref 1.4–6.5)
NEUTROPHILS NFR BLD AUTO: 68.1 % (ref 40–75)
OSMOLALITY SERPL CALC.SUM OF ELEC: 290.2 MOSM/KG (ref 273–305)
PLATELET # BLD AUTO: 305 10*3/MM3 (ref 130–400)
PMV BLD AUTO: 11.8 FL (ref 6–10)
POTASSIUM BLD-SCNC: 3.9 MMOL/L (ref 3.5–5.3)
RBC # BLD AUTO: 3.81 10*6/MM3 (ref 4.2–5.4)
SODIUM BLD-SCNC: 145 MMOL/L (ref 135–153)
WBC NRBC COR # BLD: 9.01 10*3/MM3 (ref 4.5–12.5)

## 2019-02-06 PROCEDURE — 80048 BASIC METABOLIC PNL TOTAL CA: CPT | Performed by: INTERNAL MEDICINE

## 2019-02-06 PROCEDURE — 85025 COMPLETE CBC W/AUTO DIFF WBC: CPT | Performed by: INTERNAL MEDICINE

## 2019-02-13 ENCOUNTER — LAB REQUISITION (OUTPATIENT)
Dept: LAB | Facility: HOSPITAL | Age: 74
End: 2019-02-13

## 2019-02-13 DIAGNOSIS — I10 ESSENTIAL (PRIMARY) HYPERTENSION: ICD-10-CM

## 2019-02-13 LAB
ANION GAP SERPL CALCULATED.3IONS-SCNC: 6.2 MMOL/L (ref 3.6–11.2)
BASOPHILS # BLD AUTO: 0.04 10*3/MM3 (ref 0–0.3)
BASOPHILS NFR BLD AUTO: 0.7 % (ref 0–2)
BUN BLD-MCNC: 16 MG/DL (ref 7–21)
BUN/CREAT SERPL: 19.5 (ref 7–25)
CALCIUM SPEC-SCNC: 9.7 MG/DL (ref 7.7–10)
CHLORIDE SERPL-SCNC: 109 MMOL/L (ref 99–112)
CO2 SERPL-SCNC: 28.8 MMOL/L (ref 24.3–31.9)
CREAT BLD-MCNC: 0.82 MG/DL (ref 0.43–1.29)
DEPRECATED RDW RBC AUTO: 49.1 FL (ref 37–54)
EOSINOPHIL # BLD AUTO: 0.19 10*3/MM3 (ref 0–0.7)
EOSINOPHIL NFR BLD AUTO: 3.1 % (ref 0–7)
ERYTHROCYTE [DISTWIDTH] IN BLOOD BY AUTOMATED COUNT: 13.7 % (ref 11.5–14.5)
GFR SERPL CREATININE-BSD FRML MDRD: 69 ML/MIN/1.73
GLUCOSE BLD-MCNC: 93 MG/DL (ref 70–110)
HCT VFR BLD AUTO: 33.9 % (ref 37–47)
HGB BLD-MCNC: 10.5 G/DL (ref 12–16)
IMM GRANULOCYTES # BLD AUTO: 0.01 10*3/MM3 (ref 0–0.03)
IMM GRANULOCYTES NFR BLD AUTO: 0.2 % (ref 0–0.5)
LYMPHOCYTES # BLD AUTO: 1.6 10*3/MM3 (ref 1–3)
LYMPHOCYTES NFR BLD AUTO: 26.4 % (ref 16–46)
MCH RBC QN AUTO: 30.3 PG (ref 27–33)
MCHC RBC AUTO-ENTMCNC: 31 G/DL (ref 33–37)
MCV RBC AUTO: 97.7 FL (ref 80–94)
MONOCYTES # BLD AUTO: 0.73 10*3/MM3 (ref 0.1–0.9)
MONOCYTES NFR BLD AUTO: 12.1 % (ref 0–12)
NEUTROPHILS # BLD AUTO: 3.48 10*3/MM3 (ref 1.4–6.5)
NEUTROPHILS NFR BLD AUTO: 57.5 % (ref 40–75)
OSMOLALITY SERPL CALC.SUM OF ELEC: 287.7 MOSM/KG (ref 273–305)
PLAT MORPH BLD: NORMAL
PLATELET # BLD AUTO: 317 10*3/MM3 (ref 130–400)
PMV BLD AUTO: 12.2 FL (ref 6–10)
POTASSIUM BLD-SCNC: 3.3 MMOL/L (ref 3.5–5.3)
RBC # BLD AUTO: 3.47 10*6/MM3 (ref 4.2–5.4)
RBC MORPH BLD: NORMAL
SODIUM BLD-SCNC: 144 MMOL/L (ref 135–153)
WBC NRBC COR # BLD: 6.05 10*3/MM3 (ref 4.5–12.5)

## 2019-02-13 PROCEDURE — 80048 BASIC METABOLIC PNL TOTAL CA: CPT | Performed by: INTERNAL MEDICINE

## 2019-02-13 PROCEDURE — 85025 COMPLETE CBC W/AUTO DIFF WBC: CPT | Performed by: INTERNAL MEDICINE

## 2019-02-13 PROCEDURE — 85007 BL SMEAR W/DIFF WBC COUNT: CPT | Performed by: INTERNAL MEDICINE

## 2019-02-20 ENCOUNTER — LAB REQUISITION (OUTPATIENT)
Dept: LAB | Facility: HOSPITAL | Age: 74
End: 2019-02-20

## 2019-02-20 DIAGNOSIS — D64.9 ANEMIA: ICD-10-CM

## 2019-02-20 DIAGNOSIS — I10 ESSENTIAL (PRIMARY) HYPERTENSION: ICD-10-CM

## 2019-02-20 LAB
ANION GAP SERPL CALCULATED.3IONS-SCNC: 7 MMOL/L (ref 3.6–11.2)
BUN BLD-MCNC: 20 MG/DL (ref 7–21)
BUN/CREAT SERPL: 22.2 (ref 7–25)
CALCIUM SPEC-SCNC: 10 MG/DL (ref 7.7–10)
CHLORIDE SERPL-SCNC: 112 MMOL/L (ref 99–112)
CO2 SERPL-SCNC: 27 MMOL/L (ref 24.3–31.9)
CREAT BLD-MCNC: 0.9 MG/DL (ref 0.43–1.29)
DEPRECATED RDW RBC AUTO: 50.4 FL (ref 37–54)
ERYTHROCYTE [DISTWIDTH] IN BLOOD BY AUTOMATED COUNT: 14.2 % (ref 11.5–14.5)
GFR SERPL CREATININE-BSD FRML MDRD: 62 ML/MIN/1.73
GLUCOSE BLD-MCNC: 86 MG/DL (ref 70–110)
HCT VFR BLD AUTO: 37.6 % (ref 37–47)
HGB BLD-MCNC: 11.5 G/DL (ref 12–16)
MCH RBC QN AUTO: 29.9 PG (ref 27–33)
MCHC RBC AUTO-ENTMCNC: 30.6 G/DL (ref 33–37)
MCV RBC AUTO: 97.9 FL (ref 80–94)
OSMOLALITY SERPL CALC.SUM OF ELEC: 292.5 MOSM/KG (ref 273–305)
PLATELET # BLD AUTO: 392 10*3/MM3 (ref 130–400)
PMV BLD AUTO: 12.1 FL (ref 6–10)
POTASSIUM BLD-SCNC: 4.4 MMOL/L (ref 3.5–5.3)
RBC # BLD AUTO: 3.84 10*6/MM3 (ref 4.2–5.4)
SODIUM BLD-SCNC: 146 MMOL/L (ref 135–153)
WBC NRBC COR # BLD: 6.89 10*3/MM3 (ref 4.5–12.5)

## 2019-02-20 PROCEDURE — 80048 BASIC METABOLIC PNL TOTAL CA: CPT | Performed by: INTERNAL MEDICINE

## 2019-02-20 PROCEDURE — 85027 COMPLETE CBC AUTOMATED: CPT | Performed by: INTERNAL MEDICINE

## 2019-02-23 ENCOUNTER — APPOINTMENT (OUTPATIENT)
Dept: CT IMAGING | Facility: HOSPITAL | Age: 74
End: 2019-02-23

## 2019-02-23 ENCOUNTER — APPOINTMENT (OUTPATIENT)
Dept: GENERAL RADIOLOGY | Facility: HOSPITAL | Age: 74
End: 2019-02-23

## 2019-02-23 ENCOUNTER — HOSPITAL ENCOUNTER (EMERGENCY)
Facility: HOSPITAL | Age: 74
Discharge: HOME | End: 2019-02-24
Attending: EMERGENCY MEDICINE | Admitting: EMERGENCY MEDICINE

## 2019-02-23 DIAGNOSIS — S09.90XA CLOSED HEAD INJURY, INITIAL ENCOUNTER: ICD-10-CM

## 2019-02-23 DIAGNOSIS — S70.02XA CONTUSION OF LEFT HIP, INITIAL ENCOUNTER: Primary | ICD-10-CM

## 2019-02-23 PROCEDURE — 73502 X-RAY EXAM HIP UNI 2-3 VIEWS: CPT | Performed by: RADIOLOGY

## 2019-02-23 PROCEDURE — 70450 CT HEAD/BRAIN W/O DYE: CPT | Performed by: RADIOLOGY

## 2019-02-23 PROCEDURE — 73502 X-RAY EXAM HIP UNI 2-3 VIEWS: CPT

## 2019-02-23 PROCEDURE — 99283 EMERGENCY DEPT VISIT LOW MDM: CPT

## 2019-02-23 PROCEDURE — 70450 CT HEAD/BRAIN W/O DYE: CPT

## 2019-02-24 ENCOUNTER — APPOINTMENT (OUTPATIENT)
Dept: CT IMAGING | Facility: HOSPITAL | Age: 74
End: 2019-02-24

## 2019-02-24 VITALS
SYSTOLIC BLOOD PRESSURE: 162 MMHG | RESPIRATION RATE: 18 BRPM | TEMPERATURE: 98 F | HEIGHT: 60 IN | WEIGHT: 126 LBS | DIASTOLIC BLOOD PRESSURE: 78 MMHG | OXYGEN SATURATION: 99 % | HEART RATE: 82 BPM | BODY MASS INDEX: 24.74 KG/M2

## 2019-02-24 PROCEDURE — 73700 CT LOWER EXTREMITY W/O DYE: CPT

## 2019-02-24 PROCEDURE — 73700 CT LOWER EXTREMITY W/O DYE: CPT | Performed by: RADIOLOGY

## 2019-02-26 ENCOUNTER — LAB REQUISITION (OUTPATIENT)
Dept: LAB | Facility: HOSPITAL | Age: 74
End: 2019-02-26

## 2019-02-26 DIAGNOSIS — I10 ESSENTIAL (PRIMARY) HYPERTENSION: ICD-10-CM

## 2019-02-26 LAB
ANION GAP SERPL CALCULATED.3IONS-SCNC: 4.5 MMOL/L (ref 3.6–11.2)
BUN BLD-MCNC: 20 MG/DL (ref 7–21)
BUN/CREAT SERPL: 26.3 (ref 7–25)
CALCIUM SPEC-SCNC: 9.6 MG/DL (ref 7.7–10)
CHLORIDE SERPL-SCNC: 114 MMOL/L (ref 99–112)
CO2 SERPL-SCNC: 24.5 MMOL/L (ref 24.3–31.9)
CREAT BLD-MCNC: 0.76 MG/DL (ref 0.43–1.29)
DEPRECATED RDW RBC AUTO: 50.3 FL (ref 37–54)
ERYTHROCYTE [DISTWIDTH] IN BLOOD BY AUTOMATED COUNT: 14.6 % (ref 11.5–14.5)
GFR SERPL CREATININE-BSD FRML MDRD: 75 ML/MIN/1.73
GLUCOSE BLD-MCNC: 88 MG/DL (ref 70–110)
HCT VFR BLD AUTO: 37.5 % (ref 37–47)
HGB BLD-MCNC: 11.1 G/DL (ref 12–16)
MCH RBC QN AUTO: 29.2 PG (ref 27–33)
MCHC RBC AUTO-ENTMCNC: 29.6 G/DL (ref 33–37)
MCV RBC AUTO: 98.7 FL (ref 80–94)
OSMOLALITY SERPL CALC.SUM OF ELEC: 287 MOSM/KG (ref 273–305)
PLATELET # BLD AUTO: 302 10*3/MM3 (ref 130–400)
PMV BLD AUTO: 11.8 FL (ref 6–10)
POTASSIUM BLD-SCNC: 4.1 MMOL/L (ref 3.5–5.3)
RBC # BLD AUTO: 3.8 10*6/MM3 (ref 4.2–5.4)
SODIUM BLD-SCNC: 143 MMOL/L (ref 135–153)
WBC NRBC COR # BLD: 6.8 10*3/MM3 (ref 4.5–12.5)

## 2019-02-26 PROCEDURE — 85027 COMPLETE CBC AUTOMATED: CPT | Performed by: INTERNAL MEDICINE

## 2019-02-26 PROCEDURE — 80048 BASIC METABOLIC PNL TOTAL CA: CPT | Performed by: INTERNAL MEDICINE

## 2019-02-27 ENCOUNTER — HOSPITAL ENCOUNTER (EMERGENCY)
Facility: HOSPITAL | Age: 74
Discharge: HOME | End: 2019-02-28
Attending: EMERGENCY MEDICINE | Admitting: EMERGENCY MEDICINE

## 2019-02-27 ENCOUNTER — APPOINTMENT (OUTPATIENT)
Dept: CT IMAGING | Facility: HOSPITAL | Age: 74
End: 2019-02-27

## 2019-02-27 DIAGNOSIS — R51.9 NONINTRACTABLE EPISODIC HEADACHE, UNSPECIFIED HEADACHE TYPE: Primary | ICD-10-CM

## 2019-02-27 LAB
ALBUMIN SERPL-MCNC: 4 G/DL (ref 3.4–4.8)
ALBUMIN/GLOB SERPL: 1.7 G/DL (ref 1.5–2.5)
ALP SERPL-CCNC: 73 U/L (ref 35–104)
ALT SERPL W P-5'-P-CCNC: 14 U/L (ref 10–36)
ANION GAP SERPL CALCULATED.3IONS-SCNC: 4.1 MMOL/L (ref 3.6–11.2)
APTT PPP: 28.3 SECONDS (ref 23.8–36.1)
AST SERPL-CCNC: 14 U/L (ref 10–30)
BASOPHILS # BLD AUTO: 0.02 10*3/MM3 (ref 0–0.3)
BASOPHILS NFR BLD AUTO: 0.2 % (ref 0–2)
BILIRUB SERPL-MCNC: 0.2 MG/DL (ref 0.2–1.8)
BUN BLD-MCNC: 23 MG/DL (ref 7–21)
BUN/CREAT SERPL: 24.7 (ref 7–25)
CALCIUM SPEC-SCNC: 9.9 MG/DL (ref 7.7–10)
CHLORIDE SERPL-SCNC: 109 MMOL/L (ref 99–112)
CO2 SERPL-SCNC: 26.9 MMOL/L (ref 24.3–31.9)
CREAT BLD-MCNC: 0.93 MG/DL (ref 0.43–1.29)
D-LACTATE SERPL-SCNC: 1.1 MMOL/L (ref 0.5–2)
DEPRECATED RDW RBC AUTO: 50.7 FL (ref 37–54)
EOSINOPHIL # BLD AUTO: 0.1 10*3/MM3 (ref 0–0.7)
EOSINOPHIL NFR BLD AUTO: 1.1 % (ref 0–7)
ERYTHROCYTE [DISTWIDTH] IN BLOOD BY AUTOMATED COUNT: 14.8 % (ref 11.5–14.5)
FLUAV AG NPH QL: NEGATIVE
FLUBV AG NPH QL IA: NEGATIVE
GFR SERPL CREATININE-BSD FRML MDRD: 59 ML/MIN/1.73
GLOBULIN UR ELPH-MCNC: 2.4 GM/DL
GLUCOSE BLD-MCNC: 111 MG/DL (ref 70–110)
HCT VFR BLD AUTO: 38.1 % (ref 37–47)
HGB BLD-MCNC: 11.5 G/DL (ref 12–16)
IMM GRANULOCYTES # BLD AUTO: 0.03 10*3/MM3 (ref 0–0.03)
IMM GRANULOCYTES NFR BLD AUTO: 0.3 % (ref 0–0.5)
INR PPP: 1.01 (ref 0.9–1.1)
LYMPHOCYTES # BLD AUTO: 1.61 10*3/MM3 (ref 1–3)
LYMPHOCYTES NFR BLD AUTO: 17.8 % (ref 16–46)
MCH RBC QN AUTO: 29.8 PG (ref 27–33)
MCHC RBC AUTO-ENTMCNC: 30.2 G/DL (ref 33–37)
MCV RBC AUTO: 98.7 FL (ref 80–94)
MONOCYTES # BLD AUTO: 0.98 10*3/MM3 (ref 0.1–0.9)
MONOCYTES NFR BLD AUTO: 10.8 % (ref 0–12)
NEUTROPHILS # BLD AUTO: 6.31 10*3/MM3 (ref 1.4–6.5)
NEUTROPHILS NFR BLD AUTO: 69.8 % (ref 40–75)
OSMOLALITY SERPL CALC.SUM OF ELEC: 283.8 MOSM/KG (ref 273–305)
PLATELET # BLD AUTO: 326 10*3/MM3 (ref 130–400)
PMV BLD AUTO: 11.7 FL (ref 6–10)
POTASSIUM BLD-SCNC: 3.8 MMOL/L (ref 3.5–5.3)
PROT SERPL-MCNC: 6.4 G/DL (ref 6–8)
PROTHROMBIN TIME: 13.5 SECONDS (ref 11–15.4)
RBC # BLD AUTO: 3.86 10*6/MM3 (ref 4.2–5.4)
SODIUM BLD-SCNC: 140 MMOL/L (ref 135–153)
TROPONIN I SERPL-MCNC: 0.03 NG/ML
WBC NRBC COR # BLD: 9.05 10*3/MM3 (ref 4.5–12.5)

## 2019-02-27 PROCEDURE — 80053 COMPREHEN METABOLIC PANEL: CPT | Performed by: EMERGENCY MEDICINE

## 2019-02-27 PROCEDURE — 70450 CT HEAD/BRAIN W/O DYE: CPT

## 2019-02-27 PROCEDURE — 93005 ELECTROCARDIOGRAM TRACING: CPT | Performed by: EMERGENCY MEDICINE

## 2019-02-27 PROCEDURE — P9612 CATHETERIZE FOR URINE SPEC: HCPCS

## 2019-02-27 PROCEDURE — 96374 THER/PROPH/DIAG INJ IV PUSH: CPT

## 2019-02-27 PROCEDURE — 85025 COMPLETE CBC W/AUTO DIFF WBC: CPT | Performed by: EMERGENCY MEDICINE

## 2019-02-27 PROCEDURE — 84484 ASSAY OF TROPONIN QUANT: CPT | Performed by: EMERGENCY MEDICINE

## 2019-02-27 PROCEDURE — 85730 THROMBOPLASTIN TIME PARTIAL: CPT | Performed by: EMERGENCY MEDICINE

## 2019-02-27 PROCEDURE — 87040 BLOOD CULTURE FOR BACTERIA: CPT | Performed by: EMERGENCY MEDICINE

## 2019-02-27 PROCEDURE — 70450 CT HEAD/BRAIN W/O DYE: CPT | Performed by: RADIOLOGY

## 2019-02-27 PROCEDURE — 93010 ELECTROCARDIOGRAM REPORT: CPT | Performed by: INTERNAL MEDICINE

## 2019-02-27 PROCEDURE — 87804 INFLUENZA ASSAY W/OPTIC: CPT | Performed by: EMERGENCY MEDICINE

## 2019-02-27 PROCEDURE — 83605 ASSAY OF LACTIC ACID: CPT | Performed by: EMERGENCY MEDICINE

## 2019-02-27 PROCEDURE — 85610 PROTHROMBIN TIME: CPT | Performed by: EMERGENCY MEDICINE

## 2019-02-27 PROCEDURE — 99285 EMERGENCY DEPT VISIT HI MDM: CPT

## 2019-02-27 RX ORDER — SODIUM CHLORIDE 0.9 % (FLUSH) 0.9 %
10 SYRINGE (ML) INJECTION AS NEEDED
Status: DISCONTINUED | OUTPATIENT
Start: 2019-02-27 | End: 2019-02-28 | Stop reason: HOSPADM

## 2019-02-27 RX ORDER — SODIUM CHLORIDE 9 MG/ML
125 INJECTION, SOLUTION INTRAVENOUS CONTINUOUS
Status: DISCONTINUED | OUTPATIENT
Start: 2019-02-27 | End: 2019-02-28 | Stop reason: HOSPADM

## 2019-02-27 RX ORDER — ORPHENADRINE CITRATE 30 MG/ML
60 INJECTION INTRAMUSCULAR; INTRAVENOUS ONCE
Status: COMPLETED | OUTPATIENT
Start: 2019-02-27 | End: 2019-02-28

## 2019-02-27 RX ADMIN — SODIUM CHLORIDE 125 ML/HR: 9 INJECTION, SOLUTION INTRAVENOUS at 22:49

## 2019-02-28 VITALS
SYSTOLIC BLOOD PRESSURE: 155 MMHG | DIASTOLIC BLOOD PRESSURE: 81 MMHG | TEMPERATURE: 99.4 F | WEIGHT: 86 LBS | RESPIRATION RATE: 18 BRPM | HEIGHT: 60 IN | OXYGEN SATURATION: 100 % | BODY MASS INDEX: 16.88 KG/M2 | HEART RATE: 103 BPM

## 2019-02-28 LAB
BACTERIA UR QL AUTO: ABNORMAL /HPF
BILIRUB UR QL STRIP: NEGATIVE
CLARITY UR: CLEAR
COLOR UR: YELLOW
GLUCOSE UR STRIP-MCNC: NEGATIVE MG/DL
HGB UR QL STRIP.AUTO: ABNORMAL
HYALINE CASTS UR QL AUTO: ABNORMAL /LPF
KETONES UR QL STRIP: NEGATIVE
LEUKOCYTE ESTERASE UR QL STRIP.AUTO: ABNORMAL
NITRITE UR QL STRIP: POSITIVE
PH UR STRIP.AUTO: 6.5 [PH] (ref 5–8)
PROT UR QL STRIP: NEGATIVE
RBC # UR: ABNORMAL /HPF
REF LAB TEST METHOD: ABNORMAL
SP GR UR STRIP: 1.01 (ref 1–1.03)
SQUAMOUS #/AREA URNS HPF: ABNORMAL /HPF
TROPONIN I SERPL-MCNC: 0.02 NG/ML
UROBILINOGEN UR QL STRIP: ABNORMAL
WBC UR QL AUTO: ABNORMAL /HPF

## 2019-02-28 PROCEDURE — 25010000002 LORAZEPAM PER 2 MG: Performed by: EMERGENCY MEDICINE

## 2019-02-28 PROCEDURE — 84484 ASSAY OF TROPONIN QUANT: CPT | Performed by: EMERGENCY MEDICINE

## 2019-02-28 PROCEDURE — 81001 URINALYSIS AUTO W/SCOPE: CPT | Performed by: EMERGENCY MEDICINE

## 2019-02-28 PROCEDURE — 25010000002 MORPHINE PER 10 MG: Performed by: EMERGENCY MEDICINE

## 2019-02-28 PROCEDURE — 25010000002 ORPHENADRINE CITRATE PER 60 MG: Performed by: EMERGENCY MEDICINE

## 2019-02-28 PROCEDURE — 96375 TX/PRO/DX INJ NEW DRUG ADDON: CPT

## 2019-02-28 PROCEDURE — 96361 HYDRATE IV INFUSION ADD-ON: CPT

## 2019-02-28 RX ORDER — LORAZEPAM 2 MG/ML
2 INJECTION INTRAMUSCULAR ONCE
Status: COMPLETED | OUTPATIENT
Start: 2019-02-28 | End: 2019-02-28

## 2019-02-28 RX ADMIN — LORAZEPAM 2 MG: 2 INJECTION INTRAMUSCULAR; INTRAVENOUS at 01:44

## 2019-02-28 RX ADMIN — ORPHENADRINE CITRATE 60 MG: 30 INJECTION INTRAMUSCULAR; INTRAVENOUS at 00:56

## 2019-02-28 RX ADMIN — MORPHINE SULFATE 2 MG: 4 INJECTION, SOLUTION INTRAMUSCULAR; INTRAVENOUS at 00:57

## 2019-03-04 LAB
BACTERIA SPEC AEROBE CULT: NORMAL
BACTERIA SPEC AEROBE CULT: NORMAL

## 2019-03-05 ENCOUNTER — APPOINTMENT (OUTPATIENT)
Dept: CT IMAGING | Facility: HOSPITAL | Age: 74
End: 2019-03-05

## 2019-03-05 ENCOUNTER — HOSPITAL ENCOUNTER (EMERGENCY)
Facility: HOSPITAL | Age: 74
Discharge: HOME | End: 2019-03-05
Attending: EMERGENCY MEDICINE | Admitting: EMERGENCY MEDICINE

## 2019-03-05 VITALS
SYSTOLIC BLOOD PRESSURE: 128 MMHG | TEMPERATURE: 97.6 F | HEART RATE: 89 BPM | HEIGHT: 63 IN | RESPIRATION RATE: 16 BRPM | OXYGEN SATURATION: 98 % | DIASTOLIC BLOOD PRESSURE: 64 MMHG | WEIGHT: 90 LBS | BODY MASS INDEX: 15.95 KG/M2

## 2019-03-05 DIAGNOSIS — G44.209 TENSION HEADACHE: ICD-10-CM

## 2019-03-05 DIAGNOSIS — I10 ESSENTIAL HYPERTENSION: Primary | ICD-10-CM

## 2019-03-05 LAB
ALBUMIN SERPL-MCNC: 4.3 G/DL (ref 3.4–4.8)
ALBUMIN/GLOB SERPL: 1.4 G/DL (ref 1.5–2.5)
ALP SERPL-CCNC: 79 U/L (ref 35–104)
ALT SERPL W P-5'-P-CCNC: 9 U/L (ref 10–36)
ANION GAP SERPL CALCULATED.3IONS-SCNC: 8 MMOL/L (ref 3.6–11.2)
AST SERPL-CCNC: 28 U/L (ref 10–30)
BASOPHILS # BLD AUTO: 0.02 10*3/MM3 (ref 0–0.3)
BASOPHILS NFR BLD AUTO: 0.2 % (ref 0–2)
BILIRUB SERPL-MCNC: 0.2 MG/DL (ref 0.2–1.8)
BUN BLD-MCNC: 22 MG/DL (ref 7–21)
BUN/CREAT SERPL: 29.7 (ref 7–25)
CALCIUM SPEC-SCNC: 10.2 MG/DL (ref 7.7–10)
CHLORIDE SERPL-SCNC: 108 MMOL/L (ref 99–112)
CO2 SERPL-SCNC: 25 MMOL/L (ref 24.3–31.9)
CREAT BLD-MCNC: 0.74 MG/DL (ref 0.43–1.29)
DEPRECATED RDW RBC AUTO: 50.3 FL (ref 37–54)
EOSINOPHIL # BLD AUTO: 0.13 10*3/MM3 (ref 0–0.7)
EOSINOPHIL NFR BLD AUTO: 1.6 % (ref 0–7)
ERYTHROCYTE [DISTWIDTH] IN BLOOD BY AUTOMATED COUNT: 14.6 % (ref 11.5–14.5)
GFR SERPL CREATININE-BSD FRML MDRD: 77 ML/MIN/1.73
GLOBULIN UR ELPH-MCNC: 3.1 GM/DL
GLUCOSE BLD-MCNC: 97 MG/DL (ref 70–110)
HCT VFR BLD AUTO: 40.3 % (ref 37–47)
HGB BLD-MCNC: 12.1 G/DL (ref 12–16)
IMM GRANULOCYTES # BLD AUTO: 0.01 10*3/MM3 (ref 0–0.03)
IMM GRANULOCYTES NFR BLD AUTO: 0.1 % (ref 0–0.5)
LYMPHOCYTES # BLD AUTO: 1.41 10*3/MM3 (ref 1–3)
LYMPHOCYTES NFR BLD AUTO: 17.3 % (ref 16–46)
MCH RBC QN AUTO: 29.5 PG (ref 27–33)
MCHC RBC AUTO-ENTMCNC: 30 G/DL (ref 33–37)
MCV RBC AUTO: 98.3 FL (ref 80–94)
MONOCYTES # BLD AUTO: 1.11 10*3/MM3 (ref 0.1–0.9)
MONOCYTES NFR BLD AUTO: 13.6 % (ref 0–12)
NEUTROPHILS # BLD AUTO: 5.47 10*3/MM3 (ref 1.4–6.5)
NEUTROPHILS NFR BLD AUTO: 67.2 % (ref 40–75)
OSMOLALITY SERPL CALC.SUM OF ELEC: 284.5 MOSM/KG (ref 273–305)
PLATELET # BLD AUTO: 331 10*3/MM3 (ref 130–400)
PMV BLD AUTO: 12.2 FL (ref 6–10)
POTASSIUM BLD-SCNC: 4.3 MMOL/L (ref 3.5–5.3)
PROT SERPL-MCNC: 7.4 G/DL (ref 6–8)
RBC # BLD AUTO: 4.1 10*6/MM3 (ref 4.2–5.4)
SODIUM BLD-SCNC: 141 MMOL/L (ref 135–153)
WBC NRBC COR # BLD: 8.15 10*3/MM3 (ref 4.5–12.5)

## 2019-03-05 PROCEDURE — 85025 COMPLETE CBC W/AUTO DIFF WBC: CPT | Performed by: EMERGENCY MEDICINE

## 2019-03-05 PROCEDURE — 80053 COMPREHEN METABOLIC PANEL: CPT | Performed by: EMERGENCY MEDICINE

## 2019-03-05 PROCEDURE — 99284 EMERGENCY DEPT VISIT MOD MDM: CPT

## 2019-03-05 PROCEDURE — 25010000003 HYDROXYZINE PER 25 MG: Performed by: EMERGENCY MEDICINE

## 2019-03-05 PROCEDURE — 70450 CT HEAD/BRAIN W/O DYE: CPT | Performed by: RADIOLOGY

## 2019-03-05 PROCEDURE — 70450 CT HEAD/BRAIN W/O DYE: CPT

## 2019-03-05 PROCEDURE — 96372 THER/PROPH/DIAG INJ SC/IM: CPT

## 2019-03-05 RX ORDER — HYDROCODONE BITARTRATE AND ACETAMINOPHEN 7.5; 325 MG/1; MG/1
1 TABLET ORAL ONCE
Status: COMPLETED | OUTPATIENT
Start: 2019-03-05 | End: 2019-03-05

## 2019-03-05 RX ORDER — HYDROXYZINE HYDROCHLORIDE 50 MG/ML
25 INJECTION, SOLUTION INTRAMUSCULAR EVERY 6 HOURS PRN
Status: DISCONTINUED | OUTPATIENT
Start: 2019-03-05 | End: 2019-03-05 | Stop reason: HOSPADM

## 2019-03-05 RX ADMIN — HYDROXYZINE HYDROCHLORIDE 25 MG: 50 INJECTION, SOLUTION INTRAMUSCULAR at 17:40

## 2019-03-05 RX ADMIN — HYDROCODONE BITARTRATE AND ACETAMINOPHEN 1 TABLET: 7.5; 325 TABLET ORAL at 17:21

## 2019-03-05 NOTE — ED PROVIDER NOTES
Subjective   Patient presents with headache, anxiety. Had CVA 3 months ago, just came home from nursing home 4 days ago.        Headache   Pain location:  L parietal  Quality:  Sharp  Severity currently:  5/10  Severity at highest:  5/10  Onset quality:  Gradual  Progression:  Waxing and waning  Chronicity:  Recurrent  Associated symptoms: fatigue, photophobia and weakness        Review of Systems   Constitutional: Positive for activity change and fatigue.   Eyes: Positive for photophobia.   Respiratory: Negative.    Cardiovascular: Negative.    Gastrointestinal: Negative.    Endocrine: Negative.    Genitourinary: Negative.    Musculoskeletal: Negative.    Allergic/Immunologic: Negative.    Neurological: Positive for weakness and headaches.   Psychiatric/Behavioral: Negative.        Past Medical History:   Diagnosis Date   • Acid reflux    • Arthritis    • Atherosclerosis of coronary artery    • Chronic fatigue    • Coronary artery disease    • CVA (cerebral vascular accident) (CMS/HCC)    • Degenerative lumbar disc    • Elevated cholesterol    • Esophageal stricture    • Hypertension    • PONV (postoperative nausea and vomiting)        Allergies   Allergen Reactions   • Meperidine Unknown (See Comments)     Does not take     • Sulfa Antibiotics Nausea Only   • Versed [Midazolam] Other (See Comments)     Patient states she can not wake up   • Atorvastatin Unknown (See Comments)     States not allergic wont take         Past Surgical History:   Procedure Laterality Date   • ABDOMINAL SURGERY     • BACK SURGERY     • CARDIAC CATHETERIZATION     • CATARACT EXTRACTION, BILATERAL     • CHOLECYSTECTOMY     • COLONOSCOPY     • ENDOSCOPY     • EYE SURGERY     • TONSILLECTOMY AND ADENOIDECTOMY     • TRIGGER FINGER RELEASE Right     Third and Fourth Fingers Dr. Ashley   • TRIGGER FINGER RELEASE Left 11/27/2018    Procedure: TRIGGER FINGER  RELEASE LEFT THIRD;  Surgeon: Garret De Anda MD;  Location: [facility] OR;   Service: Orthopedics   • TUBAL ABDOMINAL LIGATION         Family History   Problem Relation Age of Onset   • Hypertension Mother    • Hypertension Father    • Cancer Sister    • Hypertension Sister        Social History     Socioeconomic History   • Marital status:      Spouse name: Not on file   • Number of children: Not on file   • Years of education: Not on file   • Highest education level: Not on file   Occupational History   • Occupation:      Comment: Works for [employer].   Tobacco Use   • Smoking status: Current Every Day Smoker     Packs/day: 0.25     Years: 30.00     Pack years: 7.50   • Smokeless tobacco: Never Used   Substance and Sexual Activity   • Alcohol use: No   • Drug use: No   • Sexual activity: Defer           Objective   Physical Exam   Constitutional: She appears well-developed and well-nourished.   HENT:   Head: Normocephalic and atraumatic.   Eyes: Pupils are equal, round, and reactive to light.   Neck: Normal range of motion.   Cardiovascular: Normal rate and regular rhythm.   Pulmonary/Chest: Effort normal.   Abdominal: Soft.   Musculoskeletal: Normal range of motion.   Neurological:   Deficits on left side since CVA 3 months ago   Skin: Skin is warm.   Nursing note and vitals reviewed.      Procedures           ED Course                  MDM      Final diagnoses:   Essential hypertension   Tension headache            Chandrakant Hartley MD  03/05/19 9351

## 2019-03-11 ENCOUNTER — HOSPITAL ENCOUNTER (EMERGENCY)
Facility: HOSPITAL | Age: 74
Discharge: HOME | End: 2019-03-11
Attending: EMERGENCY MEDICINE | Admitting: EMERGENCY MEDICINE

## 2019-03-11 ENCOUNTER — APPOINTMENT (OUTPATIENT)
Dept: GENERAL RADIOLOGY | Facility: HOSPITAL | Age: 74
End: 2019-03-11

## 2019-03-11 VITALS
HEIGHT: 60 IN | RESPIRATION RATE: 20 BRPM | SYSTOLIC BLOOD PRESSURE: 124 MMHG | BODY MASS INDEX: 19.63 KG/M2 | OXYGEN SATURATION: 100 % | DIASTOLIC BLOOD PRESSURE: 58 MMHG | HEART RATE: 94 BPM | TEMPERATURE: 98.2 F | WEIGHT: 100 LBS

## 2019-03-11 DIAGNOSIS — N39.0 UTI (URINARY TRACT INFECTION), UNCOMPLICATED: Primary | ICD-10-CM

## 2019-03-11 DIAGNOSIS — R52 GENERALIZED PAIN: ICD-10-CM

## 2019-03-11 DIAGNOSIS — R51.9 NONINTRACTABLE EPISODIC HEADACHE, UNSPECIFIED HEADACHE TYPE: ICD-10-CM

## 2019-03-11 LAB
6-ACETYL MORPHINE: NEGATIVE
ALBUMIN SERPL-MCNC: 3.64 G/DL (ref 3.5–5.2)
ALBUMIN/GLOB SERPL: 1.2 G/DL
ALP SERPL-CCNC: 80 U/L (ref 39–117)
ALT SERPL W P-5'-P-CCNC: 9 U/L (ref 1–33)
AMPHET+METHAMPHET UR QL: NEGATIVE
ANION GAP SERPL CALCULATED.3IONS-SCNC: 11.1 MMOL/L
AST SERPL-CCNC: 15 U/L (ref 1–32)
BACTERIA UR QL AUTO: ABNORMAL /HPF
BARBITURATES UR QL SCN: NEGATIVE
BASOPHILS # BLD AUTO: 0.01 10*3/MM3 (ref 0–0.2)
BASOPHILS NFR BLD AUTO: 0.1 % (ref 0–1.5)
BENZODIAZ UR QL SCN: NEGATIVE
BILIRUB SERPL-MCNC: <0.2 MG/DL (ref 0.1–1.2)
BILIRUB UR QL STRIP: NEGATIVE
BUN BLD-MCNC: 13 MG/DL (ref 8–23)
BUN/CREAT SERPL: 19.7 (ref 7–25)
BUPRENORPHINE SERPL-MCNC: NEGATIVE NG/ML
CALCIUM SPEC-SCNC: 10.2 MG/DL (ref 8.6–10.5)
CANNABINOIDS SERPL QL: NEGATIVE
CHLORIDE SERPL-SCNC: 108 MMOL/L (ref 98–107)
CLARITY UR: CLEAR
CO2 SERPL-SCNC: 25.9 MMOL/L (ref 22–29)
COCAINE UR QL: NEGATIVE
COLOR UR: YELLOW
CREAT BLD-MCNC: 0.66 MG/DL (ref 0.57–1)
DEPRECATED RDW RBC AUTO: 47.8 FL (ref 37–54)
EOSINOPHIL # BLD AUTO: 0.07 10*3/MM3 (ref 0–0.4)
EOSINOPHIL NFR BLD AUTO: 0.7 % (ref 0.3–6.2)
ERYTHROCYTE [DISTWIDTH] IN BLOOD BY AUTOMATED COUNT: 14.6 % (ref 12.3–15.4)
GFR SERPL CREATININE-BSD FRML MDRD: 88 ML/MIN/1.73
GLOBULIN UR ELPH-MCNC: 3.2 GM/DL
GLUCOSE BLD-MCNC: 97 MG/DL (ref 65–99)
GLUCOSE UR STRIP-MCNC: NEGATIVE MG/DL
HCT VFR BLD AUTO: 36 % (ref 34–46.6)
HGB BLD-MCNC: 10.8 G/DL (ref 12–15.9)
HGB UR QL STRIP.AUTO: ABNORMAL
HYALINE CASTS UR QL AUTO: ABNORMAL /LPF
IMM GRANULOCYTES # BLD AUTO: 0.01 10*3/MM3 (ref 0–0.05)
IMM GRANULOCYTES NFR BLD AUTO: 0.1 % (ref 0–0.5)
KETONES UR QL STRIP: NEGATIVE
LEUKOCYTE ESTERASE UR QL STRIP.AUTO: ABNORMAL
LYMPHOCYTES # BLD AUTO: 1.38 10*3/MM3 (ref 0.7–3.1)
LYMPHOCYTES NFR BLD AUTO: 13.4 % (ref 19.6–45.3)
MCH RBC QN AUTO: 28.8 PG (ref 26.6–33)
MCHC RBC AUTO-ENTMCNC: 30 G/DL (ref 31.5–35.7)
MCV RBC AUTO: 96 FL (ref 79–97)
METHADONE UR QL SCN: NEGATIVE
MONOCYTES # BLD AUTO: 0.73 10*3/MM3 (ref 0.1–0.9)
MONOCYTES NFR BLD AUTO: 7.1 % (ref 5–12)
NEUTROPHILS # BLD AUTO: 8.1 10*3/MM3 (ref 1.4–7)
NEUTROPHILS NFR BLD AUTO: 78.6 % (ref 42.7–76)
NITRITE UR QL STRIP: POSITIVE
OPIATES UR QL: NEGATIVE
OXYCODONE UR QL SCN: NEGATIVE
PCP UR QL SCN: NEGATIVE
PH UR STRIP.AUTO: 6.5 [PH] (ref 5–8)
PLATELET # BLD AUTO: 352 10*3/MM3 (ref 140–450)
PMV BLD AUTO: 11.3 FL (ref 6–12)
POTASSIUM BLD-SCNC: 3.7 MMOL/L (ref 3.5–5.2)
PROT SERPL-MCNC: 6.8 G/DL (ref 6–8.5)
PROT UR QL STRIP: NEGATIVE
RBC # BLD AUTO: 3.75 10*6/MM3 (ref 3.77–5.28)
RBC # UR: ABNORMAL /HPF
REF LAB TEST METHOD: ABNORMAL
SODIUM BLD-SCNC: 145 MMOL/L (ref 136–145)
SP GR UR STRIP: 1.01 (ref 1–1.03)
SQUAMOUS #/AREA URNS HPF: ABNORMAL /HPF
UROBILINOGEN UR QL STRIP: ABNORMAL
WBC NRBC COR # BLD: 10.3 10*3/MM3 (ref 3.4–10.8)
WBC UR QL AUTO: ABNORMAL /HPF

## 2019-03-11 PROCEDURE — 96374 THER/PROPH/DIAG INJ IV PUSH: CPT

## 2019-03-11 PROCEDURE — 80307 DRUG TEST PRSMV CHEM ANLYZR: CPT | Performed by: PHYSICIAN ASSISTANT

## 2019-03-11 PROCEDURE — 80053 COMPREHEN METABOLIC PANEL: CPT | Performed by: PHYSICIAN ASSISTANT

## 2019-03-11 PROCEDURE — 71045 X-RAY EXAM CHEST 1 VIEW: CPT

## 2019-03-11 PROCEDURE — 85025 COMPLETE CBC W/AUTO DIFF WBC: CPT | Performed by: PHYSICIAN ASSISTANT

## 2019-03-11 PROCEDURE — 25010000002 ONDANSETRON PER 1 MG: Performed by: PHYSICIAN ASSISTANT

## 2019-03-11 PROCEDURE — 81001 URINALYSIS AUTO W/SCOPE: CPT | Performed by: PHYSICIAN ASSISTANT

## 2019-03-11 PROCEDURE — 96375 TX/PRO/DX INJ NEW DRUG ADDON: CPT

## 2019-03-11 PROCEDURE — 25010000002 MORPHINE PER 10 MG: Performed by: EMERGENCY MEDICINE

## 2019-03-11 PROCEDURE — 71045 X-RAY EXAM CHEST 1 VIEW: CPT | Performed by: RADIOLOGY

## 2019-03-11 PROCEDURE — 99284 EMERGENCY DEPT VISIT MOD MDM: CPT

## 2019-03-11 PROCEDURE — P9612 CATHETERIZE FOR URINE SPEC: HCPCS

## 2019-03-11 RX ORDER — SODIUM CHLORIDE 0.9 % (FLUSH) 0.9 %
10 SYRINGE (ML) INJECTION AS NEEDED
Status: DISCONTINUED | OUTPATIENT
Start: 2019-03-11 | End: 2019-03-11 | Stop reason: HOSPADM

## 2019-03-11 RX ORDER — CEPHALEXIN 500 MG/1
500 CAPSULE ORAL 3 TIMES DAILY
Qty: 21 CAPSULE | Refills: 0 | Status: SHIPPED | OUTPATIENT
Start: 2019-03-11 | End: 2019-03-18

## 2019-03-11 RX ORDER — ONDANSETRON 2 MG/ML
4 INJECTION INTRAMUSCULAR; INTRAVENOUS ONCE
Status: COMPLETED | OUTPATIENT
Start: 2019-03-11 | End: 2019-03-11

## 2019-03-11 RX ADMIN — ONDANSETRON 4 MG: 2 INJECTION, SOLUTION INTRAMUSCULAR; INTRAVENOUS at 11:50

## 2019-03-11 RX ADMIN — SODIUM CHLORIDE 500 ML: 9 INJECTION, SOLUTION INTRAVENOUS at 11:52

## 2019-03-11 RX ADMIN — MORPHINE SULFATE 2 MG: 4 INJECTION INTRAVENOUS at 11:50

## 2019-03-11 NOTE — ED NOTES
Discharge assessment completed no acute distress noted skin warm pink and dry. He denies any pain at present. Daughter here to take patient home. Patient taken out in wheel chair     Sarahy Chand RN  03/11/19 1988

## 2019-03-11 NOTE — ED NOTES
Straight catheterization performed using 8FR straight cath kit, patient perineum cleansed with perineal hygiene wipe per hospital policy. Sterile technique maintained throughout procedure. Patient tolerated well, will continue to monitor.        Nayeli Choe RN  03/11/19 9634

## 2019-03-11 NOTE — ED PROVIDER NOTES
"Subjective   73-year-old female presents the ED today complaining of pain all over.  He states this is been going on for about a week.  She states she is having headache and back pain currently.  She states she had a stroke last month.  She was seen here on March 5 and prescribed some pain medicine but she states it has not been helping.  She states \"I have been yelling and crying and driving my daughter crazy with the pain.\"  She denies any nausea, vomiting or diarrhea.  She denies any chest pain or shortness of breath.  She denies any abdominal pain.  She denies any urinary symptoms.        History provided by:  Patient  Illness   Location:  Generalized  Severity:  Moderate  Onset quality:  Gradual  Duration:  1 week  Timing:  Constant  Progression:  Unchanged  Chronicity:  New  Associated symptoms: headaches and myalgias    Associated symptoms: no abdominal pain, no chest pain, no congestion, no cough, no diarrhea, no ear pain, no fatigue, no fever, no loss of consciousness, no nausea, no rash, no rhinorrhea, no shortness of breath, no sore throat, no vomiting and no wheezing        Review of Systems   Constitutional: Negative for fatigue and fever.   HENT: Negative for congestion, ear pain, rhinorrhea and sore throat.    Eyes: Negative.    Respiratory: Negative for cough, shortness of breath and wheezing.    Cardiovascular: Negative for chest pain.   Gastrointestinal: Negative for abdominal pain, diarrhea, nausea and vomiting.   Genitourinary: Negative.    Musculoskeletal: Positive for myalgias.   Skin: Negative for rash.   Neurological: Positive for headaches. Negative for loss of consciousness.   Psychiatric/Behavioral: Negative.    All other systems reviewed and are negative.      Past Medical History:   Diagnosis Date   • Acid reflux    • Arthritis    • Atherosclerosis of coronary artery    • Chronic fatigue    • Coronary artery disease    • CVA (cerebral vascular accident) (CMS/[facility])    • Degenerative lumbar " disc    • Elevated cholesterol    • Esophageal stricture    • Hypertension    • PONV (postoperative nausea and vomiting)        Allergies   Allergen Reactions   • Meperidine Unknown (See Comments)     Does not take     • Sulfa Antibiotics Nausea Only   • Versed [Midazolam] Other (See Comments)     Patient states she can not wake up   • Atorvastatin Unknown (See Comments)     States not allergic wont take         Past Surgical History:   Procedure Laterality Date   • ABDOMINAL SURGERY     • BACK SURGERY     • CARDIAC CATHETERIZATION     • CATARACT EXTRACTION, BILATERAL     • CHOLECYSTECTOMY     • COLONOSCOPY     • ENDOSCOPY     • EYE SURGERY     • TONSILLECTOMY AND ADENOIDECTOMY     • TRIGGER FINGER RELEASE Right     [address] and [address] Fingers Dr. [facility]   • TRIGGER FINGER RELEASE Left 11/27/2018    Procedure: TRIGGER FINGER  RELEASE LEFT THIRD;  Surgeon: Garret De Anda MD;  Location: [facility];  Service: Orthopedics   • TUBAL ABDOMINAL LIGATION         Family History   Problem Relation Age of Onset   • Hypertension Mother    • Hypertension Father    • Cancer Sister    • Hypertension Sister        Social History     Socioeconomic History   • Marital status:      Spouse name: Not on file   • Number of children: Not on file   • Years of education: Not on file   • Highest education level: Not on file   Occupational History   • Occupation:      Comment: Works for [employer]   Tobacco Use   • Smoking status: Current Every Day Smoker     Packs/day: 0.25     Years: 30.00     Pack years: 7.50   • Smokeless tobacco: Never Used   Substance and Sexual Activity   • Alcohol use: No   • Drug use: No   • Sexual activity: Defer           Objective   Physical Exam   Constitutional: She is oriented to person, place, and time. She appears well-developed and well-nourished. No distress.   Patient is sleeping, easily aroused   HENT:   Head: Normocephalic and atraumatic.   Right Ear: External ear normal.   Left  Ear: External ear normal.   Nose: Nose normal.   Mouth/Throat: Oropharynx is clear and moist.   Eyes: Conjunctivae and EOM are normal. Pupils are equal, round, and reactive to light.   Neck: Normal range of motion. Neck supple.   Cardiovascular: Normal rate, regular rhythm, normal heart sounds and intact distal pulses.   Pulmonary/Chest: Effort normal and breath sounds normal. She exhibits no tenderness.   Abdominal: Soft. Bowel sounds are normal. There is no tenderness. There is no rebound and no guarding.   Musculoskeletal: She exhibits no tenderness or deformity.   Residual left sided weakness from CVA   Neurological: She is alert and oriented to person, place, and time.   Skin: Skin is warm and dry. Capillary refill takes less than 2 seconds.   Psychiatric: She has a normal mood and affect. Her behavior is normal. Judgment and thought content normal.   Nursing note and vitals reviewed.      Procedures           ED Course  ED Course as of Mar 11 1732   Mon Mar 11, 2019   1436 Patient sleeping, no distress.  She states she feels better but then states that her legs are hurting.  Plan is to treat for her UTI and then have her follow up outpatient as needed.  [AH]      ED Course User Index  [AH] Madison Tillman PA                  MDM  Number of Diagnoses or Management Options  Generalized pain:   Nonintractable episodic headache, unspecified headache type:   UTI (urinary tract infection), uncomplicated:      Amount and/or Complexity of Data Reviewed  Clinical lab tests: reviewed  Tests in the radiology section of CPT®: reviewed    Patient Progress  Patient progress: improved        Final diagnoses:   UTI (urinary tract infection), uncomplicated   Nonintractable episodic headache, unspecified headache type   Generalized pain            Madison Tillman PA  03/11/19 5412

## 2019-03-28 ENCOUNTER — OFFICE VISIT (OUTPATIENT)
Dept: PSYCHIATRY | Facility: CLINIC | Age: 74
End: 2019-03-28

## 2019-03-28 VITALS
DIASTOLIC BLOOD PRESSURE: 98 MMHG | WEIGHT: 82 LBS | HEART RATE: 78 BPM | SYSTOLIC BLOOD PRESSURE: 153 MMHG | HEIGHT: 60 IN | BODY MASS INDEX: 16.1 KG/M2

## 2019-03-28 DIAGNOSIS — F41.1 GENERALIZED ANXIETY DISORDER: Primary | ICD-10-CM

## 2019-03-28 DIAGNOSIS — F29 PSYCHOSIS, UNSPECIFIED PSYCHOSIS TYPE (HCC): ICD-10-CM

## 2019-03-28 PROCEDURE — 90792 PSYCH DIAG EVAL W/MED SRVCS: CPT | Performed by: NURSE PRACTITIONER

## 2019-03-28 RX ORDER — DULOXETIN HYDROCHLORIDE 30 MG/1
30 CAPSULE, DELAYED RELEASE ORAL DAILY
Qty: 30 CAPSULE | Refills: 0 | Status: SHIPPED | OUTPATIENT
Start: 2019-03-28 | End: 2019-04-29 | Stop reason: HOSPADM

## 2019-03-28 RX ORDER — QUETIAPINE FUMARATE 25 MG/1
1 TABLET, FILM COATED ORAL EVERY 12 HOURS
COMMUNITY
Start: 2019-03-14 | End: 2019-03-28 | Stop reason: SDUPTHER

## 2019-03-28 RX ORDER — BUSPIRONE HYDROCHLORIDE 5 MG/1
1 TABLET ORAL EVERY 8 HOURS
COMMUNITY
Start: 2019-03-01 | End: 2019-03-28 | Stop reason: SDUPTHER

## 2019-03-28 RX ORDER — BUSPIRONE HYDROCHLORIDE 10 MG/1
10 TABLET ORAL 3 TIMES DAILY
Qty: 90 TABLET | Refills: 0 | Status: ON HOLD | OUTPATIENT
Start: 2019-03-28 | End: 2019-04-20 | Stop reason: DRUGHIGH

## 2019-03-28 RX ORDER — POTASSIUM CHLORIDE 750 MG/1
CAPSULE, EXTENDED RELEASE ORAL
Status: ON HOLD | COMMUNITY
Start: 2019-03-01 | End: 2019-04-20 | Stop reason: DRUGHIGH

## 2019-03-28 RX ORDER — QUETIAPINE FUMARATE 50 MG/1
50 TABLET, FILM COATED ORAL NIGHTLY
Qty: 30 TABLET | Refills: 0 | Status: ON HOLD | OUTPATIENT
Start: 2019-03-28 | End: 2019-04-20 | Stop reason: DRUGHIGH

## 2019-03-28 RX ORDER — DIVALPROEX SODIUM 250 MG/1
250 TABLET, DELAYED RELEASE ORAL 2 TIMES DAILY
Qty: 60 TABLET | Refills: 0 | Status: ON HOLD | OUTPATIENT
Start: 2019-03-28 | End: 2019-04-20 | Stop reason: DRUGHIGH

## 2019-03-28 RX ORDER — DIVALPROEX SODIUM 125 MG/1
2 CAPSULE, COATED PELLETS ORAL EVERY 12 HOURS
COMMUNITY
Start: 2019-03-28 | End: 2019-03-28

## 2019-03-28 RX ORDER — LORATADINE 10 MG/1
1 TABLET ORAL
COMMUNITY
Start: 2019-03-01 | End: 2019-04-29 | Stop reason: HOSPADM

## 2019-03-28 RX ORDER — GABAPENTIN 100 MG/1
1 CAPSULE ORAL EVERY 8 HOURS
COMMUNITY
Start: 2019-03-14 | End: 2019-03-28

## 2019-03-28 NOTE — PROGRESS NOTES
Subjective   Anita Roche is a 73 y.o. female who is here today for initial appointment.     Chief Complaint:  Anxiety and psychosis    HPI:  History of Present Illness  Patient presents an hour and a half late for her appointment with her daughter today.  Her daughter reports that the patient had a stroke on  which has caused significant problems with her behavior and anxiety.  Patient's daughter has to speak for her as her recent stroke in December has left her with significant impairments.  Patient's daughter states that after the stroke she had to go to the [facility] to recover and rehabilitate.  Since the stroke patient has had difficulty sleeping and only will sleep an hour and 45 minutes at a time and then be awake for 2 or 4 hours and then sleep for 30 minutes to an hour and has no sleep regulation.  Her daughter reports that her appetite has been good.  Her daughter reports that she is fearful of someone touching her left side and moving her at all.  She reports that she will have outburst of screaming and crying along with agitation and irritability and will try to fight others.  Her daughter reports that she is often forgetful. The patient reports the following symptoms of anxiety: constant anxiety/worry, restlessness/on edge, difficulty concentrating, mind goes blank, irritability, muscle tension, sleep disturbance and anxiety causes distress/impairment in important areas of functioning and have caused impairment in important areas of functioning.  Her daughter reports that since the stroke she has had difficulty with executive function as well as repeating things at least 3 times over and over.  The daughter also reports that on occasion she will see her sister that has  several years ago and have a conversation with her.  Throughout the interview patient was able to answer some of my questions but was easily distracted and complained regarding pain and an clothing touching her skin  which made it hard to continue the conversation.  Patient continuously repeated items over and over again.  Patient was eating a piece of candy and then after she ate the candy she asked her daughter if she had ate that piece of candy instead of giving it to her.  Patient did report that she is constantly worried and fearful of someone touching her left side and hurting her.  Her daughter reports that she constantly wants to be on the move and in the [facility] she fell 3 times so she is nervous regarding the ability for anyone to care for her.  Patient reports that she only wants her daughter taking care of her as she will yell at others if they are not doing what they are supposed to.  Her daughter reports that her PCP started her on Seroquel roughly 3 weeks ago and Depakote a month ago but have not noticed any improvement with behavior or sleep.  Her daughter reports she was on the BuSpar while in the [facility] and they still noted ongoing behavioral disturbances as well as agitation and irritability and hitting.  Daughter notes that the patient will often scream out at times for unknown reasons.  Her daughter reports that they have not followed up with a neurologist yet highly encouraged him to do so.  When asking the daughter patient if noted any side effects they stated they have not noticed any as the patient still has weakness on the left side with facial drooping and cognitive deficits.  Patient adamantly denies any SI or HI.      Past Psych History: None; Patient recently suffered a stroke in December 2018.  Since patient stroke she has been more irritable and agitated and more argumentative with increased anxiety.  Along with the difficulty sleeping with uncontrollable anger and ongoing behavior problems.  Patient was placed on Seroquel and Depakote along with BuSpar by her PCP.  Patient was placed on BuSpar roughly around the month of January and February as daughter was unaware as patient was currently  recovering and doing rehab at the [facility].  Patient has been on Depakote sprinkles 125 3 times a day for 1 month and Seroquel 25 mg twice a day.  This is the only medication that the patient has not ever been on for psychiatric conditions.  Patient has never been hospitalized.  Patient denies any suicidal ideation or attempts in the past or self-harm.    Substance Abuse: None Ellis reviewed.  Due to patient's stroke and inability to move up and down UDS was unable to collect at this time.  Patient will not be getting any controlled substances from me as the provider.    Past Social History: Patient was born and raised in [place] but moved to [place] in the 70s.  Patient currently has 2 children along with 7 grandchildren and 4 great-grandchildren.  Patient reports that she was  16 years but  early on in her life.  Patient states that she did not have any issues growing up as she always worked hard and took care of her family.  Patient's daughter has to answer her questions due to her stroke which has affected her memory along with her ability to carry on conversation.  Patient's daughter report that she done well in her life but in her 50s she started having increased anxiety as well as fears to be out in crowds or to go anywhere.  Patient currently worked for a [employer] office for 19 years and actually had her stroke at work.  Patient states that she enjoyed working and would continue doing so had it not been for her stroke.  Patient denies any history of abuse.  Patient currently lives at home and his cared for 24/7 by her daughter. Her daughter reports that she is the only one that will allow them to take care of her as she has ongoing anger and irritability.     Family History: None  family history includes Cancer in her sister; Hypertension in her father, mother, and sister.    Medical/Surgical History:  Past Medical History:   Diagnosis Date   • Acid reflux    •  Arthritis    • Atherosclerosis of coronary artery    • Chronic fatigue    • Coronary artery disease    • CVA (cerebral vascular accident) (CMS/HCC)    • Degenerative lumbar disc    • Elevated cholesterol    • Esophageal stricture    • Hypertension    • PONV (postoperative nausea and vomiting)      Past Surgical History:   Procedure Laterality Date   • ABDOMINAL SURGERY     • BACK SURGERY     • CARDIAC CATHETERIZATION     • CATARACT EXTRACTION, BILATERAL     • CHOLECYSTECTOMY     • COLONOSCOPY     • ENDOSCOPY     • EYE SURGERY     • TONSILLECTOMY AND ADENOIDECTOMY     • TRIGGER FINGER RELEASE Right     Third and Fourth Fingers Dr. [facility]   • TRIGGER FINGER RELEASE Left 11/27/2018    Procedure: TRIGGER FINGER  RELEASE LEFT THIRD;  Surgeon: Garret De Anda MD;  Location: [facility];  Service: Orthopedics   • TUBAL ABDOMINAL LIGATION         Allergies   Allergen Reactions   • Meperidine Unknown (See Comments)     Does not take     • Sulfa Antibiotics Nausea Only   • Versed [Midazolam] Other (See Comments)     Patient states she can not wake up   • Atorvastatin Unknown (See Comments)     States not allergic wont take         Current Medications:   Current Outpatient Medications   Medication Sig Dispense Refill   • acetaminophen (TYLENOL) 325 MG tablet Take 2 tablets by mouth Every 6 (Six) Hours As Needed for Mild Pain .     • aspirin 81 MG tablet Take 2 tablets by mouth Daily.     • busPIRone (BUSPAR) 10 MG tablet Take 1 tablet by mouth 3 (Three) Times a Day. 90 tablet 0   • clopidogrel (PLAVIX) 75 MG tablet Take 1 tablet by mouth Daily. 30 tablet    • gabapentin (NEURONTIN) 100 MG capsule Take 1 capsule by mouth Every 8 (Eight) Hours.     • ipratropium-albuterol (DUO-NEB) 0.5-2.5 mg/3 ml nebulizer Take 3 mL by nebulization 4 (Four) Times a Day As Needed for Shortness of Air. 360 mL    • loratadine (CLARITIN) 10 MG tablet Take 1 tablet by mouth.     • metoprolol tartrate (LOPRESSOR) 25 MG tablet Take 1 tablet  by mouth Every 8 (Eight) Hours.     • potassium chloride (MICRO-K) 10 MEQ CR capsule take 4 capsule by oral route  every day with food     • QUEtiapine (SEROquel) 50 MG tablet Take 1 tablet by mouth Every Night. 30 tablet 0   • vitamin D (ERGOCALCIFEROL) 75103 units capsule capsule Take 50,000 Units by mouth 1 (One) Time Per Week.     • divalproex (DEPAKOTE) 250 MG DR tablet Take 1 tablet by mouth 2 (Two) Times a Day. 60 tablet 0   • DULoxetine (CYMBALTA) 30 MG capsule Take 1 capsule by mouth Daily. 30 capsule 0   • lisinopril (PRINIVIL,ZESTRIL) 10 MG tablet Take 10 mg by mouth Daily.     • traMADol (ULTRAM) 50 MG tablet Take 1 tablet by mouth Every 6 (Six) Hours As Needed for Moderate Pain . 12 tablet 0     No current facility-administered medications for this visit.        Review of Systems   Neurological: Positive for tremors, weakness and numbness.   Psychiatric/Behavioral: Positive for agitation, behavioral problems and sleep disturbance. The patient is nervous/anxious.    All other systems reviewed and are negative.      Review of Systems - General ROS: negative for - chills, fever or malaise  Ophthalmic ROS: negative for - loss of vision  ENT ROS: negative for - hearing change  Allergy and Immunology ROS: negative for - hives  Hematological and Lymphatic ROS: negative for - bleeding problems  Endocrine ROS: negative for - skin changes  Respiratory ROS: no cough, shortness of breath, or wheezing  Cardiovascular ROS: no chest pain or dyspnea on exertion  Gastrointestinal ROS: no abdominal pain, change in bowel habits, or black or bloody stools  Genito-Urinary ROS: no dysuria, trouble voiding, or hematuria  Musculoskeletal ROS: negative for - gait disturbance  Neurological ROS: Recent stroke symptoms  Dermatological ROS: negative for rash    Objective   Physical Exam   Constitutional: She appears well-developed and well-nourished.   Neurological: She displays atrophy. A sensory deficit is present.   Recent CVA  "  Psychiatric: Her mood appears anxious. Her speech is slurred. She is agitated. Thought content is delusional. Cognition and memory are impaired. She expresses inappropriate judgment.   Patient recently suffered a CVA and still has facial drooping and weakness on her left side.    Nursing note and vitals reviewed.    Blood pressure 153/98, pulse 78, height 152.4 cm (60\"), weight 37.2 kg (82 lb), not currently breastfeeding.    Mental Status Exam:   Hygiene:   good  Cooperation:  Guarded  Eye Contact:  Fair  Psychomotor Behavior:  Restless  Affect:  Restricted  Hopelessness: Denies  Speech:  Normal  Thought Process:  Disorganized  Thought Content:  Normal  Suicidal:  None  Homicidal:  None  Hallucinations:  None  Delusion:  None  Memory:  Intact  Orientation:  Unable to evaluate  Reliability:  poor  Insight:  Poor  Judgement:  Poor Impaired   Impulse Control:  Impaired  Physical/Medical Issues:  Yes recent CVA        Assessment/Plan  Discussed medication options.  Begin Cymbalta 30 mg for anxiety as patient complains of significant neuropathic pain as well.  Increase Depakote to 250 mg tablets twice a day for psychosis and agitation and irritability.  Instructed patient's daughter to take 50 mg of the Seroquel at night to help with her sleep.  Discussed the risks, benefits, and side effects of the medication; client acknowledged and verbally consented.  Patient is aware to contact the [facility] with any worsening of symptom.  Patient is agreeable to go to the ER or call 911 should they begin SI/HI.  Advised the patient and her daughter regarding the risk and benefits and side effects of psychotropic medications in her age patient and daughter aware and verbally consented and agreed.  Highly instructed patient's daughter to get her a neurology appointment to assess for vascular dementia, patient's daughter agreed.  Informed daughter to call if any worsening symptoms or noted any side effects.  Educated patient " and daughter on proper sleep hygiene to benefit the patient to help get her on a more scheduled sleeping pattern since she has been inpatient and in the [facility] her change in environment could possibly it contributed to for her altered sleeping better as she is only been home 5 weeks now.  Patient's daughter agreed and stated she is trying to get her medication as well as her on a more scheduled routine.  Diagnoses and all orders for this visit:    Generalized anxiety disorder  -     DULoxetine (CYMBALTA) 30 MG capsule; Take 1 capsule by mouth Daily.  -     QUEtiapine (SEROquel) 50 MG tablet; Take 1 tablet by mouth Every Night.    Psychosis, unspecified psychosis type (CMS/HCC)  -     busPIRone (BUSPAR) 10 MG tablet; Take 1 tablet by mouth 3 (Three) Times a Day.  -     divalproex (DEPAKOTE) 250 MG DR tablet; Take 1 tablet by mouth 2 (Two) Times a Day.  -     QUEtiapine (SEROquel) 50 MG tablet; Take 1 tablet by mouth Every Night.    Rule out Vascular Dementia    We discussed risks, benefits, and side effects of the above medication and the patient was agreeable with the plan.     Return in about 4 weeks (around 4/25/2019), or if symptoms worsen or fail to improve, for Next scheduled follow up.       Problem List: psychosis and anxiety along with irritability and agitation    Short Term Goals:Patient will be compliant with clinic appointments.  Patient will be engaged in therapy, medication compliant with minimal side effects. Patient will report decrease of symptoms and frequency.      Long Term Goals:Patient will have minimal symptoms of with continued medication management. Patient will be compliant with treatment and appointments.     Errors in dictation may reflect use of voice recognition software and not all errors in transcription may have been detected prior to signing.

## 2019-04-05 ENCOUNTER — TELEPHONE (OUTPATIENT)
Dept: PSYCHIATRY | Facility: CLINIC | Age: 74
End: 2019-04-05

## 2019-04-05 NOTE — TELEPHONE ENCOUNTER
Spoke with patient's home health nurse and let her know to stop the Cymbalta and see if symptoms improve over the weekend per Renetta. Patient's nurse will call back Monday if she has not improved and we will schedule her and have her do lab work.

## 2019-04-05 NOTE — TELEPHONE ENCOUNTER
For now just have them stop the Cymbalta and see if her symptoms improve over the weekend. If not tell her to call Monday for an appointment next week to see me as we may need to do lab work to check her depakote level. Thanks.

## 2019-04-05 NOTE — TELEPHONE ENCOUNTER
Home health nurse called stating that since patient has had medications adjusted she is beginning to have nightmares and right leg tremors. They are wondering if it is from medication changes. Please advise.

## 2019-04-20 ENCOUNTER — APPOINTMENT (OUTPATIENT)
Dept: CT IMAGING | Facility: HOSPITAL | Age: 74
End: 2019-04-20

## 2019-04-20 ENCOUNTER — HOSPITAL ENCOUNTER (INPATIENT)
Facility: HOSPITAL | Age: 74
LOS: 1 days | Discharge: ANOTHER HOSPITAL | End: 2019-04-21
Attending: EMERGENCY MEDICINE | Admitting: INTERNAL MEDICINE

## 2019-04-20 ENCOUNTER — APPOINTMENT (OUTPATIENT)
Dept: GENERAL RADIOLOGY | Facility: HOSPITAL | Age: 74
End: 2019-04-20

## 2019-04-20 DIAGNOSIS — M54.2 NECK PAIN: ICD-10-CM

## 2019-04-20 DIAGNOSIS — R51.9 INTRACTABLE HEADACHE, UNSPECIFIED CHRONICITY PATTERN, UNSPECIFIED HEADACHE TYPE: Primary | ICD-10-CM

## 2019-04-20 LAB
ALBUMIN SERPL-MCNC: 3.37 G/DL (ref 3.5–5.2)
ALBUMIN/GLOB SERPL: 0.7 G/DL
ALP SERPL-CCNC: 88 U/L (ref 39–117)
ALT SERPL W P-5'-P-CCNC: 7 U/L (ref 1–33)
AMORPH URATE CRY URNS QL MICRO: ABNORMAL /HPF
ANION GAP SERPL CALCULATED.3IONS-SCNC: 16.8 MMOL/L
APPEARANCE CSF: CLEAR
APPEARANCE CSF: CLEAR
AST SERPL-CCNC: 16 U/L (ref 1–32)
BACTERIA UR QL AUTO: ABNORMAL /HPF
BASOPHILS # BLD AUTO: 0.01 10*3/MM3 (ref 0–0.2)
BASOPHILS NFR BLD AUTO: 0.1 % (ref 0–1.5)
BILIRUB SERPL-MCNC: 0.3 MG/DL (ref 0.2–1.2)
BILIRUB UR QL STRIP: NEGATIVE
BUN BLD-MCNC: 18 MG/DL (ref 8–23)
BUN/CREAT SERPL: 18.6 (ref 7–25)
CALCIUM SPEC-SCNC: 11.7 MG/DL (ref 8.6–10.5)
CHLORIDE SERPL-SCNC: 102 MMOL/L (ref 98–107)
CLARITY UR: ABNORMAL
CO2 SERPL-SCNC: 23.2 MMOL/L (ref 22–29)
COLOR CSF: COLORLESS
COLOR CSF: COLORLESS
COLOR UR: YELLOW
CREAT BLD-MCNC: 0.97 MG/DL (ref 0.57–1)
CRP SERPL-MCNC: 15.36 MG/DL (ref 0–0.5)
D-LACTATE SERPL-SCNC: 1.5 MMOL/L (ref 0.5–2)
DEPRECATED RDW RBC AUTO: 52.6 FL (ref 37–54)
EOSINOPHIL # BLD AUTO: 0.03 10*3/MM3 (ref 0–0.4)
EOSINOPHIL NFR BLD AUTO: 0.2 % (ref 0.3–6.2)
EOSINOPHIL NFR CSF MANUAL: 1 %
ERYTHROCYTE [DISTWIDTH] IN BLOOD BY AUTOMATED COUNT: 16.2 % (ref 12.3–15.4)
FLUAV AG NPH QL: NEGATIVE
FLUBV AG NPH QL IA: NEGATIVE
GFR SERPL CREATININE-BSD FRML MDRD: 56 ML/MIN/1.73
GLOBULIN UR ELPH-MCNC: 4.5 GM/DL
GLUCOSE BLD-MCNC: 120 MG/DL (ref 65–99)
GLUCOSE CSF-MCNC: 71 MG/DL (ref 40–70)
GLUCOSE UR STRIP-MCNC: NEGATIVE MG/DL
GRAM STN SPEC: NORMAL
HCT VFR BLD AUTO: 42.6 % (ref 34–46.6)
HGB BLD-MCNC: 12.9 G/DL (ref 12–15.9)
HGB UR QL STRIP.AUTO: ABNORMAL
HOLD SPECIMEN: NORMAL
HYALINE CASTS UR QL AUTO: ABNORMAL /LPF
IMM GRANULOCYTES # BLD AUTO: 0.04 10*3/MM3 (ref 0–0.05)
IMM GRANULOCYTES NFR BLD AUTO: 0.3 % (ref 0–0.5)
KETONES UR QL STRIP: ABNORMAL
LEUKOCYTE ESTERASE UR QL STRIP.AUTO: ABNORMAL
LYMPHOCYTES # BLD AUTO: 1.13 10*3/MM3 (ref 0.7–3.1)
LYMPHOCYTES NFR BLD AUTO: 8.9 % (ref 19.6–45.3)
LYMPHOCYTES NFR CSF MANUAL: 34 %
MAGNESIUM SERPL-MCNC: 2.1 MG/DL (ref 1.6–2.4)
MCH RBC QN AUTO: 27.3 PG (ref 26.6–33)
MCHC RBC AUTO-ENTMCNC: 30.3 G/DL (ref 31.5–35.7)
MCV RBC AUTO: 90.1 FL (ref 79–97)
MONOCYTES # BLD AUTO: 1.11 10*3/MM3 (ref 0.1–0.9)
MONOCYTES NFR BLD AUTO: 8.8 % (ref 5–12)
MONOS+MACROS NFR CSF: 8 %
NEUTROPHILS # BLD AUTO: 10.35 10*3/MM3 (ref 1.7–7)
NEUTROPHILS NFR BLD AUTO: 81.7 % (ref 42.7–76)
NEUTROPHILS NFR CSF MICRO: 57 %
NITRITE UR QL STRIP: NEGATIVE
NUC CELL # CSF MANUAL: 2 /MM3 (ref 0–5)
NUC CELL # CSF MANUAL: 9 /MM3 (ref 0–5)
PH UR STRIP.AUTO: 6 [PH] (ref 5–8)
PLATELET # BLD AUTO: 311 10*3/MM3 (ref 140–450)
PMV BLD AUTO: 11.8 FL (ref 6–12)
POTASSIUM BLD-SCNC: 4.3 MMOL/L (ref 3.5–5.2)
PROT CSF-MCNC: 56.1 MG/DL (ref 15–45)
PROT SERPL-MCNC: 7.9 G/DL (ref 6–8.5)
PROT UR QL STRIP: ABNORMAL
RBC # BLD AUTO: 4.73 10*6/MM3 (ref 3.77–5.28)
RBC # CSF MANUAL: 2 /MM3 (ref 0–0)
RBC # CSF MANUAL: 575 /MM3 (ref 0–0)
RBC # UR: ABNORMAL /HPF
REF LAB TEST METHOD: ABNORMAL
S PYO AG THROAT QL: NEGATIVE
SODIUM BLD-SCNC: 142 MMOL/L (ref 136–145)
SP GR UR STRIP: 1.02 (ref 1–1.03)
SPECIMEN VOL CSF: 1 ML
SPECIMEN VOL CSF: 1 ML
SQUAMOUS #/AREA URNS HPF: ABNORMAL /HPF
TROPONIN T SERPL-MCNC: <0.01 NG/ML (ref 0–0.03)
TROPONIN T SERPL-MCNC: <0.01 NG/ML (ref 0–0.03)
TSH SERPL DL<=0.05 MIU/L-ACNC: 0.98 MIU/ML (ref 0.27–4.2)
TUBE # CSF: 1
TUBE # CSF: 4
UROBILINOGEN UR QL STRIP: ABNORMAL
VALPROATE SERPL-MCNC: 24.8 MCG/ML (ref 50–125)
WBC NRBC COR # BLD: 12.67 10*3/MM3 (ref 3.4–10.8)
WBC UR QL AUTO: ABNORMAL /HPF
WHOLE BLOOD HOLD SPECIMEN: NORMAL

## 2019-04-20 PROCEDURE — 84484 ASSAY OF TROPONIN QUANT: CPT | Performed by: EMERGENCY MEDICINE

## 2019-04-20 PROCEDURE — 72125 CT NECK SPINE W/O DYE: CPT | Performed by: RADIOLOGY

## 2019-04-20 PROCEDURE — 87081 CULTURE SCREEN ONLY: CPT | Performed by: EMERGENCY MEDICINE

## 2019-04-20 PROCEDURE — 70450 CT HEAD/BRAIN W/O DYE: CPT | Performed by: RADIOLOGY

## 2019-04-20 PROCEDURE — 82945 GLUCOSE OTHER FLUID: CPT | Performed by: EMERGENCY MEDICINE

## 2019-04-20 PROCEDURE — 87483 CNS DNA AMP PROBE TYPE 12-25: CPT | Performed by: EMERGENCY MEDICINE

## 2019-04-20 PROCEDURE — 25010000002 DIPHENHYDRAMINE PER 50 MG: Performed by: EMERGENCY MEDICINE

## 2019-04-20 PROCEDURE — 71045 X-RAY EXAM CHEST 1 VIEW: CPT | Performed by: RADIOLOGY

## 2019-04-20 PROCEDURE — 93010 ELECTROCARDIOGRAM REPORT: CPT | Performed by: INTERNAL MEDICINE

## 2019-04-20 PROCEDURE — 25010000002 MORPHINE PER 10 MG: Performed by: EMERGENCY MEDICINE

## 2019-04-20 PROCEDURE — 87070 CULTURE OTHR SPECIMN AEROBIC: CPT | Performed by: EMERGENCY MEDICINE

## 2019-04-20 PROCEDURE — 25010000002 ONDANSETRON PER 1 MG: Performed by: EMERGENCY MEDICINE

## 2019-04-20 PROCEDURE — G0378 HOSPITAL OBSERVATION PER HR: HCPCS

## 2019-04-20 PROCEDURE — 70450 CT HEAD/BRAIN W/O DYE: CPT

## 2019-04-20 PROCEDURE — 99285 EMERGENCY DEPT VISIT HI MDM: CPT

## 2019-04-20 PROCEDURE — 25010000002 MORPHINE PER 10 MG: Performed by: INTERNAL MEDICINE

## 2019-04-20 PROCEDURE — 87116 MYCOBACTERIA CULTURE: CPT | Performed by: EMERGENCY MEDICINE

## 2019-04-20 PROCEDURE — 25010000002 HYDROMORPHONE 1 MG/ML SOLUTION: Performed by: EMERGENCY MEDICINE

## 2019-04-20 PROCEDURE — 86592 SYPHILIS TEST NON-TREP QUAL: CPT | Performed by: EMERGENCY MEDICINE

## 2019-04-20 PROCEDURE — 87205 SMEAR GRAM STAIN: CPT | Performed by: EMERGENCY MEDICINE

## 2019-04-20 PROCEDURE — 89050 BODY FLUID CELL COUNT: CPT | Performed by: EMERGENCY MEDICINE

## 2019-04-20 PROCEDURE — 80164 ASSAY DIPROPYLACETIC ACD TOT: CPT | Performed by: EMERGENCY MEDICINE

## 2019-04-20 PROCEDURE — 71045 X-RAY EXAM CHEST 1 VIEW: CPT

## 2019-04-20 PROCEDURE — 009U3ZX DRAINAGE OF SPINAL CANAL, PERCUTANEOUS APPROACH, DIAGNOSTIC: ICD-10-PCS | Performed by: EMERGENCY MEDICINE

## 2019-04-20 PROCEDURE — 84484 ASSAY OF TROPONIN QUANT: CPT | Performed by: INTERNAL MEDICINE

## 2019-04-20 PROCEDURE — 72125 CT NECK SPINE W/O DYE: CPT

## 2019-04-20 PROCEDURE — 83605 ASSAY OF LACTIC ACID: CPT | Performed by: EMERGENCY MEDICINE

## 2019-04-20 PROCEDURE — 87206 SMEAR FLUORESCENT/ACID STAI: CPT | Performed by: EMERGENCY MEDICINE

## 2019-04-20 PROCEDURE — 87880 STREP A ASSAY W/OPTIC: CPT | Performed by: EMERGENCY MEDICINE

## 2019-04-20 PROCEDURE — 93005 ELECTROCARDIOGRAM TRACING: CPT | Performed by: EMERGENCY MEDICINE

## 2019-04-20 PROCEDURE — 83916 OLIGOCLONAL BANDS: CPT | Performed by: EMERGENCY MEDICINE

## 2019-04-20 PROCEDURE — 87804 INFLUENZA ASSAY W/OPTIC: CPT | Performed by: EMERGENCY MEDICINE

## 2019-04-20 PROCEDURE — 25010000002 CEFTRIAXONE: Performed by: EMERGENCY MEDICINE

## 2019-04-20 PROCEDURE — 87086 URINE CULTURE/COLONY COUNT: CPT | Performed by: EMERGENCY MEDICINE

## 2019-04-20 PROCEDURE — 86140 C-REACTIVE PROTEIN: CPT | Performed by: EMERGENCY MEDICINE

## 2019-04-20 PROCEDURE — 85025 COMPLETE CBC W/AUTO DIFF WBC: CPT | Performed by: EMERGENCY MEDICINE

## 2019-04-20 PROCEDURE — 83735 ASSAY OF MAGNESIUM: CPT | Performed by: EMERGENCY MEDICINE

## 2019-04-20 PROCEDURE — 87015 SPECIMEN INFECT AGNT CONCNTJ: CPT | Performed by: EMERGENCY MEDICINE

## 2019-04-20 PROCEDURE — 25010000002 HALOPERIDOL LACTATE PER 5 MG: Performed by: EMERGENCY MEDICINE

## 2019-04-20 PROCEDURE — 84157 ASSAY OF PROTEIN OTHER: CPT | Performed by: EMERGENCY MEDICINE

## 2019-04-20 PROCEDURE — 84443 ASSAY THYROID STIM HORMONE: CPT | Performed by: EMERGENCY MEDICINE

## 2019-04-20 PROCEDURE — 81001 URINALYSIS AUTO W/SCOPE: CPT | Performed by: EMERGENCY MEDICINE

## 2019-04-20 PROCEDURE — 89051 BODY FLUID CELL COUNT: CPT | Performed by: EMERGENCY MEDICINE

## 2019-04-20 PROCEDURE — 80053 COMPREHEN METABOLIC PANEL: CPT | Performed by: EMERGENCY MEDICINE

## 2019-04-20 PROCEDURE — P9612 CATHETERIZE FOR URINE SPEC: HCPCS

## 2019-04-20 PROCEDURE — 87102 FUNGUS ISOLATION CULTURE: CPT | Performed by: EMERGENCY MEDICINE

## 2019-04-20 RX ORDER — LIDOCAINE HYDROCHLORIDE 10 MG/ML
INJECTION, SOLUTION INFILTRATION; PERINEURAL
Status: COMPLETED
Start: 2019-04-20 | End: 2019-04-20

## 2019-04-20 RX ORDER — HALOPERIDOL 5 MG/ML
INJECTION INTRAMUSCULAR
Status: DISPENSED
Start: 2019-04-20 | End: 2019-04-21

## 2019-04-20 RX ORDER — NITROGLYCERIN 0.4 MG/1
0.4 TABLET SUBLINGUAL
Status: DISCONTINUED | OUTPATIENT
Start: 2019-04-20 | End: 2019-04-21 | Stop reason: HOSPADM

## 2019-04-20 RX ORDER — MORPHINE SULFATE 2 MG/ML
1 INJECTION, SOLUTION INTRAMUSCULAR; INTRAVENOUS ONCE
Status: COMPLETED | OUTPATIENT
Start: 2019-04-20 | End: 2019-04-20

## 2019-04-20 RX ORDER — NALOXONE HCL 0.4 MG/ML
0.4 VIAL (ML) INJECTION
Status: DISCONTINUED | OUTPATIENT
Start: 2019-04-20 | End: 2019-04-21 | Stop reason: HOSPADM

## 2019-04-20 RX ORDER — SODIUM CHLORIDE 0.9 % (FLUSH) 0.9 %
3-10 SYRINGE (ML) INJECTION AS NEEDED
Status: DISCONTINUED | OUTPATIENT
Start: 2019-04-20 | End: 2019-04-21 | Stop reason: HOSPADM

## 2019-04-20 RX ORDER — ONDANSETRON 2 MG/ML
4 INJECTION INTRAMUSCULAR; INTRAVENOUS EVERY 6 HOURS PRN
Status: DISCONTINUED | OUTPATIENT
Start: 2019-04-20 | End: 2019-04-21 | Stop reason: HOSPADM

## 2019-04-20 RX ORDER — SODIUM CHLORIDE 0.9 % (FLUSH) 0.9 %
10 SYRINGE (ML) INJECTION AS NEEDED
Status: DISCONTINUED | OUTPATIENT
Start: 2019-04-20 | End: 2019-04-21 | Stop reason: HOSPADM

## 2019-04-20 RX ORDER — DIPHENHYDRAMINE HYDROCHLORIDE 50 MG/ML
12.5 INJECTION INTRAMUSCULAR; INTRAVENOUS ONCE
Status: COMPLETED | OUTPATIENT
Start: 2019-04-20 | End: 2019-04-20

## 2019-04-20 RX ORDER — GABAPENTIN 300 MG/1
300 CAPSULE ORAL 3 TIMES DAILY
Status: CANCELLED | OUTPATIENT
Start: 2019-04-21

## 2019-04-20 RX ORDER — ONDANSETRON 2 MG/ML
4 INJECTION INTRAMUSCULAR; INTRAVENOUS ONCE
Status: COMPLETED | OUTPATIENT
Start: 2019-04-20 | End: 2019-04-20

## 2019-04-20 RX ORDER — MORPHINE SULFATE 2 MG/ML
1 INJECTION, SOLUTION INTRAMUSCULAR; INTRAVENOUS EVERY 4 HOURS PRN
Status: DISCONTINUED | OUTPATIENT
Start: 2019-04-20 | End: 2019-04-21 | Stop reason: HOSPADM

## 2019-04-20 RX ORDER — HALOPERIDOL 5 MG/ML
2 INJECTION INTRAMUSCULAR ONCE
Status: COMPLETED | OUTPATIENT
Start: 2019-04-20 | End: 2019-04-20

## 2019-04-20 RX ORDER — ACETAMINOPHEN 325 MG/1
650 TABLET ORAL EVERY 4 HOURS PRN
Status: DISCONTINUED | OUTPATIENT
Start: 2019-04-20 | End: 2019-04-21 | Stop reason: HOSPADM

## 2019-04-20 RX ORDER — QUETIAPINE FUMARATE 50 MG/1
50 TABLET, FILM COATED ORAL NIGHTLY
COMMUNITY
End: 2019-04-29 | Stop reason: HOSPADM

## 2019-04-20 RX ORDER — BUSPIRONE HYDROCHLORIDE 10 MG/1
10 TABLET ORAL 2 TIMES DAILY
COMMUNITY
End: 2019-04-29 | Stop reason: HOSPADM

## 2019-04-20 RX ORDER — SODIUM CHLORIDE 9 MG/ML
125 INJECTION, SOLUTION INTRAVENOUS CONTINUOUS
Status: DISCONTINUED | OUTPATIENT
Start: 2019-04-20 | End: 2019-04-21 | Stop reason: HOSPADM

## 2019-04-20 RX ORDER — GABAPENTIN 300 MG/1
300 CAPSULE ORAL 3 TIMES DAILY
COMMUNITY
End: 2019-04-29 | Stop reason: HOSPADM

## 2019-04-20 RX ORDER — CALCIUM CARBONATE 200(500)MG
3 TABLET,CHEWABLE ORAL 2 TIMES DAILY PRN
Status: DISCONTINUED | OUTPATIENT
Start: 2019-04-20 | End: 2019-04-21 | Stop reason: HOSPADM

## 2019-04-20 RX ORDER — HALOPERIDOL 5 MG/ML
3 INJECTION INTRAMUSCULAR ONCE
Status: COMPLETED | OUTPATIENT
Start: 2019-04-20 | End: 2019-04-20

## 2019-04-20 RX ORDER — SODIUM CHLORIDE 0.9 % (FLUSH) 0.9 %
3 SYRINGE (ML) INJECTION EVERY 12 HOURS SCHEDULED
Status: DISCONTINUED | OUTPATIENT
Start: 2019-04-20 | End: 2019-04-21 | Stop reason: HOSPADM

## 2019-04-20 RX ORDER — CETIRIZINE HYDROCHLORIDE 10 MG/1
10 TABLET ORAL DAILY
Status: CANCELLED | OUTPATIENT
Start: 2019-04-21

## 2019-04-20 RX ORDER — DIVALPROEX SODIUM 125 MG/1
250 CAPSULE, COATED PELLETS ORAL 2 TIMES DAILY
Status: ON HOLD | COMMUNITY
End: 2019-04-22

## 2019-04-20 RX ORDER — POTASSIUM CHLORIDE 750 MG/1
10 TABLET, EXTENDED RELEASE ORAL 4 TIMES DAILY
COMMUNITY
End: 2019-04-29 | Stop reason: HOSPADM

## 2019-04-20 RX ORDER — HYDROMORPHONE HYDROCHLORIDE 1 MG/ML
0.25 INJECTION, SOLUTION INTRAMUSCULAR; INTRAVENOUS; SUBCUTANEOUS ONCE
Status: COMPLETED | OUTPATIENT
Start: 2019-04-20 | End: 2019-04-20

## 2019-04-20 RX ORDER — DIPHENHYDRAMINE HYDROCHLORIDE 50 MG/ML
INJECTION INTRAMUSCULAR; INTRAVENOUS
Status: DISPENSED
Start: 2019-04-20 | End: 2019-04-21

## 2019-04-20 RX ADMIN — MORPHINE SULFATE 1 MG: 2 INJECTION, SOLUTION INTRAMUSCULAR; INTRAVENOUS at 15:19

## 2019-04-20 RX ADMIN — SODIUM CHLORIDE, PRESERVATIVE FREE 3 ML: 5 INJECTION INTRAVENOUS at 22:34

## 2019-04-20 RX ADMIN — LIDOCAINE HYDROCHLORIDE: 10 INJECTION, SOLUTION INFILTRATION; PERINEURAL at 15:38

## 2019-04-20 RX ADMIN — HALOPERIDOL LACTATE 2 MG: 5 INJECTION, SOLUTION INTRAMUSCULAR at 12:52

## 2019-04-20 RX ADMIN — HYDROMORPHONE HYDROCHLORIDE 0.25 MG: 1 INJECTION, SOLUTION INTRAMUSCULAR; INTRAVENOUS; SUBCUTANEOUS at 15:32

## 2019-04-20 RX ADMIN — CEFTRIAXONE 2 G: 2 INJECTION, POWDER, FOR SOLUTION INTRAMUSCULAR; INTRAVENOUS at 16:26

## 2019-04-20 RX ADMIN — ONDANSETRON 4 MG: 2 INJECTION, SOLUTION INTRAMUSCULAR; INTRAVENOUS at 15:17

## 2019-04-20 RX ADMIN — DIPHENHYDRAMINE HYDROCHLORIDE 12.5 MG: 50 INJECTION INTRAMUSCULAR; INTRAVENOUS at 12:55

## 2019-04-20 RX ADMIN — HALOPERIDOL LACTATE 3 MG: 5 INJECTION, SOLUTION INTRAMUSCULAR at 15:35

## 2019-04-20 RX ADMIN — DIPHENHYDRAMINE HYDROCHLORIDE 12.5 MG: 50 INJECTION INTRAMUSCULAR; INTRAVENOUS at 15:37

## 2019-04-20 RX ADMIN — MORPHINE SULFATE 1 MG: 2 INJECTION, SOLUTION INTRAMUSCULAR; INTRAVENOUS at 22:34

## 2019-04-20 RX ADMIN — HALOPERIDOL LACTATE 2 MG: 5 INJECTION, SOLUTION INTRAMUSCULAR at 15:21

## 2019-04-20 RX ADMIN — SODIUM CHLORIDE 500 ML: 9 INJECTION, SOLUTION INTRAVENOUS at 12:52

## 2019-04-20 RX ADMIN — SODIUM CHLORIDE 125 ML/HR: 9 INJECTION, SOLUTION INTRAVENOUS at 12:52

## 2019-04-20 NOTE — ED NOTES
Consent for lumbar puncture signed by pts daughter/poa at this time     Emeli Gordon, RN  04/20/19 2685

## 2019-04-20 NOTE — ED NOTES
Attempted to stick pt for blood cultures, patient and daughter refused after attempted stick .     Beatrice Tinoco  04/20/19 0361

## 2019-04-20 NOTE — ED PROVIDER NOTES
Subjective   Patient is a 73-year-old female who presents by EMS complaining of a headache and neck pain.  She is a poor historian.  She is confused.  She is hysterical.  She yells and screams almost constantly.  Initially no one accompanies her.  Later her daughter arrives, and the daughter is a poor historian as well; she is very anxious and upset.  Patient was seen here on March 11 by another physician, had chief complaints of headache and back pain at that time.  At physical exam she was described as having been alert and oriented to person, place and time.  No other meaningful acute history is available.            Review of Systems   Unable to perform ROS: Mental status change   Respiratory: Negative for shortness of breath.    Cardiovascular: Negative for chest pain.   Gastrointestinal: Negative for abdominal pain and vomiting.   Musculoskeletal: Positive for back pain and neck pain.   Neurological: Positive for headaches.   Psychiatric/Behavioral: Positive for confusion.   All other systems reviewed and are negative.      Past Medical History:   Diagnosis Date   • Acid reflux    • Arthritis    • Atherosclerosis of coronary artery    • Chronic fatigue    • Coronary artery disease    • CVA (cerebral vascular accident) (CMS/HCC)    • Degenerative lumbar disc    • Elevated cholesterol    • Esophageal stricture    • Hypertension    • PONV (postoperative nausea and vomiting)        Allergies   Allergen Reactions   • Meperidine Unknown (See Comments)     Does not take     • Sulfa Antibiotics Nausea Only   • Versed [Midazolam] Other (See Comments)     Patient states she can not wake up   • Atorvastatin Unknown (See Comments)     States not allergic wont take         Past Surgical History:   Procedure Laterality Date   • ABDOMINAL SURGERY     • BACK SURGERY     • CARDIAC CATHETERIZATION     • CATARACT EXTRACTION, BILATERAL     • CHOLECYSTECTOMY     • COLONOSCOPY     • ENDOSCOPY     • EYE SURGERY     • TONSILLECTOMY  AND ADENOIDECTOMY     • TRIGGER FINGER RELEASE Right     Third and Fourth Fingers Dr. Ashley   • TRIGGER FINGER RELEASE Left 11/27/2018    Procedure: TRIGGER FINGER  RELEASE LEFT THIRD;  Surgeon: Garret De Anda MD;  Location: [facility];  Service: Orthopedics   • TUBAL ABDOMINAL LIGATION         Family History   Problem Relation Age of Onset   • Hypertension Mother    • Hypertension Father    • Cancer Sister    • Hypertension Sister        Social History     Socioeconomic History   • Marital status:      Spouse name: Not on file   • Number of children: Not on file   • Years of education: Not on file   • Highest education level: Not on file   Occupational History   • Occupation:      Comment: Works for [employer]   Tobacco Use   • Smoking status: Current Every Day Smoker     Packs/day: 0.25     Years: 30.00     Pack years: 7.50   • Smokeless tobacco: Never Used   Substance and Sexual Activity   • Alcohol use: No   • Drug use: No   • Sexual activity: Defer           Objective   Physical Exam   Constitutional:   A thin, elderly, chronically ill-appearing female who yells and screams repeatedly complaining of headache and neck pain.  She is alert.  She is oriented to person only.  She is not oriented to place or time.  She does not cooperate with exam.   HENT:   Head: Normocephalic and atraumatic.   Eyes: EOM are normal. Pupils are equal, round, and reactive to light. No scleral icterus.   Neck: No neck rigidity. No tracheal deviation present.   Cardiovascular: Regular rhythm and intact distal pulses.   Tachycardic, regular   Pulmonary/Chest: Effort normal and breath sounds normal. No respiratory distress. She exhibits no tenderness.   Abdominal: Soft. Bowel sounds are normal. There is no tenderness. There is no rebound and no guarding.   Musculoskeletal: Normal range of motion. She exhibits no tenderness.   Neurological: She is alert. She exhibits normal muscle tone.   Patient does not follow  commands, does not cooperate with neurological examination.  The left upper extremity is in a sling, seems very weak.  She moves the other extremities spontaneously without difficulty.  On March 11 her physical exam documented that she had left-sided weakness residual from previous CVA.   Skin: Skin is warm and dry. Capillary refill takes less than 2 seconds. No cyanosis. No pallor.   Psychiatric: She is agitated.   Mental status is described above   Nursing note and vitals reviewed.      Lumbar Puncture  Date/Time: 4/20/2019 3:55 PM  Performed by: Andrew Callaway MD  Authorized by: Andrew Callaway MD     Consent:     Consent obtained:  Written    Consent given by: daughter.  Pre-procedure details:     Procedure purpose:  Diagnostic    Preparation: Patient was prepped and draped in usual sterile fashion    Anesthesia (see MAR for exact dosages):     Anesthesia method:  Local infiltration    Local anesthetic:  Lidocaine 1% w/o epi  Procedure details:     Lumbar space:  L3-L4 interspace    Patient position:  L lateral decubitus    Needle gauge:  22    Needle length (in):  3.5    Number of attempts:  1    Fluid appearance:  Clear    Tubes of fluid:  4    Total volume (ml):  6  Post-procedure:     Puncture site:  Adhesive bandage applied    Patient tolerance of procedure:  Tolerated well, no immediate complications    EKG shows sinus tachycardia with a rate of 117.  Evidence of left ventricular hypertrophy.  Nonspecific ST-T changes.  No apparent acute ischemia.  CT Cervical Spine Without Contrast   ED Interpretation   Per virtual radiology, spondylosis with mild mid cervical stenosis.  No evidence for fracture.      CT Head Without Contrast   ED Interpretation   Per virtual radiology, senescent changes, no acute intracranial abnormality.      XR Chest 1 View    (Results Pending)       Chest x-ray shows no apparent acute disease pending radiologist.  Results for orders placed or performed during the hospital  encounter of 04/20/19   Influenza Antigen, Rapid - Swab, Nasopharynx   Result Value Ref Range    Influenza A Ag, EIA Negative Negative    Influenza B Ag, EIA Negative Negative   Rapid Strep A Screen - Swab, Throat   Result Value Ref Range    Strep A Ag Negative Negative   Gram Stain Use tube: 3   Result Value Ref Range    Gram Stain No organisms seen    Valproic Acid Level, Total   Result Value Ref Range    Valproic Acid 24.8 (L) 50.0 - 125.0 mcg/mL   Comprehensive Metabolic Panel   Result Value Ref Range    Glucose 120 (H) 65 - 99 mg/dL    BUN 18 8 - 23 mg/dL    Creatinine 0.97 0.57 - 1.00 mg/dL    Sodium 142 136 - 145 mmol/L    Potassium 4.3 3.5 - 5.2 mmol/L    Chloride 102 98 - 107 mmol/L    CO2 23.2 22.0 - 29.0 mmol/L    Calcium 11.7 (H) 8.6 - 10.5 mg/dL    Total Protein 7.9 6.0 - 8.5 g/dL    Albumin 3.37 (L) 3.50 - 5.20 g/dL    ALT (SGPT) 7 1 - 33 U/L    AST (SGOT) 16 1 - 32 U/L    Alkaline Phosphatase 88 39 - 117 U/L    Total Bilirubin 0.3 0.2 - 1.2 mg/dL    eGFR Non African Amer 56 (L) >60 mL/min/1.73    Globulin 4.5 gm/dL    A/G Ratio 0.7 g/dL    BUN/Creatinine Ratio 18.6 7.0 - 25.0    Anion Gap 16.8 mmol/L   Troponin   Result Value Ref Range    Troponin T <0.010 0.000 - 0.030 ng/mL   Lactic Acid, Plasma   Result Value Ref Range    Lactate 1.5 0.5 - 2.0 mmol/L   C-reactive Protein   Result Value Ref Range    C-Reactive Protein 15.36 (H) 0.00 - 0.50 mg/dL   Magnesium   Result Value Ref Range    Magnesium 2.1 1.6 - 2.4 mg/dL   TSH   Result Value Ref Range    TSH 0.985 0.270 - 4.200 mIU/mL   CBC Auto Differential   Result Value Ref Range    WBC 12.67 (H) 3.40 - 10.80 10*3/mm3    RBC 4.73 3.77 - 5.28 10*6/mm3    Hemoglobin 12.9 12.0 - 15.9 g/dL    Hematocrit 42.6 34.0 - 46.6 %    MCV 90.1 79.0 - 97.0 fL    MCH 27.3 26.6 - 33.0 pg    MCHC 30.3 (L) 31.5 - 35.7 g/dL    RDW 16.2 (H) 12.3 - 15.4 %    RDW-SD 52.6 37.0 - 54.0 fl    MPV 11.8 6.0 - 12.0 fL    Platelets 311 140 - 450 10*3/mm3    Neutrophil % 81.7 (H) 42.7  - 76.0 %    Lymphocyte % 8.9 (L) 19.6 - 45.3 %    Monocyte % 8.8 5.0 - 12.0 %    Eosinophil % 0.2 (L) 0.3 - 6.2 %    Basophil % 0.1 0.0 - 1.5 %    Immature Grans % 0.3 0.0 - 0.5 %    Neutrophils, Absolute 10.35 (H) 1.70 - 7.00 10*3/mm3    Lymphocytes, Absolute 1.13 0.70 - 3.10 10*3/mm3    Monocytes, Absolute 1.11 (H) 0.10 - 0.90 10*3/mm3    Eosinophils, Absolute 0.03 0.00 - 0.40 10*3/mm3    Basophils, Absolute 0.01 0.00 - 0.20 10*3/mm3    Immature Grans, Absolute 0.04 0.00 - 0.05 10*3/mm3   Urinalysis With Culture If Indicated - Urine, Catheter   Result Value Ref Range    Color, UA Yellow Yellow, Straw    Appearance, UA Cloudy (A) Clear    pH, UA 6.0 5.0 - 8.0    Specific Gravity, UA 1.019 1.005 - 1.030    Glucose, UA Negative Negative    Ketones, UA Trace (A) Negative    Bilirubin, UA Negative Negative    Blood, UA Moderate (2+) (A) Negative    Protein, UA 30 mg/dL (1+) (A) Negative    Leuk Esterase, UA Moderate (2+) (A) Negative    Nitrite, UA Negative Negative    Urobilinogen, UA 0.2 E.U./dL 0.2 - 1.0 E.U./dL   Urinalysis, Microscopic Only - Urine, Catheter   Result Value Ref Range    RBC, UA 3-5 (A) None Seen, 0-2 /HPF    WBC, UA 6-12 (A) None Seen, 0-2 /HPF    Bacteria, UA 1+ (A) None Seen /HPF    Squamous Epithelial Cells, UA 3-6 (A) None Seen, 0-2 /HPF    Hyaline Casts, UA None Seen None Seen /LPF    Amorphous Crystals, UA Small/1+ None Seen /HPF    Methodology Automated Microscopy    Glucose, CSF - Cerebrospinal Fluid, Lumbar Puncture   Result Value Ref Range    Glucose, CSF 71 (H) 40 - 70 mg/dL   Protein, CSF - Cerebrospinal Fluid, Lumbar Puncture   Result Value Ref Range    Protein, Total (CSF) 56.1 (H) 15.0 - 45.0 mg/dL   Cell Count, CSF - Cerebrospinal Fluid, Lumbar Puncture   Result Value Ref Range    Color, CSF Colorless Colorless    Appearance, CSF Clear Clear    RBC,  (H) 0 - 0 /mm3    Nucleated Cells, CSF 9 (C) 0 - 5 /mm3    Volume, CSF 1.0 mL    Tube Number, CSF 1    Cell Count, CSF -  Cerebrospinal Fluid, Lumbar Puncture   Result Value Ref Range    Color, CSF Colorless Colorless    Appearance, CSF Clear Clear    RBC, CSF 2 (H) 0 - 0 /mm3    Nucleated Cells, CSF 2 0 - 5 /mm3    Volume, CSF 1.0 mL    Tube Number, CSF 4    Spinal fluid differential - Cerebrospinal Fluid, Lumbar Puncture   Result Value Ref Range    Neutrophils, CSF 57 %    Lymphocytes, CSF 34 %    Eosinophils, CSF 1 %    Mononuclear, CSF 8 %   Green Top (Gel)   Result Value Ref Range    Extra Tube Hold for add-ons.    Lavender Top   Result Value Ref Range    Extra Tube hold for add-on    Gold Top - SST   Result Value Ref Range    Extra Tube Hold for add-ons.    Red Top   Result Value Ref Range    Extra Tube Hold for add-ons.                 ED Course  ED Course as of Apr 20 1838   Sat Apr 20, 2019 1822 Patient appears to be resting quite comfortably now.  She voices that she does feel better, but her head and neck still hurt.  I discussed the case with Moose Moulton.  He will discuss the case with Dr. Christensen and call me back.  [CM]   1833 Case discussed with Dr. Christensen.  He is admitting patient to the observation unit for further care.  [CM]      ED Course User Index  [CM] Andrew Callaway MD                  [facility]      Final diagnoses:   Intractable headache, unspecified chronicity pattern, unspecified headache type   Neck pain             Please note that portions of this note were completed with a voice recognition program. Efforts were made to edit the dictations, but occasionally words are mistranscribed.       Andrew Callaway MD  04/20/19 1838

## 2019-04-21 ENCOUNTER — APPOINTMENT (OUTPATIENT)
Dept: GENERAL RADIOLOGY | Facility: HOSPITAL | Age: 74
End: 2019-04-21

## 2019-04-21 ENCOUNTER — APPOINTMENT (OUTPATIENT)
Dept: CT IMAGING | Facility: HOSPITAL | Age: 74
End: 2019-04-21

## 2019-04-21 ENCOUNTER — APPOINTMENT (OUTPATIENT)
Dept: ULTRASOUND IMAGING | Facility: HOSPITAL | Age: 74
End: 2019-04-21

## 2019-04-21 ENCOUNTER — HOSPITAL ENCOUNTER (INPATIENT)
Facility: HOSPITAL | Age: 74
LOS: 8 days | Discharge: HOSPICE | End: 2019-04-29
Attending: INTERNAL MEDICINE | Admitting: INTERNAL MEDICINE

## 2019-04-21 VITALS
DIASTOLIC BLOOD PRESSURE: 94 MMHG | SYSTOLIC BLOOD PRESSURE: 163 MMHG | HEART RATE: 99 BPM | TEMPERATURE: 98.3 F | HEIGHT: 60 IN | BODY MASS INDEX: 18.06 KG/M2 | RESPIRATION RATE: 14 BRPM | WEIGHT: 92 LBS | OXYGEN SATURATION: 100 %

## 2019-04-21 DIAGNOSIS — R13.12 OROPHARYNGEAL DYSPHAGIA: ICD-10-CM

## 2019-04-21 DIAGNOSIS — J96.01 ACUTE RESPIRATORY FAILURE WITH HYPOXIA (HCC): Primary | ICD-10-CM

## 2019-04-21 PROBLEM — I69.30 HISTORY OF CEREBROVASCULAR ACCIDENT (CVA) WITH RESIDUAL DEFICIT: Status: ACTIVE | Noted: 2019-04-21

## 2019-04-21 PROBLEM — M47.812 OSTEOARTHRITIS OF CERVICAL SPINE: Status: ACTIVE | Noted: 2019-04-21

## 2019-04-21 PROBLEM — J96.90 RESPIRATORY FAILURE (HCC): Status: ACTIVE | Noted: 2019-04-21

## 2019-04-21 PROBLEM — I65.21 RIGHT CAROTID ARTERY OCCLUSION: Status: ACTIVE | Noted: 2019-04-21

## 2019-04-21 PROBLEM — F29 PSYCHOSIS (HCC): Status: ACTIVE | Noted: 2019-04-21

## 2019-04-21 LAB
A-A DO2: >300 MMHG (ref 0–300)
ALBUMIN SERPL-MCNC: 3.05 G/DL (ref 3.5–5.2)
ALBUMIN SERPL-MCNC: 3.34 G/DL (ref 3.5–5.2)
ALBUMIN/GLOB SERPL: 0.9 G/DL
ALBUMIN/GLOB SERPL: 0.9 G/DL
ALP SERPL-CCNC: 73 U/L (ref 39–117)
ALP SERPL-CCNC: 80 U/L (ref 39–117)
ALT SERPL W P-5'-P-CCNC: 7 U/L (ref 1–33)
ALT SERPL W P-5'-P-CCNC: 8 U/L (ref 1–33)
ANION GAP SERPL CALCULATED.3IONS-SCNC: 15.3 MMOL/L
ANION GAP SERPL CALCULATED.3IONS-SCNC: 18.5 MMOL/L
APTT PPP: 31.3 SECONDS (ref 23.8–36.1)
ARTERIAL PATENCY WRIST A: ABNORMAL
ARTERIAL PATENCY WRIST A: POSITIVE
AST SERPL-CCNC: 11 U/L (ref 1–32)
AST SERPL-CCNC: 14 U/L (ref 1–32)
ATMOSPHERIC PRESS: 727 MMHG
ATMOSPHERIC PRESS: ABNORMAL MMHG
BACTERIA SPEC AEROBE CULT: NORMAL
BASE EXCESS BLDA CALC-SCNC: -1 MMOL/L
BASE EXCESS BLDA CALC-SCNC: 1 MMOL/L (ref 0–2)
BASOPHILS # BLD AUTO: 0.02 10*3/MM3 (ref 0–0.2)
BASOPHILS NFR BLD AUTO: 0.2 % (ref 0–1.5)
BDY SITE: ABNORMAL
BDY SITE: ABNORMAL
BILIRUB SERPL-MCNC: 0.2 MG/DL (ref 0.2–1.2)
BILIRUB SERPL-MCNC: 0.2 MG/DL (ref 0.2–1.2)
BODY TEMPERATURE: 37 C
BODY TEMPERATURE: 98.6 C
BUN BLD-MCNC: 17 MG/DL (ref 8–23)
BUN BLD-MCNC: 18 MG/DL (ref 8–23)
BUN/CREAT SERPL: 18.9 (ref 7–25)
BUN/CREAT SERPL: 20 (ref 7–25)
C GATTII+NEOFOR DNA CSF QL NAA+NON-PROBE: NOT DETECTED
CALCIUM SPEC-SCNC: 10.6 MG/DL (ref 8.6–10.5)
CALCIUM SPEC-SCNC: 10.9 MG/DL (ref 8.6–10.5)
CHLORIDE SERPL-SCNC: 104 MMOL/L (ref 98–107)
CHLORIDE SERPL-SCNC: 104 MMOL/L (ref 98–107)
CMV DNA CSF QL NAA+PROBE: NOT DETECTED
CO2 BLDA-SCNC: 26.3 MMOL/L (ref 23–27)
CO2 SERPL-SCNC: 19.7 MMOL/L (ref 22–29)
CO2 SERPL-SCNC: 20.5 MMOL/L (ref 22–29)
COHGB MFR BLD: 0.6 % (ref 0–5)
COHGB MFR BLD: 0.7 % (ref 0–2)
CREAT BLD-MCNC: 0.9 MG/DL (ref 0.57–1)
CREAT BLD-MCNC: 0.9 MG/DL (ref 0.57–1)
CRP SERPL-MCNC: 15.11 MG/DL (ref 0–0.5)
D-LACTATE SERPL-SCNC: 1.5 MMOL/L (ref 0.5–2)
DEPRECATED RDW RBC AUTO: 51.7 FL (ref 37–54)
DEPRECATED RDW RBC AUTO: 51.9 FL (ref 37–54)
DEPRECATED RDW RBC AUTO: 52.1 FL (ref 37–54)
E COLI K1 DNA CSF QL NAA+NON-PROBE: NOT DETECTED
EOSINOPHIL # BLD AUTO: 0.01 10*3/MM3 (ref 0–0.4)
EOSINOPHIL # BLD AUTO: 0.01 10*3/MM3 (ref 0–0.4)
EOSINOPHIL # BLD AUTO: 0.04 10*3/MM3 (ref 0–0.4)
EOSINOPHIL NFR BLD AUTO: 0.1 % (ref 0.3–6.2)
EOSINOPHIL NFR BLD AUTO: 0.1 % (ref 0.3–6.2)
EOSINOPHIL NFR BLD AUTO: 0.4 % (ref 0.3–6.2)
ERYTHROCYTE [DISTWIDTH] IN BLOOD BY AUTOMATED COUNT: 15.9 % (ref 12.3–15.4)
ERYTHROCYTE [DISTWIDTH] IN BLOOD BY AUTOMATED COUNT: 16 % (ref 12.3–15.4)
ERYTHROCYTE [DISTWIDTH] IN BLOOD BY AUTOMATED COUNT: 16 % (ref 12.3–15.4)
EV RNA CSF QL NAA+PROBE: NOT DETECTED
GFR SERPL CREATININE-BSD FRML MDRD: 62 ML/MIN/1.73
GFR SERPL CREATININE-BSD FRML MDRD: 62 ML/MIN/1.73
GLOBULIN UR ELPH-MCNC: 3.6 GM/DL
GLOBULIN UR ELPH-MCNC: 3.8 GM/DL
GLUCOSE BLD-MCNC: 105 MG/DL (ref 65–99)
GLUCOSE BLD-MCNC: 119 MG/DL (ref 65–99)
GP B STREP DNA SPEC QL NAA+PROBE: NOT DETECTED
HAEM INFLU SEROTYP DNA SPEC NAA+PROBE: NOT DETECTED
HCO3 BLDA-SCNC: 22.7 MMOL/L (ref 22–26)
HCO3 BLDA-SCNC: 25.1 MMOL/L (ref 20–26)
HCT VFR BLD AUTO: 35.5 % (ref 34–46.6)
HCT VFR BLD AUTO: 36.6 % (ref 34–46.6)
HCT VFR BLD AUTO: 36.6 % (ref 34–46.6)
HCT VFR BLD CALC: 33 % (ref 37–47)
HCT VFR BLD CALC: 33.5 %
HGB BLD-MCNC: 10.7 G/DL (ref 12–15.9)
HGB BLD-MCNC: 11 G/DL (ref 12–15.9)
HGB BLD-MCNC: 11.2 G/DL (ref 12–15.9)
HGB BLDA-MCNC: 10.9 G/DL (ref 14–18)
HGB BLDA-MCNC: 11.3 G/DL (ref 12–16)
HHV6 DNA CSF QL NAA+PROBE: NOT DETECTED
HOROWITZ INDEX BLD+IHG-RTO: 100 %
HOROWITZ INDEX BLD+IHG-RTO: 30 %
HSV1 DNA CSF QL NAA+PROBE: NOT DETECTED
HSV2 DNA CSF QL NAA+PROBE: NOT DETECTED
IMM GRANULOCYTES # BLD AUTO: 0.03 10*3/MM3 (ref 0–0.05)
IMM GRANULOCYTES NFR BLD AUTO: 0.2 % (ref 0–0.5)
IMM GRANULOCYTES NFR BLD AUTO: 0.3 % (ref 0–0.5)
IMM GRANULOCYTES NFR BLD AUTO: 0.3 % (ref 0–0.5)
INR PPP: 1.2 (ref 0.9–1.1)
L MONOCYTOG RRNA SPEC QL PROBE: NOT DETECTED
LIPASE SERPL-CCNC: 17 U/L (ref 13–60)
LYMPHOCYTES # BLD AUTO: 1.19 10*3/MM3 (ref 0.7–3.1)
LYMPHOCYTES # BLD AUTO: 1.35 10*3/MM3 (ref 0.7–3.1)
LYMPHOCYTES # BLD AUTO: 1.67 10*3/MM3 (ref 0.7–3.1)
LYMPHOCYTES NFR BLD AUTO: 10.7 % (ref 19.6–45.3)
LYMPHOCYTES NFR BLD AUTO: 11.9 % (ref 19.6–45.3)
LYMPHOCYTES NFR BLD AUTO: 17.1 % (ref 19.6–45.3)
MCH RBC QN AUTO: 27.4 PG (ref 26.6–33)
MCH RBC QN AUTO: 27.6 PG (ref 26.6–33)
MCH RBC QN AUTO: 27.7 PG (ref 26.6–33)
MCHC RBC AUTO-ENTMCNC: 30.1 G/DL (ref 31.5–35.7)
MCHC RBC AUTO-ENTMCNC: 30.1 G/DL (ref 31.5–35.7)
MCHC RBC AUTO-ENTMCNC: 30.6 G/DL (ref 31.5–35.7)
MCV RBC AUTO: 90.4 FL (ref 79–97)
MCV RBC AUTO: 90.8 FL (ref 79–97)
MCV RBC AUTO: 92 FL (ref 79–97)
METHGB BLD QL: 0.1 % (ref 0–3)
METHGB BLD QL: 0.9 % (ref 0–1.5)
MODALITY: ABNORMAL
MODALITY: ABNORMAL
MONOCYTES # BLD AUTO: 1.2 10*3/MM3 (ref 0.1–0.9)
MONOCYTES # BLD AUTO: 1.21 10*3/MM3 (ref 0.1–0.9)
MONOCYTES # BLD AUTO: 1.25 10*3/MM3 (ref 0.1–0.9)
MONOCYTES NFR BLD AUTO: 12 % (ref 5–12)
MONOCYTES NFR BLD AUTO: 12.4 % (ref 5–12)
MONOCYTES NFR BLD AUTO: 9.9 % (ref 5–12)
N MEN DNA SPEC QL NAA+PROBE: NOT DETECTED
NEUTROPHILS # BLD AUTO: 6.78 10*3/MM3 (ref 1.7–7)
NEUTROPHILS # BLD AUTO: 7.57 10*3/MM3 (ref 1.7–7)
NEUTROPHILS # BLD AUTO: 9.92 10*3/MM3 (ref 1.7–7)
NEUTROPHILS NFR BLD AUTO: 69.6 % (ref 42.7–76)
NEUTROPHILS NFR BLD AUTO: 75.5 % (ref 42.7–76)
NEUTROPHILS NFR BLD AUTO: 78.9 % (ref 42.7–76)
NOTE: ABNORMAL
OXYHGB MFR BLDV: 91 % (ref 94–99)
OXYHGB MFR BLDV: 99.2 % (ref 85–100)
PARECHOVIRUS A RNA CSF QL NAA+NON-PROBE: NOT DETECTED
PCO2 BLDA: 34.2 MM HG (ref 35–45)
PCO2 BLDA: 37.4 MM HG (ref 35–45)
PCO2 TEMP ADJ BLD: 37.4 MM HG (ref 35–45)
PEEP RESPIRATORY: 5 CM[H2O]
PH BLDA: 7.44 PH UNITS (ref 7.35–7.45)
PH BLDA: 7.44 PH UNITS (ref 7.35–7.45)
PH, TEMP CORRECTED: 7.44 PH UNITS
PLATELET # BLD AUTO: 256 10*3/MM3 (ref 140–450)
PLATELET # BLD AUTO: 275 10*3/MM3 (ref 140–450)
PLATELET # BLD AUTO: 280 10*3/MM3 (ref 140–450)
PMV BLD AUTO: 11.4 FL (ref 6–12)
PMV BLD AUTO: 12 FL (ref 6–12)
PMV BLD AUTO: 12.3 FL (ref 6–12)
PO2 BLDA: 255.8 MM HG (ref 80–100)
PO2 BLDA: 60.7 MM HG (ref 83–108)
PO2 TEMP ADJ BLD: 60.7 MM HG (ref 83–108)
POTASSIUM BLD-SCNC: 3.8 MMOL/L (ref 3.5–5.2)
POTASSIUM BLD-SCNC: 3.8 MMOL/L (ref 3.5–5.2)
PROCALCITONIN SERPL-MCNC: 0.36 NG/ML (ref 0.1–0.25)
PROT SERPL-MCNC: 6.6 G/DL (ref 6–8.5)
PROT SERPL-MCNC: 7.1 G/DL (ref 6–8.5)
PROTHROMBIN TIME: 15.5 SECONDS (ref 11–15.4)
RBC # BLD AUTO: 3.91 10*6/MM3 (ref 3.77–5.28)
RBC # BLD AUTO: 3.98 10*6/MM3 (ref 3.77–5.28)
RBC # BLD AUTO: 4.05 10*6/MM3 (ref 3.77–5.28)
S PNEUM DNA CSF QL NAA+NON-PROBE: NOT DETECTED
SAO2 % BLDCOA: 99.9 % (ref 90–100)
SET MECH RESP RATE: 18
SODIUM BLD-SCNC: 139 MMOL/L (ref 136–145)
SODIUM BLD-SCNC: 143 MMOL/L (ref 136–145)
TROPONIN T SERPL-MCNC: <0.01 NG/ML (ref 0–0.03)
TROPONIN T SERPL-MCNC: <0.01 NG/ML (ref 0–0.03)
TSH SERPL DL<=0.05 MIU/L-ACNC: 4.22 MIU/ML (ref 0.27–4.2)
VENTILATOR MODE: AC
VENTILATOR MODE: AC
VT ON VENT VENT: 400 ML
VZV DNA CSF QL NAA+PROBE: NOT DETECTED
WBC NRBC COR # BLD: 10.02 10*3/MM3 (ref 3.4–10.8)
WBC NRBC COR # BLD: 12.58 10*3/MM3 (ref 3.4–10.8)
WBC NRBC COR # BLD: 9.75 10*3/MM3 (ref 3.4–10.8)

## 2019-04-21 PROCEDURE — 93880 EXTRACRANIAL BILAT STUDY: CPT

## 2019-04-21 PROCEDURE — 85025 COMPLETE CBC W/AUTO DIFF WBC: CPT | Performed by: INTERNAL MEDICINE

## 2019-04-21 PROCEDURE — 36600 WITHDRAWAL OF ARTERIAL BLOOD: CPT

## 2019-04-21 PROCEDURE — 71275 CT ANGIOGRAPHY CHEST: CPT

## 2019-04-21 PROCEDURE — 71045 X-RAY EXAM CHEST 1 VIEW: CPT | Performed by: RADIOLOGY

## 2019-04-21 PROCEDURE — 94799 UNLISTED PULMONARY SVC/PX: CPT

## 2019-04-21 PROCEDURE — 71045 X-RAY EXAM CHEST 1 VIEW: CPT

## 2019-04-21 PROCEDURE — 85610 PROTHROMBIN TIME: CPT | Performed by: INTERNAL MEDICINE

## 2019-04-21 PROCEDURE — 25010000002 MEROPENEM: Performed by: PHYSICIAN ASSISTANT

## 2019-04-21 PROCEDURE — 80053 COMPREHEN METABOLIC PANEL: CPT | Performed by: INTERNAL MEDICINE

## 2019-04-21 PROCEDURE — 87040 BLOOD CULTURE FOR BACTERIA: CPT | Performed by: INTERNAL MEDICINE

## 2019-04-21 PROCEDURE — 31500 INSERT EMERGENCY AIRWAY: CPT

## 2019-04-21 PROCEDURE — 25010000002 ENOXAPARIN PER 10 MG: Performed by: INTERNAL MEDICINE

## 2019-04-21 PROCEDURE — 25010000002 PROPOFOL 1000 MG/ML EMULSION: Performed by: INTERNAL MEDICINE

## 2019-04-21 PROCEDURE — 82805 BLOOD GASES W/O2 SATURATION: CPT

## 2019-04-21 PROCEDURE — 84484 ASSAY OF TROPONIN QUANT: CPT | Performed by: PHYSICIAN ASSISTANT

## 2019-04-21 PROCEDURE — 93880 EXTRACRANIAL BILAT STUDY: CPT | Performed by: RADIOLOGY

## 2019-04-21 PROCEDURE — 93010 ELECTROCARDIOGRAM REPORT: CPT | Performed by: INTERNAL MEDICINE

## 2019-04-21 PROCEDURE — 84145 PROCALCITONIN (PCT): CPT | Performed by: NURSE PRACTITIONER

## 2019-04-21 PROCEDURE — 93005 ELECTROCARDIOGRAM TRACING: CPT | Performed by: INTERNAL MEDICINE

## 2019-04-21 PROCEDURE — 70450 CT HEAD/BRAIN W/O DYE: CPT

## 2019-04-21 PROCEDURE — 0 IOPAMIDOL PER 1 ML: Performed by: INTERNAL MEDICINE

## 2019-04-21 PROCEDURE — 25010000002 HEPARIN (PORCINE) PER 1000 UNITS: Performed by: INTERNAL MEDICINE

## 2019-04-21 PROCEDURE — 94002 VENT MGMT INPAT INIT DAY: CPT

## 2019-04-21 PROCEDURE — 36600 WITHDRAWAL OF ARTERIAL BLOOD: CPT | Performed by: FAMILY MEDICINE

## 2019-04-21 PROCEDURE — 82805 BLOOD GASES W/O2 SATURATION: CPT | Performed by: FAMILY MEDICINE

## 2019-04-21 PROCEDURE — 74018 RADEX ABDOMEN 1 VIEW: CPT

## 2019-04-21 PROCEDURE — 5A1935Z RESPIRATORY VENTILATION, LESS THAN 24 CONSECUTIVE HOURS: ICD-10-PCS | Performed by: FAMILY MEDICINE

## 2019-04-21 PROCEDURE — 94003 VENT MGMT INPAT SUBQ DAY: CPT

## 2019-04-21 PROCEDURE — 25010000002 HALOPERIDOL LACTATE PER 5 MG: Performed by: NURSE PRACTITIONER

## 2019-04-21 PROCEDURE — 83690 ASSAY OF LIPASE: CPT | Performed by: INTERNAL MEDICINE

## 2019-04-21 PROCEDURE — 25010000002 SUCCINYLCHOLINE PER 20 MG

## 2019-04-21 PROCEDURE — 93005 ELECTROCARDIOGRAM TRACING: CPT | Performed by: PHYSICIAN ASSISTANT

## 2019-04-21 PROCEDURE — 83605 ASSAY OF LACTIC ACID: CPT | Performed by: INTERNAL MEDICINE

## 2019-04-21 PROCEDURE — 99291 CRITICAL CARE FIRST HOUR: CPT | Performed by: INTERNAL MEDICINE

## 2019-04-21 PROCEDURE — 0BH17EZ INSERTION OF ENDOTRACHEAL AIRWAY INTO TRACHEA, VIA NATURAL OR ARTIFICIAL OPENING: ICD-10-PCS | Performed by: FAMILY MEDICINE

## 2019-04-21 PROCEDURE — 82375 ASSAY CARBOXYHB QUANT: CPT | Performed by: FAMILY MEDICINE

## 2019-04-21 PROCEDURE — 83050 HGB METHEMOGLOBIN QUAN: CPT | Performed by: FAMILY MEDICINE

## 2019-04-21 PROCEDURE — 5A1935Z RESPIRATORY VENTILATION, LESS THAN 24 CONSECUTIVE HOURS: ICD-10-PCS | Performed by: INTERNAL MEDICINE

## 2019-04-21 PROCEDURE — 86140 C-REACTIVE PROTEIN: CPT | Performed by: INTERNAL MEDICINE

## 2019-04-21 PROCEDURE — 85730 THROMBOPLASTIN TIME PARTIAL: CPT | Performed by: INTERNAL MEDICINE

## 2019-04-21 PROCEDURE — 25010000002 VANCOMYCIN 5 G RECONSTITUTED SOLUTION 5,000 MG VIAL: Performed by: PHYSICIAN ASSISTANT

## 2019-04-21 PROCEDURE — 0BH17EZ INSERTION OF ENDOTRACHEAL AIRWAY INTO TRACHEA, VIA NATURAL OR ARTIFICIAL OPENING: ICD-10-PCS | Performed by: INTERNAL MEDICINE

## 2019-04-21 PROCEDURE — 25010000002 PROPOFOL 1000 MG/ML EMULSION: Performed by: NURSE PRACTITIONER

## 2019-04-21 PROCEDURE — 84484 ASSAY OF TROPONIN QUANT: CPT | Performed by: INTERNAL MEDICINE

## 2019-04-21 PROCEDURE — 71275 CT ANGIOGRAPHY CHEST: CPT | Performed by: RADIOLOGY

## 2019-04-21 PROCEDURE — 70450 CT HEAD/BRAIN W/O DYE: CPT | Performed by: RADIOLOGY

## 2019-04-21 PROCEDURE — 84443 ASSAY THYROID STIM HORMONE: CPT | Performed by: INTERNAL MEDICINE

## 2019-04-21 PROCEDURE — 25010000002 PROPOFOL 200 MG/20ML EMULSION

## 2019-04-21 RX ORDER — SODIUM CHLORIDE 0.9 % (FLUSH) 0.9 %
3-10 SYRINGE (ML) INJECTION AS NEEDED
Status: DISCONTINUED | OUTPATIENT
Start: 2019-04-21 | End: 2019-04-21 | Stop reason: HOSPADM

## 2019-04-21 RX ORDER — DULOXETIN HYDROCHLORIDE 30 MG/1
30 CAPSULE, DELAYED RELEASE ORAL DAILY
Status: DISCONTINUED | OUTPATIENT
Start: 2019-04-22 | End: 2019-04-23

## 2019-04-21 RX ORDER — IPRATROPIUM BROMIDE AND ALBUTEROL SULFATE 2.5; .5 MG/3ML; MG/3ML
3 SOLUTION RESPIRATORY (INHALATION) EVERY 6 HOURS PRN
Status: DISCONTINUED | OUTPATIENT
Start: 2019-04-21 | End: 2019-04-29 | Stop reason: HOSPADM

## 2019-04-21 RX ORDER — CLOPIDOGREL BISULFATE 75 MG/1
75 TABLET ORAL DAILY
Status: DISCONTINUED | OUTPATIENT
Start: 2019-04-22 | End: 2019-04-23

## 2019-04-21 RX ORDER — HEPARIN SODIUM 5000 [USP'U]/ML
30 INJECTION, SOLUTION INTRAVENOUS; SUBCUTANEOUS AS NEEDED
Status: DISCONTINUED | OUTPATIENT
Start: 2019-04-21 | End: 2019-04-21 | Stop reason: HOSPADM

## 2019-04-21 RX ORDER — HEPARIN SODIUM 10000 [USP'U]/100ML
12 INJECTION, SOLUTION INTRAVENOUS
Status: DISCONTINUED | OUTPATIENT
Start: 2019-04-21 | End: 2019-04-21 | Stop reason: HOSPADM

## 2019-04-21 RX ORDER — CHLORHEXIDINE GLUCONATE 0.12 MG/ML
15 RINSE ORAL EVERY 12 HOURS SCHEDULED
Status: DISPENSED | OUTPATIENT
Start: 2019-04-21 | End: 2019-04-23

## 2019-04-21 RX ORDER — ALBUTEROL SULFATE 2.5 MG/3ML
2.5 SOLUTION RESPIRATORY (INHALATION) ONCE
Status: DISCONTINUED | OUTPATIENT
Start: 2019-04-21 | End: 2019-04-21

## 2019-04-21 RX ORDER — HALOPERIDOL 5 MG/ML
2 INJECTION INTRAMUSCULAR ONCE
Qty: 0.4 ML | Refills: 0 | Status: ON HOLD
Start: 2019-04-21 | End: 2019-04-22

## 2019-04-21 RX ORDER — FAMOTIDINE 10 MG/ML
20 INJECTION, SOLUTION INTRAVENOUS DAILY
Status: DISCONTINUED | OUTPATIENT
Start: 2019-04-22 | End: 2019-04-24

## 2019-04-21 RX ORDER — CHLORHEXIDINE GLUCONATE 0.12 MG/ML
15 RINSE ORAL EVERY 12 HOURS SCHEDULED
Status: DISCONTINUED | OUTPATIENT
Start: 2019-04-21 | End: 2019-04-21 | Stop reason: SDUPTHER

## 2019-04-21 RX ORDER — PANTOPRAZOLE SODIUM 40 MG/10ML
40 INJECTION, POWDER, LYOPHILIZED, FOR SOLUTION INTRAVENOUS
Status: DISCONTINUED | OUTPATIENT
Start: 2019-04-21 | End: 2019-04-21 | Stop reason: HOSPADM

## 2019-04-21 RX ORDER — SODIUM CHLORIDE 0.9 % (FLUSH) 0.9 %
3-10 SYRINGE (ML) INJECTION AS NEEDED
Status: DISCONTINUED | OUTPATIENT
Start: 2019-04-21 | End: 2019-04-26

## 2019-04-21 RX ORDER — PANTOPRAZOLE SODIUM 40 MG/10ML
40 INJECTION, POWDER, LYOPHILIZED, FOR SOLUTION INTRAVENOUS
Status: ON HOLD
Start: 2019-04-21 | End: 2019-04-22

## 2019-04-21 RX ORDER — CETIRIZINE HYDROCHLORIDE 10 MG/1
5 TABLET ORAL DAILY
Status: DISCONTINUED | OUTPATIENT
Start: 2019-04-21 | End: 2019-04-21 | Stop reason: HOSPADM

## 2019-04-21 RX ORDER — CLOPIDOGREL BISULFATE 75 MG/1
75 TABLET ORAL DAILY
Status: DISCONTINUED | OUTPATIENT
Start: 2019-04-21 | End: 2019-04-21 | Stop reason: HOSPADM

## 2019-04-21 RX ORDER — GABAPENTIN 300 MG/1
300 CAPSULE ORAL 2 TIMES DAILY
Status: DISCONTINUED | OUTPATIENT
Start: 2019-04-21 | End: 2019-04-21 | Stop reason: HOSPADM

## 2019-04-21 RX ORDER — DULOXETIN HYDROCHLORIDE 30 MG/1
30 CAPSULE, DELAYED RELEASE ORAL DAILY
Status: DISCONTINUED | OUTPATIENT
Start: 2019-04-21 | End: 2019-04-21 | Stop reason: HOSPADM

## 2019-04-21 RX ORDER — HEPARIN SODIUM 5000 [USP'U]/ML
60 INJECTION, SOLUTION INTRAVENOUS; SUBCUTANEOUS AS NEEDED
Status: DISCONTINUED | OUTPATIENT
Start: 2019-04-21 | End: 2019-04-21 | Stop reason: HOSPADM

## 2019-04-21 RX ORDER — DIVALPROEX SODIUM 125 MG/1
250 CAPSULE, COATED PELLETS ORAL EVERY 12 HOURS SCHEDULED
Status: DISCONTINUED | OUTPATIENT
Start: 2019-04-21 | End: 2019-04-22

## 2019-04-21 RX ORDER — IPRATROPIUM BROMIDE AND ALBUTEROL SULFATE 2.5; .5 MG/3ML; MG/3ML
3 SOLUTION RESPIRATORY (INHALATION)
Status: DISCONTINUED | OUTPATIENT
Start: 2019-04-22 | End: 2019-04-24

## 2019-04-21 RX ORDER — HEPARIN SODIUM 5000 [USP'U]/ML
60 INJECTION, SOLUTION INTRAVENOUS; SUBCUTANEOUS ONCE
Status: COMPLETED | OUTPATIENT
Start: 2019-04-21 | End: 2019-04-21

## 2019-04-21 RX ORDER — POTASSIUM CHLORIDE 750 MG/1
10 TABLET, FILM COATED, EXTENDED RELEASE ORAL 4 TIMES DAILY
Status: DISCONTINUED | OUTPATIENT
Start: 2019-04-21 | End: 2019-04-21 | Stop reason: HOSPADM

## 2019-04-21 RX ORDER — DIVALPROEX SODIUM 125 MG/1
250 CAPSULE, COATED PELLETS ORAL EVERY 12 HOURS SCHEDULED
Status: DISCONTINUED | OUTPATIENT
Start: 2019-04-21 | End: 2019-04-21 | Stop reason: HOSPADM

## 2019-04-21 RX ORDER — ALBUTEROL SULFATE 2.5 MG/3ML
2.5 SOLUTION RESPIRATORY (INHALATION) ONCE
Status: DISCONTINUED | OUTPATIENT
Start: 2019-04-21 | End: 2019-04-21 | Stop reason: SDUPTHER

## 2019-04-21 RX ORDER — CHLORHEXIDINE GLUCONATE 0.12 MG/ML
15 RINSE ORAL EVERY 12 HOURS SCHEDULED
Status: ON HOLD
Start: 2019-04-21 | End: 2019-04-22

## 2019-04-21 RX ORDER — SODIUM CHLORIDE 0.9 % (FLUSH) 0.9 %
3 SYRINGE (ML) INJECTION EVERY 12 HOURS SCHEDULED
Status: DISCONTINUED | OUTPATIENT
Start: 2019-04-21 | End: 2019-04-21 | Stop reason: HOSPADM

## 2019-04-21 RX ORDER — HALOPERIDOL 5 MG/ML
1 INJECTION INTRAMUSCULAR ONCE
Status: COMPLETED | OUTPATIENT
Start: 2019-04-21 | End: 2019-04-21

## 2019-04-21 RX ORDER — DULOXETIN HYDROCHLORIDE 30 MG/1
30 CAPSULE, DELAYED RELEASE ORAL DAILY
Status: DISCONTINUED | OUTPATIENT
Start: 2019-04-22 | End: 2019-04-21

## 2019-04-21 RX ORDER — HEPARIN SODIUM 10000 [USP'U]/100ML
12 INJECTION, SOLUTION INTRAVENOUS
Status: ON HOLD
Start: 2019-04-21 | End: 2019-04-22

## 2019-04-21 RX ORDER — HALOPERIDOL 5 MG/ML
2 INJECTION INTRAMUSCULAR ONCE
Status: DISCONTINUED | OUTPATIENT
Start: 2019-04-21 | End: 2019-04-21 | Stop reason: HOSPADM

## 2019-04-21 RX ORDER — HEPARIN SODIUM 5000 [USP'U]/ML
5000 INJECTION, SOLUTION INTRAVENOUS; SUBCUTANEOUS EVERY 8 HOURS SCHEDULED
Status: DISCONTINUED | OUTPATIENT
Start: 2019-04-21 | End: 2019-04-22

## 2019-04-21 RX ORDER — QUETIAPINE FUMARATE 25 MG/1
50 TABLET, FILM COATED ORAL 2 TIMES DAILY
Status: DISCONTINUED | OUTPATIENT
Start: 2019-04-21 | End: 2019-04-21 | Stop reason: HOSPADM

## 2019-04-21 RX ORDER — CHLORHEXIDINE GLUCONATE 0.12 MG/ML
15 RINSE ORAL EVERY 12 HOURS SCHEDULED
Status: DISCONTINUED | OUTPATIENT
Start: 2019-04-21 | End: 2019-04-21 | Stop reason: HOSPADM

## 2019-04-21 RX ORDER — HEPARIN SODIUM 5000 [USP'U]/ML
30 INJECTION, SOLUTION INTRAVENOUS; SUBCUTANEOUS AS NEEDED
Status: ON HOLD
Start: 2019-04-21 | End: 2019-04-22

## 2019-04-21 RX ORDER — BUSPIRONE HYDROCHLORIDE 10 MG/1
10 TABLET ORAL 2 TIMES DAILY
Status: DISCONTINUED | OUTPATIENT
Start: 2019-04-21 | End: 2019-04-21 | Stop reason: HOSPADM

## 2019-04-21 RX ADMIN — MEROPENEM 500 MG: 500 INJECTION, POWDER, FOR SOLUTION INTRAVENOUS at 16:05

## 2019-04-21 RX ADMIN — BUSPIRONE HYDROCHLORIDE 10 MG: 10 TABLET ORAL at 01:54

## 2019-04-21 RX ADMIN — HEPARIN SODIUM 2500 UNITS: 5000 INJECTION INTRAVENOUS; SUBCUTANEOUS at 18:09

## 2019-04-21 RX ADMIN — ENOXAPARIN SODIUM 40 MG: 40 INJECTION SUBCUTANEOUS at 01:54

## 2019-04-21 RX ADMIN — DIVALPROEX SODIUM 250 MG: 125 CAPSULE ORAL at 01:54

## 2019-04-21 RX ADMIN — QUETIAPINE FUMARATE 50 MG: 25 TABLET, FILM COATED ORAL at 01:54

## 2019-04-21 RX ADMIN — VANCOMYCIN HYDROCHLORIDE 750 MG: 5 INJECTION, POWDER, LYOPHILIZED, FOR SOLUTION INTRAVENOUS at 16:05

## 2019-04-21 RX ADMIN — PROPOFOL 40 MCG/KG/MIN: 10 INJECTION, EMULSION INTRAVENOUS at 21:38

## 2019-04-21 RX ADMIN — POTASSIUM CHLORIDE 10 MEQ: 750 TABLET, FILM COATED, EXTENDED RELEASE ORAL at 18:09

## 2019-04-21 RX ADMIN — HEPARIN SODIUM 12 UNITS/KG/HR: 10000 INJECTION, SOLUTION INTRAVENOUS at 18:09

## 2019-04-21 RX ADMIN — HALOPERIDOL LACTATE 1 MG: 5 INJECTION, SOLUTION INTRAMUSCULAR at 03:01

## 2019-04-21 RX ADMIN — PROPOFOL 20 MCG/KG/MIN: 10 INJECTION, EMULSION INTRAVENOUS at 16:05

## 2019-04-21 RX ADMIN — IOPAMIDOL 50 ML: 755 INJECTION, SOLUTION INTRAVENOUS at 15:00

## 2019-04-21 RX ADMIN — METOPROLOL TARTRATE 25 MG: 25 TABLET, FILM COATED ORAL at 01:54

## 2019-04-21 NOTE — NURSING NOTE
Pt O2 saturation reading in the 70s per telemetry monitor, pt in sinus tachycardia at this time rate 138. Pt dusky in color, showing signs of respiratory distress. Pt is not verbally responsive, however can open eyes in response to voice. Tatiana, RRT contacted regarding pt's O2 saturation, states she will be there as soon as possible. O2 being administered via nasal cannula at 10 L.

## 2019-04-21 NOTE — CONSULTS
[facility] Medicine Consult Note     Reason for Consult: Respiratory distress, hypoxia, tachycardia   Referring Physician: Dr. Amparo Hendrickson   HPI  Anita Roche is a 73 y.o. female with hx of CVA with left-sided hemiparesis, CAD, HTN, who initially presented to [facility] ED on 4/20/19 for evaluation of AMS, headache, neck pain and slurred speech.     Workup in the ED included a CT head which did not reveal any acute intracranial abnormalities, mild leukocytosis, elevated CRP and possible UTI. EKG revealed sinus tachycardia with negative troponin. An LP was completed in the ED with preliminary results revealing no growth. Ms. Roche was initially admitted to the observation unit and started on Rocephin. Serial troponin's were followed and remained negative.  Pt was confused and agitated overnight requiring Haldol. A rapid response was called in the afternoon 2/2 hypoxia, tachycardia and respiratory distress.    Upon my arrival patient appeared to be in moderate to severe respiratory distress with O2% in the 70's and tachycardic with HR in the 120s-130s. BP was stable. She appeared very confused and attempted to move her RLE upon command but was unable to follow any other commands. ABG was obtained revealing compensated pH, pCO2 in the 30's with a pO2 of 41. Pt's daughter arrived to bedside and I discussed code status. She was agreeable to intubation and ER provider was contacted who graciously came to the floor quickly and intubated patient without difficulty. I discussed with house supervisor and began making arrangements to transfer patient to the CCU under the hospital medicine service for further evaluation and treatment. She requested MRI for further evaluation of possible CVA. I informed her that we could not obtain an MRI at this time as our MRI is located in our outpatient diagnostic center. She then requested transfer to [facility] and I informed the observation team that  she was requesting transfer. Pt was stabilized, taken to CT for CT head and chest with PE protocol and then taken to the CCU while primary team arranged transfer to [facility].        History  Past Medical History:   Diagnosis Date   • Acid reflux    • Arthritis    • Atherosclerosis of coronary artery    • Chronic fatigue    • Coronary artery disease    • CVA (cerebral vascular accident) (CMS/HCC)    • Degenerative lumbar disc    • Elevated cholesterol    • Esophageal stricture    • Hypertension    • PONV (postoperative nausea and vomiting)      Past Surgical History:   Procedure Laterality Date   • ABDOMINAL SURGERY     • BACK SURGERY     • CARDIAC CATHETERIZATION     • CATARACT EXTRACTION, BILATERAL     • CHOLECYSTECTOMY     • COLONOSCOPY     • ENDOSCOPY     • EYE SURGERY     • TONSILLECTOMY AND ADENOIDECTOMY     • TRIGGER FINGER RELEASE Right     Third and Fourth Fingers Dr. Ashley   • TRIGGER FINGER RELEASE Left 11/27/2018    Procedure: TRIGGER FINGER  RELEASE LEFT THIRD;  Surgeon: Garret De Anda MD;  Location: [facility];  Service: Orthopedics   • TUBAL ABDOMINAL LIGATION       Family History   Problem Relation Age of Onset   • Hypertension Mother    • Hypertension Father    • Cancer Sister    • Hypertension Sister      Social History     Tobacco Use   • Smoking status: Current Every Day Smoker     Packs/day: 0.25     Years: 30.00     Pack years: 7.50   • Smokeless tobacco: Never Used   Substance Use Topics   • Alcohol use: No   • Drug use: No     Medications Prior to Admission   Medication Sig Dispense Refill Last Dose   • busPIRone (BUSPAR) 10 MG tablet Take 10 mg by mouth 2 (Two) Times a Day.   4/20/2019 at am   • [START ON 4/24/2019] cholecalciferol (VITAMIN D3) 26082 units capsule Take 50,000 Units by mouth Every 7 (Seven) Days.   4/17/2019 at Unknown time   • clopidogrel (PLAVIX) 75 MG tablet Take 1 tablet by mouth Daily. 30 tablet  4/20/2019 at am   • Divalproex Sodium (DEPAKOTE SPRINKLE)  125 MG capsule Take 250 mg by mouth 2 (Two) Times a Day.   4/20/2019 at am   • DULoxetine (CYMBALTA) 30 MG capsule Take 1 capsule by mouth Daily. 30 capsule 0 4/20/2019 at am   • gabapentin (NEURONTIN) 300 MG capsule Take 300 mg by mouth 3 (Three) Times a Day.   4/20/2019 at am   • loratadine (CLARITIN) 10 MG tablet Take 1 tablet by mouth.   4/20/2019 at am   • metoprolol tartrate (LOPRESSOR) 25 MG tablet Take 1 tablet by mouth Every 8 (Eight) Hours.   4/20/2019 at am   • potassium chloride (K-DUR,KLOR-CON) 10 MEQ CR tablet Take 10 mEq by mouth 4 (Four) Times a Day.   4/20/2019 at am   • QUEtiapine (SEROquel) 50 MG tablet Take 50 mg by mouth 2 (Two) Times a Day.   4/20/2019 at am       Current Facility-Administered Medications:   •  acetaminophen (TYLENOL) tablet 650 mg, 650 mg, Oral, Q4H PRN, Susy Christensen MD  •  busPIRone (BUSPAR) tablet 10 mg, 10 mg, Oral, BID, Susy Christensen MD, 10 mg at 04/21/19 0154  •  calcium carbonate (TUMS) chewable tablet 500 mg (200 mg elemental), 3 tablet, Oral, BID PRN, Susy Christensen MD  •  cetirizine (zyrTEC) tablet 5 mg, 5 mg, Oral, Daily, Edwige Velasco, SAMREEN  •  [START ON 4/24/2019] cholecalciferol (VITAMIN D3) capsule 50,000 Units, 50,000 Units, Oral, Q7 Days, Susy Christensen MD  •  clopidogrel (PLAVIX) tablet 75 mg, 75 mg, Oral, Daily, Susy Christensen MD  •  Divalproex Sodium (DEPAKOTE SPRINKLE) capsule 250 mg, 250 mg, Oral, Q12H, Susy Christensen MD, 250 mg at 04/21/19 0154  •  DULoxetine (CYMBALTA) DR capsule 30 mg, 30 mg, Oral, Daily, Susy Christensen MD  •  enoxaparin (LOVENOX) syringe 40 mg, 40 mg, Subcutaneous, Q24H, Susy Christensen MD, 40 mg at 04/21/19 0154  •  gabapentin (NEURONTIN) capsule 300 mg, 300 mg, Oral, BID, Edwige Velasco APRN, Stopped at 04/21/19 0323  •  haloperidol lactate (HALDOL) injection 2 mg, 2 mg, Intravenous, Once, Edwige Velasco APRN, Stopped at 04/21/19 0515  •  Hold medication -  Anticoagulants, 1 each, Does  not apply, Continuous PRN, Andrew Callaway MD  •  metoprolol tartrate (LOPRESSOR) tablet 25 mg, 25 mg, Oral, Q8H, Susy Christensen MD, 25 mg at 04/21/19 0154  •  morphine injection 1 mg, 1 mg, Intravenous, Q4H PRN, 1 mg at 04/20/19 2234 **AND** naloxone (NARCAN) injection 0.4 mg, 0.4 mg, Intravenous, Q5 Min PRN, Susy Christensen MD  •  nitroglycerin (NITROSTAT) SL tablet 0.4 mg, 0.4 mg, Sublingual, Q5 Min PRN, Susy Christensen MD  •  ondansetron (ZOFRAN) injection 4 mg, 4 mg, Intravenous, Q6H PRN, uSsy Christensen MD  •  Pharmacy Consult - Pharmacy to dose, , Does not apply, Continuous PRN, Nisha Dominguez PA-C  •  Pharmacy to dose vancomycin, , Does not apply, Continuous PRN, Nisha Dominguez PA-C  •  potassium chloride (K-DUR,KLOR-CON) ER tablet 10 mEq, 10 mEq, Oral, 4x Daily, Susy Christensen MD  •  QUEtiapine (SEROquel) tablet 50 mg, 50 mg, Oral, BID, Susy Christensen MD, 50 mg at 04/21/19 0154  •  [COMPLETED] Insert peripheral IV, , , Once **AND** sodium chloride 0.9 % flush 10 mL, 10 mL, Intravenous, PRN, Andrew Callaway MD  •  sodium chloride 0.9 % flush 3 mL, 3 mL, Intravenous, Q12H, Susy Christensen MD, 3 mL at 04/20/19 2234  •  sodium chloride 0.9 % flush 3-10 mL, 3-10 mL, Intravenous, PRN, Susy Christensen MD  •  sodium chloride 0.9 % infusion, 125 mL/hr, Intravenous, Continuous, Andrew Callaway MD, Last Rate: 125 mL/hr at 04/20/19 1252, 125 mL/hr at 04/20/19 1252  Allergies:  Meperidine; Sulfa antibiotics; Versed [midazolam]; and Atorvastatin      Review of Systems   Review of systems could not be obtained due to   patient confusion. patient sedation status. emergent nature of case.    Objective     Vital Signs  Temp:  [98.1 °F (36.7 °C)-100.1 °F (37.8 °C)] 99.4 °F (37.4 °C)  Heart Rate:  [100-138] 135  Resp:  [18] 18  BP: (105-180)/() 105/60    Physical Exam:  General:    Awake, confused, appears in moderate to severe respiratory distress, ill  appaering    Head:    Normocephalic, atraumatic   Eyes:           PERRLA, Conjunctivae and sclerae normal. No icterus or pallor.   Ears:    Ears appear intact with no abnormalities noted.   Throat:   No oral lesions. Oral mucosa moist   Heart:      Normal S1 and S2. Tachycardic. No significant murmur, rubs or gallops appreciated.   Lungs:    Moderate to severe respiratory distress with tachypnea. Coarse breath sounds throughout. No wheezes.    Abdomen:   Soft and nontender. No guarding, rebound tenderness or  organomegaly noted. Bowel sounds present x 4.   MS:   Left-sided hemiparesis. Attempted to move her RLE upon command.    Extremities:  No clubbing, cyanosis or edema noted.    Pulses:   Pulses palpable and equal bilaterally.   Skin:   No bleeding, bruising or rash.   Lymph nodes:   No palpable adenopathy   Neurologic: Difficult to assess 2/2 confusion.        Results Review:     I reviewed the patient's new imaging results and agree with the interpretation.  I reviewed the patient's other test results and agree with the interpretation.      Results from last 7 days   Lab Units 04/21/19  0259 04/20/19  1259   WBC 10*3/mm3 9.75 12.67*   HEMOGLOBIN g/dL 11.0* 12.9   PLATELETS 10*3/mm3 275 311     Results from last 7 days   Lab Units 04/21/19  0259 04/20/19  1259   SODIUM mmol/L 139 142   POTASSIUM mmol/L 3.8 4.3   CHLORIDE mmol/L 104 102   CO2 mmol/L 19.7* 23.2   BUN mg/dL 17 18   CREATININE mg/dL 0.90 0.97   CALCIUM mg/dL 10.6* 11.7*   GLUCOSE mg/dL 105* 120*     Results from last 7 days   Lab Units 04/21/19  0259 04/20/19  1259   BILIRUBIN mg/dL 0.2 0.3   ALK PHOS U/L 73 88   AST (SGOT) U/L 11 16   ALT (SGPT) U/L 7 7     Results from last 7 days   Lab Units 04/20/19  1259   MAGNESIUM mg/dL 2.1         Results from last 7 days   Lab Units 04/21/19  0259 04/20/19  2147 04/20/19  1259   TROPONIN T ng/mL <0.010 <0.010 <0.010       Imaging Results (last 24 hours)     Procedure Component Value Units Date/Time    US  Carotid Bilateral [[id number]] Collected:  04/21/19 1131     Updated:  04/21/19 1136    Narrative:       EXAMINATION: US CAROTID BILATERAL-      Technique: Multiple real-time color Doppler images were acquired of  bilateral carotid arteries.     Stenosis measurements if obtained, were performed by the NASCET or  similar method.        CLINICAL INDICATION:     confusion; R51-Headache; M54.2-Cervicalgia     COMPARISON:    None     FINDINGS:        RIGHT:  Severe plaque right CCA and right ICA. Nearly occluded.  RIGHT CCA PSV:26.5 cm/s  RIGHT ICA PSV: 111.3 cm/s  RIGHT ICA EDV: 19.2 cm/s.   Right ICA/CCA Ratio: 4.2  Anterograde flow is demonstrated in RIGHT vertebral artery.     LEFT:  Moderate plaque. No occlusion identified.  LEFT CCA PSV: 104.8 cm/s  LEFT ICA PSV: 120.0 cm/s  LEFT EDV: 25.4 cm/s.   Left ICA/CCA Ratio: 1.1  Anterograde flow is demonstrated in LEFT vertebral artery.          Impression:       1. Severe plaque and near complete occlusion of the right ICA. Moderate  plaque left ICA.  2. 70-99% stenosis right ICA.  3. No hemodynamically significant stenosis left ICA.  4. Antegrade flow noted both vertebral arteries.  5. Follow-up with contrast-enhanced CTA or MRA for more definitive  characterization.     This report was finalized on 4/21/2019 11:34 AM by Dr. Ubaldo Olivo MD.       XR Chest 1 View [[id number]] Collected:  04/21/19 0723     Updated:  04/21/19 0726    Narrative:       EXAMINATION: XR CHEST 1 VW-      CLINICAL INDICATION:     ams     TECHNIQUE:  XR CHEST 1 VW-      COMPARISON: 03/11/2019      FINDINGS:      Lungs are aerated.   Heart size, mediastinum, and pulmonary vascularity are unremarkable.   No pneumothorax.   No effusions.   No acute osseous findings.          Impression:       No radiographic evidence of acute cardiac or pulmonary  disease.     This report was finalized on 4/21/2019 7:24 AM by Dr. Ubaldo Olivo MD.       CT Cervical Spine Without Contrast [[id number]] Collected:   04/21/19 0610     Updated:  04/21/19 0613    Narrative:       EXAMINATION: CT CERVICAL SPINE WO CONTRAST-      CLINICAL INDICATION:     neck pain     TECHNIQUE: Multiple axial CT images of the entire cervical spine (to  include the cervicothoracic junction) were obtained without contrast  administration. Reformatted images in the coronal and/or sagittal  plane(s) were generated from the axial data set to facilitate diagnostic  accuracy and/or surgical planning.      Radiation dose reduction techniques were utilized per ALARA protocol.  Automated exposure control was initiated through either or Dajie or  DoseRight software packages by  protocol.             COMPARISON:  None.       FINDINGS: No acute fracture. No traumatic malalignment. Degenerative  spondylolisthesis C4 on C5 approximately 5 mm and is in the setting of  facet arthropathy. Advanced degenerative disc disease C4/5 through C6/7  with disc osteophyte formation and central canal and neural foraminal  stenosis. Posterior elements appear intact. Prevertebral soft tissues  are within normal limits. Carotid vascular calcifications are noted.       Impression:       1. No acute fracture.  2. Spondylolisthesis C4 on C5. Advanced multilevel degenerative changes.  3. No traumatic malalignment.     This report was finalized on 4/21/2019 6:11 AM by Dr. Ubaldo Olivo MD.       CT Head Without Contrast [[id number]] Collected:  04/21/19 0609     Updated:  04/21/19 0612    Narrative:       EXAMINATION: CT HEAD WO CONTRAST-      CLINICAL INDICATION:     03/05/2019     COMPARISON:    None     Technique: Multiple CT axial images were obtained through the level of  the brain without IV contrast administration. Reformatted images in the  coronal and/or sagittal plane(s) were generated from the axial data set  to facilitate diagnostic accuracy and/or surgical planning.     Radiation dose reduction techniques were utilized per ALARA protocol.  Automated  exposure control was initiated through either or CareDose or  DoseRigEZDOCTOR software packages by  protocol.       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  No acute intracranial abnormality.  No midline shift or mass effect.  No hydrocephalus or intracranial hemorrhage.  There is no CT evidence of acute vascular territory infarct.  Bone windows show no acute osseous abnormality.          Impression:       Stable exam demonstrate atrophy and chronic infarcts. No CT  evidence of acute intracranial abnormality.     This report was finalized on 4/21/2019 6:09 AM by Dr. Ubaldo Olivo MD.                   Assessment/Plan   Acute hypoxic respiratory failure  Severe sepsis  Encephalopathy  Suspected aspiration pneumonia  Possible UTI  Paroxysmal Aflutter with RVR  Recent CVA with residual left-sided hemiparesis    Plan  Called ED provider who quickly arrived to bedside and intubated patient. Pt placed on propofol. Once stable, obtained STAT CT head with no acute intracranial abnormalities. Obtained CT chest PE protocol with no evidence of PE (per [facility]) but possible developing pneumonia in right lower lung per my read. Discussed with primary team and antibiotics have been escalated to Vanc and Merrem with concern for aspiration event. Repeat lactate is normal. I have made vent adjustments based on follow up ABG post intubation. Repeat EKG reveals sinus tachycardia with negative troponin. However, upon further review pt had EKG completed this AM which revealed Aflutter with HR in the 140's. I discussed EKG with cardiology who confirmed. I then reviewed rhythm strips with RN at length and was able to find strips revealing Aflutter after 0600 this AM that lasted for a brief time. Upon review of chart I do not see a documented hx of Afib/flutter and pt's  daughter denies any known history. Pt is currently in sinus on telemetry. CHADS-VASc score is at least 5 (female sex, age, HTN and hx of CVA). As there is no bleed noted on CT head I have discussed with primary team and will start heparin gtt. Pt will need further cardiac eval but I will defer to this accepting physician as pt will be transferred to [facility] soon. Pt's daughter in agreement with outlined treatment plan.     I discussed the patients findings and my recommendations with family, nursing staff and primary care team.      Critical Care Time: 65 minutes     Donato Bauer DO  04/21/19  1:21 PM

## 2019-04-21 NOTE — PROGRESS NOTES
Rapid response was called on patient due to hypoxia and tachycardia.  Repeat EKG shows sinus tach with premature SVT complexes with atrial flutter replacing sinus rhythm.  Lizette from hospitalist  code.  She was intubated.  Repeat troponins and chest x-ray, pending results.  CT per PE protocol with contrast ordered, pending results.  Repeat CT of head was ordered, pending results.  Sound of carotid bilaterally revealed severe plaque with near complete occlusion in the right ICA with moderate plaque in the left ICA.  Patient was transferred to CCU bed 9.  Patient was discussed with Dr. Horner with [facility] who agreed to accept the patient into their services.  Awaiting bed availability.  Case was discussed with patient's daughter.

## 2019-04-21 NOTE — H&P
HISTORY AND PHYSICAL        Patient Identification:  Name:  Anita Roche  Age:  73 y.o.  Sex:  female  :  1945  MRN:  [id number]   Visit Number:  [id number]  Primary Care Physician:  Diann Ferreira APRN       Subjective     Subjective     Chief complaint:     Chief Complaint   Patient presents with   • Altered Mental Status       History of presenting illness:     Patient is a 73-year-old female with a past medical history of GERD, arthritis, coronary artery disease, chronic fatigue syndrome, CVA, degenerative lumbar disc, hyperlipidemia, esophageal stricture, and hypertension.  She presents the ED today complaining of a headache and neck pain.  Patient is confused at this time, therefore history obtained from patient's daughter who is her primary caregiver at home.  Patient daughter states head ache neck pain started yesterday and got worse this morning.  She states patient began to have increased confusion and slurred speech.  Patient daughter states that she suffered a stroke in December, has left-sided deficits and weakness.  Her daughter states that her speech was impaired after the stroke but that it was worse this morning along with her confusion.  Patient daughter denies any complaints of chest pain, shortness of breath, fevers, chills, syncopal episodes, abdominal pain, nausea, vomiting, diarrhea.    Work-up in the ED included: WBC 12.67.  Hemoglobin 12.9.  Urinalysis positive.  TSH 0.985.  Magnesium 2.1.  CRP 15.36.  Lactate 1.5.  Troponin negative.  Creatinine 0.97.  Sodium 142.  Potassium 4.3.  Strep and influenza negative.  Lumbar puncture was performed in the ED per Dr. Callaway, unremarkable  EKG reveals sinus tachycardia, biatrial enlargement. Left ventricular hypertrophy with repolarization abnormality      Patient was admitted to the observation unit for further evaluation and  monitoring.      ---------------------------------------------------------------------------------------------------------------------     Review Of Systems:    Constitutional: no fever, chills and night sweats. No appetite change or unexpected weight change. No fatigue.  Eyes: no eye drainage, itching or redness.  HEENT: no mouth sores, dysphagia or nose bleed.  Respiratory: no for shortness of breath, cough or production of sputum.  Cardiovascular: no chest pain, no palpitations, no orthopnea.  Gastrointestinal: no nausea, vomiting or diarrhea. No abdominal pain, hematemesis or rectal bleeding.  Genitourinary: no dysuria or polyuria.  Hematologic/lymphatic: no lymph node abnormalities, no easy bruising or easy bleeding.  Musculoskeletal: Reports headache and neck pain.  Skin: No rash and no itching.  Neurological: no loss of consciousness, no seizure.  Reports headache  Behavioral/Psych: no depression or suicidal ideation.  Endocrine: no hot flashes.  Immunologic: negative.    ---------------------------------------------------------------------------------------------------------------------     Past Medical History    Past Medical History:   Diagnosis Date   • Acid reflux    • Arthritis    • Atherosclerosis of coronary artery    • Chronic fatigue    • Coronary artery disease    • CVA (cerebral vascular accident) (CMS/HCC)    • Degenerative lumbar disc    • Elevated cholesterol    • Esophageal stricture    • Hypertension    • PONV (postoperative nausea and vomiting)        Past Surgical History    Past Surgical History:   Procedure Laterality Date   • ABDOMINAL SURGERY     • BACK SURGERY     • CARDIAC CATHETERIZATION     • CATARACT EXTRACTION, BILATERAL     • CHOLECYSTECTOMY     • COLONOSCOPY     • ENDOSCOPY     • EYE SURGERY     • TONSILLECTOMY AND ADENOIDECTOMY     • TRIGGER FINGER RELEASE Right     Third and Fourth Fingers Dr. Ashley   • TRIGGER FINGER RELEASE Left 11/27/2018    Procedure: TRIGGER FINGER   RELEASE LEFT THIRD;  Surgeon: Garret De Anda MD;  Location: [facility];  Service: Orthopedics   • TUBAL ABDOMINAL LIGATION         Family History    Family History   Problem Relation Age of Onset   • Hypertension Mother    • Hypertension Father    • Cancer Sister    • Hypertension Sister        Social History    Social History     Tobacco Use   • Smoking status: Current Every Day Smoker     Packs/day: 0.25     Years: 30.00     Pack years: 7.50   • Smokeless tobacco: Never Used   Substance Use Topics   • Alcohol use: No   • Drug use: No       Allergies    Meperidine; Sulfa antibiotics; Versed [midazolam]; and Atorvastatin  ---------------------------------------------------------------------------------------------------------------------     Home Medications:    Prior to Admission Medications     Prescriptions Last Dose Informant Patient Reported? Taking?    acetaminophen (TYLENOL) 325 MG tablet   No No    Take 2 tablets by mouth Every 6 (Six) Hours As Needed for Mild Pain .    aspirin 81 MG tablet   Yes No    Take 2 tablets by mouth Daily.    busPIRone (BUSPAR) 10 MG tablet   No No    Take 1 tablet by mouth 3 (Three) Times a Day.    clopidogrel (PLAVIX) 75 MG tablet   No No    Take 1 tablet by mouth Daily.    divalproex (DEPAKOTE) 250 MG DR tablet   No No    Take 1 tablet by mouth 2 (Two) Times a Day.    DULoxetine (CYMBALTA) 30 MG capsule   No No    Take 1 capsule by mouth Daily.    ipratropium-albuterol (DUO-NEB) 0.5-2.5 mg/3 ml nebulizer   No No    Take 3 mL by nebulization 4 (Four) Times a Day As Needed for Shortness of Air.    lisinopril (PRINIVIL,ZESTRIL) 10 MG tablet   Yes No    Take 10 mg by mouth Daily.    loratadine (CLARITIN) 10 MG tablet   Yes No    Take 1 tablet by mouth.    metoprolol tartrate (LOPRESSOR) 25 MG tablet   Yes No    Take 1 tablet by mouth Every 8 (Eight) Hours.    potassium chloride (MICRO-K) 10 MEQ CR capsule   Yes No    take 4 capsule by oral route  every day with food     QUEtiapine (SEROquel) 50 MG tablet   No No    Take 1 tablet by mouth Every Night.    traMADol (ULTRAM) 50 MG tablet   No No    Take 1 tablet by mouth Every 6 (Six) Hours As Needed for Moderate Pain .    vitamin D (ERGOCALCIFEROL) 42197 units capsule capsule   Yes No    Take 50,000 Units by mouth 1 (One) Time Per Week.        ---------------------------------------------------------------------------------------------------------------------    Objective     Objective     Hospital Scheduled Meds:    [START ON 4/21/2019] ceftriaxone 2 g Intravenous Q24H   enoxaparin 40 mg Subcutaneous Q24H   sodium chloride 3 mL Intravenous Q12H       hold 1 each    sodium chloride 125 mL/hr Last Rate: 125 mL/hr (04/20/19 1252)     ---------------------------------------------------------------------------------------------------------------------   Vital Signs:  Temp:  [97.9 °F (36.6 °C)-99.6 °F (37.6 °C)] 99.6 °F (37.6 °C)  Heart Rate:  [100-122] 113  Resp:  [18] 18  BP: (119-180)/() 146/75  Mean Arterial Pressure (Non-Invasive) for the past 24 hrs (Last 3 readings):   Noninvasive MAP (mmHg)   04/20/19 1958 107   04/20/19 1901 89   04/20/19 1846 84     SpO2 Percentage    04/20/19 1901 04/20/19 1914 04/20/19 1958   SpO2: 95% 97% 90%     SpO2:  [90 %-100 %] 90 %  on   ;        Body mass index is 17.97 kg/m².  Wt Readings from Last 3 Encounters:   04/20/19 41.7 kg (92 lb)   03/28/19 37.2 kg (82 lb)   03/11/19 45.4 kg (100 lb)     ---------------------------------------------------------------------------------------------------------------------     Physical Exam:    Constitutional: Chronically ill-appearing.  No respiratory distress.      HENT:  Head: Normocephalic and atraumatic.  Mouth:  Moist mucous membranes.    Eyes:  Conjunctivae and EOM are normal.  No scleral icterus.  Neck:  Neck supple.  No JVD present.    Cardiovascular:  Normal rate, regular rhythm and normal heart sounds with no murmur. No edema.  Pulmonary/Chest:   No respiratory distress, no wheezes, no crackles, with normal breath sounds and good air movement.  Abdominal:  Soft.  Bowel sounds are normal.  No distension and no tenderness.   Musculoskeletal: Left-sided weakness from prior stroke.  Neurological:  Alert and oriented to person, place, and time.  No facial droop.  No slurred speech.   Skin:  Skin is warm and dry.  No rash noted.  No pallor.   Psychiatric: Anxiety present.    ---------------------------------------------------------------------------------------------------------------------  I have personally reviewed the EKG/Telemetry strip  ---------------------------------------------------------------------------------------------------------------------   Results from last 7 days   Lab Units 04/20/19  2147 04/20/19  1259   TROPONIN T ng/mL <0.010 <0.010           Results from last 7 days   Lab Units 04/20/19  1259   CRP mg/dL 15.36*   LACTATE mmol/L 1.5   WBC 10*3/mm3 12.67*   HEMOGLOBIN g/dL 12.9   HEMATOCRIT % 42.6   MCV fL 90.1   MCHC g/dL 30.3*   PLATELETS 10*3/mm3 311     Results from last 7 days   Lab Units 04/20/19  1259   SODIUM mmol/L 142   POTASSIUM mmol/L 4.3   MAGNESIUM mg/dL 2.1   CHLORIDE mmol/L 102   CO2 mmol/L 23.2   BUN mg/dL 18   CREATININE mg/dL 0.97   EGFR IF NONAFRICN AM mL/min/1.73 56*   CALCIUM mg/dL 11.7*   GLUCOSE mg/dL 120*   ALBUMIN g/dL 3.37*   BILIRUBIN mg/dL 0.3   ALK PHOS U/L 88   AST (SGOT) U/L 16   ALT (SGPT) U/L 7   Estimated Creatinine Clearance: 34.5 mL/min (by C-G formula based on SCr of 0.97 mg/dL).  No results found for: AMMONIA    No results found for: HGBA1C, POCGLU  Lab Results   Component Value Date    HGBA1C 5.10 12/09/2018     Lab Results   Component Value Date    TSH 0.985 04/20/2019                      Pain Management Panel     Pain Management Panel Latest Ref Rng & Units 3/11/2019    AMPHETAMINES SCREEN, URINE Negative Negative    BARBITURATES SCREEN Negative Negative    BENZODIAZEPINE SCREEN, URINE Negative  Negative    BUPRENORPHINE Negative Negative    COCAINE SCREEN, URINE Negative Negative    METHADONE SCREEN, URINE Negative Negative        I have personally reviewed the above laboratory results.   ---------------------------------------------------------------------------------------------------------------------  Imaging Results (last 7 days)     Procedure Component Value Units Date/Time    XR Chest 1 View [[id number]] Updated:  04/20/19 1515    CT Cervical Spine Without Contrast [[id number]] Resulted:  04/20/19 1504     Updated:  04/20/19 1504    CT Head Without Contrast [[id number]] Resulted:  04/20/19 1503     Updated:  04/20/19 1504        I have personally reviewed the above radiology results.   ---------------------------------------------------------------------------------------------------------------------      Assessment & Plan        Assessment/Plan       ASSESSMENT:  1.  Confusion  2.  Intractable headache  3.  Neck pain  4.  UTI    PLAN:  Patient is a 73-year-old female with a past medical history of GERD, arthritis, coronary artery disease, chronic fatigue syndrome, CVA, degenerative lumbar disc, hyperlipidemia, esophageal stricture, and hypertension.  She presents the ED today complaining of a headache and neck pain.  Patient is confused at this time, therefore history obtained from patient's daughter who is her primary caregiver at home.  Patient daughter states head ache neck pain started yesterday and got worse this morning.  She states patient began to have increased confusion and slurred speech.  Patient daughter states that she suffered a stroke in December, has left-sided deficits and weakness.  Her daughter states that her speech was impaired after the stroke but that it was worse this morning along with her confusion.  Patient daughter denies any complaints of chest pain, shortness of breath, fevers, chills, syncopal episodes, abdominal pain, nausea, vomiting, diarrhea.    Work-up in the ED  included: WBC 12.67.  Hemoglobin 12.9.  Urinalysis positive.  TSH 0.985.  Magnesium 2.1.  CRP 15.36.  Lactate 1.5.  Troponin negative.  Creatinine 0.97.  Sodium 142.  Potassium 4.3.  Strep and influenza negative.  Lumbar puncture was performed in the ED per Dr. Callaway, unremarkable  EKG reveals sinus tachycardia, biatrial enlargement. Left ventricular hypertrophy with repolarization abnormality      Patient was admitted to the observation unit for further evaluation and monitoring.    Labs in the AM.  Electrolyte replacement protocol placed.  VTE prophylaxis ordered- Lovenox.  We will continue to monitor the patient on continuous pulse oximetry and cardiac monitoring.    IV hydration ordered, normal saline at 125 ml/hr.    Rocephin IV 2 g every 24 hours ordered in the setting of UTI.    Consult placed with case management.    Blood cultures x 2 ordered, pending results.     Fall precautions and neuro checks ordered.    We will continue to monitor the patient for any acute changes or abnormalities.    Patient's findings and recommendations were discussed with patient, family and nursing staff      Code Status:   Code Status and Medical Interventions:   Ordered at: 04/20/19 1904     Code Status:    CPR     Medical Interventions (Level of Support Prior to Arrest):    Full           Edwige Velasco, SAMREEN  04/20/19  10:26 PM

## 2019-04-21 NOTE — DISCHARGE SUMMARY
DISCHARGE SUMMARY        Patient Identification:  Name:  Anita Roche  Age:  73 y.o.  Sex:  female  :  1945  MRN:  [id number]  Visit Number:  [id number]    Date of Admission: 2019  Date of Discharge:  2019    PCP: Diann Ferreira APRN    Discharging Provider: Moose Moulton     Discharge Diagnoses     Respiratory failure on the vent      Consults/Procedures     Consults:   Consults     Date and Time Order Name Status Description    2019 1527 Inpatient Hospitalist Consult      2019 0753 Inpatient Orthopedic Surgery Consult      2019 1837 IP General Consult (Use specialty-specific consult if known)            Procedures Performed:     1314  ED INTUBATION    History of Presenting Illness          Patient is a 73-year-old female with a past medical history of GERD, arthritis, coronary artery disease, chronic fatigue syndrome, CVA, degenerative lumbar disc, hyperlipidemia, esophageal stricture, and hypertension.  She presents the ED today complaining of a headache and neck pain.  Patient is confused at this time, therefore history obtained from patient's daughter who is her primary caregiver at home.  Patient daughter states head ache neck pain started yesterday and got worse this morning.  She states patient began to have increased confusion and slurred speech.  Patient daughter states that she suffered a stroke in December, has left-sided deficits and weakness.  Her daughter states that her speech was impaired after the stroke but that it was worse this morning along with her confusion.  Patient daughter denies any complaints of chest pain, shortness of breath, fevers, chills, syncopal episodes, abdominal pain, nausea, vomiting, diarrhea.     Work-up in the ED included: WBC 12.67.  Hemoglobin 12.9.  Urinalysis positive.  TSH 0.985.  Magnesium 2.1.  CRP 15.36.  Lactate 1.5.  Troponin negative.  Creatinine 0.97.  Sodium 142.  Potassium 4.3.  Strep and influenza  negative.  Lumbar puncture was performed in the ED per Dr. Callaway, unremarkable  EKG reveals sinus tachycardia, biatrial enlargement. Left ventricular hypertrophy with repolarization abnormality      Hospital Course     Patient was admitted to the Observation unit, where she was placed on continuous cardiac monitoring and pulse ox. Fall precautions and neuro checks q 4 hours ordered with no abnormalities reported. Initially started on Rocephin 2g IV q 24 hours for UTI coverage. LP performed in the ED with Meningitis/Encephalitis panel negative. CRP elevated at 15.11 with mild leukocytosis. Afebrile initially but developed a  low-grade temp of 100.1 with tachycardia overnight. Patient had persistent symptoms overnight with abdominal and flank pain. Nursing staff reports that patient cried out all night.  Haldol was given with some relief.  Low-grade temp of 100.1 with tachycardia overnight.  CRP stable with normalization of leukocytosis.  Chest x-ray shows no evidence of acute cardiopulmonary disease.  CT of cervical spine shows spondylolithiasis of C4 on C5.  CT of head stable with atrophy and chronic infarcts however no evidence of acute intracranial abnormalities.      Rapid response was called in the afternoon due to severe hypoxia and respiratory distress. Repeat EKG shows sinus tach with premature SVT complexes with atrial flutter replacing sinus rhythm.  Dr. Bauer from hospitalist  rapid response. ABG with low O2 and it was felt the patient would require intubation.  Repeat troponins negative so far and chest x-ray, pending results.  CT per PE protocol with contrast ordered, pending results.  Repeat CT of head was ordered, pending results.  Ultrasound carotid bilaterally revealed severe plaque with near complete occlusion in the right ICA with moderate plaque in the left ICA.  Patient was transferred to CCU bed 9. Case was discussed with patient's daughter, Teresa, who has requested transfer of the  patient to [facility] as she was treated there when she had a stroke in December of 2018. IT is felt at this time that the patient may have suffered from an aspiration event. Antibiotic therapy was broadened to Meropenem and Vancomycin per pharmacy dosing. Lactic acid ordered STAT and normal.      Patient was discussed with Dr. Horner with [facility] who agreed to accept the patient into their services. The patient will be transferred when bed is available. Notified the patients daughter,Teresa, and she is agreeable.     Discharge Vitals/Physical Examination     Vital Signs:  Temp:  [98.1 °F (36.7 °C)-100.1 °F (37.8 °C)] 99.4 °F (37.4 °C)  Heart Rate:  [100-141] 106  Resp:  [18-28] 20  BP: ()/(59-99) 145/79  FiO2 (%):  [60 %-100 %] 60 %  Mean Arterial Pressure (Non-Invasive) for the past 24 hrs (Last 3 readings):   Noninvasive MAP (mmHg)   04/21/19 1618 110   04/21/19 1304 76   04/21/19 1117 88     SpO2 Percentage    04/21/19 1408 04/21/19 1412 04/21/19 1618   SpO2: 100% 100% 100%     SpO2:  [79 %-100 %] 100 %  on  Flow (L/min):  [2-8] 8;   Device (Oxygen Therapy): ventilator    Body mass index is 17.97 kg/m².  Wt Readings from Last 3 Encounters:   04/20/19 41.7 kg (92 lb)   03/28/19 37.2 kg (82 lb)   03/11/19 45.4 kg (100 lb)         Physical Exam:    Constitutional:  Intubated on the vent.      HENT:  Head: Normocephalic and atraumatic.  Mouth:  Moist mucous membranes.    Eyes:  Conjunctivae and EOM are normal.  No scleral icterus.  Neck:  Neck supple.  No JVD present.    Cardiovascular:  Normal rate, regular rhythm and normal heart sounds with no murmur. No edema.  Pulmonary/Chest:  No respiratory distress, no wheezes, no crackles, with normal breath sounds and good air movement.  Abdominal:  Soft.  Bowel sounds are normal.  No distension and no tenderness.   Musculoskeletal:  No edema, no tenderness, and no deformity.  No swelling or redness of joints.  Neurological:   Lethargic. Obtunded. No facial droop.  No slurred speech. Left sided hemiparesis from previous stroke.   Skin:  Skin is warm and dry.  No rash noted.  No pallor.         Pertinent Laboratory/Radiology Results     Pertinent Laboratory Results:    Results from last 7 days   Lab Units 04/21/19  1327 04/21/19  0259 04/20/19  2147   TROPONIN T ng/mL <0.010 <0.010 <0.010         Results from last 7 days   Lab Units 04/21/19  1516   PH, ARTERIAL pH units 7.440   PO2 ART mm Hg 255.8*   PCO2, ARTERIAL mm Hg 34.2*   HCO3 ART mmol/L 22.7     Results from last 7 days   Lab Units 04/21/19  1326 04/21/19  0259 04/20/19  1259   CRP mg/dL  --  15.11* 15.36*   LACTATE mmol/L 1.5  --  1.5   WBC 10*3/mm3 12.58* 9.75 12.67*   HEMOGLOBIN g/dL 11.2* 11.0* 12.9   HEMATOCRIT % 36.6 36.6 42.6   MCV fL 90.4 92.0 90.1   MCHC g/dL 30.6* 30.1* 30.3*   PLATELETS 10*3/mm3 280 275 311     Results from last 7 days   Lab Units 04/21/19  1326 04/21/19  0259 04/20/19  1259   SODIUM mmol/L 143 139 142   POTASSIUM mmol/L 3.8 3.8 4.3   MAGNESIUM mg/dL  --   --  2.1   CHLORIDE mmol/L 104 104 102   CO2 mmol/L 20.5* 19.7* 23.2   BUN mg/dL 18 17 18   CREATININE mg/dL 0.90 0.90 0.97   EGFR IF NONAFRICN AM mL/min/1.73 62 62 56*   CALCIUM mg/dL 10.9* 10.6* 11.7*   GLUCOSE mg/dL 119* 105* 120*   ALBUMIN g/dL 3.34* 3.05* 3.37*   BILIRUBIN mg/dL 0.2 0.2 0.3   ALK PHOS U/L 80 73 88   AST (SGOT) U/L 14 11 16   ALT (SGPT) U/L 8 7 7   Estimated Creatinine Clearance: 37.2 mL/min (by C-G formula based on SCr of 0.9 mg/dL).  No results found for: AMMONIA    No results found for: HGBA1C, POCGLU  Lab Results   Component Value Date    HGBA1C 5.10 12/09/2018     Lab Results   Component Value Date    TSH 4.220 (H) 04/21/2019          Urine Culture   Date Value Ref Range Status   04/20/2019 >100,000 CFU/mL Mixed Caro Isolated  Final              Pain Management Panel     Pain Management Panel Latest Ref Rng & Units 3/11/2019    AMPHETAMINES SCREEN, URINE Negative Negative     BARBITURATES SCREEN Negative Negative    BENZODIAZEPINE SCREEN, URINE Negative Negative    BUPRENORPHINE Negative Negative    COCAINE SCREEN, URINE Negative Negative    METHADONE SCREEN, URINE Negative Negative          Pertinent Radiology Results:  Imaging Results (all)     Procedure Component Value Units Date/Time    XR Chest 1 View [[id number]] Updated:  04/21/19 1436    XR Chest 1 View [[id number]] Updated:  04/21/19 1420    CT Chest Pulmonary Embolism With Contrast [[id number]] Updated:  04/21/19 1405    CT Head Without Contrast [[id number]] Updated:  04/21/19 1400    US Carotid Bilateral [[id number]] Collected:  04/21/19 1131     Updated:  04/21/19 1136    Narrative:       EXAMINATION: US CAROTID BILATERAL-      Technique: Multiple real-time color Doppler images were acquired of  bilateral carotid arteries.     Stenosis measurements if obtained, were performed by the NASCET or  similar method.        CLINICAL INDICATION:     confusion; R51-Headache; M54.2-Cervicalgia     COMPARISON:    None     FINDINGS:        RIGHT:  Severe plaque right CCA and right ICA. Nearly occluded.  RIGHT CCA PSV:26.5 cm/s  RIGHT ICA PSV: 111.3 cm/s  RIGHT ICA EDV: 19.2 cm/s.   Right ICA/CCA Ratio: 4.2  Anterograde flow is demonstrated in RIGHT vertebral artery.     LEFT:  Moderate plaque. No occlusion identified.  LEFT CCA PSV: 104.8 cm/s  LEFT ICA PSV: 120.0 cm/s  LEFT EDV: 25.4 cm/s.   Left ICA/CCA Ratio: 1.1  Anterograde flow is demonstrated in LEFT vertebral artery.          Impression:       1. Severe plaque and near complete occlusion of the right ICA. Moderate  plaque left ICA.  2. 70-99% stenosis right ICA.  3. No hemodynamically significant stenosis left ICA.  4. Antegrade flow noted both vertebral arteries.  5. Follow-up with contrast-enhanced CTA or MRA for more definitive  characterization.     This report was finalized on 4/21/2019 11:34 AM by Dr. Ubaldo Olivo MD.       XR Chest 1 View [[id number]] Collected:  04/21/19 0723      Updated:  04/21/19 0726    Narrative:       EXAMINATION: XR CHEST 1 VW-      CLINICAL INDICATION:     ams     TECHNIQUE:  XR CHEST 1 VW-      COMPARISON: 03/11/2019      FINDINGS:      Lungs are aerated.   Heart size, mediastinum, and pulmonary vascularity are unremarkable.   No pneumothorax.   No effusions.   No acute osseous findings.          Impression:       No radiographic evidence of acute cardiac or pulmonary  disease.     This report was finalized on 4/21/2019 7:24 AM by Dr. Ubaldo Olivo MD.       CT Cervical Spine Without Contrast [[id number]] Collected:  04/21/19 0610     Updated:  04/21/19 0613    Narrative:       EXAMINATION: CT CERVICAL SPINE WO CONTRAST-      CLINICAL INDICATION:     neck pain     TECHNIQUE: Multiple axial CT images of the entire cervical spine (to  include the cervicothoracic junction) were obtained without contrast  administration. Reformatted images in the coronal and/or sagittal  plane(s) were generated from the axial data set to facilitate diagnostic  accuracy and/or surgical planning.      Radiation dose reduction techniques were utilized per ALARA protocol.  Automated exposure control was initiated through either or [employer] or  DoseRight software packages by  protocol.             COMPARISON:  None.       FINDINGS: No acute fracture. No traumatic malalignment. Degenerative  spondylolisthesis C4 on C5 approximately 5 mm and is in the setting of  facet arthropathy. Advanced degenerative disc disease C4/5 through C6/7  with disc osteophyte formation and central canal and neural foraminal  stenosis. Posterior elements appear intact. Prevertebral soft tissues  are within normal limits. Carotid vascular calcifications are noted.       Impression:       1. No acute fracture.  2. Spondylolisthesis C4 on C5. Advanced multilevel degenerative changes.  3. No traumatic malalignment.     This report was finalized on 4/21/2019 6:11 AM by Dr. Ubaldo Olivo MD.       CT Head  Without Contrast [[id number]] Collected:  04/21/19 0609     Updated:  04/21/19 0612    Narrative:       EXAMINATION: CT HEAD WO CONTRAST-      CLINICAL INDICATION:     03/05/2019     COMPARISON:    None     Technique: Multiple CT axial images were obtained through the level of  the brain without IV contrast administration. Reformatted images in the  coronal and/or sagittal plane(s) were generated from the axial data set  to facilitate diagnostic accuracy and/or surgical planning.     Radiation dose reduction techniques were utilized per ALARA protocol.  Automated exposure control was initiated through either or [employer] or  DoseRight software packages by  protocol.       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  No acute intracranial abnormality.  No midline shift or mass effect.  No hydrocephalus or intracranial hemorrhage.  There is no CT evidence of acute vascular territory infarct.  Bone windows show no acute osseous abnormality.          Impression:       Stable exam demonstrate atrophy and chronic infarcts. No CT  evidence of acute intracranial abnormality.     This report was finalized on 4/21/2019 6:09 AM by Dr. Ubaldo Olivo MD.             Test Results Pending at Discharge:   Order Current Status    AFB Culture - Cerebrospinal Fluid, Lumbar Puncture In process    Blood Culture - Blood, Arm, Right In process    Blood Culture - Blood, Hand, Right In process    Fungus Culture - Cerebrospinal Fluid, Lumbar Puncture In process    Oligoclonal Banding In process    Oligoclonal Banding - Cerebrospinal Fluid, Lumbar Puncture In process    VDRL, CSF - Cerebrospinal Fluid, Lumbar Puncture In process    Beta Strep Culture, Throat - Swab, Throat Preliminary result    Body Fluid Culture - Body Fluid, Spine, Lumbar Preliminary result           Discharge Disposition/Discharge Medications/Discharge Appointments     Discharge Disposition:   Short Term Hospital (DC - External)    Condition at Discharge:  Stable, intubated on the vent    Code Status While Inpatient:  Code Status and Medical Interventions:   Ordered at: 04/20/19 1904     Code Status:    CPR     Medical Interventions (Level of Support Prior to Arrest):    Full       Discharge Medications:     Discharge Medications      New Medications      Instructions Start Date   chlorhexidine 0.12 % solution  Commonly known as:  PERIDEX   15 mL, Mouth/Throat, Every 12 Hours Scheduled      meropenem  Commonly known as:  MERREM   500 mg, Intravenous, Every 8 Hours      propofol 1000 mg/mL emulsion infusion  Commonly known as:  DIPRIVAN   5-50 mcg/kg/min, Intravenous, Titrated      vancomycin 750 mg in sodium chloride 0.9 % 250 mL IVPB   750 mg, Intravenous, Every 24 Hours   Start Date:  4/22/2019        Continue These Medications      Instructions Start Date   busPIRone 10 MG tablet  Commonly known as:  BUSPAR   10 mg, Oral, 2 Times Daily      cholecalciferol 55105 units capsule  Commonly known as:  VITAMIN D3   50,000 Units, Oral, Every 7 Days   Start Date:  4/24/2019     CLARITIN 10 MG tablet  Generic drug:  loratadine   1 tablet, Oral      clopidogrel 75 MG tablet  Commonly known as:  PLAVIX   75 mg, Oral, Daily      Divalproex Sodium 125 MG capsule  Commonly known as:  DEPAKOTE SPRINKLE   250 mg, Oral, 2 Times Daily      DULoxetine 30 MG capsule  Commonly known as:  CYMBALTA   30 mg, Oral, Daily      gabapentin 300 MG capsule  Commonly known as:  NEURONTIN   300 mg, Oral, 3 Times Daily      metoprolol tartrate 25 MG tablet  Commonly known as:  LOPRESSOR   1 tablet, Oral, Every 8 Hours      potassium chloride 10 MEQ CR tablet  Commonly known as:  K-DUR,KLOR-CON   10 mEq, Oral, 4 Times Daily      QUEtiapine 50 MG tablet  Commonly known as:  SEROquel   50 mg, Oral, 2 Times Daily             Discharge  Diet:      Discharge Activity:      Discharge Appointments:  Your Scheduled Appointments    Apr 25, 2019  3:00 PM EDT  Medicine Check with SAMREEN Lawrence  [facility] (--) [address] [facility] [address]  [phone]          Follow-up Information     Diann Ferreira APRN .    Specialty:  Nurse Practitioner  Contact information:  [address] [place] [id number]  [phone]                     Moose Moulton PA-C  04/21/19  4:38 PM        Please note that this discharge summary required more than 30 minutes to complete.

## 2019-04-21 NOTE — PROGRESS NOTES
THC Physician - Brief Progress Note  PERMANENT  04/21/2019 16:19    [facility]  [facility] - U - 10 - [place], [place] ([facility])    LEOPOLDO KOROMA    Date of Service 04/21/2019 16:19    HPI/Events of Note AICU Provider Assessment Note  Admitted yesterday with headache, neck pain, slurred speech  and confusion.  Hx of recent stroke with residual left sided deficits.   Lumbar puncture and head CT unremarkable. Strep and Influenza Ag negative.  Today, pt became acutely hypoxic, remained   altered; no witnessed seizure, vomiting. Recieved MS last night for headache and likely aspirated on po pills    Vent settings AC 14/400/5/60%  /80, P 110    EKG: ST, Biatrial enlargement  CXR:  Emphysema,   CT Angio PE protocol - No PE per radiology report  7.44/ 34/255  AGAP 18, Creat 0.9  WBC 12.58  Lactic acid 1.5  CT Head Neg  MRI Head Pending      Assessment and Plan:  Ac hypoxic resp failure  Sepsis  Aspiration PNA    Plan;  Fluids  Atbxs - meropenem and vanc  Pancultured  Swallow eval when extubated      __Y___   Video Assessment performed  __Y___   Most recent labs reviewed  __Y___   Vital Signs reviewed  __Y___   Best Practices addressed:                 VTE prophylaxis:Y                 SUP (when indicated):                 Glycemic control:                      Please notify bedside physician when present or [facility] if glc > 180 X 2                 Sepsis guidelines:Y                 Lung protective strategy: Decrease TV to 360; Repeat ABG                 Targeted Temperature Management:    __Y___     Spoke with bedside RN  __Y___     Orders written      Contact [facility] for any needs if bedside physician is not present.      Interventions Major-Respiratory failure - evaluation and management, Other: ASPIRATION PNA        Electronically Signed by: Leif Mclaughlin) on 04/21/2019 16:54

## 2019-04-21 NOTE — PROGRESS NOTES
Patient initiated on vancomycin and meropenem for sepsis. Based on patient and population kinetics (ke = 0.035 hrs-1, t 1/2 = 19.6 hrs, Vd = 29.2 L), will start vancomycin at 750 mg daily. Will check a trough level at steady state and follow. Based on Scr = 0.9 mg/dL, the estimated Clcr = 37 ml/min. Will start meropenem at 500 mg q8 hrs extended interval dosing for Clcr 26-50 ml/min. Will continue to follow.    Thank you,    Malaika Castellon, [facility]

## 2019-04-21 NOTE — NURSING NOTE
[place] received patient at this time. Called [facility] to make nurse aware of patients time of leaving this facility. Patient stable at time of transfer of care.

## 2019-04-21 NOTE — PROCEDURES
Called to patient bedside for patient in respiratory distress.  Patient was recently admitted to the hospital for intractable headache and altered mental status.  Patient was found to have decreased oxygen saturations on the floor today.  Patient had ABG that reflected hypoxemia with PO2 in the 40s.  Decision given patient's respiratory distress to intubate patient.  Have preoxygenated with bag valve mask 100% oxygen.  Suction was made available.  Have medicated patient with etomidate as well as suctioning.  Has suctioned or secretions and subsequently used a MAC 3 laryngoscope to pass 7.5 ET tube with direct visualization through vocal cords.  Cuff inflated.  Positive ET CO2 detector color change noted.  Symmetric chest wall movement.  Symmetric breath sounds.  Cuff was inflated.  Tube was secured with device 23 at length.  Have ordered post intubation chest x-ray.  Have placement settings.  Sedation ordered per protocol. Patient tolerated procedure without complication.

## 2019-04-21 NOTE — PROGRESS NOTES
PROGRESS NOTE         Patient Identification:  Name:  Anita Roche  Age:  73 y.o.  Sex:  female  :  1945  MRN:  [id number]  Visit Number:  [id number]  Primary Care Provider:  Diann Ferreira APRN      ----------------------------------------------------------------------------------------------------------------------  Subjective       Chief Complaints:    Altered Mental Status        Interval History:      Patient had continued symptoms overnight.  Patient has flat affect this morning and reports abdominal and flank pain.  Nursing staff reports that patient cried out all night.  Haldol was given with some relief.  Low-grade temp of 100.1 with tachycardia overnight.  CRP stable with normalization of leukocytosis.    Review of Systems:    Constitutional: Positive headache no fever, chills and night sweats. No appetite change or unexpected weight change. No fatigue.  Eyes: no eye drainage, itching or redness.  HEENT: no mouth sores, dysphagia or nose bleed.  Respiratory: no for shortness of breath, cough or production of sputum.  Cardiovascular: no chest pain, no palpitations, no orthopnea.  Gastrointestinal: Positive abdominal and flank pain.  No nausea, vomiting or diarrhea. No abdominal pain, hematemesis or rectal bleeding.  Genitourinary: no dysuria or polyuria.  Hematologic/lymphatic: no lymph node abnormalities, no easy bruising or easy bleeding.  Musculoskeletal: no muscle or joint pain.  Skin: No rash and no itching.  Neurological: no loss of consciousness, no seizure, no headache.  Behavioral/Psych: Flat affect.  No depression or suicidal ideation.  Endocrine: no hot flashes.  Immunologic: negative.  ----------------------------------------------------------------------------------------------------------------------      Objective       [facility] Meds:    busPIRone 10 mg Oral BID   ceftriaxone 2 g Intravenous Q24H   cetirizine 5 mg Oral Daily   [START ON 2019]  cholecalciferol 50,000 Units Oral Q7 Days   clopidogrel 75 mg Oral Daily   Divalproex Sodium 250 mg Oral Q12H   DULoxetine 30 mg Oral Daily   enoxaparin 40 mg Subcutaneous Q24H   gabapentin 300 mg Oral BID   haloperidol lactate 2 mg Intravenous Once   metoprolol tartrate 25 mg Oral Q8H   potassium chloride 10 mEq Oral 4x Daily   QUEtiapine 50 mg Oral BID   sodium chloride 3 mL Intravenous Q12H       hold 1 each    sodium chloride 125 mL/hr Last Rate: 125 mL/hr (04/20/19 1252)     ----------------------------------------------------------------------------------------------------------------------    Vital Signs:  Temp:  [97.9 °F (36.6 °C)-100.1 °F (37.8 °C)] 100.1 °F (37.8 °C)  Heart Rate:  [100-122] 114  Resp:  [18] 18  BP: (119-180)/() 166/77  Mean Arterial Pressure (Non-Invasive) for the past 24 hrs (Last 3 readings):   Noninvasive MAP (mmHg)   04/21/19 0100 97   04/20/19 1958 107   04/20/19 1901 89     SpO2 Percentage    04/20/19 1958 04/21/19 0100 04/21/19 0434   SpO2: 90% 91% 92%  Comment: telemonitor     SpO2:  [90 %-100 %] 92 %  on   ;        Body mass index is 17.97 kg/m².  Wt Readings from Last 3 Encounters:   04/20/19 41.7 kg (92 lb)   03/28/19 37.2 kg (82 lb)   03/11/19 45.4 kg (100 lb)        Intake/Output Summary (Last 24 hours) at 4/21/2019 0740  Last data filed at 4/20/2019 1700  Gross per 24 hour   Intake 600 ml   Output --   Net 600 ml     Diet Regular  ----------------------------------------------------------------------------------------------------------------------    Physical exam:    Constitutional:  Well-developed and well-nourished.  No respiratory distress.      HENT:  Head:  Normocephalic and atraumatic.  Mouth:  Moist mucous membranes.    Eyes:  Conjunctivae and EOM are normal.  No scleral icterus.    Neck:  Neck supple.  No JVD present.    Cardiovascular:  Normal rate, regular rhythm and normal heart sounds with no murmur. No edema.  Pulmonary/Chest:  No respiratory distress, no  wheezes, no crackles, with normal breath sounds and good air movement.  Abdominal:  Soft.  Bowel sounds are normal.  No distension and no tenderness.   Musculoskeletal:  No edema, no tenderness, and no deformity.  No red or swollen joints anywhere.    Neurological:  Alert and oriented to person, place, and time. No facial droop.  No slurred speech.   Skin:  Skin is warm and dry. No rash noted. No pallor.     ----------------------------------------------------------------------------------------------------------------------  I have personally reviewed the EKG/Telemetry strips   ----------------------------------------------------------------------------------------------------------------------  Results from last 7 days   Lab Units 04/21/19 0259 04/20/19 2147 04/20/19  1259   TROPONIN T ng/mL <0.010 <0.010 <0.010           Results from last 7 days   Lab Units 04/21/19 0259 04/20/19  1259   CRP mg/dL 15.11* 15.36*   LACTATE mmol/L  --  1.5   WBC 10*3/mm3 9.75 12.67*   HEMOGLOBIN g/dL 11.0* 12.9   HEMATOCRIT % 36.6 42.6   MCV fL 92.0 90.1   MCHC g/dL 30.1* 30.3*   PLATELETS 10*3/mm3 275 311     Results from last 7 days   Lab Units 04/21/19 0259 04/20/19  1259   SODIUM mmol/L 139 142   POTASSIUM mmol/L 3.8 4.3   MAGNESIUM mg/dL  --  2.1   CHLORIDE mmol/L 104 102   CO2 mmol/L 19.7* 23.2   BUN mg/dL 17 18   CREATININE mg/dL 0.90 0.97   EGFR IF NONAFRICN AM mL/min/1.73 62 56*   CALCIUM mg/dL 10.6* 11.7*   GLUCOSE mg/dL 105* 120*   ALBUMIN g/dL 3.05* 3.37*   BILIRUBIN mg/dL 0.2 0.3   ALK PHOS U/L 73 88   AST (SGOT) U/L 11 16   ALT (SGPT) U/L 7 7   Estimated Creatinine Clearance: 37.2 mL/min (by C-G formula based on SCr of 0.9 mg/dL).  No results found for: AMMONIA    No results found for: HGBA1C, POCGLU  Lab Results   Component Value Date    HGBA1C 5.10 12/09/2018     Lab Results   Component Value Date    TSH 4.220 (H) 04/21/2019                      Pain Management Panel     Pain Management Panel Latest Ref Rng &  Units 3/11/2019    AMPHETAMINES SCREEN, URINE Negative Negative    BARBITURATES SCREEN Negative Negative    BENZODIAZEPINE SCREEN, URINE Negative Negative    BUPRENORPHINE Negative Negative    COCAINE SCREEN, URINE Negative Negative    METHADONE SCREEN, URINE Negative Negative          I have personally reviewed the above laboratory results.   ----------------------------------------------------------------------------------------------------------------------  Imaging Results (last 24 hours)     Procedure Component Value Units Date/Time    XR Chest 1 View [[id number]] Collected:  04/21/19 0723     Updated:  04/21/19 0726    Narrative:       EXAMINATION: XR CHEST 1 VW-      CLINICAL INDICATION:     ams     TECHNIQUE:  XR CHEST 1 VW-      COMPARISON: 03/11/2019      FINDINGS:      Lungs are aerated.   Heart size, mediastinum, and pulmonary vascularity are unremarkable.   No pneumothorax.   No effusions.   No acute osseous findings.          Impression:       No radiographic evidence of acute cardiac or pulmonary  disease.     This report was finalized on 4/21/2019 7:24 AM by Dr. Ubaldo Olivo MD.       CT Cervical Spine Without Contrast [[id number]] Collected:  04/21/19 0610     Updated:  04/21/19 0613    Narrative:       EXAMINATION: CT CERVICAL SPINE WO CONTRAST-      CLINICAL INDICATION:     neck pain     TECHNIQUE: Multiple axial CT images of the entire cervical spine (to  include the cervicothoracic junction) were obtained without contrast  administration. Reformatted images in the coronal and/or sagittal  plane(s) were generated from the axial data set to facilitate diagnostic  accuracy and/or surgical planning.      Radiation dose reduction techniques were utilized per ALARA protocol.  Automated exposure control was initiated through either or [employer] or  DoseRight software packages by  protocol.             COMPARISON:  None.       FINDINGS: No acute fracture. No traumatic malalignment.  Degenerative  spondylolisthesis C4 on C5 approximately 5 mm and is in the setting of  facet arthropathy. Advanced degenerative disc disease C4/5 through C6/7  with disc osteophyte formation and central canal and neural foraminal  stenosis. Posterior elements appear intact. Prevertebral soft tissues  are within normal limits. Carotid vascular calcifications are noted.       Impression:       1. No acute fracture.  2. Spondylolisthesis C4 on C5. Advanced multilevel degenerative changes.  3. No traumatic malalignment.     This report was finalized on 4/21/2019 6:11 AM by Dr. Ubaldo Olivo MD.       CT Head Without Contrast [[id number]] Collected:  04/21/19 0609     Updated:  04/21/19 0612    Narrative:       EXAMINATION: CT HEAD WO CONTRAST-      CLINICAL INDICATION:     03/05/2019     COMPARISON:    None     Technique: Multiple CT axial images were obtained through the level of  the brain without IV contrast administration. Reformatted images in the  coronal and/or sagittal plane(s) were generated from the axial data set  to facilitate diagnostic accuracy and/or surgical planning.     Radiation dose reduction techniques were utilized per ALARA protocol.  Automated exposure control was initiated through either or [employer] or  DoseRight software packages by  protocol.       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  No acute intracranial abnormality.  No midline shift or mass effect.  No hydrocephalus or intracranial hemorrhage.  There is no CT evidence of acute vascular territory infarct.  Bone windows show no acute osseous abnormality.          Impression:       Stable exam demonstrate atrophy and chronic infarcts. No CT  evidence of acute intracranial abnormality.     This report was finalized on 4/21/2019 6:09 AM by Dr. Ubaldo Olivo MD.            I have personally reviewed the above radiology results.   ----------------------------------------------------------------------------------------------------------------------    Assessment/Plan       Assessment/Plan     ASSESSMENT:    1.  Confusion  2.  Intractable headache  3.  Neck pain  4.  UTI    PLAN:    Patient had continued symptoms overnight.  Patient has flat affect this morning and reports abdominal and flank pain.  Nursing staff reports that patient cried out all night.  Haldol was given with some relief.  Low-grade temp of 100.1 with tachycardia overnight.  CRP stable with normalization of leukocytosis.    Chest x-ray shows no evidence of acute cardiopulmonary disease.  CT of cervical spine shows spondylolithiasis of C4 on C5.  CT of head stable with atrophy and chronic infarcts however no evidence of acute intracranial abnormalities.    Lumbar spine body fluid shows no organisms.  CSF glucose is elevated at 71 with elevated protein at 56.1.    Awaiting meningitis\encephalitis panel, AFB cultures, and fungus cultures.    Urine cultures pending, awaiting results.    Ortho consulted for spondylolithiasis.    Continue Rocephin 2 g IV every 24 for UTI.    Code Status:   Code Status and Medical Interventions:   Ordered at: 04/20/19 1904     Code Status:    CPR     Medical Interventions (Level of Support Prior to Arrest):    Full       Moose Moulton PA-C  04/21/19  7:40 AM

## 2019-04-21 NOTE — PLAN OF CARE
Problem: Fall Risk (Adult)  Intervention: Monitor/Assist with Self Care  Pt NPO for Speech evaluation    Goal: Identify Related Risk Factors and Signs and Symptoms  Outcome: Ongoing (interventions implemented as appropriate)    Goal: Absence of Fall  Outcome: Ongoing (interventions implemented as appropriate)      Problem: Skin Injury Risk (Adult)  Intervention: Prevent/Manage Excess Moisture  Pt turned Q2 hours per healthcare provider. Wedge and Pillows utilized to relieve pressure, as well as pressure redistributing mattress.     Goal: Identify Related Risk Factors and Signs and Symptoms  Outcome: Ongoing (interventions implemented as appropriate)    Goal: Skin Health and Integrity  Outcome: Ongoing (interventions implemented as appropriate)      Problem: Patient Care Overview  Goal: Plan of Care Review  Outcome: Ongoing (interventions implemented as appropriate)    Goal: Individualization and Mutuality  Outcome: Ongoing (interventions implemented as appropriate)    Goal: Discharge Needs Assessment  Outcome: Ongoing (interventions implemented as appropriate)    Goal: Interprofessional Rounds/Family Conf  Outcome: Ongoing (interventions implemented as appropriate)      Problem: Pain, Acute (Adult)  Goal: Identify Related Risk Factors and Signs and Symptoms  Outcome: Ongoing (interventions implemented as appropriate)    Goal: Acceptable Pain Control/Comfort Level  Outcome: Ongoing (interventions implemented as appropriate)      Problem: Thrombolytic Therapy (Adult)  Goal: Signs and Symptoms of Listed Potential Problems Will be Absent, Minimized or Managed (Thrombolytic Therapy)  Outcome: Ongoing (interventions implemented as appropriate)      Problem: Patient Care Overview  Goal: Plan of Care Review  Outcome: Ongoing (interventions implemented as appropriate)    Goal: Individualization and Mutuality  Outcome: Ongoing (interventions implemented as appropriate)    Goal: Discharge Needs Assessment  Outcome: Ongoing  (interventions implemented as appropriate)    Goal: Interprofessional Rounds/Family Conf  Outcome: Ongoing (interventions implemented as appropriate)

## 2019-04-21 NOTE — SIGNIFICANT NOTE
Responded to rapid response on obs unit room 209.    Intubation per Dr. Escoto    RSI:     1305: 12mg etomidate  1307: 40 mg succ  1309: intubated with 7.5 tube 23cm @ lip

## 2019-04-21 NOTE — NURSING NOTE
Present during conversation between Dr. Bauer and patient daughter.  Patient daughter agreeable to intubation.

## 2019-04-21 NOTE — PLAN OF CARE
Problem: Fall Risk (Adult)  Goal: Identify Related Risk Factors and Signs and Symptoms  Outcome: Ongoing (interventions implemented as appropriate)    Goal: Absence of Fall  Outcome: Ongoing (interventions implemented as appropriate)      Problem: Skin Injury Risk (Adult)  Goal: Identify Related Risk Factors and Signs and Symptoms  Outcome: Ongoing (interventions implemented as appropriate)    Goal: Skin Health and Integrity  Outcome: Ongoing (interventions implemented as appropriate)      Problem: Patient Care Overview  Goal: Plan of Care Review  Outcome: Ongoing (interventions implemented as appropriate)      Problem: Pain, Acute (Adult)  Goal: Identify Related Risk Factors and Signs and Symptoms  Outcome: Ongoing (interventions implemented as appropriate)    Goal: Acceptable Pain Control/Comfort Level  Outcome: Ongoing (interventions implemented as appropriate)

## 2019-04-22 ENCOUNTER — APPOINTMENT (OUTPATIENT)
Dept: GENERAL RADIOLOGY | Facility: HOSPITAL | Age: 74
End: 2019-04-22

## 2019-04-22 PROBLEM — I48.0 PAROXYSMAL ATRIAL FIBRILLATION (HCC): Status: ACTIVE | Noted: 2019-04-22

## 2019-04-22 LAB
ANION GAP SERPL CALCULATED.3IONS-SCNC: 17 MMOL/L
APTT PPP: 27.8 SECONDS (ref 85–120)
ARTERIAL PATENCY WRIST A: ABNORMAL
ARTERIAL PATENCY WRIST A: ABNORMAL
ATMOSPHERIC PRESS: ABNORMAL MMHG
ATMOSPHERIC PRESS: ABNORMAL MMHG
B PARAPERT DNA SPEC QL NAA+PROBE: NOT DETECTED
B PERT DNA SPEC QL NAA+PROBE: NOT DETECTED
BACTERIA SPEC AEROBE CULT: NORMAL
BASE EXCESS BLDA CALC-SCNC: -0.1 MMOL/L (ref 0–2)
BASE EXCESS BLDA CALC-SCNC: -0.3 MMOL/L (ref 0–2)
BASOPHILS # BLD AUTO: 0.02 10*3/MM3 (ref 0–0.2)
BASOPHILS NFR BLD AUTO: 0.2 % (ref 0–1.5)
BDY SITE: ABNORMAL
BDY SITE: ABNORMAL
BODY TEMPERATURE: 37 C
BODY TEMPERATURE: 37 C
BUN BLD-MCNC: 18 MG/DL (ref 8–23)
BUN/CREAT SERPL: 23.4 (ref 7–25)
C PNEUM DNA NPH QL NAA+NON-PROBE: NOT DETECTED
CALCIUM SPEC-SCNC: 10.9 MG/DL (ref 8.6–10.5)
CHLORIDE SERPL-SCNC: 106 MMOL/L (ref 98–107)
CO2 BLDA-SCNC: 25.2 MMOL/L (ref 23–27)
CO2 BLDA-SCNC: 26.1 MMOL/L (ref 23–27)
CO2 SERPL-SCNC: 20 MMOL/L (ref 22–29)
COHGB MFR BLD: 0.4 % (ref 0–2)
COHGB MFR BLD: 0.7 % (ref 0–2)
CREAT BLD-MCNC: 0.77 MG/DL (ref 0.57–1)
DEPRECATED RDW RBC AUTO: 53.7 FL (ref 37–54)
DEPRECATED RDW RBC AUTO: 55.3 FL (ref 37–54)
EOSINOPHIL # BLD AUTO: 0.09 10*3/MM3 (ref 0–0.4)
EOSINOPHIL NFR BLD AUTO: 1 % (ref 0.3–6.2)
ERYTHROCYTE [DISTWIDTH] IN BLOOD BY AUTOMATED COUNT: 16.3 % (ref 12.3–15.4)
ERYTHROCYTE [DISTWIDTH] IN BLOOD BY AUTOMATED COUNT: 16.3 % (ref 12.3–15.4)
FLUAV H1 2009 PAND RNA NPH QL NAA+PROBE: NOT DETECTED
FLUAV H1 HA GENE NPH QL NAA+PROBE: NOT DETECTED
FLUAV H3 RNA NPH QL NAA+PROBE: NOT DETECTED
FLUAV SUBTYP SPEC NAA+PROBE: NOT DETECTED
FLUBV RNA ISLT QL NAA+PROBE: NOT DETECTED
GFR SERPL CREATININE-BSD FRML MDRD: 74 ML/MIN/1.73
GLUCOSE BLD-MCNC: 95 MG/DL (ref 65–99)
HADV DNA SPEC NAA+PROBE: NOT DETECTED
HCO3 BLDA-SCNC: 24.1 MMOL/L (ref 20–26)
HCO3 BLDA-SCNC: 24.8 MMOL/L (ref 20–26)
HCOV 229E RNA SPEC QL NAA+PROBE: NOT DETECTED
HCOV HKU1 RNA SPEC QL NAA+PROBE: NOT DETECTED
HCOV NL63 RNA SPEC QL NAA+PROBE: NOT DETECTED
HCOV OC43 RNA SPEC QL NAA+PROBE: NOT DETECTED
HCT VFR BLD AUTO: 35.3 % (ref 34–46.6)
HCT VFR BLD AUTO: 38.1 % (ref 34–46.6)
HCT VFR BLD CALC: 32.3 %
HCT VFR BLD CALC: 33.4 %
HGB BLD-MCNC: 10.7 G/DL (ref 12–15.9)
HGB BLD-MCNC: 11.1 G/DL (ref 12–15.9)
HGB BLDA-MCNC: 10.5 G/DL (ref 14–18)
HGB BLDA-MCNC: 10.9 G/DL (ref 14–18)
HMPV RNA NPH QL NAA+NON-PROBE: NOT DETECTED
HOROWITZ INDEX BLD+IHG-RTO: 30 %
HOROWITZ INDEX BLD+IHG-RTO: 30 %
HPIV1 RNA SPEC QL NAA+PROBE: NOT DETECTED
HPIV2 RNA SPEC QL NAA+PROBE: NOT DETECTED
HPIV3 RNA NPH QL NAA+PROBE: NOT DETECTED
HPIV4 P GENE NPH QL NAA+PROBE: NOT DETECTED
IMM GRANULOCYTES # BLD AUTO: 0.03 10*3/MM3 (ref 0–0.05)
IMM GRANULOCYTES NFR BLD AUTO: 0.3 % (ref 0–0.5)
INR PPP: 1.19 (ref 0.85–1.16)
LYMPHOCYTES # BLD AUTO: 0.74 10*3/MM3 (ref 0.7–3.1)
LYMPHOCYTES NFR BLD AUTO: 8.5 % (ref 19.6–45.3)
M PNEUMO IGG SER IA-ACNC: NOT DETECTED
MAGNESIUM SERPL-MCNC: 1.8 MG/DL (ref 1.6–2.4)
MCH RBC QN AUTO: 27.1 PG (ref 26.6–33)
MCH RBC QN AUTO: 27.4 PG (ref 26.6–33)
MCHC RBC AUTO-ENTMCNC: 29.1 G/DL (ref 31.5–35.7)
MCHC RBC AUTO-ENTMCNC: 30.3 G/DL (ref 31.5–35.7)
MCV RBC AUTO: 90.5 FL (ref 79–97)
MCV RBC AUTO: 93.2 FL (ref 79–97)
METHGB BLD QL: 0.5 % (ref 0–1.5)
METHGB BLD QL: 0.8 % (ref 0–1.5)
MODALITY: ABNORMAL
MODALITY: ABNORMAL
MONOCYTES # BLD AUTO: 0.93 10*3/MM3 (ref 0.1–0.9)
MONOCYTES NFR BLD AUTO: 10.7 % (ref 5–12)
MRSA DNA SPEC QL NAA+PROBE: POSITIVE
NEUTROPHILS # BLD AUTO: 6.9 10*3/MM3 (ref 1.7–7)
NEUTROPHILS NFR BLD AUTO: 79.6 % (ref 42.7–76)
NOTE: ABNORMAL
NOTE: ABNORMAL
OXYHGB MFR BLDV: 96.2 % (ref 94–99)
OXYHGB MFR BLDV: 97.3 % (ref 94–99)
PCO2 BLDA: 36.5 MM HG (ref 35–45)
PCO2 BLDA: 41.6 MM HG (ref 35–45)
PCO2 TEMP ADJ BLD: 36.5 MM HG (ref 35–45)
PCO2 TEMP ADJ BLD: 41.6 MM HG (ref 35–45)
PEEP RESPIRATORY: 5 CM[H2O]
PH BLDA: 7.38 PH UNITS (ref 7.35–7.45)
PH BLDA: 7.43 PH UNITS (ref 7.35–7.45)
PH, TEMP CORRECTED: 7.38 PH UNITS
PH, TEMP CORRECTED: 7.43 PH UNITS
PHOSPHATE SERPL-MCNC: 3.5 MG/DL (ref 2.5–4.5)
PLATELET # BLD AUTO: 315 10*3/MM3 (ref 140–450)
PLATELET # BLD AUTO: 317 10*3/MM3 (ref 140–450)
PMV BLD AUTO: 11.2 FL (ref 6–12)
PMV BLD AUTO: 11.8 FL (ref 6–12)
PO2 BLDA: 107 MM HG (ref 83–108)
PO2 BLDA: 94 MM HG (ref 83–108)
PO2 TEMP ADJ BLD: 107 MM HG (ref 83–108)
PO2 TEMP ADJ BLD: 94 MM HG (ref 83–108)
POTASSIUM BLD-SCNC: 3.4 MMOL/L (ref 3.5–5.2)
POTASSIUM BLD-SCNC: 4.3 MMOL/L (ref 3.5–5.2)
PROTHROMBIN TIME: 14.5 SECONDS (ref 11.2–14.3)
PSV: 10 CMH2O
RBC # BLD AUTO: 3.9 10*6/MM3 (ref 3.77–5.28)
RBC # BLD AUTO: 4.09 10*6/MM3 (ref 3.77–5.28)
RHINOVIRUS RNA SPEC NAA+PROBE: NOT DETECTED
RSV RNA NPH QL NAA+NON-PROBE: NOT DETECTED
SODIUM BLD-SCNC: 143 MMOL/L (ref 136–145)
UFH PPP CHRO-ACNC: 0.1 IU/ML (ref 0.3–0.7)
VALPROATE SERPL-MCNC: 4.6 MCG/ML (ref 50–125)
VANCOMYCIN SERPL-MCNC: 10.8 MCG/ML (ref 5–40)
VENTILATOR MODE: ABNORMAL
VENTILATOR MODE: AC
WBC NRBC COR # BLD: 10.67 10*3/MM3 (ref 3.4–10.8)
WBC NRBC COR # BLD: 8.68 10*3/MM3 (ref 3.4–10.8)

## 2019-04-22 PROCEDURE — 94799 UNLISTED PULMONARY SVC/PX: CPT

## 2019-04-22 PROCEDURE — 25010000002 VANCOMYCIN PER 500 MG

## 2019-04-22 PROCEDURE — 83735 ASSAY OF MAGNESIUM: CPT | Performed by: NURSE PRACTITIONER

## 2019-04-22 PROCEDURE — 25010000002 MEROPENEM: Performed by: NURSE PRACTITIONER

## 2019-04-22 PROCEDURE — 82805 BLOOD GASES W/O2 SATURATION: CPT

## 2019-04-22 PROCEDURE — 87633 RESP VIRUS 12-25 TARGETS: CPT | Performed by: NURSE PRACTITIONER

## 2019-04-22 PROCEDURE — 85610 PROTHROMBIN TIME: CPT | Performed by: INTERNAL MEDICINE

## 2019-04-22 PROCEDURE — 93005 ELECTROCARDIOGRAM TRACING: CPT | Performed by: INTERNAL MEDICINE

## 2019-04-22 PROCEDURE — 94003 VENT MGMT INPAT SUBQ DAY: CPT

## 2019-04-22 PROCEDURE — 85730 THROMBOPLASTIN TIME PARTIAL: CPT | Performed by: INTERNAL MEDICINE

## 2019-04-22 PROCEDURE — 87205 SMEAR GRAM STAIN: CPT | Performed by: NURSE PRACTITIONER

## 2019-04-22 PROCEDURE — 85027 COMPLETE CBC AUTOMATED: CPT | Performed by: NURSE PRACTITIONER

## 2019-04-22 PROCEDURE — 25010000002 PROPOFOL 1000 MG/ML EMULSION: Performed by: NURSE PRACTITIONER

## 2019-04-22 PROCEDURE — 85025 COMPLETE CBC W/AUTO DIFF WBC: CPT | Performed by: INTERNAL MEDICINE

## 2019-04-22 PROCEDURE — 80164 ASSAY DIPROPYLACETIC ACD TOT: CPT | Performed by: NURSE PRACTITIONER

## 2019-04-22 PROCEDURE — 71045 X-RAY EXAM CHEST 1 VIEW: CPT

## 2019-04-22 PROCEDURE — 25010000002 HEPARIN (PORCINE) PER 1000 UNITS: Performed by: NURSE PRACTITIONER

## 2019-04-22 PROCEDURE — 87486 CHLMYD PNEUM DNA AMP PROBE: CPT | Performed by: NURSE PRACTITIONER

## 2019-04-22 PROCEDURE — 87641 MR-STAPH DNA AMP PROBE: CPT

## 2019-04-22 PROCEDURE — 84132 ASSAY OF SERUM POTASSIUM: CPT | Performed by: NURSE PRACTITIONER

## 2019-04-22 PROCEDURE — 84100 ASSAY OF PHOSPHORUS: CPT | Performed by: NURSE PRACTITIONER

## 2019-04-22 PROCEDURE — 87581 M.PNEUMON DNA AMP PROBE: CPT | Performed by: NURSE PRACTITIONER

## 2019-04-22 PROCEDURE — 93010 ELECTROCARDIOGRAM REPORT: CPT | Performed by: INTERNAL MEDICINE

## 2019-04-22 PROCEDURE — 80202 ASSAY OF VANCOMYCIN: CPT

## 2019-04-22 PROCEDURE — 25010000003 POTASSIUM CHLORIDE 10 MEQ/100ML SOLUTION: Performed by: NURSE PRACTITIONER

## 2019-04-22 PROCEDURE — 99291 CRITICAL CARE FIRST HOUR: CPT | Performed by: INTERNAL MEDICINE

## 2019-04-22 PROCEDURE — 80048 BASIC METABOLIC PNL TOTAL CA: CPT | Performed by: NURSE PRACTITIONER

## 2019-04-22 PROCEDURE — 87070 CULTURE OTHR SPECIMN AEROBIC: CPT | Performed by: NURSE PRACTITIONER

## 2019-04-22 PROCEDURE — 25010000002 HEPARIN (PORCINE) IN NACL 25000-0.45 UT/250ML-% SOLUTION: Performed by: INTERNAL MEDICINE

## 2019-04-22 PROCEDURE — 87798 DETECT AGENT NOS DNA AMP: CPT | Performed by: NURSE PRACTITIONER

## 2019-04-22 PROCEDURE — 85520 HEPARIN ASSAY: CPT | Performed by: INTERNAL MEDICINE

## 2019-04-22 PROCEDURE — 36600 WITHDRAWAL OF ARTERIAL BLOOD: CPT

## 2019-04-22 PROCEDURE — 25010000002 PIPERACILLIN SOD-TAZOBACTAM PER 1 G: Performed by: INTERNAL MEDICINE

## 2019-04-22 PROCEDURE — 25010000002 HEPARIN (PORCINE) PER 1000 UNITS: Performed by: INTERNAL MEDICINE

## 2019-04-22 RX ORDER — POTASSIUM CHLORIDE 7.45 MG/ML
10 INJECTION INTRAVENOUS
Status: DISCONTINUED | OUTPATIENT
Start: 2019-04-22 | End: 2019-04-26

## 2019-04-22 RX ORDER — LABETALOL HYDROCHLORIDE 5 MG/ML
20 INJECTION, SOLUTION INTRAVENOUS
Status: DISCONTINUED | OUTPATIENT
Start: 2019-04-22 | End: 2019-04-23

## 2019-04-22 RX ORDER — HEPARIN SODIUM 1000 [USP'U]/ML
60 INJECTION, SOLUTION INTRAVENOUS; SUBCUTANEOUS AS NEEDED
Status: DISCONTINUED | OUTPATIENT
Start: 2019-04-22 | End: 2019-04-22

## 2019-04-22 RX ORDER — MAGNESIUM SULFATE HEPTAHYDRATE 40 MG/ML
2 INJECTION, SOLUTION INTRAVENOUS AS NEEDED
Status: DISCONTINUED | OUTPATIENT
Start: 2019-04-22 | End: 2019-04-26

## 2019-04-22 RX ORDER — DIVALPROEX SODIUM 250 MG/1
250 TABLET, EXTENDED RELEASE ORAL 2 TIMES DAILY
COMMUNITY
End: 2019-04-29 | Stop reason: HOSPADM

## 2019-04-22 RX ORDER — MAGNESIUM SULFATE HEPTAHYDRATE 40 MG/ML
4 INJECTION, SOLUTION INTRAVENOUS AS NEEDED
Status: DISCONTINUED | OUTPATIENT
Start: 2019-04-22 | End: 2019-04-26

## 2019-04-22 RX ORDER — METOPROLOL TARTRATE 50 MG/1
50 TABLET, FILM COATED ORAL EVERY 12 HOURS SCHEDULED
Status: DISCONTINUED | OUTPATIENT
Start: 2019-04-22 | End: 2019-04-23

## 2019-04-22 RX ORDER — HEPARIN SODIUM 1000 [USP'U]/ML
60 INJECTION, SOLUTION INTRAVENOUS; SUBCUTANEOUS ONCE
Status: COMPLETED | OUTPATIENT
Start: 2019-04-22 | End: 2019-04-22

## 2019-04-22 RX ORDER — HEPARIN SODIUM 1000 [USP'U]/ML
30 INJECTION, SOLUTION INTRAVENOUS; SUBCUTANEOUS AS NEEDED
Status: DISCONTINUED | OUTPATIENT
Start: 2019-04-22 | End: 2019-04-22

## 2019-04-22 RX ADMIN — HEPARIN SODIUM 5000 UNITS: 5000 INJECTION INTRAVENOUS; SUBCUTANEOUS at 13:58

## 2019-04-22 RX ADMIN — CLOPIDOGREL BISULFATE 75 MG: 75 TABLET ORAL at 09:00

## 2019-04-22 RX ADMIN — PROPOFOL 50 MCG/KG/MIN: 10 INJECTION, EMULSION INTRAVENOUS at 06:37

## 2019-04-22 RX ADMIN — IPRATROPIUM BROMIDE AND ALBUTEROL SULFATE 3 ML: 2.5; .5 SOLUTION RESPIRATORY (INHALATION) at 07:21

## 2019-04-22 RX ADMIN — TAZOBACTAM SODIUM AND PIPERACILLIN SODIUM 3.38 G: 375; 3 INJECTION, SOLUTION INTRAVENOUS at 16:50

## 2019-04-22 RX ADMIN — POTASSIUM CHLORIDE 10 MEQ: 10 INJECTION, SOLUTION INTRAVENOUS at 06:37

## 2019-04-22 RX ADMIN — METOPROLOL TARTRATE 25 MG: 25 TABLET ORAL at 13:56

## 2019-04-22 RX ADMIN — VANCOMYCIN HYDROCHLORIDE 750 MG: 750 INJECTION, SOLUTION INTRAVENOUS at 10:38

## 2019-04-22 RX ADMIN — MEROPENEM 1 G: 1 INJECTION, POWDER, FOR SOLUTION INTRAVENOUS at 12:12

## 2019-04-22 RX ADMIN — MAGNESIUM SULFATE HEPTAHYDRATE 4 G: 40 INJECTION, SOLUTION INTRAVENOUS at 05:23

## 2019-04-22 RX ADMIN — HEPARIN SODIUM 5000 UNITS: 5000 INJECTION INTRAVENOUS; SUBCUTANEOUS at 05:21

## 2019-04-22 RX ADMIN — METOPROLOL TARTRATE 5 MG: 5 INJECTION INTRAVENOUS at 16:53

## 2019-04-22 RX ADMIN — TAZOBACTAM SODIUM AND PIPERACILLIN SODIUM 3.38 G: 375; 3 INJECTION, SOLUTION INTRAVENOUS at 22:01

## 2019-04-22 RX ADMIN — HEPARIN SODIUM 2700 UNITS: 1000 INJECTION, SOLUTION INTRAVENOUS; SUBCUTANEOUS at 21:52

## 2019-04-22 RX ADMIN — MEROPENEM 1 G: 1 INJECTION, POWDER, FOR SOLUTION INTRAVENOUS at 00:00

## 2019-04-22 RX ADMIN — DIVALPROEX SODIUM 250 MG: 125 CAPSULE ORAL at 00:01

## 2019-04-22 RX ADMIN — LABETALOL 20 MG/4 ML (5 MG/ML) INTRAVENOUS SYRINGE 20 MG: at 12:12

## 2019-04-22 RX ADMIN — HEPARIN SODIUM 12 UNITS/KG/HR: 10000 INJECTION, SOLUTION INTRAVENOUS at 21:52

## 2019-04-22 RX ADMIN — DIVALPROEX SODIUM 250 MG: 125 CAPSULE ORAL at 09:00

## 2019-04-22 RX ADMIN — CHLORHEXIDINE GLUCONATE 0.12% ORAL RINSE 15 ML: 1.2 LIQUID ORAL at 00:00

## 2019-04-22 RX ADMIN — CHLORHEXIDINE GLUCONATE 0.12% ORAL RINSE 15 ML: 1.2 LIQUID ORAL at 21:53

## 2019-04-22 RX ADMIN — CHLORHEXIDINE GLUCONATE 0.12% ORAL RINSE 15 ML: 1.2 LIQUID ORAL at 09:00

## 2019-04-22 RX ADMIN — IPRATROPIUM BROMIDE AND ALBUTEROL SULFATE 3 ML: 2.5; .5 SOLUTION RESPIRATORY (INHALATION) at 11:50

## 2019-04-22 RX ADMIN — IPRATROPIUM BROMIDE AND ALBUTEROL SULFATE 3 ML: 2.5; .5 SOLUTION RESPIRATORY (INHALATION) at 01:51

## 2019-04-22 RX ADMIN — FAMOTIDINE 20 MG: 10 INJECTION, SOLUTION INTRAVENOUS at 09:00

## 2019-04-22 RX ADMIN — POTASSIUM CHLORIDE 10 MEQ: 10 INJECTION, SOLUTION INTRAVENOUS at 10:38

## 2019-04-22 RX ADMIN — IPRATROPIUM BROMIDE AND ALBUTEROL SULFATE 3 ML: 2.5; .5 SOLUTION RESPIRATORY (INHALATION) at 19:17

## 2019-04-23 ENCOUNTER — APPOINTMENT (OUTPATIENT)
Dept: GENERAL RADIOLOGY | Facility: HOSPITAL | Age: 74
End: 2019-04-23

## 2019-04-23 LAB
ANION GAP SERPL CALCULATED.3IONS-SCNC: 16 MMOL/L
BASOPHILS # BLD AUTO: 0.03 10*3/MM3 (ref 0–0.2)
BASOPHILS NFR BLD AUTO: 0.4 % (ref 0–1.5)
BUN BLD-MCNC: 13 MG/DL (ref 8–23)
BUN/CREAT SERPL: 21 (ref 7–25)
CALCIUM SPEC-SCNC: 9.9 MG/DL (ref 8.6–10.5)
CHLORIDE SERPL-SCNC: 102 MMOL/L (ref 98–107)
CO2 SERPL-SCNC: 23 MMOL/L (ref 22–29)
CREAT BLD-MCNC: 0.62 MG/DL (ref 0.57–1)
DEPRECATED RDW RBC AUTO: 52.9 FL (ref 37–54)
EOSINOPHIL # BLD AUTO: 0.14 10*3/MM3 (ref 0–0.4)
EOSINOPHIL NFR BLD AUTO: 1.6 % (ref 0.3–6.2)
ERYTHROCYTE [DISTWIDTH] IN BLOOD BY AUTOMATED COUNT: 16.1 % (ref 12.3–15.4)
GFR SERPL CREATININE-BSD FRML MDRD: 95 ML/MIN/1.73
GLUCOSE BLD-MCNC: 93 MG/DL (ref 65–99)
HCT VFR BLD AUTO: 32.5 % (ref 34–46.6)
HGB BLD-MCNC: 9.7 G/DL (ref 12–15.9)
IMM GRANULOCYTES # BLD AUTO: 0.02 10*3/MM3 (ref 0–0.05)
IMM GRANULOCYTES NFR BLD AUTO: 0.2 % (ref 0–0.5)
LYMPHOCYTES # BLD AUTO: 0.93 10*3/MM3 (ref 0.7–3.1)
LYMPHOCYTES NFR BLD AUTO: 10.9 % (ref 19.6–45.3)
MAGNESIUM SERPL-MCNC: 2.2 MG/DL (ref 1.6–2.4)
MCH RBC QN AUTO: 26.9 PG (ref 26.6–33)
MCHC RBC AUTO-ENTMCNC: 29.8 G/DL (ref 31.5–35.7)
MCV RBC AUTO: 90 FL (ref 79–97)
MONOCYTES # BLD AUTO: 0.91 10*3/MM3 (ref 0.1–0.9)
MONOCYTES NFR BLD AUTO: 10.6 % (ref 5–12)
NEUTROPHILS # BLD AUTO: 6.54 10*3/MM3 (ref 1.7–7)
NEUTROPHILS NFR BLD AUTO: 76.5 % (ref 42.7–76)
PLATELET # BLD AUTO: 305 10*3/MM3 (ref 140–450)
PMV BLD AUTO: 12 FL (ref 6–12)
POTASSIUM BLD-SCNC: 3.1 MMOL/L (ref 3.5–5.2)
POTASSIUM BLD-SCNC: 3.8 MMOL/L (ref 3.5–5.2)
RBC # BLD AUTO: 3.61 10*6/MM3 (ref 3.77–5.28)
REAGIN AB CSF QL: NON REACTIVE
SODIUM BLD-SCNC: 141 MMOL/L (ref 136–145)
UFH PPP CHRO-ACNC: 0.18 IU/ML (ref 0.3–0.7)
UFH PPP CHRO-ACNC: 0.2 IU/ML (ref 0.3–0.7)
UFH PPP CHRO-ACNC: 0.34 IU/ML (ref 0.3–0.7)
WBC NRBC COR # BLD: 8.55 10*3/MM3 (ref 3.4–10.8)

## 2019-04-23 PROCEDURE — 85520 HEPARIN ASSAY: CPT

## 2019-04-23 PROCEDURE — 92610 EVALUATE SWALLOWING FUNCTION: CPT

## 2019-04-23 PROCEDURE — 94799 UNLISTED PULMONARY SVC/PX: CPT

## 2019-04-23 PROCEDURE — 83735 ASSAY OF MAGNESIUM: CPT | Performed by: INTERNAL MEDICINE

## 2019-04-23 PROCEDURE — 85025 COMPLETE CBC W/AUTO DIFF WBC: CPT | Performed by: INTERNAL MEDICINE

## 2019-04-23 PROCEDURE — 84132 ASSAY OF SERUM POTASSIUM: CPT | Performed by: INTERNAL MEDICINE

## 2019-04-23 PROCEDURE — 25010000003 POTASSIUM CHLORIDE 10 MEQ/100ML SOLUTION: Performed by: NURSE PRACTITIONER

## 2019-04-23 PROCEDURE — 71045 X-RAY EXAM CHEST 1 VIEW: CPT

## 2019-04-23 PROCEDURE — 82962 GLUCOSE BLOOD TEST: CPT

## 2019-04-23 PROCEDURE — 25010000002 VANCOMYCIN PER 500 MG

## 2019-04-23 PROCEDURE — 99233 SBSQ HOSP IP/OBS HIGH 50: CPT | Performed by: INTERNAL MEDICINE

## 2019-04-23 PROCEDURE — 25010000002 PIPERACILLIN SOD-TAZOBACTAM PER 1 G: Performed by: INTERNAL MEDICINE

## 2019-04-23 PROCEDURE — 25010000002 HEPARIN (PORCINE) PER 1000 UNITS

## 2019-04-23 PROCEDURE — 80048 BASIC METABOLIC PNL TOTAL CA: CPT | Performed by: INTERNAL MEDICINE

## 2019-04-23 PROCEDURE — 99223 1ST HOSP IP/OBS HIGH 75: CPT | Performed by: PSYCHIATRY & NEUROLOGY

## 2019-04-23 RX ORDER — METOPROLOL TARTRATE 5 MG/5ML
5 INJECTION INTRAVENOUS EVERY 4 HOURS PRN
Status: DISCONTINUED | OUTPATIENT
Start: 2019-04-23 | End: 2019-04-29 | Stop reason: HOSPADM

## 2019-04-23 RX ORDER — HEPARIN SODIUM 1000 [USP'U]/ML
1500 INJECTION, SOLUTION INTRAVENOUS; SUBCUTANEOUS ONCE
Status: COMPLETED | OUTPATIENT
Start: 2019-04-23 | End: 2019-04-23

## 2019-04-23 RX ORDER — ACETAMINOPHEN 650 MG/1
650 SUPPOSITORY RECTAL EVERY 4 HOURS PRN
Status: DISCONTINUED | OUTPATIENT
Start: 2019-04-23 | End: 2019-04-24

## 2019-04-23 RX ORDER — METOPROLOL TARTRATE 5 MG/5ML
5 INJECTION INTRAVENOUS EVERY 6 HOURS SCHEDULED
Status: DISCONTINUED | OUTPATIENT
Start: 2019-04-23 | End: 2019-04-29 | Stop reason: HOSPADM

## 2019-04-23 RX ORDER — HEPARIN SODIUM 1000 [USP'U]/ML
2000 INJECTION, SOLUTION INTRAVENOUS; SUBCUTANEOUS ONCE
Status: COMPLETED | OUTPATIENT
Start: 2019-04-23 | End: 2019-04-23

## 2019-04-23 RX ADMIN — HEPARIN SODIUM 1500 UNITS: 1000 INJECTION, SOLUTION INTRAVENOUS; SUBCUTANEOUS at 06:11

## 2019-04-23 RX ADMIN — ACETAMINOPHEN 650 MG: 650 SUPPOSITORY RECTAL at 01:30

## 2019-04-23 RX ADMIN — VALPROATE SODIUM 250 MG: 100 INJECTION, SOLUTION INTRAVENOUS at 21:43

## 2019-04-23 RX ADMIN — IPRATROPIUM BROMIDE AND ALBUTEROL SULFATE 3 ML: 2.5; .5 SOLUTION RESPIRATORY (INHALATION) at 12:52

## 2019-04-23 RX ADMIN — TAZOBACTAM SODIUM AND PIPERACILLIN SODIUM 3.38 G: 375; 3 INJECTION, SOLUTION INTRAVENOUS at 22:52

## 2019-04-23 RX ADMIN — CHLORHEXIDINE GLUCONATE 0.12% ORAL RINSE 15 ML: 1.2 LIQUID ORAL at 08:46

## 2019-04-23 RX ADMIN — ACETAMINOPHEN 650 MG: 650 SUPPOSITORY RECTAL at 10:22

## 2019-04-23 RX ADMIN — POTASSIUM CHLORIDE 10 MEQ: 10 INJECTION, SOLUTION INTRAVENOUS at 14:16

## 2019-04-23 RX ADMIN — TAZOBACTAM SODIUM AND PIPERACILLIN SODIUM 3.38 G: 375; 3 INJECTION, SOLUTION INTRAVENOUS at 14:08

## 2019-04-23 RX ADMIN — METOPROLOL TARTRATE 5 MG: 5 INJECTION INTRAVENOUS at 17:21

## 2019-04-23 RX ADMIN — POTASSIUM CHLORIDE 10 MEQ: 10 INJECTION, SOLUTION INTRAVENOUS at 12:13

## 2019-04-23 RX ADMIN — METOPROLOL TARTRATE 5 MG: 5 INJECTION INTRAVENOUS at 14:08

## 2019-04-23 RX ADMIN — VALPROATE SODIUM 250 MG: 100 INJECTION, SOLUTION INTRAVENOUS at 01:31

## 2019-04-23 RX ADMIN — HEPARIN SODIUM 2000 UNITS: 1000 INJECTION, SOLUTION INTRAVENOUS; SUBCUTANEOUS at 14:08

## 2019-04-23 RX ADMIN — POTASSIUM CHLORIDE 10 MEQ: 10 INJECTION, SOLUTION INTRAVENOUS at 17:21

## 2019-04-23 RX ADMIN — TAZOBACTAM SODIUM AND PIPERACILLIN SODIUM 3.38 G: 375; 3 INJECTION, SOLUTION INTRAVENOUS at 06:04

## 2019-04-23 RX ADMIN — METOPROLOL TARTRATE 5 MG: 5 INJECTION INTRAVENOUS at 01:30

## 2019-04-23 RX ADMIN — POTASSIUM CHLORIDE 10 MEQ: 10 INJECTION, SOLUTION INTRAVENOUS at 06:04

## 2019-04-23 RX ADMIN — IPRATROPIUM BROMIDE AND ALBUTEROL SULFATE 3 ML: 2.5; .5 SOLUTION RESPIRATORY (INHALATION) at 19:49

## 2019-04-23 RX ADMIN — POTASSIUM CHLORIDE 10 MEQ: 10 INJECTION, SOLUTION INTRAVENOUS at 10:16

## 2019-04-23 RX ADMIN — IPRATROPIUM BROMIDE AND ALBUTEROL SULFATE 3 ML: 2.5; .5 SOLUTION RESPIRATORY (INHALATION) at 00:03

## 2019-04-23 RX ADMIN — VALPROATE SODIUM 250 MG: 100 INJECTION, SOLUTION INTRAVENOUS at 08:54

## 2019-04-23 RX ADMIN — FAMOTIDINE 20 MG: 10 INJECTION, SOLUTION INTRAVENOUS at 08:46

## 2019-04-23 RX ADMIN — VANCOMYCIN HYDROCHLORIDE 750 MG: 750 INJECTION, SOLUTION INTRAVENOUS at 04:12

## 2019-04-23 RX ADMIN — IPRATROPIUM BROMIDE AND ALBUTEROL SULFATE 3 ML: 2.5; .5 SOLUTION RESPIRATORY (INHALATION) at 06:58

## 2019-04-23 RX ADMIN — METOPROLOL TARTRATE 5 MG: 5 INJECTION INTRAVENOUS at 23:43

## 2019-04-23 RX ADMIN — POTASSIUM CHLORIDE 10 MEQ: 10 INJECTION, SOLUTION INTRAVENOUS at 08:46

## 2019-04-24 ENCOUNTER — ANCILLARY PROCEDURE (OUTPATIENT)
Dept: SPEECH THERAPY | Facility: HOSPITAL | Age: 74
End: 2019-04-24

## 2019-04-24 DIAGNOSIS — F29 PSYCHOSIS, UNSPECIFIED PSYCHOSIS TYPE (HCC): ICD-10-CM

## 2019-04-24 DIAGNOSIS — F41.1 GENERALIZED ANXIETY DISORDER: ICD-10-CM

## 2019-04-24 LAB
ANION GAP SERPL CALCULATED.3IONS-SCNC: 20 MMOL/L
BACTERIA SPEC RESP CULT: NORMAL
BASOPHILS # BLD AUTO: 0.03 10*3/MM3 (ref 0–0.2)
BASOPHILS NFR BLD AUTO: 0.5 % (ref 0–1.5)
BUN BLD-MCNC: 10 MG/DL (ref 8–23)
BUN/CREAT SERPL: 14.5 (ref 7–25)
CALCIUM SPEC-SCNC: 10.6 MG/DL (ref 8.6–10.5)
CHLORIDE SERPL-SCNC: 102 MMOL/L (ref 98–107)
CO2 SERPL-SCNC: 19 MMOL/L (ref 22–29)
CREAT BLD-MCNC: 0.69 MG/DL (ref 0.57–1)
DEPRECATED RDW RBC AUTO: 51.6 FL (ref 37–54)
EOSINOPHIL # BLD AUTO: 0.2 10*3/MM3 (ref 0–0.4)
EOSINOPHIL NFR BLD AUTO: 3 % (ref 0.3–6.2)
ERYTHROCYTE [DISTWIDTH] IN BLOOD BY AUTOMATED COUNT: 15.8 % (ref 12.3–15.4)
GFR SERPL CREATININE-BSD FRML MDRD: 84 ML/MIN/1.73
GLUCOSE BLD-MCNC: 73 MG/DL (ref 65–99)
GLUCOSE BLDC GLUCOMTR-MCNC: 73 MG/DL (ref 70–130)
GLUCOSE BLDC GLUCOMTR-MCNC: 74 MG/DL (ref 70–130)
GRAM STN SPEC: NORMAL
HCT VFR BLD AUTO: 35.2 % (ref 34–46.6)
HGB BLD-MCNC: 10.7 G/DL (ref 12–15.9)
IMM GRANULOCYTES # BLD AUTO: 0.04 10*3/MM3 (ref 0–0.05)
IMM GRANULOCYTES NFR BLD AUTO: 0.6 % (ref 0–0.5)
LYMPHOCYTES # BLD AUTO: 1.47 10*3/MM3 (ref 0.7–3.1)
LYMPHOCYTES NFR BLD AUTO: 22.2 % (ref 19.6–45.3)
MCH RBC QN AUTO: 27.1 PG (ref 26.6–33)
MCHC RBC AUTO-ENTMCNC: 30.4 G/DL (ref 31.5–35.7)
MCV RBC AUTO: 89.1 FL (ref 79–97)
MONOCYTES # BLD AUTO: 0.71 10*3/MM3 (ref 0.1–0.9)
MONOCYTES NFR BLD AUTO: 10.7 % (ref 5–12)
NEUTROPHILS # BLD AUTO: 4.21 10*3/MM3 (ref 1.7–7)
NEUTROPHILS NFR BLD AUTO: 63.6 % (ref 42.7–76)
OLIGOCLONAL BANDS.IT SER+CSF QL: NORMAL
PLATELET # BLD AUTO: 388 10*3/MM3 (ref 140–450)
PMV BLD AUTO: 11.5 FL (ref 6–12)
POTASSIUM BLD-SCNC: 3.6 MMOL/L (ref 3.5–5.2)
RBC # BLD AUTO: 3.95 10*6/MM3 (ref 3.77–5.28)
SODIUM BLD-SCNC: 141 MMOL/L (ref 136–145)
UFH PPP CHRO-ACNC: 0.19 IU/ML (ref 0.3–0.7)
VANCOMYCIN TROUGH SERPL-MCNC: 13.4 MCG/ML (ref 5–20)
WBC NRBC COR # BLD: 6.62 10*3/MM3 (ref 3.4–10.8)

## 2019-04-24 PROCEDURE — 94799 UNLISTED PULMONARY SVC/PX: CPT

## 2019-04-24 PROCEDURE — 25010000002 HEPARIN (PORCINE) PER 1000 UNITS

## 2019-04-24 PROCEDURE — 25010000002 HEPARIN (PORCINE) IN NACL 25000-0.45 UT/250ML-% SOLUTION

## 2019-04-24 PROCEDURE — 85520 HEPARIN ASSAY: CPT

## 2019-04-24 PROCEDURE — 99233 SBSQ HOSP IP/OBS HIGH 50: CPT | Performed by: INTERNAL MEDICINE

## 2019-04-24 PROCEDURE — 25010000002 VANCOMYCIN PER 500 MG

## 2019-04-24 PROCEDURE — 99232 SBSQ HOSP IP/OBS MODERATE 35: CPT | Performed by: PSYCHIATRY & NEUROLOGY

## 2019-04-24 PROCEDURE — 92610 EVALUATE SWALLOWING FUNCTION: CPT

## 2019-04-24 PROCEDURE — 82962 GLUCOSE BLOOD TEST: CPT

## 2019-04-24 PROCEDURE — 80202 ASSAY OF VANCOMYCIN: CPT

## 2019-04-24 PROCEDURE — 92612 ENDOSCOPY SWALLOW (FEES) VID: CPT

## 2019-04-24 PROCEDURE — 80048 BASIC METABOLIC PNL TOTAL CA: CPT | Performed by: INTERNAL MEDICINE

## 2019-04-24 PROCEDURE — 25010000003 POTASSIUM CHLORIDE 10 MEQ/100ML SOLUTION: Performed by: NURSE PRACTITIONER

## 2019-04-24 PROCEDURE — 25010000002 ENOXAPARIN PER 10 MG: Performed by: INTERNAL MEDICINE

## 2019-04-24 PROCEDURE — 97535 SELF CARE MNGMENT TRAINING: CPT

## 2019-04-24 PROCEDURE — 25010000002 PIPERACILLIN SOD-TAZOBACTAM PER 1 G: Performed by: INTERNAL MEDICINE

## 2019-04-24 PROCEDURE — 85025 COMPLETE CBC W/AUTO DIFF WBC: CPT | Performed by: INTERNAL MEDICINE

## 2019-04-24 PROCEDURE — 25010000002 HYDROMORPHONE PER 4 MG: Performed by: FAMILY MEDICINE

## 2019-04-24 PROCEDURE — 94660 CPAP INITIATION&MGMT: CPT

## 2019-04-24 RX ORDER — DULOXETIN HYDROCHLORIDE 30 MG/1
CAPSULE, DELAYED RELEASE ORAL
Qty: 30 CAPSULE | Refills: 0 | OUTPATIENT
Start: 2019-04-24

## 2019-04-24 RX ORDER — BUSPIRONE HYDROCHLORIDE 10 MG/1
TABLET ORAL
Qty: 90 TABLET | Refills: 0 | OUTPATIENT
Start: 2019-04-24

## 2019-04-24 RX ORDER — ACETAMINOPHEN 650 MG/1
650 SUPPOSITORY RECTAL EVERY 4 HOURS PRN
Status: DISCONTINUED | OUTPATIENT
Start: 2019-04-24 | End: 2019-04-29 | Stop reason: HOSPADM

## 2019-04-24 RX ORDER — ENALAPRILAT 2.5 MG/2ML
0.62 INJECTION INTRAVENOUS EVERY 12 HOURS
Status: DISCONTINUED | OUTPATIENT
Start: 2019-04-24 | End: 2019-04-26

## 2019-04-24 RX ORDER — IPRATROPIUM BROMIDE AND ALBUTEROL SULFATE 2.5; .5 MG/3ML; MG/3ML
3 SOLUTION RESPIRATORY (INHALATION) EVERY 6 HOURS PRN
Status: DISCONTINUED | OUTPATIENT
Start: 2019-04-24 | End: 2019-04-24 | Stop reason: SDUPTHER

## 2019-04-24 RX ORDER — QUETIAPINE FUMARATE 50 MG/1
TABLET, FILM COATED ORAL
Qty: 30 TABLET | Refills: 0 | OUTPATIENT
Start: 2019-04-24

## 2019-04-24 RX ORDER — FAMOTIDINE 10 MG/ML
20 INJECTION, SOLUTION INTRAVENOUS DAILY
Status: DISCONTINUED | OUTPATIENT
Start: 2019-04-24 | End: 2019-04-26

## 2019-04-24 RX ORDER — DIVALPROEX SODIUM 250 MG/1
TABLET, DELAYED RELEASE ORAL
Qty: 60 TABLET | Refills: 0 | OUTPATIENT
Start: 2019-04-24

## 2019-04-24 RX ORDER — HEPARIN SODIUM 1000 [USP'U]/ML
2000 INJECTION, SOLUTION INTRAVENOUS; SUBCUTANEOUS ONCE
Status: COMPLETED | OUTPATIENT
Start: 2019-04-24 | End: 2019-04-24

## 2019-04-24 RX ORDER — HYDROMORPHONE HYDROCHLORIDE 1 MG/ML
0.25 INJECTION, SOLUTION INTRAMUSCULAR; INTRAVENOUS; SUBCUTANEOUS
Status: DISCONTINUED | OUTPATIENT
Start: 2019-04-24 | End: 2019-04-29 | Stop reason: HOSPADM

## 2019-04-24 RX ADMIN — ENOXAPARIN SODIUM 40 MG: 40 INJECTION SUBCUTANEOUS at 11:47

## 2019-04-24 RX ADMIN — HEPARIN SODIUM 2000 UNITS: 1000 INJECTION, SOLUTION INTRAVENOUS; SUBCUTANEOUS at 08:10

## 2019-04-24 RX ADMIN — POTASSIUM CHLORIDE 10 MEQ: 10 INJECTION, SOLUTION INTRAVENOUS at 14:14

## 2019-04-24 RX ADMIN — HYDROMORPHONE HYDROCHLORIDE 0.25 MG: 1 INJECTION, SOLUTION INTRAMUSCULAR; INTRAVENOUS; SUBCUTANEOUS at 15:55

## 2019-04-24 RX ADMIN — METOPROLOL TARTRATE 5 MG: 5 INJECTION INTRAVENOUS at 17:30

## 2019-04-24 RX ADMIN — VANCOMYCIN HYDROCHLORIDE 750 MG: 750 INJECTION, SOLUTION INTRAVENOUS at 08:48

## 2019-04-24 RX ADMIN — IPRATROPIUM BROMIDE AND ALBUTEROL SULFATE 3 ML: 2.5; .5 SOLUTION RESPIRATORY (INHALATION) at 00:48

## 2019-04-24 RX ADMIN — VALPROATE SODIUM 250 MG: 100 INJECTION, SOLUTION INTRAVENOUS at 08:48

## 2019-04-24 RX ADMIN — POTASSIUM CHLORIDE 10 MEQ: 10 INJECTION, SOLUTION INTRAVENOUS at 11:47

## 2019-04-24 RX ADMIN — TAZOBACTAM SODIUM AND PIPERACILLIN SODIUM 3.38 G: 375; 3 INJECTION, SOLUTION INTRAVENOUS at 22:54

## 2019-04-24 RX ADMIN — METOPROLOL TARTRATE 5 MG: 5 INJECTION INTRAVENOUS at 11:47

## 2019-04-24 RX ADMIN — HYDROMORPHONE HYDROCHLORIDE 0.25 MG: 1 INJECTION, SOLUTION INTRAMUSCULAR; INTRAVENOUS; SUBCUTANEOUS at 22:54

## 2019-04-24 RX ADMIN — ENALAPRILAT 0.62 MG: 1.25 INJECTION INTRAVENOUS at 17:30

## 2019-04-24 RX ADMIN — METOPROLOL TARTRATE 5 MG: 5 INJECTION INTRAVENOUS at 15:54

## 2019-04-24 RX ADMIN — TAZOBACTAM SODIUM AND PIPERACILLIN SODIUM 3.38 G: 375; 3 INJECTION, SOLUTION INTRAVENOUS at 05:43

## 2019-04-24 RX ADMIN — ACETAMINOPHEN 650 MG: 650 SUPPOSITORY RECTAL at 00:36

## 2019-04-24 RX ADMIN — VALPROATE SODIUM 250 MG: 100 INJECTION, SOLUTION INTRAVENOUS at 21:00

## 2019-04-24 RX ADMIN — ACETAMINOPHEN 650 MG: 650 SUPPOSITORY RECTAL at 11:47

## 2019-04-24 RX ADMIN — FAMOTIDINE 20 MG: 10 INJECTION, SOLUTION INTRAVENOUS at 08:12

## 2019-04-24 RX ADMIN — HEPARIN SODIUM 22 UNITS/KG/HR: 10000 INJECTION, SOLUTION INTRAVENOUS at 08:29

## 2019-04-24 RX ADMIN — POTASSIUM CHLORIDE 10 MEQ: 10 INJECTION, SOLUTION INTRAVENOUS at 15:55

## 2019-04-24 RX ADMIN — METOPROLOL TARTRATE 5 MG: 5 INJECTION INTRAVENOUS at 05:43

## 2019-04-24 RX ADMIN — TAZOBACTAM SODIUM AND PIPERACILLIN SODIUM 3.38 G: 375; 3 INJECTION, SOLUTION INTRAVENOUS at 14:15

## 2019-04-24 NOTE — TELEPHONE ENCOUNTER
It looks like she is currently inpatient and had to be placed on the ventilator at one time. So will wait on medications until discharged and follow-up appointment made. Thanks

## 2019-04-25 LAB
ANION GAP SERPL CALCULATED.3IONS-SCNC: 15 MMOL/L
APTT PPP: 38 SECONDS (ref 24–37)
BUN BLD-MCNC: 11 MG/DL (ref 8–23)
BUN/CREAT SERPL: 14.7 (ref 7–25)
CALCIUM SPEC-SCNC: 10.5 MG/DL (ref 8.6–10.5)
CHLORIDE SERPL-SCNC: 101 MMOL/L (ref 98–107)
CO2 SERPL-SCNC: 21 MMOL/L (ref 22–29)
CREAT BLD-MCNC: 0.75 MG/DL (ref 0.57–1)
DEPRECATED RDW RBC AUTO: 50.6 FL (ref 37–54)
ERYTHROCYTE [DISTWIDTH] IN BLOOD BY AUTOMATED COUNT: 15.8 % (ref 12.3–15.4)
GFR SERPL CREATININE-BSD FRML MDRD: 76 ML/MIN/1.73
GLUCOSE BLD-MCNC: 120 MG/DL (ref 65–99)
GLUCOSE BLDC GLUCOMTR-MCNC: 102 MG/DL (ref 70–130)
HCT VFR BLD AUTO: 34.2 % (ref 34–46.6)
HGB BLD-MCNC: 10.4 G/DL (ref 12–15.9)
INR PPP: 1.16 (ref 0.85–1.16)
MCH RBC QN AUTO: 27 PG (ref 26.6–33)
MCHC RBC AUTO-ENTMCNC: 30.4 G/DL (ref 31.5–35.7)
MCV RBC AUTO: 88.8 FL (ref 79–97)
PLATELET # BLD AUTO: 383 10*3/MM3 (ref 140–450)
PMV BLD AUTO: 11.1 FL (ref 6–12)
POTASSIUM BLD-SCNC: 3.3 MMOL/L (ref 3.5–5.2)
PROTHROMBIN TIME: 14.3 SECONDS (ref 11.2–14.3)
RBC # BLD AUTO: 3.85 10*6/MM3 (ref 3.77–5.28)
SODIUM BLD-SCNC: 137 MMOL/L (ref 136–145)
WBC NRBC COR # BLD: 5.51 10*3/MM3 (ref 3.4–10.8)

## 2019-04-25 PROCEDURE — 80048 BASIC METABOLIC PNL TOTAL CA: CPT | Performed by: INTERNAL MEDICINE

## 2019-04-25 PROCEDURE — 85730 THROMBOPLASTIN TIME PARTIAL: CPT | Performed by: INTERNAL MEDICINE

## 2019-04-25 PROCEDURE — 85027 COMPLETE CBC AUTOMATED: CPT | Performed by: INTERNAL MEDICINE

## 2019-04-25 PROCEDURE — 99232 SBSQ HOSP IP/OBS MODERATE 35: CPT | Performed by: INTERNAL MEDICINE

## 2019-04-25 PROCEDURE — 82962 GLUCOSE BLOOD TEST: CPT

## 2019-04-25 PROCEDURE — 99231 SBSQ HOSP IP/OBS SF/LOW 25: CPT | Performed by: PSYCHIATRY & NEUROLOGY

## 2019-04-25 PROCEDURE — 97535 SELF CARE MNGMENT TRAINING: CPT

## 2019-04-25 PROCEDURE — 25010000002 VANCOMYCIN PER 500 MG

## 2019-04-25 PROCEDURE — 25010000002 HYDROMORPHONE PER 4 MG: Performed by: FAMILY MEDICINE

## 2019-04-25 PROCEDURE — 25010000002 PIPERACILLIN SOD-TAZOBACTAM PER 1 G: Performed by: INTERNAL MEDICINE

## 2019-04-25 PROCEDURE — 85610 PROTHROMBIN TIME: CPT | Performed by: INTERNAL MEDICINE

## 2019-04-25 PROCEDURE — 25010000002 ENOXAPARIN PER 10 MG: Performed by: INTERNAL MEDICINE

## 2019-04-25 RX ORDER — GLYCOPYRROLATE 0.2 MG/ML
0.2 INJECTION INTRAMUSCULAR; INTRAVENOUS EVERY 4 HOURS PRN
Status: DISCONTINUED | OUTPATIENT
Start: 2019-04-25 | End: 2019-04-29 | Stop reason: HOSPADM

## 2019-04-25 RX ADMIN — METOPROLOL TARTRATE 5 MG: 5 INJECTION INTRAVENOUS at 13:08

## 2019-04-25 RX ADMIN — VALPROATE SODIUM 250 MG: 100 INJECTION, SOLUTION INTRAVENOUS at 14:07

## 2019-04-25 RX ADMIN — TAZOBACTAM SODIUM AND PIPERACILLIN SODIUM 3.38 G: 375; 3 INJECTION, SOLUTION INTRAVENOUS at 22:27

## 2019-04-25 RX ADMIN — ENALAPRILAT 0.62 MG: 1.25 INJECTION INTRAVENOUS at 18:19

## 2019-04-25 RX ADMIN — VANCOMYCIN HYDROCHLORIDE 750 MG: 750 INJECTION, SOLUTION INTRAVENOUS at 05:38

## 2019-04-25 RX ADMIN — GLYCOPYRROLATE 0.2 MG: 0.2 INJECTION, SOLUTION INTRAMUSCULAR; INTRAVENOUS at 17:50

## 2019-04-25 RX ADMIN — TAZOBACTAM SODIUM AND PIPERACILLIN SODIUM 3.38 G: 375; 3 INJECTION, SOLUTION INTRAVENOUS at 05:38

## 2019-04-25 RX ADMIN — HYDROMORPHONE HYDROCHLORIDE 0.25 MG: 1 INJECTION, SOLUTION INTRAMUSCULAR; INTRAVENOUS; SUBCUTANEOUS at 17:50

## 2019-04-25 RX ADMIN — VALPROATE SODIUM 250 MG: 100 INJECTION, SOLUTION INTRAVENOUS at 21:24

## 2019-04-25 RX ADMIN — ENOXAPARIN SODIUM 40 MG: 40 INJECTION SUBCUTANEOUS at 12:59

## 2019-04-25 RX ADMIN — FAMOTIDINE 20 MG: 10 INJECTION, SOLUTION INTRAVENOUS at 08:49

## 2019-04-25 RX ADMIN — METOPROLOL TARTRATE 5 MG: 5 INJECTION INTRAVENOUS at 18:19

## 2019-04-25 RX ADMIN — TAZOBACTAM SODIUM AND PIPERACILLIN SODIUM 3.38 G: 375; 3 INJECTION, SOLUTION INTRAVENOUS at 15:43

## 2019-04-25 RX ADMIN — HYDROMORPHONE HYDROCHLORIDE 0.25 MG: 1 INJECTION, SOLUTION INTRAMUSCULAR; INTRAVENOUS; SUBCUTANEOUS at 08:49

## 2019-04-25 RX ADMIN — ENALAPRILAT 0.62 MG: 1.25 INJECTION INTRAVENOUS at 05:37

## 2019-04-25 RX ADMIN — METOPROLOL TARTRATE 5 MG: 5 INJECTION INTRAVENOUS at 05:37

## 2019-04-26 LAB
ANION GAP SERPL CALCULATED.3IONS-SCNC: 19 MMOL/L
BACTERIA SPEC AEROBE CULT: NORMAL
BACTERIA SPEC AEROBE CULT: NORMAL
BASOPHILS # BLD AUTO: 0.05 10*3/MM3 (ref 0–0.2)
BASOPHILS NFR BLD AUTO: 0.7 % (ref 0–1.5)
BUN BLD-MCNC: 13 MG/DL (ref 8–23)
BUN/CREAT SERPL: 17.3 (ref 7–25)
CALCIUM SPEC-SCNC: 10.9 MG/DL (ref 8.6–10.5)
CHLORIDE SERPL-SCNC: 102 MMOL/L (ref 98–107)
CO2 SERPL-SCNC: 21 MMOL/L (ref 22–29)
CREAT BLD-MCNC: 0.75 MG/DL (ref 0.57–1)
DEPRECATED RDW RBC AUTO: 50.6 FL (ref 37–54)
EOSINOPHIL # BLD AUTO: 0.15 10*3/MM3 (ref 0–0.4)
EOSINOPHIL NFR BLD AUTO: 2.1 % (ref 0.3–6.2)
ERYTHROCYTE [DISTWIDTH] IN BLOOD BY AUTOMATED COUNT: 15.8 % (ref 12.3–15.4)
GFR SERPL CREATININE-BSD FRML MDRD: 76 ML/MIN/1.73
GLUCOSE BLD-MCNC: 77 MG/DL (ref 65–99)
HCT VFR BLD AUTO: 34.3 % (ref 34–46.6)
HGB BLD-MCNC: 10.6 G/DL (ref 12–15.9)
IMM GRANULOCYTES # BLD AUTO: 0.03 10*3/MM3 (ref 0–0.05)
IMM GRANULOCYTES NFR BLD AUTO: 0.4 % (ref 0–0.5)
LYMPHOCYTES # BLD AUTO: 1.65 10*3/MM3 (ref 0.7–3.1)
LYMPHOCYTES NFR BLD AUTO: 22.6 % (ref 19.6–45.3)
MCH RBC QN AUTO: 27.1 PG (ref 26.6–33)
MCHC RBC AUTO-ENTMCNC: 30.9 G/DL (ref 31.5–35.7)
MCV RBC AUTO: 87.7 FL (ref 79–97)
MONOCYTES # BLD AUTO: 1.04 10*3/MM3 (ref 0.1–0.9)
MONOCYTES NFR BLD AUTO: 14.3 % (ref 5–12)
NEUTROPHILS # BLD AUTO: 4.37 10*3/MM3 (ref 1.7–7)
NEUTROPHILS NFR BLD AUTO: 59.9 % (ref 42.7–76)
PLATELET # BLD AUTO: 373 10*3/MM3 (ref 140–450)
PMV BLD AUTO: 10.5 FL (ref 6–12)
POTASSIUM BLD-SCNC: 3.3 MMOL/L (ref 3.5–5.2)
RBC # BLD AUTO: 3.91 10*6/MM3 (ref 3.77–5.28)
SODIUM BLD-SCNC: 142 MMOL/L (ref 136–145)
WBC NRBC COR # BLD: 7.29 10*3/MM3 (ref 3.4–10.8)

## 2019-04-26 PROCEDURE — 25010000002 PIPERACILLIN SOD-TAZOBACTAM PER 1 G: Performed by: INTERNAL MEDICINE

## 2019-04-26 PROCEDURE — 25010000002 VANCOMYCIN PER 500 MG

## 2019-04-26 PROCEDURE — 85025 COMPLETE CBC W/AUTO DIFF WBC: CPT | Performed by: INTERNAL MEDICINE

## 2019-04-26 PROCEDURE — 80048 BASIC METABOLIC PNL TOTAL CA: CPT | Performed by: INTERNAL MEDICINE

## 2019-04-26 PROCEDURE — 25010000002 HYDROMORPHONE PER 4 MG: Performed by: FAMILY MEDICINE

## 2019-04-26 PROCEDURE — 99233 SBSQ HOSP IP/OBS HIGH 50: CPT | Performed by: INTERNAL MEDICINE

## 2019-04-26 PROCEDURE — 25010000002 ENOXAPARIN PER 10 MG: Performed by: INTERNAL MEDICINE

## 2019-04-26 PROCEDURE — 25010000003 POTASSIUM CHLORIDE 10 MEQ/100ML SOLUTION: Performed by: PHYSICIAN ASSISTANT

## 2019-04-26 RX ORDER — SENNA AND DOCUSATE SODIUM 50; 8.6 MG/1; MG/1
2 TABLET, FILM COATED ORAL NIGHTLY
Status: DISCONTINUED | OUTPATIENT
Start: 2019-04-26 | End: 2019-04-29 | Stop reason: HOSPADM

## 2019-04-26 RX ORDER — LORAZEPAM 2 MG/ML
0.25 INJECTION INTRAMUSCULAR EVERY 6 HOURS PRN
Status: DISCONTINUED | OUTPATIENT
Start: 2019-04-26 | End: 2019-04-29 | Stop reason: HOSPADM

## 2019-04-26 RX ORDER — POTASSIUM CHLORIDE 7.45 MG/ML
10 INJECTION INTRAVENOUS
Status: DISCONTINUED | OUTPATIENT
Start: 2019-04-26 | End: 2019-04-26

## 2019-04-26 RX ORDER — MORPHINE SULFATE 10 MG/5ML
5 SOLUTION ORAL
Status: DISCONTINUED | OUTPATIENT
Start: 2019-04-26 | End: 2019-04-29 | Stop reason: HOSPADM

## 2019-04-26 RX ORDER — BISACODYL 10 MG
10 SUPPOSITORY, RECTAL RECTAL ONCE
Status: COMPLETED | OUTPATIENT
Start: 2019-04-26 | End: 2019-04-26

## 2019-04-26 RX ADMIN — POTASSIUM CHLORIDE 10 MEQ: 7.46 INJECTION, SOLUTION INTRAVENOUS at 12:26

## 2019-04-26 RX ADMIN — HYDROMORPHONE HYDROCHLORIDE 0.25 MG: 1 INJECTION, SOLUTION INTRAMUSCULAR; INTRAVENOUS; SUBCUTANEOUS at 14:21

## 2019-04-26 RX ADMIN — HYDROMORPHONE HYDROCHLORIDE 0.25 MG: 1 INJECTION, SOLUTION INTRAMUSCULAR; INTRAVENOUS; SUBCUTANEOUS at 08:04

## 2019-04-26 RX ADMIN — MORPHINE SULFATE 5 MG: 10 SOLUTION ORAL at 21:14

## 2019-04-26 RX ADMIN — MORPHINE SULFATE 5 MG: 10 SOLUTION ORAL at 16:59

## 2019-04-26 RX ADMIN — VANCOMYCIN HYDROCHLORIDE 750 MG: 750 INJECTION, SOLUTION INTRAVENOUS at 05:20

## 2019-04-26 RX ADMIN — ENOXAPARIN SODIUM 40 MG: 40 INJECTION SUBCUTANEOUS at 12:24

## 2019-04-26 RX ADMIN — HYDROMORPHONE HYDROCHLORIDE 0.25 MG: 1 INJECTION, SOLUTION INTRAMUSCULAR; INTRAVENOUS; SUBCUTANEOUS at 19:31

## 2019-04-26 RX ADMIN — TAZOBACTAM SODIUM AND PIPERACILLIN SODIUM 3.38 G: 375; 3 INJECTION, SOLUTION INTRAVENOUS at 06:19

## 2019-04-26 RX ADMIN — Medication 10 MG: at 17:00

## 2019-04-26 RX ADMIN — FAMOTIDINE 20 MG: 10 INJECTION, SOLUTION INTRAVENOUS at 08:03

## 2019-04-26 RX ADMIN — HYDROMORPHONE HYDROCHLORIDE 0.25 MG: 1 INJECTION, SOLUTION INTRAMUSCULAR; INTRAVENOUS; SUBCUTANEOUS at 00:14

## 2019-04-27 LAB
ANION GAP SERPL CALCULATED.3IONS-SCNC: 18 MMOL/L
BASOPHILS # BLD AUTO: 0.03 10*3/MM3 (ref 0–0.2)
BASOPHILS NFR BLD AUTO: 0.4 % (ref 0–1.5)
BUN BLD-MCNC: 13 MG/DL (ref 8–23)
BUN/CREAT SERPL: 15.7 (ref 7–25)
CALCIUM SPEC-SCNC: 10.5 MG/DL (ref 8.6–10.5)
CHLORIDE SERPL-SCNC: 105 MMOL/L (ref 98–107)
CO2 SERPL-SCNC: 22 MMOL/L (ref 22–29)
CREAT BLD-MCNC: 0.83 MG/DL (ref 0.57–1)
DEPRECATED RDW RBC AUTO: 50.3 FL (ref 37–54)
EOSINOPHIL # BLD AUTO: 0.18 10*3/MM3 (ref 0–0.4)
EOSINOPHIL NFR BLD AUTO: 2.5 % (ref 0.3–6.2)
ERYTHROCYTE [DISTWIDTH] IN BLOOD BY AUTOMATED COUNT: 15.7 % (ref 12.3–15.4)
GFR SERPL CREATININE-BSD FRML MDRD: 68 ML/MIN/1.73
GLUCOSE BLD-MCNC: 68 MG/DL (ref 65–99)
HCT VFR BLD AUTO: 33.1 % (ref 34–46.6)
HGB BLD-MCNC: 10.1 G/DL (ref 12–15.9)
IMM GRANULOCYTES # BLD AUTO: 0.04 10*3/MM3 (ref 0–0.05)
IMM GRANULOCYTES NFR BLD AUTO: 0.6 % (ref 0–0.5)
LYMPHOCYTES # BLD AUTO: 1.43 10*3/MM3 (ref 0.7–3.1)
LYMPHOCYTES NFR BLD AUTO: 19.8 % (ref 19.6–45.3)
MCH RBC QN AUTO: 27 PG (ref 26.6–33)
MCHC RBC AUTO-ENTMCNC: 30.5 G/DL (ref 31.5–35.7)
MCV RBC AUTO: 88.5 FL (ref 79–97)
MONOCYTES # BLD AUTO: 1.2 10*3/MM3 (ref 0.1–0.9)
MONOCYTES NFR BLD AUTO: 16.6 % (ref 5–12)
NEUTROPHILS # BLD AUTO: 4.33 10*3/MM3 (ref 1.7–7)
NEUTROPHILS NFR BLD AUTO: 60.1 % (ref 42.7–76)
PLATELET # BLD AUTO: 358 10*3/MM3 (ref 140–450)
PMV BLD AUTO: 10.6 FL (ref 6–12)
POTASSIUM BLD-SCNC: 3.3 MMOL/L (ref 3.5–5.2)
RBC # BLD AUTO: 3.74 10*6/MM3 (ref 3.77–5.28)
SODIUM BLD-SCNC: 145 MMOL/L (ref 136–145)
WBC NRBC COR # BLD: 7.21 10*3/MM3 (ref 3.4–10.8)

## 2019-04-27 PROCEDURE — 25010000002 HYDROMORPHONE PER 4 MG: Performed by: FAMILY MEDICINE

## 2019-04-27 PROCEDURE — 97535 SELF CARE MNGMENT TRAINING: CPT

## 2019-04-27 PROCEDURE — 94799 UNLISTED PULMONARY SVC/PX: CPT

## 2019-04-27 PROCEDURE — 85025 COMPLETE CBC W/AUTO DIFF WBC: CPT | Performed by: INTERNAL MEDICINE

## 2019-04-27 PROCEDURE — 99232 SBSQ HOSP IP/OBS MODERATE 35: CPT | Performed by: INTERNAL MEDICINE

## 2019-04-27 PROCEDURE — 80048 BASIC METABOLIC PNL TOTAL CA: CPT | Performed by: INTERNAL MEDICINE

## 2019-04-27 RX ADMIN — HYDROMORPHONE HYDROCHLORIDE 0.25 MG: 1 INJECTION, SOLUTION INTRAMUSCULAR; INTRAVENOUS; SUBCUTANEOUS at 20:16

## 2019-04-27 RX ADMIN — METOPROLOL TARTRATE 5 MG: 5 INJECTION INTRAVENOUS at 23:29

## 2019-04-27 RX ADMIN — METOPROLOL TARTRATE 5 MG: 5 INJECTION INTRAVENOUS at 18:19

## 2019-04-27 RX ADMIN — HYDROMORPHONE HYDROCHLORIDE 0.25 MG: 1 INJECTION, SOLUTION INTRAMUSCULAR; INTRAVENOUS; SUBCUTANEOUS at 14:40

## 2019-04-27 RX ADMIN — MINERAL OIL: 471.95 OIL ORAL at 23:30

## 2019-04-27 RX ADMIN — HYDROMORPHONE HYDROCHLORIDE 0.25 MG: 1 INJECTION, SOLUTION INTRAMUSCULAR; INTRAVENOUS; SUBCUTANEOUS at 12:29

## 2019-04-27 RX ADMIN — METOPROLOL TARTRATE 5 MG: 5 INJECTION INTRAVENOUS at 12:29

## 2019-04-27 RX ADMIN — HYDROMORPHONE HYDROCHLORIDE 0.25 MG: 1 INJECTION, SOLUTION INTRAMUSCULAR; INTRAVENOUS; SUBCUTANEOUS at 04:36

## 2019-04-27 RX ADMIN — HYDROMORPHONE HYDROCHLORIDE 0.25 MG: 1 INJECTION, SOLUTION INTRAMUSCULAR; INTRAVENOUS; SUBCUTANEOUS at 23:29

## 2019-04-27 RX ADMIN — HYDROMORPHONE HYDROCHLORIDE 0.25 MG: 1 INJECTION, SOLUTION INTRAMUSCULAR; INTRAVENOUS; SUBCUTANEOUS at 02:36

## 2019-04-28 PROCEDURE — 99232 SBSQ HOSP IP/OBS MODERATE 35: CPT | Performed by: INTERNAL MEDICINE

## 2019-04-28 PROCEDURE — 25010000002 LORAZEPAM PER 2 MG: Performed by: INTERNAL MEDICINE

## 2019-04-28 PROCEDURE — 25010000002 HYDROMORPHONE PER 4 MG: Performed by: FAMILY MEDICINE

## 2019-04-28 RX ADMIN — HYDROMORPHONE HYDROCHLORIDE 0.25 MG: 1 INJECTION, SOLUTION INTRAMUSCULAR; INTRAVENOUS; SUBCUTANEOUS at 18:28

## 2019-04-28 RX ADMIN — MINERAL OIL: 471.95 OIL ORAL at 16:02

## 2019-04-28 RX ADMIN — HYDROMORPHONE HYDROCHLORIDE 0.25 MG: 1 INJECTION, SOLUTION INTRAMUSCULAR; INTRAVENOUS; SUBCUTANEOUS at 10:23

## 2019-04-28 RX ADMIN — HYDROMORPHONE HYDROCHLORIDE 0.25 MG: 1 INJECTION, SOLUTION INTRAMUSCULAR; INTRAVENOUS; SUBCUTANEOUS at 03:37

## 2019-04-28 RX ADMIN — METOPROLOL TARTRATE 5 MG: 5 INJECTION INTRAVENOUS at 12:06

## 2019-04-28 RX ADMIN — HYDROMORPHONE HYDROCHLORIDE 0.25 MG: 1 INJECTION, SOLUTION INTRAMUSCULAR; INTRAVENOUS; SUBCUTANEOUS at 08:27

## 2019-04-28 RX ADMIN — SENNOSIDES AND DOCUSATE SODIUM 2 TABLET: 8.6; 5 TABLET ORAL at 20:44

## 2019-04-28 RX ADMIN — HYDROMORPHONE HYDROCHLORIDE 0.25 MG: 1 INJECTION, SOLUTION INTRAMUSCULAR; INTRAVENOUS; SUBCUTANEOUS at 13:50

## 2019-04-28 RX ADMIN — LORAZEPAM 0.25 MG: 2 INJECTION INTRAMUSCULAR; INTRAVENOUS at 23:58

## 2019-04-28 RX ADMIN — HYDROMORPHONE HYDROCHLORIDE 0.25 MG: 1 INJECTION, SOLUTION INTRAMUSCULAR; INTRAVENOUS; SUBCUTANEOUS at 16:02

## 2019-04-28 RX ADMIN — HYDROMORPHONE HYDROCHLORIDE 0.25 MG: 1 INJECTION, SOLUTION INTRAMUSCULAR; INTRAVENOUS; SUBCUTANEOUS at 05:58

## 2019-04-28 RX ADMIN — MINERAL OIL: 471.95 OIL ORAL at 03:37

## 2019-04-28 RX ADMIN — MINERAL OIL: 471.95 OIL ORAL at 05:58

## 2019-04-28 RX ADMIN — MORPHINE SULFATE 5 MG: 10 SOLUTION ORAL at 23:58

## 2019-04-28 RX ADMIN — HYDROMORPHONE HYDROCHLORIDE 0.25 MG: 1 INJECTION, SOLUTION INTRAMUSCULAR; INTRAVENOUS; SUBCUTANEOUS at 20:53

## 2019-04-28 RX ADMIN — METOPROLOL TARTRATE 5 MG: 5 INJECTION INTRAVENOUS at 05:58

## 2019-04-29 VITALS
TEMPERATURE: 98.7 F | HEART RATE: 92 BPM | DIASTOLIC BLOOD PRESSURE: 70 MMHG | BODY MASS INDEX: 18.44 KG/M2 | OXYGEN SATURATION: 96 % | SYSTOLIC BLOOD PRESSURE: 154 MMHG | RESPIRATION RATE: 18 BRPM | WEIGHT: 93.92 LBS | HEIGHT: 60 IN

## 2019-04-29 PROCEDURE — 25010000002 LORAZEPAM PER 2 MG: Performed by: INTERNAL MEDICINE

## 2019-04-29 PROCEDURE — 25010000002 HYDROMORPHONE PER 4 MG: Performed by: FAMILY MEDICINE

## 2019-04-29 PROCEDURE — 99239 HOSP IP/OBS DSCHRG MGMT >30: CPT | Performed by: PHYSICIAN ASSISTANT

## 2019-04-29 RX ORDER — METOPROLOL SUCCINATE 25 MG/1
25 TABLET, EXTENDED RELEASE ORAL DAILY
Qty: 30 TABLET | Refills: 1 | Status: SHIPPED | OUTPATIENT
Start: 2019-04-29

## 2019-04-29 RX ORDER — MORPHINE SULFATE 20 MG/5ML
5 SOLUTION ORAL
Qty: 30 ML | Refills: 0
Start: 2019-04-29

## 2019-04-29 RX ORDER — LORAZEPAM 0.5 MG/1
0.25 TABLET ORAL EVERY 6 HOURS PRN
Qty: 12 TABLET | Refills: 0
Start: 2019-04-29

## 2019-04-29 RX ORDER — SENNA AND DOCUSATE SODIUM 50; 8.6 MG/1; MG/1
2 TABLET, FILM COATED ORAL 2 TIMES DAILY PRN
Qty: 60 TABLET | Refills: 1 | Status: SHIPPED | OUTPATIENT
Start: 2019-04-29

## 2019-04-29 RX ADMIN — HYDROMORPHONE HYDROCHLORIDE 0.25 MG: 1 INJECTION, SOLUTION INTRAMUSCULAR; INTRAVENOUS; SUBCUTANEOUS at 08:04

## 2019-04-29 RX ADMIN — LORAZEPAM 0.25 MG: 2 INJECTION INTRAMUSCULAR; INTRAVENOUS at 08:04

## 2019-04-29 RX ADMIN — METOPROLOL TARTRATE 5 MG: 5 INJECTION INTRAVENOUS at 01:15

## 2019-04-29 RX ADMIN — HYDROMORPHONE HYDROCHLORIDE 0.25 MG: 1 INJECTION, SOLUTION INTRAMUSCULAR; INTRAVENOUS; SUBCUTANEOUS at 10:30

## 2019-04-29 RX ADMIN — HYDROMORPHONE HYDROCHLORIDE 0.25 MG: 1 INJECTION, SOLUTION INTRAMUSCULAR; INTRAVENOUS; SUBCUTANEOUS at 04:32

## 2019-04-29 RX ADMIN — METOPROLOL TARTRATE 5 MG: 5 INJECTION INTRAVENOUS at 06:57

## 2019-04-29 RX ADMIN — MINERAL OIL: 471.95 OIL ORAL at 08:04

## 2019-04-30 ENCOUNTER — EPISODE CHANGES (OUTPATIENT)
Dept: CASE MANAGEMENT | Facility: OTHER | Age: 74
End: 2019-04-30

## 2019-04-30 ENCOUNTER — READMISSION MANAGEMENT (OUTPATIENT)
Dept: CALL CENTER | Facility: HOSPITAL | Age: 74
End: 2019-04-30

## 2019-04-30 LAB
BACTERIA FLD CULT: NORMAL
GRAM STN SPEC: NORMAL

## 2019-04-30 NOTE — OUTREACH NOTE
Prep Survey      Responses   Facility patient discharged from?  [facility]   Is patient eligible?  No   What are the reasons patient is not eligible?  Hospice/Pallative Care   Does the patient have one of the following disease processes/diagnoses(primary or secondary)?  Other   Prep survey completed?  Yes          Kerry Fragoso RN

## 2019-05-20 LAB
BACTERIA SPEC AEROBE CULT: NORMAL
FUNGUS SPEC CULT: NORMAL
FUNGUS SPEC CULT: NORMAL
FUNGUS SPEC FUNGUS STN: NORMAL

## 2019-06-05 LAB
ACID FAST STN SPEC: NEGATIVE
BACTERIA SPEC AEROBE CULT: NEGATIVE
SPECIMEN PREPARATION: NORMAL

## 2019-12-02 NOTE — PLAN OF CARE
Problem: Patient Care Overview  Goal: Plan of Care Review  Outcome: Ongoing (interventions implemented as appropriate)   12/12/18 6795   Coping/Psychosocial   Plan of Care Reviewed With patient   SLP re-evaluation completed. Will plan for repeat MBS to re-assess swallowing & determine safest/least-restrictive diet. Please see note for further details and recommendations.         To get better and follow your care plan as instructed.

## 2020-07-30 NOTE — PLAN OF CARE
Problem: Patient Care Overview  Goal: Plan of Care Review  Outcome: Ongoing (interventions implemented as appropriate)   12/09/18 5578   Coping/Psychosocial   Plan of Care Reviewed With patient   OTHER   Outcome Summary Pt presents with significant facial droop, flaccid L side, and neglect. Pt unable to maintain midline with pupils or turn head past midline. Pt max-dependent X2 for EOB sitting balance. Pt with need extensive rehab at d/c.           patient/family

## (undated) DEVICE — DISPOSABLE TOURNIQUET CUFF SINGLE BLADDER, SINGLE PORT AND LUER LOCK CONNECTOR: Brand: COLOR CUFF

## (undated) DEVICE — DRSNG WND GZ CURAD OIL EMULSION 3X3IN STRL

## (undated) DEVICE — DRSNG ADAPTIC 3X8

## (undated) DEVICE — BNDG ELAS MATRX  2IN 5YD LF STRL

## (undated) DEVICE — SPNG GZ WOVN 4X4IN 12PLY 10/BX STRL

## (undated) DEVICE — SUT MNCRYL 4/0 PS2 18 IN

## (undated) DEVICE — APPL CHLORAPREP W/TINT 26ML ORNG

## (undated) DEVICE — HOLDER: Brand: DEROYAL

## (undated) DEVICE — ENCORE® LATEX MICRO SIZE 7.5, STERILE LATEX POWDER-FREE SURGICAL GLOVE: Brand: ENCORE

## (undated) DEVICE — BNDG ELAS CO-FLEX SLF ADHR 4IN5YD LF STRL

## (undated) DEVICE — SUT ETHLN 3/0 FS1 663G

## (undated) DEVICE — PAD GRND REM POLYHESIVE A/ DISP

## (undated) DEVICE — PK EXTREM UPPR 70